# Patient Record
Sex: FEMALE | Race: WHITE | URBAN - NONMETROPOLITAN AREA
[De-identification: names, ages, dates, MRNs, and addresses within clinical notes are randomized per-mention and may not be internally consistent; named-entity substitution may affect disease eponyms.]

---

## 2017-01-24 ENCOUNTER — OPTICAL OFFICE (OUTPATIENT)
Dept: URBAN - NONMETROPOLITAN AREA CLINIC 4 | Facility: CLINIC | Age: 52
Setting detail: OPHTHALMOLOGY
End: 2017-01-24
Payer: COMMERCIAL

## 2017-01-24 DIAGNOSIS — H52.223: ICD-10-CM

## 2017-01-24 PROCEDURE — V2102 SINGL VISN SPHERE 7.12-20.00: HCPCS | Performed by: OPTOMETRIST

## 2017-01-24 PROCEDURE — V2020 VISION SVCS FRAMES PURCHASES: HCPCS | Performed by: OPTOMETRIST

## 2017-01-24 PROCEDURE — V2784 LENS POLYCARB OR EQUAL: HCPCS | Performed by: OPTOMETRIST

## 2018-01-02 ENCOUNTER — RX ONLY (RX ONLY)
Age: 53
End: 2018-01-02

## 2018-02-01 ENCOUNTER — OPTICAL OFFICE (OUTPATIENT)
Dept: URBAN - NONMETROPOLITAN AREA CLINIC 5 | Facility: CLINIC | Age: 53
Setting detail: OPHTHALMOLOGY
End: 2018-02-01
Payer: COMMERCIAL

## 2018-02-01 ENCOUNTER — DOCTOR'S OFFICE (OUTPATIENT)
Dept: URBAN - NONMETROPOLITAN AREA CLINIC 2 | Facility: CLINIC | Age: 53
Setting detail: OPHTHALMOLOGY
End: 2018-02-01
Payer: COMMERCIAL

## 2018-02-01 DIAGNOSIS — H52.4: ICD-10-CM

## 2018-02-01 DIAGNOSIS — Z01.00: ICD-10-CM

## 2018-02-01 DIAGNOSIS — H52.13: ICD-10-CM

## 2018-02-01 DIAGNOSIS — H52.223: ICD-10-CM

## 2018-02-01 PROCEDURE — V2784 LENS POLYCARB OR EQUAL: HCPCS | Performed by: OPTOMETRIST

## 2018-02-01 PROCEDURE — V2102 SINGL VISN SPHERE 7.12-20.00: HCPCS | Performed by: OPTOMETRIST

## 2018-02-01 PROCEDURE — 92015 DETERMINE REFRACTIVE STATE: CPT | Performed by: OPTOMETRIST

## 2018-02-01 PROCEDURE — 92310 CONTACT LENS FITTING OU: CPT | Performed by: OPTOMETRIST

## 2018-02-01 PROCEDURE — 92014 COMPRE OPH EXAM EST PT 1/>: CPT | Performed by: OPTOMETRIST

## 2018-02-01 PROCEDURE — V2020 VISION SVCS FRAMES PURCHASES: HCPCS | Performed by: OPTOMETRIST

## 2018-02-01 ASSESSMENT — REFRACTION_OUTSIDERX
OS_AXIS: 150
OS_VA2: 20/20-2
OD_SPHERE: -2.50
OD_AXIS: 105
OD_VA2: 20/20-2
OD_ADD: +2.25
OS_ADD: +2.25
OD_CYLINDER: -0.25
OS_VA1: 20/20-2
OD_VA1: 20/20-2
OS_SPHERE: -2.50
OS_VA3: 20/
OU_VA: 20/
OS_CYLINDER: -0.25
OD_VA3: 20/

## 2018-02-01 ASSESSMENT — REFRACTION_MANIFEST
OD_VA2: 20/
OD_VA3: 20/
OD_VA1: 20/
OS_VA1: 20/
OS_VA3: 20/
OS_VA2: 20/
OD_VA3: 20/
OD_VA2: 20/
OU_VA: 20/
OS_VA1: 20/
OU_VA: 20/
OD_VA1: 20/
OS_VA3: 20/
OS_VA2: 20/

## 2018-02-01 ASSESSMENT — CONFRONTATIONAL VISUAL FIELD TEST (CVF)
OD_FINDINGS: FULL
OS_FINDINGS: FULL

## 2018-04-04 ENCOUNTER — OPTICAL OFFICE (OUTPATIENT)
Dept: URBAN - NONMETROPOLITAN AREA CLINIC 4 | Facility: CLINIC | Age: 53
Setting detail: OPHTHALMOLOGY
End: 2018-04-04
Payer: COMMERCIAL

## 2018-04-04 DIAGNOSIS — H52.223: ICD-10-CM

## 2018-04-04 PROCEDURE — V2102 SINGL VISN SPHERE 7.12-20.00: HCPCS | Performed by: OPTOMETRIST

## 2018-04-04 PROCEDURE — V2020 VISION SVCS FRAMES PURCHASES: HCPCS | Performed by: OPTOMETRIST

## 2018-04-04 PROCEDURE — V2784 LENS POLYCARB OR EQUAL: HCPCS | Performed by: OPTOMETRIST

## 2018-04-30 ASSESSMENT — REFRACTION_AUTOREFRACTION
OS_AXIS: 140
OD_CYLINDER: -0.25
OD_SPHERE: -2.50
OS_SPHERE: -2.50
OS_CYLINDER: -0.50
OD_AXIS: 086

## 2018-04-30 ASSESSMENT — KERATOMETRY
OD_AXISANGLE_DEGREES: 120
OD_K1POWER_DIOPTERS: 44.50
OD_K2POWER_DIOPTERS: 45.50

## 2018-04-30 ASSESSMENT — REFRACTION_CURRENTRX
OD_OVR_VA: 20/
OD_SPHERE: -3.00
OS_OVR_VA: 20/
OS_VPRISM_DIRECTION: SV
OD_VPRISM_DIRECTION: SV
OS_SPHERE: -3.00
OS_OVR_VA: 20/
OD_CYLINDER: -0.25
OS_OVR_VA: 20/
OD_OVR_VA: 20/
OS_CYLINDER: -0.25
OS_AXIS: 152
OD_AXIS: 098
OD_OVR_VA: 20/

## 2018-04-30 ASSESSMENT — VISUAL ACUITY
OD_BCVA: 20/25-2
OS_BCVA: 20/25+1

## 2018-04-30 ASSESSMENT — SPHEQUIV_DERIVED
OD_SPHEQUIV: -2.625
OS_SPHEQUIV: -2.75

## 2018-04-30 ASSESSMENT — AXIALLENGTH_DERIVED: OD_AL: 24.0733

## 2019-02-28 ENCOUNTER — DOCTOR'S OFFICE (OUTPATIENT)
Dept: URBAN - NONMETROPOLITAN AREA CLINIC 2 | Facility: CLINIC | Age: 54
Setting detail: OPHTHALMOLOGY
End: 2019-02-28
Payer: COMMERCIAL

## 2019-02-28 ENCOUNTER — OPTICAL OFFICE (OUTPATIENT)
Dept: URBAN - NONMETROPOLITAN AREA CLINIC 5 | Facility: CLINIC | Age: 54
Setting detail: OPHTHALMOLOGY
End: 2019-02-28
Payer: COMMERCIAL

## 2019-02-28 DIAGNOSIS — H52.13: ICD-10-CM

## 2019-02-28 DIAGNOSIS — H52.4: ICD-10-CM

## 2019-02-28 DIAGNOSIS — Z01.00: ICD-10-CM

## 2019-02-28 DIAGNOSIS — H52.223: ICD-10-CM

## 2019-02-28 PROCEDURE — 92310 CONTACT LENS FITTING OU: CPT | Performed by: OPTOMETRIST

## 2019-02-28 PROCEDURE — V2102 SINGL VISN SPHERE 7.12-20.00: HCPCS | Performed by: OPTOMETRIST

## 2019-02-28 PROCEDURE — V2103 SPHEROCYLINDR 4.00D/12-2.00D: HCPCS | Performed by: OPTOMETRIST

## 2019-02-28 PROCEDURE — V2020 VISION SVCS FRAMES PURCHASES: HCPCS | Performed by: OPTOMETRIST

## 2019-02-28 PROCEDURE — V2784 LENS POLYCARB OR EQUAL: HCPCS | Performed by: OPTOMETRIST

## 2019-02-28 PROCEDURE — 92014 COMPRE OPH EXAM EST PT 1/>: CPT | Performed by: OPTOMETRIST

## 2019-02-28 PROCEDURE — 92015 DETERMINE REFRACTIVE STATE: CPT | Performed by: OPTOMETRIST

## 2019-02-28 ASSESSMENT — REFRACTION_MANIFEST
OU_VA: 20/
OS_VA1: 20/20-2
OD_VA2: 20/
OD_VA1: 20/20-2
OD_CYLINDER: -0.25
OS_AXIS: 105
OD_VA3: 20/
OS_VA2: 20/20-2
OU_VA: 20/
OD_VA2: 20/20-2
OD_SPHERE: -2.25
OS_VA3: 20/
OD_VA3: 20/
OS_VA1: 20/
OS_VA3: 20/
OD_ADD: +2.25
OS_ADD: +2.25
OS_SPHERE: -2.25
OD_AXIS: 115
OD_VA1: 20/
OS_CYLINDER: -0.25
OS_VA2: 20/

## 2019-02-28 ASSESSMENT — CONFRONTATIONAL VISUAL FIELD TEST (CVF)
OS_FINDINGS: FULL
OD_FINDINGS: FULL

## 2019-02-28 ASSESSMENT — SPHEQUIV_DERIVED
OD_SPHEQUIV: -2.375
OS_SPHEQUIV: -2.375

## 2019-03-01 ASSESSMENT — REFRACTION_CURRENTRX
OS_OVR_VA: 20/
OD_CYLINDER: -0.25
OD_AXIS: 098
OD_VPRISM_DIRECTION: SV
OS_OVR_VA: 20/
OS_VPRISM_DIRECTION: SV
OS_CYLINDER: -0.25
OD_OVR_VA: 20/
OS_OVR_VA: 20/
OS_AXIS: 152
OD_OVR_VA: 20/
OS_SPHERE: -3.00
OD_OVR_VA: 20/
OD_SPHERE: -3.00

## 2019-03-01 ASSESSMENT — REFRACTION_AUTOREFRACTION
OS_SPHERE: -2.25
OD_SPHERE: -2.25
OS_AXIS: 108
OD_AXIS: 116
OD_CYLINDER: -1.00
OS_CYLINDER: -0.25

## 2019-03-01 ASSESSMENT — SPHEQUIV_DERIVED
OS_SPHEQUIV: -2.375
OD_SPHEQUIV: -2.75

## 2019-03-01 ASSESSMENT — KERATOMETRY
OD_AXISANGLE_DEGREES: 120
OD_K2POWER_DIOPTERS: 45.50
OD_K1POWER_DIOPTERS: 44.50

## 2019-03-01 ASSESSMENT — AXIALLENGTH_DERIVED: OD_AL: 24.1241

## 2019-03-01 ASSESSMENT — VISUAL ACUITY
OD_BCVA: 20/25-1
OS_BCVA: 20/25+1

## 2019-03-07 ENCOUNTER — OPTICAL OFFICE (OUTPATIENT)
Dept: URBAN - NONMETROPOLITAN AREA CLINIC 4 | Facility: CLINIC | Age: 54
Setting detail: OPHTHALMOLOGY
End: 2019-03-07
Payer: COMMERCIAL

## 2019-03-07 DIAGNOSIS — H52.223: ICD-10-CM

## 2019-03-07 PROCEDURE — V2103 SPHEROCYLINDR 4.00D/12-2.00D: HCPCS | Performed by: OPTOMETRIST

## 2019-03-07 PROCEDURE — V2784 LENS POLYCARB OR EQUAL: HCPCS | Performed by: OPTOMETRIST

## 2019-03-07 PROCEDURE — V2102 SINGL VISN SPHERE 7.12-20.00: HCPCS | Performed by: OPTOMETRIST

## 2019-03-07 PROCEDURE — V2020 VISION SVCS FRAMES PURCHASES: HCPCS | Performed by: OPTOMETRIST

## 2020-06-16 ENCOUNTER — OPTICAL OFFICE (OUTPATIENT)
Dept: URBAN - NONMETROPOLITAN AREA CLINIC 5 | Facility: CLINIC | Age: 55
Setting detail: OPHTHALMOLOGY
End: 2020-06-16
Payer: COMMERCIAL

## 2020-06-16 ENCOUNTER — DOCTOR'S OFFICE (OUTPATIENT)
Dept: URBAN - NONMETROPOLITAN AREA CLINIC 2 | Facility: CLINIC | Age: 55
Setting detail: OPHTHALMOLOGY
End: 2020-06-16
Payer: COMMERCIAL

## 2020-06-16 VITALS — HEIGHT: 55 IN

## 2020-06-16 DIAGNOSIS — H52.223: ICD-10-CM

## 2020-06-16 DIAGNOSIS — H52.13: ICD-10-CM

## 2020-06-16 DIAGNOSIS — H52.4: ICD-10-CM

## 2020-06-16 PROCEDURE — V2102 SINGL VISN SPHERE 7.12-20.00: HCPCS | Performed by: OPTOMETRIST

## 2020-06-16 PROCEDURE — 92014 COMPRE OPH EXAM EST PT 1/>: CPT | Performed by: OPTOMETRIST

## 2020-06-16 PROCEDURE — 92015 DETERMINE REFRACTIVE STATE: CPT | Performed by: OPTOMETRIST

## 2020-06-16 PROCEDURE — V2784 LENS POLYCARB OR EQUAL: HCPCS | Performed by: OPTOMETRIST

## 2020-06-16 PROCEDURE — V2020 VISION SVCS FRAMES PURCHASES: HCPCS | Performed by: OPTOMETRIST

## 2020-06-16 PROCEDURE — V2103 SPHEROCYLINDR 4.00D/12-2.00D: HCPCS | Performed by: OPTOMETRIST

## 2020-06-16 PROCEDURE — 92310 CONTACT LENS FITTING OU: CPT | Performed by: OPTOMETRIST

## 2020-06-16 ASSESSMENT — CONFRONTATIONAL VISUAL FIELD TEST (CVF)
OS_FINDINGS: FULL
OD_FINDINGS: FULL

## 2020-06-22 ASSESSMENT — AXIALLENGTH_DERIVED
OD_AL: 24.0198
OD_AL: 24.1212
OS_AL: 23.7376
OS_AL: 23.7376

## 2020-06-22 ASSESSMENT — VISUAL ACUITY
OD_BCVA: 20/20-1
OS_BCVA: 20/20-2

## 2020-06-22 ASSESSMENT — REFRACTION_MANIFEST
OD_SPHERE: -2.25
OS_CYLINDER: -0.25
OS_VA2: 20/20-2
OD_ADD: +2.25
OD_VA1: 20/20-2
OS_AXIS: 105
OD_CYLINDER: -0.25
OS_VA1: 20/20-2
OS_ADD: +2.25
OU_VA: 20/20
OD_AXIS: 115
OD_VA2: 20/20-2
OS_SPHERE: -2.25

## 2020-06-22 ASSESSMENT — SPHEQUIV_DERIVED
OD_SPHEQUIV: -2.625
OD_SPHEQUIV: -2.375
OS_SPHEQUIV: -2.375
OS_SPHEQUIV: -2.375

## 2020-06-22 ASSESSMENT — REFRACTION_AUTOREFRACTION
OD_AXIS: 094
OS_CYLINDER: -0.75
OS_SPHERE: -2.00
OD_SPHERE: -2.50
OD_CYLINDER: -0.25
OS_AXIS: 142

## 2020-06-22 ASSESSMENT — KERATOMETRY
OD_K1POWER_DIOPTERS: 44.50
OS_AXISANGLE_DEGREES: 002
OD_K2POWER_DIOPTERS: 45.25
OD_AXISANGLE_DEGREES: 086
OS_K1POWER_DIOPTERS: 38.25
OS_K2POWER_DIOPTERS: 53.00

## 2020-06-22 ASSESSMENT — REFRACTION_CURRENTRX
OS_OVR_VA: 20/
OS_AXIS: 109
OS_SPHERE: -2.25
OD_VPRISM_DIRECTION: SV
OS_CYLINDER: -0.25
OD_OVR_VA: 20/
OD_CYLINDER: -0.25
OS_VPRISM_DIRECTION: SV
OD_SPHERE: -2.25
OD_AXIS: 108

## 2020-07-02 ENCOUNTER — HOSPITAL ENCOUNTER (EMERGENCY)
Facility: HOSPITAL | Age: 55
Discharge: HOME | End: 2020-07-02
Attending: EMERGENCY MEDICINE | Admitting: EMERGENCY MEDICINE
Payer: COMMERCIAL

## 2020-07-02 ENCOUNTER — APPOINTMENT (EMERGENCY)
Dept: ULTRASOUND IMAGING | Facility: HOSPITAL | Age: 55
End: 2020-07-02
Payer: COMMERCIAL

## 2020-07-02 ENCOUNTER — APPOINTMENT (EMERGENCY)
Dept: CT IMAGING | Facility: HOSPITAL | Age: 55
End: 2020-07-02
Payer: COMMERCIAL

## 2020-07-02 VITALS
DIASTOLIC BLOOD PRESSURE: 75 MMHG | TEMPERATURE: 98.4 F | OXYGEN SATURATION: 98 % | HEART RATE: 65 BPM | BODY MASS INDEX: 28.44 KG/M2 | RESPIRATION RATE: 18 BRPM | HEIGHT: 67 IN | WEIGHT: 181.22 LBS | SYSTOLIC BLOOD PRESSURE: 148 MMHG

## 2020-07-02 DIAGNOSIS — R10.9 RIGHT FLANK PAIN: Primary | ICD-10-CM

## 2020-07-02 LAB
ALBUMIN SERPL BCP-MCNC: 4.2 G/DL (ref 3.5–5)
ALP SERPL-CCNC: 103 U/L (ref 46–116)
ALT SERPL W P-5'-P-CCNC: 26 U/L (ref 12–78)
ANION GAP SERPL CALCULATED.3IONS-SCNC: 10 MMOL/L (ref 4–13)
AST SERPL W P-5'-P-CCNC: 12 U/L (ref 5–45)
BACTERIA UR QL AUTO: ABNORMAL /HPF
BASOPHILS # BLD AUTO: 0.06 THOUSANDS/ΜL (ref 0–0.1)
BASOPHILS NFR BLD AUTO: 1 % (ref 0–1)
BILIRUB SERPL-MCNC: 0.39 MG/DL (ref 0.2–1)
BILIRUB UR QL STRIP: ABNORMAL
BUN SERPL-MCNC: 10 MG/DL (ref 5–25)
CALCIUM SERPL-MCNC: 9.3 MG/DL (ref 8.3–10.1)
CHLORIDE SERPL-SCNC: 102 MMOL/L (ref 100–108)
CLARITY UR: CLEAR
CO2 SERPL-SCNC: 28 MMOL/L (ref 21–32)
COLOR UR: YELLOW
CREAT SERPL-MCNC: 1.11 MG/DL (ref 0.6–1.3)
EOSINOPHIL # BLD AUTO: 0.1 THOUSAND/ΜL (ref 0–0.61)
EOSINOPHIL NFR BLD AUTO: 1 % (ref 0–6)
ERYTHROCYTE [DISTWIDTH] IN BLOOD BY AUTOMATED COUNT: 13.4 % (ref 11.6–15.1)
GFR SERPL CREATININE-BSD FRML MDRD: 56 ML/MIN/1.73SQ M
GLUCOSE SERPL-MCNC: 99 MG/DL (ref 65–140)
GLUCOSE UR STRIP-MCNC: NEGATIVE MG/DL
HCT VFR BLD AUTO: 43.2 % (ref 34.8–46.1)
HGB BLD-MCNC: 13.9 G/DL (ref 11.5–15.4)
HGB UR QL STRIP.AUTO: ABNORMAL
HYALINE CASTS #/AREA URNS LPF: ABNORMAL /LPF
IMM GRANULOCYTES # BLD AUTO: 0.04 THOUSAND/UL (ref 0–0.2)
IMM GRANULOCYTES NFR BLD AUTO: 0 % (ref 0–2)
KETONES UR STRIP-MCNC: NEGATIVE MG/DL
LEUKOCYTE ESTERASE UR QL STRIP: NEGATIVE
LIPASE SERPL-CCNC: 77 U/L (ref 73–393)
LYMPHOCYTES # BLD AUTO: 2.9 THOUSANDS/ΜL (ref 0.6–4.47)
LYMPHOCYTES NFR BLD AUTO: 29 % (ref 14–44)
MCH RBC QN AUTO: 26.9 PG (ref 26.8–34.3)
MCHC RBC AUTO-ENTMCNC: 32.2 G/DL (ref 31.4–37.4)
MCV RBC AUTO: 84 FL (ref 82–98)
MONOCYTES # BLD AUTO: 0.64 THOUSAND/ΜL (ref 0.17–1.22)
MONOCYTES NFR BLD AUTO: 6 % (ref 4–12)
NEUTROPHILS # BLD AUTO: 6.36 THOUSANDS/ΜL (ref 1.85–7.62)
NEUTS SEG NFR BLD AUTO: 63 % (ref 43–75)
NITRITE UR QL STRIP: NEGATIVE
NON-SQ EPI CELLS URNS QL MICRO: ABNORMAL /HPF
NRBC BLD AUTO-RTO: 0 /100 WBCS
PH UR STRIP.AUTO: 6.5 [PH]
PLATELET # BLD AUTO: 301 THOUSANDS/UL (ref 149–390)
PMV BLD AUTO: 11.5 FL (ref 8.9–12.7)
POTASSIUM SERPL-SCNC: 3.7 MMOL/L (ref 3.5–5.3)
PROT SERPL-MCNC: 8.5 G/DL (ref 6.4–8.2)
PROT UR STRIP-MCNC: NEGATIVE MG/DL
RBC # BLD AUTO: 5.17 MILLION/UL (ref 3.81–5.12)
RBC #/AREA URNS AUTO: ABNORMAL /HPF
SODIUM SERPL-SCNC: 140 MMOL/L (ref 136–145)
SP GR UR STRIP.AUTO: 1.01 (ref 1–1.03)
UROBILINOGEN UR QL STRIP.AUTO: 0.2 E.U./DL
WBC # BLD AUTO: 10.1 THOUSAND/UL (ref 4.31–10.16)
WBC #/AREA URNS AUTO: ABNORMAL /HPF

## 2020-07-02 PROCEDURE — 76830 TRANSVAGINAL US NON-OB: CPT

## 2020-07-02 PROCEDURE — 76856 US EXAM PELVIC COMPLETE: CPT

## 2020-07-02 PROCEDURE — 81001 URINALYSIS AUTO W/SCOPE: CPT | Performed by: PHYSICIAN ASSISTANT

## 2020-07-02 PROCEDURE — 99284 EMERGENCY DEPT VISIT MOD MDM: CPT

## 2020-07-02 PROCEDURE — 96374 THER/PROPH/DIAG INJ IV PUSH: CPT

## 2020-07-02 PROCEDURE — 83690 ASSAY OF LIPASE: CPT | Performed by: PHYSICIAN ASSISTANT

## 2020-07-02 PROCEDURE — 36415 COLL VENOUS BLD VENIPUNCTURE: CPT | Performed by: PHYSICIAN ASSISTANT

## 2020-07-02 PROCEDURE — 74177 CT ABD & PELVIS W/CONTRAST: CPT

## 2020-07-02 PROCEDURE — 85025 COMPLETE CBC W/AUTO DIFF WBC: CPT | Performed by: PHYSICIAN ASSISTANT

## 2020-07-02 PROCEDURE — 99285 EMERGENCY DEPT VISIT HI MDM: CPT | Performed by: PHYSICIAN ASSISTANT

## 2020-07-02 PROCEDURE — 80053 COMPREHEN METABOLIC PANEL: CPT | Performed by: PHYSICIAN ASSISTANT

## 2020-07-02 PROCEDURE — 96361 HYDRATE IV INFUSION ADD-ON: CPT

## 2020-07-02 RX ORDER — ACETAMINOPHEN 325 MG/1
650 TABLET ORAL ONCE
Status: COMPLETED | OUTPATIENT
Start: 2020-07-02 | End: 2020-07-02

## 2020-07-02 RX ORDER — PHENTERMINE HYDROCHLORIDE 37.5 MG/1
37.5 TABLET ORAL DAILY
COMMUNITY
Start: 2020-01-15 | End: 2021-03-08

## 2020-07-02 RX ORDER — ALBUTEROL SULFATE 90 UG/1
AEROSOL, METERED RESPIRATORY (INHALATION) AS NEEDED
COMMUNITY
Start: 2017-09-08

## 2020-07-02 RX ORDER — ALBUTEROL SULFATE 2.5 MG/3ML
2.5 SOLUTION RESPIRATORY (INHALATION) AS NEEDED
COMMUNITY
Start: 2020-05-21

## 2020-07-02 RX ORDER — EPINEPHRINE 0.3 MG/.3ML
INJECTION SUBCUTANEOUS
COMMUNITY
Start: 2019-08-14

## 2020-07-02 RX ORDER — FLUTICASONE PROPIONATE 50 MCG
SPRAY, SUSPENSION (ML) NASAL
COMMUNITY
Start: 2017-09-08

## 2020-07-02 RX ORDER — MONTELUKAST SODIUM 10 MG/1
10 TABLET ORAL
COMMUNITY
Start: 2018-02-14

## 2020-07-02 RX ORDER — CETIRIZINE HYDROCHLORIDE 10 MG/1
10 TABLET ORAL DAILY
COMMUNITY
Start: 2017-09-08

## 2020-07-02 RX ORDER — MULTIVIT-MIN/IRON FUM/FOLIC AC 7.5 MG-4
1 TABLET ORAL DAILY
COMMUNITY

## 2020-07-02 RX ORDER — FENTANYL CITRATE 50 UG/ML
50 INJECTION, SOLUTION INTRAMUSCULAR; INTRAVENOUS ONCE
Status: COMPLETED | OUTPATIENT
Start: 2020-07-02 | End: 2020-07-02

## 2020-07-02 RX ORDER — TOPIRAMATE 100 MG/1
100 TABLET, FILM COATED ORAL EVERY 12 HOURS SCHEDULED
COMMUNITY
Start: 2020-01-15 | End: 2021-05-28

## 2020-07-02 RX ORDER — AMINOCAPROIC ACID 500 MG/1
500 TABLET ORAL AS NEEDED
COMMUNITY
Start: 2019-02-13

## 2020-07-02 RX ORDER — ACETAMINOPHEN AND CODEINE PHOSPHATE 300; 30 MG/1; MG/1
TABLET ORAL AS NEEDED
COMMUNITY
Start: 2018-08-17 | End: 2021-03-08

## 2020-07-02 RX ORDER — KETOROLAC TROMETHAMINE 30 MG/ML
15 INJECTION, SOLUTION INTRAMUSCULAR; INTRAVENOUS ONCE
Status: DISCONTINUED | OUTPATIENT
Start: 2020-07-02 | End: 2020-07-02

## 2020-07-02 RX ORDER — NITROFURANTOIN 25; 75 MG/1; MG/1
100 CAPSULE ORAL 2 TIMES DAILY
COMMUNITY
Start: 2020-06-29 | End: 2020-07-04

## 2020-07-02 RX ORDER — VENLAFAXINE HYDROCHLORIDE 37.5 MG/1
37.5 CAPSULE, EXTENDED RELEASE ORAL DAILY
COMMUNITY
Start: 2020-06-25 | End: 2021-03-08

## 2020-07-02 RX ORDER — BUPROPION HYDROCHLORIDE 150 MG/1
150 TABLET, EXTENDED RELEASE ORAL 2 TIMES DAILY
COMMUNITY
Start: 2019-12-05

## 2020-07-02 RX ADMIN — FENTANYL CITRATE 50 MCG: 50 INJECTION INTRAMUSCULAR; INTRAVENOUS at 12:20

## 2020-07-02 RX ADMIN — ACETAMINOPHEN 650 MG: 325 TABLET ORAL at 12:04

## 2020-07-02 RX ADMIN — SODIUM CHLORIDE 500 ML: 0.9 INJECTION, SOLUTION INTRAVENOUS at 12:06

## 2020-07-02 RX ADMIN — IOHEXOL 100 ML: 350 INJECTION, SOLUTION INTRAVENOUS at 12:40

## 2020-07-02 NOTE — ED ATTENDING ATTESTATION
7/2/2020  Bree Cisneros DO, saw and evaluated the patient  I have discussed the patient with the resident/non-physician practitioner and agree with the resident's/non-physician practitioner's findings, Plan of Care, and MDM as documented in the resident's/non-physician practitioner's note, except where noted  All available labs and Radiology studies were reviewed  I was present for key portions of any procedure(s) performed by the resident/non-physician practitioner and I was immediately available to provide assistance  At this point I agree with the current assessment done in the Emergency Department  I have conducted an independent evaluation of this patient a history and physical is as follows:    ED Course  Seen evaluated with the physician's assistant  Patient with right lower quadrant abdominal pain right-sided flank pain since about Monday  Saw her gynecologist and was diagnosed with a urinary tract infection and started on antibiotics  She reports that the pain has persisted  She does report a history of having previous renal lithiasis and urinary tract infections in the past   No nausea vomiting    Patient has a history of hysterectomy but still has her ovaries  Evaluation the patient's abdomen found her to be soft with some fairly significant right lower quadrant tenderness to palpation that seems to extend down below the pelvic brim on the right  There is some guarding  Bowel sounds are present  Evaluation the emergency room is as documented by the physician's assistant  Patient's pain will be managed and routine imaging studies and laboratory values will be obtained  Patient will be reassessed  Disposition is pending results of the studies      Concern would be for an infectious process as such as a urinary tract infection not treated with an antibiotic that covers the pathogen, pollen nephritis, renal lithiasis, colitis, bowel obstruction, mesenteric ischemia, ovarian torsion  This is not a complete list     Please see physician assistant's note for further information regarding this patient's care and disposition      Diagnosis  Acute abdominal pain    Critical Care Time  Procedures

## 2020-07-02 NOTE — DISCHARGE INSTRUCTIONS
Please continue to take your Macrobid as prescribed by your gynecologist   Please follow-up with your gynecologist early next week for continued care  Please return to the ED with new or worsening symptoms

## 2020-07-02 NOTE — ED PROVIDER NOTES
History  Chief Complaint   Patient presents with    Back Pain     pt c/o increased right flank pain radiates to lower abdomen w/difficulty urinating for past 4 days  pt seen by gyn at that time and given abx per uti  denies travel/sob/fevers/n/v/d     63year old female presents to the ED for evaluation of right sided flank pain with radiation into the lower abdomen  PMH: UTI, Kidney stones, Von Willebrand disease, heart murmur  PSH: Appendectomy, hysterectomy  She notes pain is accompanied with frequent urge to void with little output feeling like she was unable to empty her bladder  She denies hematuria, dysuria  Symptom onset Monday 6/29/20  She was seen by her gynecologist on Monday 06/29/2020 was started Macrobid  Patient reports she has taking prescription without any improvement symptoms  Patient denies fevers, chills, nausea vomiting, diarrhea  Pt denies abnormal vaginal discharge or pelvic pain  Prior to Admission Medications   Prescriptions Last Dose Informant Patient Reported? Taking? EPINEPHrine (EPIPEN) 0 3 mg/0 3 mL SOAJ   Yes Yes   Sig: For a severe reaction: Inject in outer thigh following instructions on package and go to the Emergency room     Multiple Vitamins-Minerals (MULTIVITAMIN WITH MINERALS) tablet  Self Yes Yes   Sig: Take 1 tablet by mouth daily   OMEPRAZOLE PO   Yes Yes   Sig: Take 20 mg by mouth daily   VITAMIN D, CHOLECALCIFEROL, PO   Yes Yes   Sig: daily   acetaminophen-codeine (TYLENOL/CODEINE #3) 300-30 MG per tablet   Yes Yes   Sig: Take by mouth as needed   albuterol (2 5 mg/3 mL) 0 083 % nebulizer solution   Yes Yes   Sig: Inhale 2 5 mg as needed   albuterol (PROVENTIL HFA,VENTOLIN HFA) 90 mcg/act inhaler   Yes Yes   Sig: as needed   aminocaproic acid (AMICAR) 500 mg tablet   Yes Yes   Sig: Take 500 mg by mouth as needed   buPROPion (WELLBUTRIN SR) 150 mg 12 hr tablet   Yes Yes   Sig: Take 150 mg by mouth 2 (two) times a day   cetirizine (ZyrTEC) 10 mg tablet Yes Yes   Sig: Take 10 mg by mouth daily   fluticasone (FLONASE) 50 mcg/act nasal spray   Yes Yes   Sig: daily at bedtime   montelukast (SINGULAIR) 10 mg tablet   Yes Yes   Sig: Take 10 mg by mouth daily at bedtime   nitrofurantoin (Macrobid) 100 mg capsule   Yes Yes   Sig: Take 100 mg by mouth 2 (two) times a day   phentermine (ADIPEX-P) 37 5 MG tablet   Yes Yes   Sig: Take 37 5 mg by mouth daily   topiramate (TOPAMAX) 25 mg tablet   Yes Yes   Sig: Take 100 mg by mouth daily at bedtime   venlafaxine (EFFEXOR-XR) 37 5 mg 24 hr capsule   Yes Yes   Sig: Take 37 5 mg by mouth daily      Facility-Administered Medications: None       Past Medical History:   Diagnosis Date    Heart murmur     Kidney stones     Von Willebrand disease ([facility] [address] )        Past Surgical History:   Procedure Laterality Date    APPENDECTOMY      BREAST LUMPECTOMY Left     CARPAL TUNNEL RELEASE Bilateral     CHOLECYSTECTOMY      HYSTERECTOMY      KNEE SURGERY Left     PARTIAL HYSTERECTOMY      ROTATOR CUFF REPAIR Left     TONSILLECTOMY         History reviewed  No pertinent family history  I have reviewed and agree with the history as documented  E-Cigarette/Vaping    E-Cigarette Use Never User      E-Cigarette/Vaping Substances     Social History     Tobacco Use    Smoking status: Former Smoker     Types: Cigarettes    Smokeless tobacco: Never Used   Substance Use Topics    Alcohol use: Not Currently    Drug use: Never       Review of Systems   Constitutional: Negative for appetite change, chills, diaphoresis, fatigue and fever  HENT: Negative  Respiratory: Negative for cough, choking, chest tightness, shortness of breath and stridor  Cardiovascular: Negative for chest pain, palpitations and leg swelling  Gastrointestinal: Positive for abdominal pain  Negative for abdominal distention, anal bleeding, blood in stool, constipation, diarrhea, nausea, rectal pain and vomiting     Genitourinary: Positive for difficulty urinating, flank pain (right) and frequency  Negative for decreased urine volume, dysuria, enuresis, genital sores, hematuria, pelvic pain, urgency, vaginal bleeding, vaginal discharge and vaginal pain  Musculoskeletal: Positive for back pain  Negative for arthralgias, gait problem, joint swelling, myalgias, neck pain and neck stiffness  Skin: Negative  Neurological: Negative  Psychiatric/Behavioral: Negative  All other systems reviewed and are negative  Physical Exam  Physical Exam   Constitutional: She is oriented to person, place, and time  She appears well-developed and well-nourished  No distress  HENT:   Head: Normocephalic and atraumatic  Eyes: Pupils are equal, round, and reactive to light  Conjunctivae and EOM are normal    Neck: Normal range of motion  Cardiovascular: Normal rate and regular rhythm  Murmur heard  Pulmonary/Chest: Effort normal and breath sounds normal  No stridor  No respiratory distress  She has no wheezes  She has no rales  She exhibits no tenderness  Abdominal: Soft  Normal appearance  She exhibits no distension  There is tenderness in the right lower quadrant  There is CVA tenderness (right)  There is no rigidity, no rebound, no guarding, no tenderness at McBurney's point and negative Barber's sign  Musculoskeletal: Normal range of motion  Neurological: She is alert and oriented to person, place, and time  She displays normal reflexes  No sensory deficit  Coordination normal    Skin: Skin is warm and dry  Capillary refill takes less than 2 seconds  She is not diaphoretic  No erythema  Psychiatric: She has a normal mood and affect  Her behavior is normal  Judgment and thought content normal    Nursing note and vitals reviewed        Vital Signs  ED Triage Vitals [07/02/20 1143]   Temperature Pulse Respirations Blood Pressure SpO2   98 4 °F (36 9 °C) 75 18 134/83 99 %      Temp Source Heart Rate Source Patient Position - Orthostatic VS BP Location FiO2 (%)   Temporal Monitor Lying Right arm --      Pain Score       8           Vitals:    07/02/20 1240 07/02/20 1300 07/02/20 1415 07/02/20 1430   BP: 158/72 142/76 137/74 148/75   Pulse: 63 62 62 65   Patient Position - Orthostatic VS: Lying Lying Lying Lying         Visual Acuity  Visual Acuity      Most Recent Value   L Pupil Size (mm)  3   R Pupil Size (mm)  3          ED Medications  Medications   sodium chloride 0 9 % bolus 500 mL (0 mL Intravenous Stopped 7/2/20 1306)   acetaminophen (TYLENOL) tablet 650 mg (650 mg Oral Given 7/2/20 1204)   fentanyl citrate (PF) 100 MCG/2ML 50 mcg (50 mcg Intravenous Given 7/2/20 1220)   iohexol (OMNIPAQUE) 350 MG/ML injection (MULTI-DOSE) 100 mL (100 mL Intravenous Given 7/2/20 1240)       Diagnostic Studies  Results Reviewed     Procedure Component Value Units Date/Time    Comprehensive metabolic panel [[id number]]  (Abnormal) Collected:  07/02/20 1159    Lab Status:  Final result Specimen:  Blood from Arm, Left Updated:  07/02/20 1219     Sodium 140 mmol/L      Potassium 3 7 mmol/L      Chloride 102 mmol/L      CO2 28 mmol/L      ANION GAP 10 mmol/L      BUN 10 mg/dL      Creatinine 1 11 mg/dL      Glucose 99 mg/dL      Calcium 9 3 mg/dL      AST 12 U/L      ALT 26 U/L      Alkaline Phosphatase 103 U/L      Total Protein 8 5 g/dL      Albumin 4 2 g/dL      Total Bilirubin 0 39 mg/dL      eGFR 56 ml/min/1 73sq m     Narrative:       Radha guidelines for Chronic Kidney Disease (CKD):     Stage 1 with normal or high GFR (GFR > 90 mL/min/1 73 square meters)    Stage 2 Mild CKD (GFR = 60-89 mL/min/1 73 square meters)    Stage 3A Moderate CKD (GFR = 45-59 mL/min/1 73 square meters)    Stage 3B Moderate CKD (GFR = 30-44 mL/min/1 73 square meters)    Stage 4 Severe CKD (GFR = 15-29 mL/min/1 73 square meters)    Stage 5 End Stage CKD (GFR <15 mL/min/1 73 square meters)  Note: GFR calculation is accurate only with a steady state creatinine    Lipase [[id number]]  (Normal) Collected:  07/02/20 1159    Lab Status:  Final result Specimen:  Blood from Arm, Left Updated:  07/02/20 1219     Lipase 77 u/L     Urine Microscopic [[id number]]  (Abnormal) Collected:  07/02/20 1200    Lab Status:  Final result Specimen:  Urine, Clean Catch Updated:  07/02/20 1216     RBC, UA None Seen /hpf      WBC, UA 1-2 /hpf      Epithelial Cells Moderate /hpf      Bacteria, UA None Seen /hpf      Hyaline Casts, UA 0-1 /lpf     CBC and differential [[id number]]  (Abnormal) Collected:  07/02/20 1159    Lab Status:  Final result Specimen:  Blood from Arm, Left Updated:  07/02/20 1205     WBC 10 10 Thousand/uL      RBC 5 17 Million/uL      Hemoglobin 13 9 g/dL      Hematocrit 43 2 %      MCV 84 fL      MCH 26 9 pg      MCHC 32 2 g/dL      RDW 13 4 %      MPV 11 5 fL      Platelets 380 Thousands/uL      nRBC 0 /100 WBCs      Neutrophils Relative 63 %      Immat GRANS % 0 %      Lymphocytes Relative 29 %      Monocytes Relative 6 %      Eosinophils Relative 1 %      Basophils Relative 1 %      Neutrophils Absolute 6 36 Thousands/µL      Immature Grans Absolute 0 04 Thousand/uL      Lymphocytes Absolute 2 90 Thousands/µL      Monocytes Absolute 0 64 Thousand/µL      Eosinophils Absolute 0 10 Thousand/µL      Basophils Absolute 0 06 Thousands/µL     UA w Reflex to Microscopic w Reflex to Culture [[id number]]  (Abnormal) Collected:  07/02/20 1200    Lab Status:  Final result Specimen:  Urine, Clean Catch Updated:  07/02/20 1205     Color, UA Yellow     Clarity, UA Clear     Specific Gravity, UA 1 015     pH, UA 6 5     Leukocytes, UA Negative     Nitrite, UA Negative     Protein, UA Negative mg/dl      Glucose, UA Negative mg/dl      Ketones, UA Negative mg/dl      Urobilinogen, UA 0 2 E U /dl      Bilirubin, UA Small     Blood, UA Small                 US pelvis complete w transvaginal   Final Result by Alesia Mcbride MD (07/02 1410)       The ovaries were not identified        Some debris is noted within the bladder and should be correlated with urinalysis  Workstation performed: [id number]         CT abdomen pelvis with contrast   Final Result by Donovan Langston MD (07/02 1258)      No acute abnormality            Workstation performed: [id number]                    Procedures  Procedures         ED Course  ED Course as of Jul 02 1548   Thu Jul 02, 2020   1218 UA shows trace blood   WBC normal      1221 CMP relatively unremarkable   Lipase normal       1302 CT abd: No acute abnormality  Calcification noted within the distal right renal artery  No renal system stone  No hydronephrosis  1314 Patient has history of hysterectomy however still has ovaries  Discussed ultrasound with patient to rule ovarian cyst versus ovarian torsion which is less likely due to length of symptoms  Patient agrees with treatment planning  She is currently resting comfortably complaints  1415 Transvaginal US: The ovaries were not identified  Some debris is noted within the bladder and should be correlated with urinalysis  Discussed findings directly with Dr Santos [place]  Dr Baylee Damon notes its common to not see the ovaries in older females especially with large amount of bowel gas  Notes before unlikely to be torsion as you would expect much larger ovary if the ovary was torsed  1435 I discussed results, findings symptomatic treatment and symptoms that require prompt return to the ED for further evaluation with patient who verbalized understanding  Per urine culture and sensitivities in care everywhere Macrobid is sensitive  Patient was educated to continue her prescription of Macrobid as prescribed by her gynecologist   She was educated on the importance of follow-up with her gynecologist and will see them early next week  Patient had significant improvement of pain while in the emergency department and is resting comfortably without complaints at this time    Patient agreed to this treatment plan, she remained well ED and was discharged home  US AUDIT      Most Recent Value   Initial Alcohol Screen: US AUDIT-C    1  How often do you have a drink containing alcohol?  0 Filed at: 07/02/2020 1219   2  How many drinks containing alcohol do you have on a typical day you are drinking? 0 Filed at: 07/02/2020 1219   3b  FEMALE Any Age, or MALE 65+: How often do you have 4 or more drinks on one occassion? 0 Filed at: 07/02/2020 1219   Audit-C Score  0 Filed at: 07/02/2020 1219                  CRISTINO/DAST-10      Most Recent Value   How many times in the past year have you    Used an illegal drug or used a prescription medication for non-medical reasons? Never Filed at: 07/02/2020 1219                                MDM  Number of Diagnoses or Management Options  Right flank pain: new and requires workup  Diagnosis management comments: ddx includes but not limited to:  UTI, pyelonephritis, renal stone, ovarian cyst, ovarian torsion  Vitals within normal limits  Medical record reviewed  Patient has had urinary frequency with small output, right flank pain since Monday  Started on [address] for UTI by gynecologist   Per sensitivity in care everywhere, [address] was sensitive to urine culture  No fevers or chills  RLQ abdominal tenderness and right CVA tenderness on exam   Laboratory findings relatively unremarkable  Urine had trace blood however no bacteria, leukocytes, nitrites  CT abdomen had no acute findings  No evidence of kidney stone, hydronephrosis, pyelonephritis  Ovaries were not identified on a pelvic ultrasound therefore torsion unlikely  Patient had improvement of symptoms with fentanyl  She was educated to continue her Macrobid in follow-up Gynecology early next week  Patient was educated on symptoms that require prompt return to the ED for further evaluation verbalized understanding    Patient agreed to treatment plan, remained well ED and was discharged home Amount and/or Complexity of Data Reviewed  Clinical lab tests: ordered and reviewed  Tests in the radiology section of CPT®: ordered and reviewed  Review and summarize past medical records: yes  Discuss the patient with other providers: yes  Independent visualization of images, tracings, or specimens: yes          Disposition  Final diagnoses:   Right flank pain     Time reflects when diagnosis was documented in both MDM as applicable and the Disposition within this note     Time User Action Codes Description Comment    7/2/2020  2:29 PM Maverick Gupta Add [R10 9] Right flank pain       ED Disposition     ED Disposition Condition Date/Time Comment    Discharge Stable Thu Jul 2, 2020  2:29 PM Giancarlo Avalos discharge to home/self care              Follow-up Information     Follow up With Specialties Details Why Contact Info    Naman Alvarenga MD Family Medicine In 1 week As needed, If symptoms worsen [id number] [facility]  [phone]            Discharge Medication List as of 7/2/2020  2:30 PM      CONTINUE these medications which have NOT CHANGED    Details   acetaminophen-codeine (TYLENOL/CODEINE #3) 300-30 MG per tablet Take by mouth as needed, Starting Fri 8/17/2018, Historical Med      albuterol (2 5 mg/3 mL) 0 083 % nebulizer solution Inhale 2 5 mg as needed, Starting Thu 5/21/2020, Historical Med      albuterol (PROVENTIL HFA,VENTOLIN HFA) 90 mcg/act inhaler as needed, Starting Fri 9/8/2017, Historical Med      aminocaproic acid (AMICAR) 500 mg tablet Take 500 mg by mouth as needed, Starting Wed 2/13/2019, Historical Med      buPROPion (WELLBUTRIN SR) 150 mg 12 hr tablet Take 150 mg by mouth 2 (two) times a day, Starting Thu 12/5/2019, Historical Med      cetirizine (ZyrTEC) 10 mg tablet Take 10 mg by mouth daily, Starting Fri 9/8/2017, Historical Med      EPINEPHrine (EPIPEN) 0 3 mg/0 3 mL SOAJ For a severe reaction: Inject in outer thigh following instructions on package and go to the Emergency room  , Historical Med      fluticasone (FLONASE) 50 mcg/act nasal spray daily at bedtime, Starting Fri 9/8/2017, Historical Med      montelukast (SINGULAIR) 10 mg tablet Take 10 mg by mouth daily at bedtime, Starting Wed 2/14/2018, Historical Med      Multiple Vitamins-Minerals (MULTIVITAMIN WITH MINERALS) tablet Take 1 tablet by mouth daily, Historical Med      nitrofurantoin (Macrobid) 100 mg capsule Take 100 mg by mouth 2 (two) times a day, Starting Mon 6/29/2020, Until Sat 7/4/2020, Historical Med      OMEPRAZOLE PO Take 20 mg by mouth daily, Starting Fri 5/29/2020, Historical Med      phentermine (ADIPEX-P) 37 5 MG tablet Take 37 5 mg by mouth daily, Starting Wed 1/15/2020, Historical Med      topiramate (TOPAMAX) 25 mg tablet Take 100 mg by mouth daily at bedtime, Starting Wed 1/15/2020, Historical Med      venlafaxine (EFFEXOR-XR) 37 5 mg 24 hr capsule Take 37 5 mg by mouth daily, Starting Thu 6/25/2020, Until Fri 6/25/2021, Historical Med      VITAMIN D, CHOLECALCIFEROL, PO daily, Starting Tue 1/19/2016, Historical Med           No discharge procedures on file      PDMP Review     None          ED Provider  Electronically Signed by           Carlos Reyes PA-C  07/02/20 3264

## 2021-01-14 ENCOUNTER — HOSPITAL ENCOUNTER (EMERGENCY)
Facility: HOSPITAL | Age: 56
Discharge: HOME | End: 2021-01-14
Attending: EMERGENCY MEDICINE | Admitting: EMERGENCY MEDICINE
Payer: COMMERCIAL

## 2021-01-14 ENCOUNTER — APPOINTMENT (EMERGENCY)
Dept: RADIOLOGY | Facility: HOSPITAL | Age: 56
End: 2021-01-14
Payer: COMMERCIAL

## 2021-01-14 ENCOUNTER — APPOINTMENT (EMERGENCY)
Dept: CT IMAGING | Facility: HOSPITAL | Age: 56
End: 2021-01-14
Payer: COMMERCIAL

## 2021-01-14 VITALS
RESPIRATION RATE: 16 BRPM | OXYGEN SATURATION: 98 % | BODY MASS INDEX: 27.41 KG/M2 | WEIGHT: 175 LBS | DIASTOLIC BLOOD PRESSURE: 82 MMHG | HEART RATE: 65 BPM | SYSTOLIC BLOOD PRESSURE: 125 MMHG | TEMPERATURE: 96.9 F

## 2021-01-14 DIAGNOSIS — I10 HYPERTENSION: ICD-10-CM

## 2021-01-14 DIAGNOSIS — R42 VERTIGO: Primary | ICD-10-CM

## 2021-01-14 DIAGNOSIS — R20.2 PARESTHESIA: ICD-10-CM

## 2021-01-14 LAB
ALBUMIN SERPL BCP-MCNC: 3.7 G/DL (ref 3.5–5)
ALP SERPL-CCNC: 86 U/L (ref 46–116)
ALT SERPL W P-5'-P-CCNC: 36 U/L (ref 12–78)
ANION GAP SERPL CALCULATED.3IONS-SCNC: 12 MMOL/L (ref 4–13)
AST SERPL W P-5'-P-CCNC: 14 U/L (ref 5–45)
BASOPHILS # BLD AUTO: 0.05 THOUSANDS/ΜL (ref 0–0.1)
BASOPHILS NFR BLD AUTO: 0 % (ref 0–1)
BILIRUB SERPL-MCNC: 0.2 MG/DL (ref 0.2–1)
BUN SERPL-MCNC: 13 MG/DL (ref 5–25)
CALCIUM SERPL-MCNC: 8.2 MG/DL (ref 8.3–10.1)
CHLORIDE SERPL-SCNC: 107 MMOL/L (ref 100–108)
CO2 SERPL-SCNC: 24 MMOL/L (ref 21–32)
CREAT SERPL-MCNC: 0.99 MG/DL (ref 0.6–1.3)
D DIMER PPP FEU-MCNC: <0.27 UG/ML FEU
EOSINOPHIL # BLD AUTO: 0.07 THOUSAND/ΜL (ref 0–0.61)
EOSINOPHIL NFR BLD AUTO: 1 % (ref 0–6)
ERYTHROCYTE [DISTWIDTH] IN BLOOD BY AUTOMATED COUNT: 13.6 % (ref 11.6–15.1)
GFR SERPL CREATININE-BSD FRML MDRD: 64 ML/MIN/1.73SQ M
GLUCOSE SERPL-MCNC: 135 MG/DL (ref 65–140)
HCT VFR BLD AUTO: 41.7 % (ref 34.8–46.1)
HGB BLD-MCNC: 13.2 G/DL (ref 11.5–15.4)
IMM GRANULOCYTES # BLD AUTO: 0.1 THOUSAND/UL (ref 0–0.2)
IMM GRANULOCYTES NFR BLD AUTO: 1 % (ref 0–2)
LYMPHOCYTES # BLD AUTO: 4.05 THOUSANDS/ΜL (ref 0.6–4.47)
LYMPHOCYTES NFR BLD AUTO: 32 % (ref 14–44)
MCH RBC QN AUTO: 27 PG (ref 26.8–34.3)
MCHC RBC AUTO-ENTMCNC: 31.7 G/DL (ref 31.4–37.4)
MCV RBC AUTO: 86 FL (ref 82–98)
MONOCYTES # BLD AUTO: 0.71 THOUSAND/ΜL (ref 0.17–1.22)
MONOCYTES NFR BLD AUTO: 6 % (ref 4–12)
NEUTROPHILS # BLD AUTO: 7.83 THOUSANDS/ΜL (ref 1.85–7.62)
NEUTS SEG NFR BLD AUTO: 60 % (ref 43–75)
NRBC BLD AUTO-RTO: 0 /100 WBCS
PLATELET # BLD AUTO: 299 THOUSANDS/UL (ref 149–390)
PMV BLD AUTO: 11.3 FL (ref 8.9–12.7)
POTASSIUM SERPL-SCNC: 3.4 MMOL/L (ref 3.5–5.3)
PROT SERPL-MCNC: 7.3 G/DL (ref 6.4–8.2)
RBC # BLD AUTO: 4.88 MILLION/UL (ref 3.81–5.12)
SODIUM SERPL-SCNC: 143 MMOL/L (ref 136–145)
TROPONIN I SERPL-MCNC: <0.02 NG/ML
WBC # BLD AUTO: 12.81 THOUSAND/UL (ref 4.31–10.16)

## 2021-01-14 PROCEDURE — 93005 ELECTROCARDIOGRAM TRACING: CPT

## 2021-01-14 PROCEDURE — 85025 COMPLETE CBC W/AUTO DIFF WBC: CPT | Performed by: EMERGENCY MEDICINE

## 2021-01-14 PROCEDURE — 96361 HYDRATE IV INFUSION ADD-ON: CPT

## 2021-01-14 PROCEDURE — 99284 EMERGENCY DEPT VISIT MOD MDM: CPT

## 2021-01-14 PROCEDURE — 36415 COLL VENOUS BLD VENIPUNCTURE: CPT | Performed by: EMERGENCY MEDICINE

## 2021-01-14 PROCEDURE — G1004 CDSM NDSC: HCPCS

## 2021-01-14 PROCEDURE — 80053 COMPREHEN METABOLIC PANEL: CPT | Performed by: EMERGENCY MEDICINE

## 2021-01-14 PROCEDURE — 84484 ASSAY OF TROPONIN QUANT: CPT | Performed by: EMERGENCY MEDICINE

## 2021-01-14 PROCEDURE — 96374 THER/PROPH/DIAG INJ IV PUSH: CPT

## 2021-01-14 PROCEDURE — 85379 FIBRIN DEGRADATION QUANT: CPT | Performed by: EMERGENCY MEDICINE

## 2021-01-14 PROCEDURE — 96375 TX/PRO/DX INJ NEW DRUG ADDON: CPT

## 2021-01-14 PROCEDURE — 71045 X-RAY EXAM CHEST 1 VIEW: CPT

## 2021-01-14 PROCEDURE — 70450 CT HEAD/BRAIN W/O DYE: CPT

## 2021-01-14 PROCEDURE — 99285 EMERGENCY DEPT VISIT HI MDM: CPT | Performed by: EMERGENCY MEDICINE

## 2021-01-14 RX ORDER — DIAZEPAM 5 MG/ML
5 INJECTION, SOLUTION INTRAMUSCULAR; INTRAVENOUS ONCE
Status: COMPLETED | OUTPATIENT
Start: 2021-01-14 | End: 2021-01-14

## 2021-01-14 RX ORDER — DIAZEPAM 5 MG/1
5-10 TABLET ORAL EVERY 6 HOURS PRN
Qty: 10 TABLET | Refills: 0 | Status: SHIPPED | OUTPATIENT
Start: 2021-01-14 | End: 2021-03-08

## 2021-01-14 RX ORDER — ONDANSETRON 2 MG/ML
4 INJECTION INTRAMUSCULAR; INTRAVENOUS ONCE
Status: COMPLETED | OUTPATIENT
Start: 2021-01-14 | End: 2021-01-14

## 2021-01-14 RX ORDER — MECLIZINE HYDROCHLORIDE 25 MG/1
25 TABLET ORAL 3 TIMES DAILY PRN
Qty: 15 TABLET | Refills: 0 | Status: SHIPPED | OUTPATIENT
Start: 2021-01-14 | End: 2021-03-08

## 2021-01-14 RX ORDER — MECLIZINE HYDROCHLORIDE 25 MG/1
25 TABLET ORAL ONCE
Status: COMPLETED | OUTPATIENT
Start: 2021-01-14 | End: 2021-01-14

## 2021-01-14 RX ORDER — AMLODIPINE BESYLATE 5 MG/1
5 TABLET ORAL DAILY
COMMUNITY
Start: 2021-01-14 | End: 2021-03-08

## 2021-01-14 RX ADMIN — MECLIZINE HYDROCHLORIDE 25 MG: 25 TABLET ORAL at 21:21

## 2021-01-14 RX ADMIN — ONDANSETRON 4 MG: 2 INJECTION INTRAMUSCULAR; INTRAVENOUS at 21:18

## 2021-01-14 RX ADMIN — SODIUM CHLORIDE 1000 ML: 0.9 INJECTION, SOLUTION INTRAVENOUS at 21:17

## 2021-01-14 RX ADMIN — DIAZEPAM 5 MG: 10 INJECTION, SOLUTION INTRAMUSCULAR; INTRAVENOUS at 21:21

## 2021-01-14 NOTE — Clinical Note
Tracie Ward was seen and treated in our emergency department on 1/14/2021  Diagnosis:     Lilia Pimentel  may return to work on return date  She may return on this date: 01/16/2021         If you have any questions or concerns, please don't hesitate to call        Manolo Brewer DO    ______________________________           _______________          _______________  Hospital Representative                              Date                                Time

## 2021-01-15 LAB
ATRIAL RATE: 68 BPM
P AXIS: 77 DEGREES
PR INTERVAL: 158 MS
QRS AXIS: 66 DEGREES
QRSD INTERVAL: 86 MS
QT INTERVAL: 430 MS
QTC INTERVAL: 457 MS
T WAVE AXIS: 52 DEGREES
VENTRICULAR RATE: 68 BPM

## 2021-01-15 NOTE — ED PROVIDER NOTES
History  Chief Complaint   Patient presents with    Hypertension     Patient has been having issues with elevated blood pressure a few days ago  Patient has cont hypertension with left sided chest pain and numbness on left side of face and left arm around 630pm with a bp of 198/98  Patient has some dizziness and nausea  43-year-old female complains of intermittent dizziness, spinning, with nausea associated with headaches, mild, general, ongoing for 3 days with associated elevated blood pressure, started antihypertensive today from family physician  Two days ago while at work had an episode associated left-sided tingling  Denies numbness and weakness  She notes symptoms are worse with positional changes and activity, has some posterior neck and upper back pain  States she was seen and evaluated at [facility] ER, noted to have MINH changes  Denies past medical history of CAD, CVA and VTE      History provided by:  Patient  Hypertension  Onset quality:  Gradual  Timing:  Constant  Progression:  Unchanged  Chronicity:  New  Associated symptoms: dizziness, headaches, nausea and neck pain    Associated symptoms: no abdominal pain, no blurred vision, no confusion, no fever, no peripheral edema, no shortness of breath, no syncope, no tinnitus, not vomiting and no weakness        Prior to Admission Medications   Prescriptions Last Dose Informant Patient Reported? Taking? EPINEPHrine (EPIPEN) 0 3 mg/0 3 mL SOAJ   Yes No   Sig: For a severe reaction: Inject in outer thigh following instructions on package and go to the Emergency room     Multiple Vitamins-Minerals (MULTIVITAMIN WITH MINERALS) tablet  Self Yes No   Sig: Take 1 tablet by mouth daily   OMEPRAZOLE PO   Yes No   Sig: Take 20 mg by mouth daily   VITAMIN D, CHOLECALCIFEROL, PO   Yes No   Sig: daily   acetaminophen-codeine (TYLENOL/CODEINE #3) 300-30 MG per tablet   Yes No   Sig: Take by mouth as needed   albuterol (2 5 mg/3 mL) 0 083 % nebulizer solution   Yes No   Sig: Inhale 2 5 mg as needed   albuterol (PROVENTIL HFA,VENTOLIN HFA) 90 mcg/act inhaler   Yes No   Sig: as needed   amLODIPine (NORVASC) 5 mg tablet   Yes Yes   Sig: Take 5 mg by mouth daily   aminocaproic acid (AMICAR) 500 mg tablet   Yes No   Sig: Take 500 mg by mouth as needed   buPROPion (WELLBUTRIN SR) 150 mg 12 hr tablet   Yes No   Sig: Take 150 mg by mouth 2 (two) times a day   cetirizine (ZyrTEC) 10 mg tablet   Yes No   Sig: Take 10 mg by mouth daily   fluticasone (FLONASE) 50 mcg/act nasal spray   Yes No   Sig: daily at bedtime   montelukast (SINGULAIR) 10 mg tablet   Yes No   Sig: Take 10 mg by mouth daily at bedtime   phentermine (ADIPEX-P) 37 5 MG tablet   Yes No   Sig: Take 37 5 mg by mouth daily   topiramate (TOPAMAX) 25 mg tablet   Yes No   Sig: Take 100 mg by mouth daily at bedtime   venlafaxine (EFFEXOR-XR) 37 5 mg 24 hr capsule   Yes No   Sig: Take 37 5 mg by mouth daily      Facility-Administered Medications: None       Past Medical History:   Diagnosis Date    Heart murmur     Kidney stones     Von Willebrand disease ([facility] [address] )        Past Surgical History:   Procedure Laterality Date    APPENDECTOMY      BREAST LUMPECTOMY Left     CARPAL TUNNEL RELEASE Bilateral     CHOLECYSTECTOMY      HYSTERECTOMY      KNEE SURGERY Left     PARTIAL HYSTERECTOMY      ROTATOR CUFF REPAIR Left     TONSILLECTOMY         History reviewed  No pertinent family history  I have reviewed and agree with the history as documented  E-Cigarette/Vaping    E-Cigarette Use Never User      E-Cigarette/Vaping Substances     Social History     Tobacco Use    Smoking status: Former Smoker     Types: Cigarettes    Smokeless tobacco: Never Used   Substance Use Topics    Alcohol use: Not Currently    Drug use: Never       Review of Systems   Constitutional: Negative for fever  HENT: Negative for tinnitus  Eyes: Negative for blurred vision     Respiratory: Negative for shortness of breath  Cardiovascular: Negative for syncope  Gastrointestinal: Positive for nausea  Negative for abdominal pain and vomiting  Musculoskeletal: Positive for neck pain  Neurological: Positive for dizziness and headaches  Negative for weakness  Psychiatric/Behavioral: Negative for confusion  All other systems reviewed and are negative  Physical Exam  Physical Exam  Vitals signs and nursing note reviewed  Constitutional:       Appearance: Normal appearance  Comments: Pleasant, comfortable-appearing   HENT:      Head: Normocephalic and atraumatic  Eyes:      Conjunctiva/sclera: Conjunctivae normal       Pupils: Pupils are equal, round, and reactive to light  Comments: Mild horizontal nystagmus fast left, fatigues   Neck:      Musculoskeletal: Neck supple  Cardiovascular:      Rate and Rhythm: Normal rate and regular rhythm  Heart sounds: Normal heart sounds  Pulmonary:      Effort: Pulmonary effort is normal       Breath sounds: Normal breath sounds  Abdominal:      General: Bowel sounds are normal  There is no distension  Palpations: Abdomen is soft  Tenderness: There is no abdominal tenderness  Musculoskeletal: Normal range of motion  Skin:     General: Skin is warm and dry  Neurological:      General: No focal deficit present  Mental Status: She is alert and oriented to person, place, and time  Cranial Nerves: No cranial nerve deficit  Motor: No weakness  Coordination: Coordination normal       Gait: Gait normal    Psychiatric:         Behavior: Behavior normal          Thought Content:  Thought content normal          Judgment: Judgment normal          Vital Signs  ED Triage Vitals   Temperature Pulse Respirations Blood Pressure SpO2   01/14/21 2044 01/14/21 2044 01/14/21 2044 01/14/21 2044 01/14/21 2122   (!) 96 9 °F (36 1 °C) 65 18 170/79 96 %      Temp Source Heart Rate Source Patient Position - Orthostatic VS BP Location FiO2 (%) 01/14/21 2044 01/14/21 2044 01/14/21 2044 01/14/21 2044 --   Temporal Monitor Lying Right arm       Pain Score       01/14/21 2044       9           Vitals:    01/14/21 2044 01/14/21 2130 01/14/21 2214   BP: 170/79 168/71 131/64   Pulse: 65 84 63   Patient Position - Orthostatic VS: Lying Lying Sitting         Visual Acuity  Visual Acuity      Most Recent Value   L Pupil Size (mm)  3   R Pupil Size (mm)  3          ED Medications  Medications   sodium chloride 0 9 % bolus 1,000 mL (0 mL Intravenous Stopped 1/14/21 2214)   ondansetron (ZOFRAN) injection 4 mg (4 mg Intravenous Given 1/14/21 2118)   diazepam (VALIUM) injection 5 mg (5 mg Intravenous Given 1/14/21 2121)   meclizine (ANTIVERT) tablet 25 mg (25 mg Oral Given 1/14/21 2121)       Diagnostic Studies  Results Reviewed     Procedure Component Value Units Date/Time    Comprehensive metabolic panel [[id number]]  (Abnormal) Collected: 01/14/21 2059    Lab Status: Final result Specimen: Blood from Arm, Right Updated: 01/14/21 2147     Sodium 143 mmol/L      Potassium 3 4 mmol/L      Chloride 107 mmol/L      CO2 24 mmol/L      ANION GAP 12 mmol/L      BUN 13 mg/dL      Creatinine 0 99 mg/dL      Glucose 135 mg/dL      Calcium 8 2 mg/dL      AST 14 U/L      ALT 36 U/L      Alkaline Phosphatase 86 U/L      Total Protein 7 3 g/dL      Albumin 3 7 g/dL      Total Bilirubin 0 20 mg/dL      eGFR 64 ml/min/1 73sq m     Narrative:      Radha guidelines for Chronic Kidney Disease (CKD):     Stage 1 with normal or high GFR (GFR > 90 mL/min/1 73 square meters)    Stage 2 Mild CKD (GFR = 60-89 mL/min/1 73 square meters)    Stage 3A Moderate CKD (GFR = 45-59 mL/min/1 73 square meters)    Stage 3B Moderate CKD (GFR = 30-44 mL/min/1 73 square meters)    Stage 4 Severe CKD (GFR = 15-29 mL/min/1 73 square meters)    Stage 5 End Stage CKD (GFR <15 mL/min/1 73 square meters)  Note: GFR calculation is accurate only with a steady state creatinine D-Dimer [[id number]]  (Normal) Collected: 21    Lab Status: Final result Specimen: Blood from Arm, Right Updated: 21     D-Dimer, Quant <0 27 ug/ml FEU     Troponin I [[id number]]  (Normal) Collected: 21    Lab Status: Final result Specimen: Blood from Arm, Right Updated: 21     Troponin I <0 02 ng/mL     CBC and differential [[id number]]  (Abnormal) Collected: 21    Lab Status: Final result Specimen: Blood from Arm, Right Updated: 21     WBC 12 81 Thousand/uL      RBC 4 88 Million/uL      Hemoglobin 13 2 g/dL      Hematocrit 41 7 %      MCV 86 fL      MCH 27 0 pg      MCHC 31 7 g/dL      RDW 13 6 %      MPV 11 3 fL      Platelets 435 Thousands/uL      nRBC 0 /100 WBCs      Neutrophils Relative 60 %      Immat GRANS % 1 %      Lymphocytes Relative 32 %      Monocytes Relative 6 %      Eosinophils Relative 1 %      Basophils Relative 0 %      Neutrophils Absolute 7 83 Thousands/µL      Immature Grans Absolute 0 10 Thousand/uL      Lymphocytes Absolute 4 05 Thousands/µL      Monocytes Absolute 0 71 Thousand/µL      Eosinophils Absolute 0 07 Thousand/µL      Basophils Absolute 0 05 Thousands/µL                  XR chest 1 view portable   ED Interpretation by Ana Bagley DO ([id number])   normal      CT head without contrast   Final Result by Víctor Miller DO ([id number])      No acute intracranial abnormality  Workstation performed: [id number]                    Procedures  Procedures         ED Course  ED Course as of 2239   u  EKG  normal sinus rhythm rate 68 normal axis normal intervals no ST elevation or depression interpreted by me       States she is feeling better just has mild headache  We discussed results, I provided a copy  We discussed options including hospitalization, possibility of stroke changes    Notes she has neurologist in recent brain MRI for headache in adamantly against hospitalization, voices good understanding close outpatient follow-up and will return immediately if worse or new symptoms                    Stroke Assessment     Row Name 01/14/21 2113             NIH Stroke Scale    Interval  Baseline      Level of Consciousness (1a )  0      LOC Questions (1b )  0      LOC Commands (1c )  0      Best Gaze (2 )  0      Visual (3 )  0      Facial Palsy (4 )  0      Motor Arm, Left (5a )  0      Motor Arm, Right (5b )  0      Motor Leg, Left (6a )  0      Motor Leg, Right (6b )  0      Limb Ataxia (7 )  0      Sensory (8 )  0      Best Language (9 )  0      Dysarthria (10 )  0      Extinction and Inattention (11 ) (Formerly Neglect)  0      Total  0                    SBIRT 22yo+      Most Recent Value   SBIRT (25 yo +)   In order to provide better care to our patients, we are screening all of our patients for alcohol and drug use  Would it be okay to ask you these screening questions? Yes Filed at: 01/14/2021 2049   Initial Alcohol Screen: US AUDIT-C    1  How often do you have a drink containing alcohol?  0 Filed at: 01/14/2021 2049   2  How many drinks containing alcohol do you have on a typical day you are drinking? 0 Filed at: 01/14/2021 2049   3b  FEMALE Any Age, or MALE 65+: How often do you have 4 or more drinks on one occassion? 0 Filed at: 01/14/2021 2049   Audit-C Score  0 Filed at: 01/14/2021 2049   CRISTINO: How many times in the past year have you    Used an illegal drug or used a prescription medication for non-medical reasons?   Never Filed at: 01/14/2021 2049                    MDM    Disposition  Final diagnoses:   Vertigo   Paresthesia   Hypertension     Time reflects when diagnosis was documented in both MDM as applicable and the Disposition within this note     Time User Action Codes Description Comment    1/14/2021 10:35 PM Ashley Ozuna Add [R42] Vertigo     1/14/2021 10:36 PM Ashley Ozuna Add [R20 2] Paresthesia     1/14/2021 10:36 PM Aggie Lott J Add [I10] Hypertension       ED Disposition     ED Disposition Condition Date/Time Comment    Discharge Stable Thu Jan 14, 2021 10:37 PM Seabron Sicard discharge to home/self care  Follow-up Information     Follow up With Specialties Details Why Contact Info    Mariya Dunbar MD Family Medicine Call in 1 day Please inform of visit [phone]  [phone]            Patient's Medications   Discharge Prescriptions    DIAZEPAM (VALIUM) 5 MG TABLET    Take 1-2 tablets (5-10 mg total) by mouth every 6 (six) hours as needed (Vertigo) for up to 10 days       Start Date: 1/14/2021 End Date: 1/24/2021       Order Dose: 5-10 mg       Quantity: 10 tablet    Refills: 0    MECLIZINE (ANTIVERT) 25 MG TABLET    Take 1 tablet (25 mg total) by mouth 3 (three) times a day as needed for dizziness       Start Date: 1/14/2021 End Date: --       Order Dose: 25 mg       Quantity: 15 tablet    Refills: 0     No discharge procedures on file      PDMP Review     None          ED Provider  Electronically Signed by           Becky Milian DO  01/14/21 223

## 2021-01-15 NOTE — DISCHARGE INSTRUCTIONS
Return immediately if worse or any new symptoms  Call your neurologist tomorrow and inform of visit and arrange follow-up as soon as possible

## 2021-01-18 ENCOUNTER — APPOINTMENT (EMERGENCY)
Dept: CT IMAGING | Facility: HOSPITAL | Age: 56
End: 2021-01-18
Payer: COMMERCIAL

## 2021-01-18 ENCOUNTER — HOSPITAL ENCOUNTER (OUTPATIENT)
Facility: HOSPITAL | Age: 56
Setting detail: OBSERVATION
Discharge: HOME | End: 2021-01-19
Attending: EMERGENCY MEDICINE | Admitting: FAMILY MEDICINE
Payer: COMMERCIAL

## 2021-01-18 ENCOUNTER — APPOINTMENT (EMERGENCY)
Dept: RADIOLOGY | Facility: HOSPITAL | Age: 56
End: 2021-01-18
Payer: COMMERCIAL

## 2021-01-18 ENCOUNTER — APPOINTMENT (OUTPATIENT)
Dept: MRI IMAGING | Facility: HOSPITAL | Age: 56
End: 2021-01-18
Payer: COMMERCIAL

## 2021-01-18 DIAGNOSIS — R07.89 ATYPICAL CHEST PAIN: Primary | ICD-10-CM

## 2021-01-18 DIAGNOSIS — R29.90 STROKE-LIKE SYMPTOMS: ICD-10-CM

## 2021-01-18 DIAGNOSIS — Z86.69 HISTORY OF MIGRAINE: ICD-10-CM

## 2021-01-18 PROBLEM — I10 ESSENTIAL HYPERTENSION: Status: ACTIVE | Noted: 2021-01-18

## 2021-01-18 LAB
ALBUMIN SERPL BCP-MCNC: 3.6 G/DL (ref 3.5–5)
ALP SERPL-CCNC: 86 U/L (ref 46–116)
ALT SERPL W P-5'-P-CCNC: 27 U/L (ref 12–78)
ANION GAP SERPL CALCULATED.3IONS-SCNC: 10 MMOL/L (ref 4–13)
APTT PPP: 31 SECONDS (ref 23–37)
AST SERPL W P-5'-P-CCNC: 11 U/L (ref 5–45)
BILIRUB DIRECT SERPL-MCNC: 0.06 MG/DL (ref 0–0.2)
BILIRUB SERPL-MCNC: 0.24 MG/DL (ref 0.2–1)
BUN SERPL-MCNC: 11 MG/DL (ref 5–25)
CALCIUM SERPL-MCNC: 8.8 MG/DL (ref 8.3–10.1)
CHLORIDE SERPL-SCNC: 103 MMOL/L (ref 100–108)
CO2 SERPL-SCNC: 26 MMOL/L (ref 21–32)
CREAT SERPL-MCNC: 0.93 MG/DL (ref 0.6–1.3)
ERYTHROCYTE [DISTWIDTH] IN BLOOD BY AUTOMATED COUNT: 13.3 % (ref 11.6–15.1)
FLUAV RNA RESP QL NAA+PROBE: NEGATIVE
FLUBV RNA RESP QL NAA+PROBE: NEGATIVE
GFR SERPL CREATININE-BSD FRML MDRD: 69 ML/MIN/1.73SQ M
GLUCOSE SERPL-MCNC: 127 MG/DL (ref 65–140)
GLUCOSE SERPL-MCNC: 90 MG/DL (ref 65–140)
HCT VFR BLD AUTO: 40.8 % (ref 34.8–46.1)
HGB BLD-MCNC: 12.7 G/DL (ref 11.5–15.4)
INR PPP: 0.9 (ref 0.84–1.19)
MAGNESIUM SERPL-MCNC: 2 MG/DL (ref 1.6–2.6)
MCH RBC QN AUTO: 26.2 PG (ref 26.8–34.3)
MCHC RBC AUTO-ENTMCNC: 31.1 G/DL (ref 31.4–37.4)
MCV RBC AUTO: 84 FL (ref 82–98)
NT-PROBNP SERPL-MCNC: 57 PG/ML
PLATELET # BLD AUTO: 254 THOUSANDS/UL (ref 149–390)
PMV BLD AUTO: 10.8 FL (ref 8.9–12.7)
POTASSIUM SERPL-SCNC: 3.1 MMOL/L (ref 3.5–5.3)
PROT SERPL-MCNC: 7.4 G/DL (ref 6.4–8.2)
PROTHROMBIN TIME: 12 SECONDS (ref 11.6–14.5)
RBC # BLD AUTO: 4.85 MILLION/UL (ref 3.81–5.12)
RSV RNA RESP QL NAA+PROBE: NEGATIVE
SARS-COV-2 RNA RESP QL NAA+PROBE: NEGATIVE
SODIUM SERPL-SCNC: 139 MMOL/L (ref 136–145)
TROPONIN I SERPL-MCNC: <0.02 NG/ML
TSH SERPL DL<=0.05 MIU/L-ACNC: 1.02 UIU/ML (ref 0.36–3.74)
WBC # BLD AUTO: 13.3 THOUSAND/UL (ref 4.31–10.16)

## 2021-01-18 PROCEDURE — 80076 HEPATIC FUNCTION PANEL: CPT | Performed by: PHYSICIAN ASSISTANT

## 2021-01-18 PROCEDURE — 99285 EMERGENCY DEPT VISIT HI MDM: CPT

## 2021-01-18 PROCEDURE — 93005 ELECTROCARDIOGRAM TRACING: CPT

## 2021-01-18 PROCEDURE — 96360 HYDRATION IV INFUSION INIT: CPT

## 2021-01-18 PROCEDURE — 70498 CT ANGIOGRAPHY NECK: CPT

## 2021-01-18 PROCEDURE — 85027 COMPLETE CBC AUTOMATED: CPT | Performed by: PHYSICIAN ASSISTANT

## 2021-01-18 PROCEDURE — G1004 CDSM NDSC: HCPCS

## 2021-01-18 PROCEDURE — 80048 BASIC METABOLIC PNL TOTAL CA: CPT | Performed by: PHYSICIAN ASSISTANT

## 2021-01-18 PROCEDURE — 70551 MRI BRAIN STEM W/O DYE: CPT

## 2021-01-18 PROCEDURE — 71045 X-RAY EXAM CHEST 1 VIEW: CPT

## 2021-01-18 PROCEDURE — 0241U HB NFCT DS VIR RESP RNA 4 TRGT: CPT | Performed by: PHYSICIAN ASSISTANT

## 2021-01-18 PROCEDURE — 83880 ASSAY OF NATRIURETIC PEPTIDE: CPT | Performed by: PHYSICIAN ASSISTANT

## 2021-01-18 PROCEDURE — 84443 ASSAY THYROID STIM HORMONE: CPT | Performed by: NURSE PRACTITIONER

## 2021-01-18 PROCEDURE — 84484 ASSAY OF TROPONIN QUANT: CPT | Performed by: PHYSICIAN ASSISTANT

## 2021-01-18 PROCEDURE — 99243 OFF/OP CNSLTJ NEW/EST LOW 30: CPT | Performed by: PSYCHIATRY & NEUROLOGY

## 2021-01-18 PROCEDURE — 70496 CT ANGIOGRAPHY HEAD: CPT

## 2021-01-18 PROCEDURE — 99285 EMERGENCY DEPT VISIT HI MDM: CPT | Performed by: EMERGENCY MEDICINE

## 2021-01-18 PROCEDURE — 85610 PROTHROMBIN TIME: CPT | Performed by: PHYSICIAN ASSISTANT

## 2021-01-18 PROCEDURE — 36415 COLL VENOUS BLD VENIPUNCTURE: CPT | Performed by: PHYSICIAN ASSISTANT

## 2021-01-18 PROCEDURE — 82948 REAGENT STRIP/BLOOD GLUCOSE: CPT

## 2021-01-18 PROCEDURE — 83735 ASSAY OF MAGNESIUM: CPT | Performed by: PHYSICIAN ASSISTANT

## 2021-01-18 PROCEDURE — 84484 ASSAY OF TROPONIN QUANT: CPT | Performed by: NURSE PRACTITIONER

## 2021-01-18 PROCEDURE — 85730 THROMBOPLASTIN TIME PARTIAL: CPT | Performed by: PHYSICIAN ASSISTANT

## 2021-01-18 PROCEDURE — 99220 PR INITIAL OBSERVATION CARE/DAY 70 MINUTES: CPT | Performed by: NURSE PRACTITIONER

## 2021-01-18 RX ORDER — TOPIRAMATE 100 MG/1
100 TABLET, FILM COATED ORAL
Status: DISCONTINUED | OUTPATIENT
Start: 2021-01-18 | End: 2021-01-18

## 2021-01-18 RX ORDER — POTASSIUM CHLORIDE 20 MEQ/1
40 TABLET, EXTENDED RELEASE ORAL ONCE
Status: COMPLETED | OUTPATIENT
Start: 2021-01-18 | End: 2021-01-18

## 2021-01-18 RX ORDER — ALBUTEROL SULFATE 90 UG/1
2 AEROSOL, METERED RESPIRATORY (INHALATION) EVERY 4 HOURS PRN
Status: DISCONTINUED | OUTPATIENT
Start: 2021-01-18 | End: 2021-01-19 | Stop reason: HOSPADM

## 2021-01-18 RX ORDER — BUPROPION HYDROCHLORIDE 150 MG/1
150 TABLET ORAL DAILY
Status: DISCONTINUED | OUTPATIENT
Start: 2021-01-18 | End: 2021-01-19 | Stop reason: HOSPADM

## 2021-01-18 RX ORDER — LORATADINE 10 MG/1
10 TABLET ORAL DAILY
Status: DISCONTINUED | OUTPATIENT
Start: 2021-01-18 | End: 2021-01-19 | Stop reason: HOSPADM

## 2021-01-18 RX ORDER — VENLAFAXINE HYDROCHLORIDE 37.5 MG/1
37.5 CAPSULE, EXTENDED RELEASE ORAL DAILY
Status: DISCONTINUED | OUTPATIENT
Start: 2021-01-18 | End: 2021-01-19 | Stop reason: HOSPADM

## 2021-01-18 RX ORDER — GABAPENTIN 100 MG/1
100 CAPSULE ORAL 3 TIMES DAILY
Status: DISCONTINUED | OUTPATIENT
Start: 2021-01-18 | End: 2021-01-19

## 2021-01-18 RX ORDER — AMLODIPINE BESYLATE 5 MG/1
5 TABLET ORAL DAILY
Status: DISCONTINUED | OUTPATIENT
Start: 2021-01-19 | End: 2021-01-19 | Stop reason: HOSPADM

## 2021-01-18 RX ORDER — ALBUTEROL SULFATE 2.5 MG/3ML
2.5 SOLUTION RESPIRATORY (INHALATION) EVERY 4 HOURS PRN
Status: DISCONTINUED | OUTPATIENT
Start: 2021-01-18 | End: 2021-01-19 | Stop reason: HOSPADM

## 2021-01-18 RX ORDER — SODIUM CHLORIDE, SODIUM LACTATE, POTASSIUM CHLORIDE, CALCIUM CHLORIDE 600; 310; 30; 20 MG/100ML; MG/100ML; MG/100ML; MG/100ML
75 INJECTION, SOLUTION INTRAVENOUS CONTINUOUS
Status: DISCONTINUED | OUTPATIENT
Start: 2021-01-18 | End: 2021-01-19

## 2021-01-18 RX ORDER — MAGNESIUM SULFATE HEPTAHYDRATE 40 MG/ML
2 INJECTION, SOLUTION INTRAVENOUS ONCE
Status: COMPLETED | OUTPATIENT
Start: 2021-01-18 | End: 2021-01-18

## 2021-01-18 RX ORDER — ATORVASTATIN CALCIUM 40 MG/1
40 TABLET, FILM COATED ORAL EVERY EVENING
Status: DISCONTINUED | OUTPATIENT
Start: 2021-01-18 | End: 2021-01-19 | Stop reason: HOSPADM

## 2021-01-18 RX ORDER — FLUTICASONE PROPIONATE 50 MCG
2 SPRAY, SUSPENSION (ML) NASAL
Status: DISCONTINUED | OUTPATIENT
Start: 2021-01-18 | End: 2021-01-19 | Stop reason: HOSPADM

## 2021-01-18 RX ORDER — PANTOPRAZOLE SODIUM 20 MG/1
20 TABLET, DELAYED RELEASE ORAL
Status: DISCONTINUED | OUTPATIENT
Start: 2021-01-19 | End: 2021-01-19 | Stop reason: HOSPADM

## 2021-01-18 RX ORDER — MONTELUKAST SODIUM 10 MG/1
10 TABLET ORAL
Status: DISCONTINUED | OUTPATIENT
Start: 2021-01-18 | End: 2021-01-19 | Stop reason: HOSPADM

## 2021-01-18 RX ORDER — ACETAMINOPHEN 325 MG/1
650 TABLET ORAL EVERY 6 HOURS PRN
Status: DISCONTINUED | OUTPATIENT
Start: 2021-01-18 | End: 2021-01-19 | Stop reason: HOSPADM

## 2021-01-18 RX ADMIN — BUPROPION 150 MG: 150 TABLET, EXTENDED RELEASE ORAL at 15:25

## 2021-01-18 RX ADMIN — TOPIRAMATE 150 MG: 25 TABLET, FILM COATED ORAL at 21:07

## 2021-01-18 RX ADMIN — POTASSIUM CHLORIDE 40 MEQ: 1500 TABLET, EXTENDED RELEASE ORAL at 13:34

## 2021-01-18 RX ADMIN — IOHEXOL 85 ML: 350 INJECTION, SOLUTION INTRAVENOUS at 11:16

## 2021-01-18 RX ADMIN — MAGNESIUM SULFATE HEPTAHYDRATE 2 G: 40 INJECTION, SOLUTION INTRAVENOUS at 13:35

## 2021-01-18 RX ADMIN — GABAPENTIN 100 MG: 100 CAPSULE ORAL at 16:21

## 2021-01-18 RX ADMIN — ATORVASTATIN CALCIUM 40 MG: 40 TABLET, FILM COATED ORAL at 17:57

## 2021-01-18 RX ADMIN — MONTELUKAST 10 MG: 10 TABLET, FILM COATED ORAL at 21:07

## 2021-01-18 RX ADMIN — GABAPENTIN 100 MG: 100 CAPSULE ORAL at 21:07

## 2021-01-18 RX ADMIN — SODIUM CHLORIDE, SODIUM LACTATE, POTASSIUM CHLORIDE, AND CALCIUM CHLORIDE 75 ML/HR: .6; .31; .03; .02 INJECTION, SOLUTION INTRAVENOUS at 15:18

## 2021-01-18 RX ADMIN — ACETAMINOPHEN 650 MG: 325 TABLET ORAL at 21:07

## 2021-01-18 RX ADMIN — VENLAFAXINE HYDROCHLORIDE 37.5 MG: 37.5 CAPSULE, EXTENDED RELEASE ORAL at 15:26

## 2021-01-18 RX ADMIN — SODIUM CHLORIDE 1000 ML: 0.9 INJECTION, SOLUTION INTRAVENOUS at 11:28

## 2021-01-18 RX ADMIN — LORATADINE 10 MG: 10 TABLET ORAL at 15:25

## 2021-01-18 NOTE — ED PROVIDER NOTES
History  Chief Complaint   Patient presents with    Chest Pain     pt c/o chest pain radiating down left arm with numbness in left side face and nausea  pt recently seen for similar sx 4 days ago  denies travel/fevers/cough/v/d     The patient is a 58-year-old female with a past medical history of hypertension, von Willebrand's disease, presents emergency department today with a chief complaint of chest pain x1 week and left-sided facial numbness with arm numbness since last evening  Patient states that around 12:00 a m  Earlier this morning she developed left face and left arm numbness  Patient states she still able to the feel these areas however it feels like a decreased sensation  Patient denies any headache, blurred vision, nausea or vomiting, dizziness, falls or traumas  Patient states she has been having substernal chest pain x1 week  Patient was evaluated by her primary care physician on Wednesday was started on  Norvasc 5 mg due to her elevated blood pressures  CVA/TIA-like Symptoms  Presenting symptoms: sensory loss    Date/time of last known well:  1/18/2021 12:00 PM  Onset quality:  Sudden  Timing:  Constant  Progression:  Unchanged  Similar to previous episodes: no    Associated symptoms: chest pain    Associated symptoms: no trouble swallowing, no dizziness, no facial pain, no fall, no fever, no hearing loss, no bladder incontinence, no nausea, no neck pain, no paresthesias, no seizures, no tinnitus, no vertigo and no vomiting    Chest pain:     Quality:  Sharp    Severity:  Moderate    Duration:  7 days    Timing:  Constant    Progression:  Unchanged    Chronicity:  New      Prior to Admission Medications   Prescriptions Last Dose Informant Patient Reported? Taking? EPINEPHrine (EPIPEN) 0 3 mg/0 3 mL SOAJ More than a month at Unknown time  Yes No   Sig: For a severe reaction: Inject in outer thigh following instructions on package and go to the Emergency room     Multiple Vitamins-Minerals (MULTIVITAMIN WITH MINERALS) tablet 1/17/2021 at 0730 Self Yes Yes   Sig: Take 1 tablet by mouth daily   OMEPRAZOLE PO 1/17/2021 at 0900  Yes Yes   Sig: Take 20 mg by mouth daily   VITAMIN D, CHOLECALCIFEROL, PO 1/17/2021 at 0900  Yes Yes   Sig: daily   acetaminophen-codeine (TYLENOL/CODEINE #3) 300-30 MG per tablet Not Taking at Unknown time  Yes No   Sig: Take by mouth as needed   albuterol (2 5 mg/3 mL) 0 083 % nebulizer solution Past Week at Unknown time  Yes Yes   Sig: Inhale 2 5 mg as needed   albuterol (PROVENTIL HFA,VENTOLIN HFA) 90 mcg/act inhaler Past Week at Unknown time  Yes Yes   Sig: as needed   amLODIPine (NORVASC) 5 mg tablet 1/18/2021 at 0730  Yes Yes   Sig: Take 5 mg by mouth daily   aminocaproic acid (AMICAR) 500 mg tablet More than a month at Unknown time  Yes No   Sig: Take 500 mg by mouth as needed for bleeding    buPROPion (WELLBUTRIN SR) 150 mg 12 hr tablet 1/17/2021 at 0900  Yes Yes   Sig: Take 150 mg by mouth 2 (two) times a day   cetirizine (ZyrTEC) 10 mg tablet Past Week at Unknown time  Yes Yes   Sig: Take 10 mg by mouth daily   diazepam (VALIUM) 5 mg tablet 1/17/2021 at 0900  No Yes   Sig: Take 1-2 tablets (5-10 mg total) by mouth every 6 (six) hours as needed (Vertigo) for up to 10 days   fluticasone (FLONASE) 50 mcg/act nasal spray 1/17/2021 at Unknown time  Yes Yes   Sig: daily at bedtime   meclizine (ANTIVERT) 25 mg tablet 1/17/2021 at 0900  No Yes   Sig: Take 1 tablet (25 mg total) by mouth 3 (three) times a day as needed for dizziness   montelukast (SINGULAIR) 10 mg tablet Past Week at 1900  Yes Yes   Sig: Take 10 mg by mouth daily at bedtime   phentermine (ADIPEX-P) 37 5 MG tablet 1/17/2021 at 0900  Yes Yes   Sig: Take 37 5 mg by mouth daily   topiramate (TOPAMAX) 25 mg tablet Past Week at 1900  Yes Yes   Sig: Take 150 mg by mouth daily at bedtime    venlafaxine (EFFEXOR-XR) 37 5 mg 24 hr capsule 1/17/2021 at 0900  Yes Yes   Sig: Take 37 5 mg by mouth daily Facility-Administered Medications: None       Past Medical History:   Diagnosis Date    Heart murmur     Kidney stones     Von Willebrand disease ([facility] [address] )        Past Surgical History:   Procedure Laterality Date    APPENDECTOMY      BREAST LUMPECTOMY Left     CARPAL TUNNEL RELEASE Bilateral     CHOLECYSTECTOMY      HYSTERECTOMY      KNEE SURGERY Left     PARTIAL HYSTERECTOMY      ROTATOR CUFF REPAIR Left     TONSILLECTOMY         Family History   Problem Relation Age of Onset    Cancer Mother     Cancer Sister     Stroke Brother      I have reviewed and agree with the history as documented  E-Cigarette/Vaping    E-Cigarette Use Current Some Day User     Comments pt states 2 cigarettes a week      E-Cigarette/Vaping Substances    Nicotine Yes 2 cigarettes a week    THC No     CBD No     Flavoring No      Social History     Tobacco Use    Smoking status: Current Some Day Smoker     Years: 25 00     Types: Cigarettes    Smokeless tobacco: Current User   Substance Use Topics    Alcohol use: Not Currently    Drug use: Never       Review of Systems   Constitutional: Negative for chills and fever  HENT: Negative for ear pain, hearing loss, sore throat, tinnitus and trouble swallowing  Eyes: Negative for pain and visual disturbance  Respiratory: Negative for cough, shortness of breath and wheezing  Cardiovascular: Positive for chest pain  Negative for palpitations and leg swelling  Gastrointestinal: Negative for abdominal pain, nausea and vomiting  Genitourinary: Negative for bladder incontinence, dysuria and hematuria  Musculoskeletal: Negative for arthralgias, back pain and neck pain  Skin: Negative for color change and rash  Neurological: Negative for dizziness, vertigo, seizures, syncope and paresthesias  Physical Exam  Physical Exam  Vitals signs and nursing note reviewed  Constitutional:       General: She is not in acute distress       Appearance: She is well-developed  HENT:      Head: Normocephalic and atraumatic  Right Ear: External ear normal       Left Ear: External ear normal    Eyes:      General: No visual field deficit  Pupils: Pupils are equal, round, and reactive to light  Neck:      Musculoskeletal: Normal range of motion and neck supple  Cardiovascular:      Rate and Rhythm: Normal rate and regular rhythm  Heart sounds: No murmur  Pulmonary:      Effort: Pulmonary effort is normal  No respiratory distress  Breath sounds: Normal breath sounds  No wheezing  Chest:      Chest wall: No tenderness  Abdominal:      General: Bowel sounds are normal       Palpations: Abdomen is soft  There is no mass  Tenderness: There is no abdominal tenderness  There is no rebound  Hernia: No hernia is present  Musculoskeletal:      Right lower leg: She exhibits no tenderness  No edema  Left lower leg: She exhibits no tenderness  No edema  Skin:     General: Skin is warm and dry  Capillary Refill: Capillary refill takes less than 2 seconds  Neurological:      Mental Status: She is alert and oriented to person, place, and time  Cranial Nerves: No cranial nerve deficit or facial asymmetry  Sensory: Sensory deficit present  Motor: Motor function is intact  No tremor or pronator drift  Coordination: Coordination is intact  Coordination normal       Gait: Gait is intact  Comments: Patient has decreased sensation over her left face and left arm  NIH scale was 1 due to this finding     Psychiatric:         Behavior: Behavior normal          Vital Signs  ED Triage Vitals   Temperature Pulse Respirations Blood Pressure SpO2   01/18/21 1101 01/18/21 1101 01/18/21 1101 01/18/21 1101 01/18/21 1101   (!) 97 °F (36 1 °C) 71 18 158/100 96 %      Temp Source Heart Rate Source Patient Position - Orthostatic VS BP Location FiO2 (%)   01/18/21 1101 01/18/21 1101 01/18/21 1101 01/18/21 1101 --   Temporal Monitor Sitting Right arm       Pain Score       01/18/21 1115       8           Vitals:    01/19/21 0300 01/19/21 0500 01/19/21 0511 01/19/21 0714   BP: 134/74 136/76 136/76 135/77   Pulse:   63 63   Patient Position - Orthostatic VS:             Visual Acuity  Visual Acuity      Most Recent Value   L Pupil Size (mm)  2   R Pupil Size (mm)  2   L Pupil Shape  Round   R Pupil Shape  Round          ED Medications  Medications   albuterol inhalation solution 2 5 mg (has no administration in time range)   albuterol (PROVENTIL HFA,VENTOLIN HFA) inhaler 2 puff (has no administration in time range)   amLODIPine (NORVASC) tablet 5 mg (5 mg Oral Given 1/19/21 0836)   buPROPion (WELLBUTRIN XL) 24 hr tablet 150 mg (150 mg Oral Given 1/19/21 0835)   loratadine (CLARITIN) tablet 10 mg (10 mg Oral Refused 1/19/21 0836)   fluticasone (FLONASE) 50 mcg/act nasal spray 2 spray (2 sprays Each Nare Refused 1/18/21 2107)   montelukast (SINGULAIR) tablet 10 mg (10 mg Oral Given 1/18/21 2107)   pantoprazole (PROTONIX) EC tablet 20 mg (20 mg Oral Given 1/19/21 0523)   venlafaxine (EFFEXOR-XR) 24 hr capsule 37 5 mg (37 5 mg Oral Given 1/19/21 0835)   atorvastatin (LIPITOR) tablet 40 mg (40 mg Oral Given 1/18/21 1757)   acetaminophen (TYLENOL) tablet 650 mg (650 mg Oral Given 1/19/21 0523)   topiramate (TOPAMAX) tablet 150 mg (150 mg Oral Given 1/18/21 2107)   gabapentin (NEURONTIN) capsule 300 mg (has no administration in time range)   sodium chloride 0 9 % bolus 1,000 mL (0 mL Intravenous Stopped 1/18/21 1235)   iohexol (OMNIPAQUE) 350 MG/ML injection (SINGLE-DOSE) 85 mL (85 mL Intravenous Given 1/18/21 1116)   magnesium sulfate 2 g/50 mL IVPB (premix) 2 g (2 g Intravenous New Bag 1/18/21 1335)   potassium chloride (K-DUR,KLOR-CON) CR tablet 40 mEq (40 mEq Oral Given 1/18/21 1334)       Diagnostic Studies  Results Reviewed     Procedure Component Value Units Date/Time    TSH, 3rd generation [[id number]]  (Normal) Collected: 01/18/21 1110    Lab Status: Final result Specimen: Blood from Arm, Right Updated: 01/18/21 1622     TSH 3RD GENERATON 1 024 uIU/mL     Narrative:      Patients undergoing fluorescein dye angiography may retain small amounts of fluorescein in the body for 48-72 hours post procedure  Samples containing fluorescein can produce falsely depressed TSH values  If the patient had this procedure,a specimen should be resubmitted post fluorescein clearance  Hepatic function panel [[id number]]  (Normal) Collected: 01/18/21 1110    Lab Status: Final result Specimen: Blood from Arm, Right Updated: 01/18/21 1206     Total Bilirubin 0 24 mg/dL      Bilirubin, Direct 0 06 mg/dL      Alkaline Phosphatase 86 U/L      AST 11 U/L      ALT 27 U/L      Total Protein 7 4 g/dL      Albumin 3 6 g/dL     COVID19, Influenza A/B, RSV PCR, [facility] [[id number]]  (Normal) Collected: 01/18/21 1110    Lab Status: Final result Specimen: Nares from Nasopharyngeal Swab Updated: 01/18/21 1155     SARS-CoV-2 Negative     INFLUENZA A PCR Negative     INFLUENZA B PCR Negative     RSV PCR Negative    Narrative: This test has been authorized by FDA under an EUA (Emergency Use Assay) for use by authorized laboratories  Clinical caution and judgement should be used with the interpretation of these results with consideration of the clinical impression and other laboratory testing  Testing reported as "Positive" or "Negative" has been proven to be accurate according to standard laboratory validation requirements  All testing is performed with control materials showing appropriate reactivity at standard intervals      Basic metabolic panel [[id number]]  (Abnormal) Collected: 01/18/21 1110    Lab Status: Final result Specimen: Blood from Arm, Right Updated: 01/18/21 1149     Sodium 139 mmol/L      Potassium 3 1 mmol/L      Chloride 103 mmol/L      CO2 26 mmol/L      ANION GAP 10 mmol/L      BUN 11 mg/dL      Creatinine 0 93 mg/dL      Glucose 90 mg/dL      Calcium 8 8 mg/dL eGFR 69 ml/min/1 73sq m     Narrative:      [place] guidelines for Chronic Kidney Disease (CKD):     Stage 1 with normal or high GFR (GFR > 90 mL/min/1 73 square meters)    Stage 2 Mild CKD (GFR = 60-89 mL/min/1 73 square meters)    Stage 3A Moderate CKD (GFR = 45-59 mL/min/1 73 square meters)    Stage 3B Moderate CKD (GFR = 30-44 mL/min/1 73 square meters)    Stage 4 Severe CKD (GFR = 15-29 mL/min/1 73 square meters)    Stage 5 End Stage CKD (GFR <15 mL/min/1 73 square meters)  Note: GFR calculation is accurate only with a steady state creatinine    NT-BNP PRO [[id number]]  (Normal) Collected: 01/18/21 1110    Lab Status: Final result Specimen: Blood from Arm, Right Updated: 01/18/21 1149     NT-proBNP 57 pg/mL     Magnesium [[id number]]  (Normal) Collected: 01/18/21 1110    Lab Status: Final result Specimen: Blood from Arm, Right Updated: 01/18/21 1149     Magnesium 2 0 mg/dL     Troponin I [[id number]]  (Normal) Collected: 01/18/21 1110    Lab Status: Final result Specimen: Blood from Arm, Right Updated: 01/18/21 1135     Troponin I <0 02 ng/mL     Protime-INR [[id number]]  (Normal) Collected: 01/18/21 1110    Lab Status: Final result Specimen: Blood from Arm, Right Updated: 01/18/21 1128     Protime 12 0 seconds      INR 0 90    APTT [[id number]]  (Normal) Collected: 01/18/21 1110    Lab Status: Final result Specimen: Blood from Arm, Right Updated: 01/18/21 1128     PTT 31 seconds     CBC and Platelet [[id number]]  (Abnormal) Collected: 01/18/21 1110    Lab Status: Final result Specimen: Blood from Arm, Right Updated: 01/18/21 1116     WBC 13 30 Thousand/uL      RBC 4 85 Million/uL      Hemoglobin 12 7 g/dL      Hematocrit 40 8 %      MCV 84 fL      MCH 26 2 pg      MCHC 31 1 g/dL      RDW 13 3 %      Platelets 092 Thousands/uL      MPV 10 8 fL     Fingerstick Glucose (POCT) [[id number]]  (Normal) Collected: 01/18/21 1108    Lab Status: Final result Updated: 01/18/21 1114     POC Glucose 127 mg/dl                  MRI brain wo contrast   Final Result by Ann Marie Anthony MD (01/18 7824)      Punctate focus of signal abnormality in the right mehnaz  This may represent sequela of migraines, subacute infarction, and/or demyelination  I personally discussed this study with Jose Rahman on 1/18/2021 at 1:07 PM                Workstation performed: [id number]         X-ray chest 1 view portable   Final Result by Em Evangelista MD (01/18 1202)      No acute cardiopulmonary disease  Workstation performed: [id number]         CT stroke alert brain   Final Result by Ann Marie Anthony MD (01/18 7002)      No acute intracranial abnormality  Findings were directly discussed with PER ARCHIBALD on 1/18/2021 11:36 AM       Workstation performed: [id number]         CTA stroke alert (head/neck)   Final Result by Ann Marie Anthony MD (01/18 7663)      No cervical or intracranial large vessel occlusion  Mild bilateral cervical carotid atherosclerotic disease with less than 50% stenosis at the carotid bulbs  No intracranial aneurysm  Anatomic variation of a near fetal left PCA  No pulmonary parenchymal changes to suggest COVID19 infection        Findings were directly discussed with PER Alejandro on 1/18/2021 11:27 AM                      Workstation performed: [id number]                    Procedures  ECG 12 Lead Documentation Only    Date/Time: 1/18/2021 2:08 PM  Performed by: Raffaele Hastings PA-C  Authorized by: Raffaele Hastings PA-C     Indications / Diagnosis:  Chest pain   ECG reviewed by me, the ED Provider: yes    Patient location:  ED  Previous ECG:     Previous ECG:  Unavailable  Interpretation:     Interpretation: normal    Rate:     ECG rate:  66    ECG rate assessment: normal    Rhythm:     Rhythm: sinus rhythm    Conduction:     Conduction: normal    ST segments:     ST segments:  Normal  T waves:     T waves: normal               ED Course Stroke Assessment     Row Name 01/18/21 1112             NIH Stroke Scale    Interval  24 hours post-onset of symptoms      Level of Consciousness (1a )  0      LOC Questions (1b )  0      LOC Commands (1c )  0      Best Gaze (2 )  0      Visual (3 )  0      Facial Palsy (4 )  0      Motor Arm, Left (5a )  0      Motor Arm, Right (5b )  0      Motor Leg, Left (6a )  0      Motor Leg, Right (6b )  0      Limb Ataxia (7 )  0      Sensory (8 )  1      Best Language (9 )  0      Dysarthria (10 )  0      Extinction and Inattention (11 ) (Formerly Neglect)  0      Total  1                    SBIRT 22yo+      Most Recent Value   SBIRT (24 yo +)   In order to provide better care to our patients, we are screening all of our patients for alcohol and drug use  Would it be okay to ask you these screening questions? Yes Filed at: 01/18/2021 1216   Initial Alcohol Screen: US AUDIT-C    1  How often do you have a drink containing alcohol?  0 Filed at: 01/18/2021 1216   2  How many drinks containing alcohol do you have on a typical day you are drinking? 0 Filed at: 01/18/2021 1216   3a  Male UNDER 65: How often do you have five or more drinks on one occasion? 0 Filed at: 01/18/2021 1216   3b  FEMALE Any Age, or MALE 65+: How often do you have 4 or more drinks on one occassion? 0 Filed at: 01/18/2021 1216   Audit-C Score  0 Filed at: 01/18/2021 1216   CRISTINO: How many times in the past year have you    Used an illegal drug or used a prescription medication for non-medical reasons? Never Filed at: 01/18/2021 1216                    MDM  Number of Diagnoses or Management Options  Atypical chest pain:   Stroke-like symptoms:   Diagnosis management comments: Patient's lab work was relatively unremarkable upon today's ER visit  Troponin was negative EKG without any acute ischemic findings  Anti-platelet therapy was held secondary to patient's history of von Willebrand's disease    CTA head and neck was without any acute stroke-like findings  MRI brain with without contrast was ordered for further stroke protocol  Patient was discussed with Neurology as noted above due to the stroke alert called in the emergency department and also admitted to the hospitalist service Verdon Mcardle  Amount and/or Complexity of Data Reviewed  Clinical lab tests: ordered and reviewed  Tests in the radiology section of CPT®: ordered and reviewed  Decide to obtain previous medical records or to obtain history from someone other than the patient: yes  Review and summarize past medical records: yes  Discuss the patient with other providers: yes  Independent visualization of images, tracings, or specimens: yes    Risk of Complications, Morbidity, and/or Mortality  Presenting problems: moderate  Diagnostic procedures: moderate  Management options: moderate    Patient Progress  Patient progress: stable      Disposition  Final diagnoses:   Atypical chest pain   Stroke-like symptoms     Time reflects when diagnosis was documented in both MDM as applicable and the Disposition within this note     Time User Action Codes Description Comment    1/18/2021 12:14 PM Jonah Lucia Add [R07 89] Atypical chest pain     1/18/2021 12:14 PM Jonah Lucia Add [R29 90] Stroke-like symptoms     1/19/2021 10:16 AM Martina Conteh Add [Z86 69] History of migraine       ED Disposition     ED Disposition Condition Date/Time Comment    Admit Stable Mon Jan 18, 2021 12:14 PM Case was discussed with Reema  and the patient's admission status was agreed to be Admission Status: observation status to the service of Dr René Toth    Follow-up Information     Follow up With Specialties Details Why Contact Soila Green MD Family Medicine Follow up on 1/22/2021 A post hospital follow up appointment has been schedule for Friday, January 22 with Eduardo Jeter  Please arrive at 08:45am for a 09:00 am appointment    If you are unable to keep this appointment  Please call the office to reschedule  [address] [address] [place]  [phone]      Primary Headache Specialist  Follow up please call and schedule an appointment at earliest convenience; recommend by the end of this week              Discharge Medication List as of 1/19/2021 10:26 AM      START taking these medications    Details   atorvastatin (LIPITOR) 10 mg tablet Take 1 tablet (10 mg total) by mouth every evening, Starting Tue 1/19/2021, Until Thu 2/18/2021, Normal      gabapentin (NEURONTIN) 300 mg capsule Take 1 capsule (300 mg total) by mouth 3 (three) times a day, Starting Tue 1/19/2021, Until Thu 2/18/2021, Normal         CONTINUE these medications which have NOT CHANGED    Details   albuterol (2 5 mg/3 mL) 0 083 % nebulizer solution Inhale 2 5 mg as needed, Starting Thu 5/21/2020, Historical Med      albuterol (PROVENTIL HFA,VENTOLIN HFA) 90 mcg/act inhaler as needed, Starting Fri 9/8/2017, Historical Med      amLODIPine (NORVASC) 5 mg tablet Take 5 mg by mouth daily, Starting Thu 1/14/2021, Historical Med      buPROPion (WELLBUTRIN SR) 150 mg 12 hr tablet Take 150 mg by mouth 2 (two) times a day, Starting Thu 12/5/2019, Historical Med      cetirizine (ZyrTEC) 10 mg tablet Take 10 mg by mouth daily, Starting Fri 9/8/2017, Historical Med      diazepam (VALIUM) 5 mg tablet Take 1-2 tablets (5-10 mg total) by mouth every 6 (six) hours as needed (Vertigo) for up to 10 days, Starting Thu 1/14/2021, Until Sun 1/24/2021, Normal      fluticasone (FLONASE) 50 mcg/act nasal spray daily at bedtime, Starting Fri 9/8/2017, Historical Med      meclizine (ANTIVERT) 25 mg tablet Take 1 tablet (25 mg total) by mouth 3 (three) times a day as needed for dizziness, Starting Thu 1/14/2021, Normal      montelukast (SINGULAIR) 10 mg tablet Take 10 mg by mouth daily at bedtime, Starting Wed 2/14/2018, Historical Med      Multiple Vitamins-Minerals (MULTIVITAMIN WITH MINERALS) tablet Take 1 tablet by mouth daily, Historical Med      OMEPRAZOLE PO Take 20 mg by mouth daily, Starting Fri 5/29/2020, Historical Med      phentermine (ADIPEX-P) 37 5 MG tablet Take 37 5 mg by mouth daily, Starting Wed 1/15/2020, Historical Med      topiramate (TOPAMAX) 25 mg tablet Take 150 mg by mouth daily at bedtime , Starting Wed 1/15/2020, Historical Med      venlafaxine (EFFEXOR-XR) 37 5 mg 24 hr capsule Take 37 5 mg by mouth daily, Starting Thu 6/25/2020, Until Fri 6/25/2021, Historical Med      VITAMIN D, CHOLECALCIFEROL, PO daily, Starting Tue 1/19/2016, Historical Med      acetaminophen-codeine (TYLENOL/CODEINE #3) 300-30 MG per tablet Take by mouth as needed, Starting Fri 8/17/2018, Historical Med      aminocaproic acid (AMICAR) 500 mg tablet Take 500 mg by mouth as needed for bleeding , Starting Wed 2/13/2019, Historical Med      EPINEPHrine (EPIPEN) 0 3 mg/0 3 mL SOAJ For a severe reaction: Inject in outer thigh following instructions on package and go to the Emergency room  , Historical Med           No discharge procedures on file      PDMP Review     None          ED Provider  Electronically Signed by           Stan Nunez PA-C  01/18/21 1736 Pro Guzman MD  01/19/21 3471

## 2021-01-18 NOTE — ASSESSMENT & PLAN NOTE
· If MRI positive would start on asa and monitor on platelets after speaking to patient's primary Hematology team  · Hold off for now

## 2021-01-18 NOTE — ASSESSMENT & PLAN NOTE
· Presents to the ED with BP in the 170s  · Does not appear to be in any blood pressure medications as an outpatient  · Will await Neurology recommendation given concern for possible stroke  · Monitor with routine vitals

## 2021-01-18 NOTE — TELEMEDICINE
TeleConsultation - Stroke   Liana Truong 63 y o  female MRN: [id number]  Unit/Bed#: ED 10 Encounter: [id number]      REQUIRED DOCUMENTATION:     1  This service was provided via Telemedicine  2  Provider located at [facility] ER  3  TeleMed provider: Naa Horta MD   4  Identify all parties in room with patient during tele consult:  Patient, daughter Makayla Sanchez  5  After connecting through televideo, patient was identified by name and date of birth and assistant checked wristband  Patient was then informed that this was a Telemedicine visit and that the exam was being conducted confidentially over secure lines  Door closed  Patient acknowledged consent and understanding of privacy and security of the Telemedicine visit, and gave us permission to have the assistant stay in the room in order to assist with the history and to conduct the exam   I informed the patient that I have reviewed their record in Epic and presented the opportunity for them to ask any questions regarding the visit today  The patient agreed to participate  Assessment/Plan   Assessment: Atypical complex migraine  Low risk for acute and acute MI for that matter given lack of hlp history and normal ekg and von willenbrandt syndrome which makes blood thinner  Mri brain completed shortly after stroke alert and no restriction diffusion on mri  There is a punctuate focus of T2 hyperintense signal on the flair sequence  Unclear what to make of this as there can be multiple etiologies  Given clearly face, lue symptoms and no leg involvement, would be unusual for a pontine stroke to also not involve the leg  Further more symptoms started at midnight so we would expect restriction diffusion showing bright region on the dwi sequence if this was indeed an acute cva      Unclear why she has been having daily HAs for the past two weeks, seems like has been frequent event before hand however more intense and frequent the last couple weeks  Denies neck stiffness  She has some tenderness the left side of her neck resulting in slight restriction when turning head to the right however denies HA starting from the neck  Denies head/neck injury  CT head with no edema or early evidence of acute cva  cta head/neck with no lvo or significant stenosis  Lower suspicion for a vasculitis  She is not a great sleeper to begin with, may be playing a role with HAs and HAs may be driving her bp up as she usually is not hypertensive she states  She was in the ER four days ago with vertigo, ct head unremarkable at that time and felt to be bp induced  She currently has no meningeal signs or symptoms  Blurriness in both eyes, worse in the left, no eye pain thus lower suspicion for an optic neuritis  Pt is out of iv tpa window and lower suspicion for acute cva  Plan:   No clinical indication for antiplatelets at this point  Mag sulfate 1 gm iv bid during hospitalization  Continue topamax 150 mg daily  Start gabapentin 300 mg po tid see if it helps  outpt sleep study    History of Present Illness     Reason for Consult / Principal Problem: stroke alert  Patient last known well: midnight  Stroke alert called: 11:09 am  Neurology time of arrival: neurology call back 11:09 am  HPI: Dominic Wiley is a 63 y o   female who presents with sudden left sided chest pain while sitting down watching TV around midnight  She then noticed numbness/tingling left side of her face, then went down the lue and slight heaviness  So symptoms in LEs  She says headache mild then  Chest pain much better but no improvement or worsening of left sided symptoms  HA now up to 8/10, pressure, left side of head  No allergies  Last Monday had mild HA, some chest pain, tingling left side of face, mild numbness lue then went away 15 min later and no residual symptoms      History of chest pain for months on and off however more intense and frequent for the past two weeks along with the headaches  No infectious symptoms or sick contacts  History of left shoulder surgery where at baseline she cannot elevate the extremity as well  A few weeks ago her neurologist increased her topamax to 150 mg says didn't help  She was given prednisone last week as well, didn't help  She very infrequently uses over the counter meds given history of Von willendbrands disease  She last used tylenol last Monday  She infrequently gets migraine HAs  HAs she gets usually deep pressure with mild nausea, light sensitivity, blurriness as opposed to pulsing, severe nausea/light sensitivity that she does get with the migraines  Yesterday during the day the HA was more intense than light night and she says she was more nauseous and vomiting      Consult to Neurology  Consult performed by: Dalton Molina MD  Consult ordered by: Jorge Juarez PA-C          Review of Systems  Per 12 point review, HA, chest pain, neck pain, blurry vision, nausea, light sensitivity, trouble falling/staying asleep, rest negative    Historical Information   Past Medical History:   Diagnosis Date    Heart murmur     Kidney stones     Von Willebrand disease ([facility] [address] )      Past Surgical History:   Procedure Laterality Date    APPENDECTOMY      BREAST LUMPECTOMY Left     CARPAL TUNNEL RELEASE Bilateral     CHOLECYSTECTOMY      HYSTERECTOMY      KNEE SURGERY Left     PARTIAL HYSTERECTOMY      ROTATOR CUFF REPAIR Left     TONSILLECTOMY       Social History   Social History     Substance and Sexual Activity   Alcohol Use Not Currently     Social History     Substance and Sexual Activity   Drug Use Never     E-Cigarette/Vaping    E-Cigarette Use Never User      E-Cigarette/Vaping Substances     Social History     Tobacco Use   Smoking Status Former Smoker    Types: Cigarettes   Smokeless Tobacco Never Used     Family History: non-contributory      Meds/Allergies   all current active meds have been reviewed    Allergies   Allergen Reactions    Erythromycin Vomiting    Morphine Palpitations       Objective   Vitals:Blood pressure (!) 173/89, pulse 65, temperature (!) 97 °F (36 1 °C), temperature source Temporal, resp  rate (!) 36, height 5' 7" (1 702 m), weight 79 4 kg (175 lb), SpO2 99 %  ,Body mass index is 27 41 kg/m²  Physical Exam   General: no acute distress  Extremities: no visible deformities    Neurologic Exam   MS: Alert and orientedX3  No aphasia  CN 2-12: left v1-v3 decreased light touch  Motor: per nurse exam under my video supervision, 5 power grossly except lue 4+ with trace weakness  Sensory: lue slightly decreased to light touch, rest of extremities intact  Coordination: finger to nose, heel to shin no dysmetria or ataxia    NIHSS:  1a Level of Consciousness: 0 = Alert   1b  LOC Questions: 0 = Answers both correctly   1c  LOC Commands: 0 = Obeys both correctly   2  Best Gaze: 0 = Normal   3  Visual: 0 = No visual field loss   4  Facial Palsy: 0=Normal symmetric movement   5a  Motor Right Arm: 0=No drift, limb holds 90 (or 45) degrees for full 10 seconds   5b  Motor Left Arm: 0=No drift, limb holds 90 (or 45) degrees for full 10 seconds   6a  Motor Right Le=No drift, limb holds 90 (or 45) degrees for full 10 seconds   6b  Motor Left Le=No drift, limb holds 90 (or 45) degrees for full 10 seconds   7  Limb Ataxia:  0=Absent   8  Sensory: 1=Mild to moderate sensory loss; patient feels pinprick is less sharp or is dull on the affected side; there is a loss of superficial pain with pinprick but patient is aware She is being touched   9  Best Language:  0=No aphasia, normal   10  Dysarthria: 0=Normal articulation   11  Extinction and Inattention (formerly Neglect): 0=No abnormality   Total Score: 1       Modified [place] Score:  1 (No significant disability  Able to carry out all usual activities, despite some symptoms)    Lab Results: I have personally reviewed pertinent reports      Imaging Studies: I have personally reviewed pertinent films in PACS  EKG, Pathology, and Other Studies: I have personally reviewed pertinent films in PACS      Counseling / Coordination of Care  34 min critical care time spent

## 2021-01-18 NOTE — ED ATTENDING ATTESTATION
1/18/2021  I, Nini Jara MD, saw and evaluated the patient  I have discussed the patient with the resident/non-physician practitioner and agree with the resident's/non-physician practitioner's findings, Plan of Care, and MDM as documented in the resident's/non-physician practitioner's note, except where noted  All available labs and Radiology studies were reviewed  I was present for key portions of any procedure(s) performed by the resident/non-physician practitioner and I was immediately available to provide assistance  At this point I agree with the current assessment done in the Emergency Department      ED Course         Critical Care Time  Procedures

## 2021-01-18 NOTE — LETTER
[facility]  [address]  [facility] [address]  Dept: [phone]    January 19, 2021     Patient: Harmony Mak   YOB: 1965   Date of Visit: 1/18/2021       To Whom it May Concern:    Sonia Aldridge is under my professional care  She was seen in the hospital from 1/18/2021   to 01/19/21  She may return to work on Friday January 22nd, 2021 without limitations  If you have any questions or concerns, please don't hesitate to call           Sincerely,          ANAY Bowser

## 2021-01-18 NOTE — ASSESSMENT & PLAN NOTE
· Which has been uncontrolled and worsening over the past 2 weeks  · Currently being managed by headache specialist  · Status post steroids as well as increase in Topamax  · Pending MRI with new meds as noted above

## 2021-01-18 NOTE — PLAN OF CARE
Problem: Potential for Falls  Goal: Patient will remain free of falls  Description: INTERVENTIONS:  - Assess patient frequently for physical needs  -  Identify cognitive and physical deficits and behaviors that affect risk of falls  -  [place] fall precautions as indicated by assessment   - Educate patient/family on patient safety including physical limitations  - Instruct patient to call for assistance with activity based on assessment  - Modify environment to reduce risk of injury  - Consider OT/PT consult to assist with strengthening/mobility  Outcome: Progressing     Problem: Neurological Deficit  Goal: Neurological status is stable or improving  Description: Interventions:  - Monitor and assess patient's level of consciousness, motor function, sensory function, and level of assistance needed for ADLs  - Monitor and report changes from baseline  Collaborate with interdisciplinary team to initiate plan and implement interventions as ordered  - Provide and maintain a safe environment  - Consider seizure precautions  - Consider fall precautions  - Consider aspiration precautions  - Consider bleeding precautions  Outcome: Progressing     Problem: Activity Intolerance/Impaired Mobility  Goal: Mobility/activity is maintained at optimum level for patient  Description: Interventions:  - Assess and monitor patient  barriers to mobility and need for assistive/adaptive devices  - Assess patient's emotional response to limitations  - Collaborate with interdisciplinary team and initiate plans and interventions as ordered  - Encourage independent activity per ability   - Maintain proper body alignment  - Perform active/passive rom as tolerated/ordered    - Plan activities to conserve energy   - Turn patient as appropriate  Outcome: Progressing     Problem: NEUROSENSORY - ADULT  Goal: Achieves stable or improved neurological status  Description: INTERVENTIONS  - Monitor and report changes in neurological status  - Monitor vital signs such as temperature, blood pressure, glucose, and any other labs ordered   - Initiate measures to prevent increased intracranial pressure  - Monitor for seizure activity and implement precautions if appropriate      Outcome: Progressing  Goal: Remains free of injury related to seizures activity  Description: INTERVENTIONS  - Maintain airway, patient safety  and administer oxygen as ordered  - Monitor patient for seizure activity, document and report duration and description of seizure to physician/advanced practitioner  - If seizure occurs,  ensure patient safety during seizure  - Reorient patient post seizure  - Seizure pads on all 4 side rails  - Instruct patient/family to notify RN of any seizure activity including if an aura is experienced  - Instruct patient/family to call for assistance with activity based on nursing assessment  - Administer anti-seizure medications if ordered    Outcome: Progressing  Goal: Achieves maximal functionality and self care  Description: INTERVENTIONS  - Monitor swallowing and airway patency with patient fatigue and changes in neurological status  - Encourage and assist patient to increase activity and self care     - Encourage visually impaired, hearing impaired and aphasic patients to use assistive/communication devices  Outcome: Progressing     Problem: PAIN - ADULT  Goal: Verbalizes/displays adequate comfort level or baseline comfort level  Description: Interventions:  - Encourage patient to monitor pain and request assistance  - Assess pain using appropriate pain scale  - Administer analgesics based on type and severity of pain and evaluate response  - Implement non-pharmacological measures as appropriate and evaluate response  - Consider cultural and social influences on pain and pain management  - Notify physician/advanced practitioner if interventions unsuccessful or patient reports new pain  Outcome: Progressing     Problem: INFECTION - ADULT  Goal: Absence or prevention of progression during hospitalization  Description: INTERVENTIONS:  - Assess and monitor for signs and symptoms of infection  - Monitor lab/diagnostic results  - Monitor all insertion sites, i e  indwelling lines, tubes, and drains  - Monitor endotracheal if appropriate and nasal secretions for changes in amount and color  - [place] appropriate cooling/warming therapies per order  - Administer medications as ordered  - Instruct and encourage patient and family to use good hand hygiene technique  - Identify and instruct in appropriate isolation precautions for identified infection/condition  Outcome: Progressing     Problem: SAFETY ADULT  Goal: Patient will remain free of falls  Description: INTERVENTIONS:  - Assess patient frequently for physical needs  -  Identify cognitive and physical deficits and behaviors that affect risk of falls    -  [place] fall precautions as indicated by assessment   - Educate patient/family on patient safety including physical limitations  - Instruct patient to call for assistance with activity based on assessment  - Modify environment to reduce risk of injury  - Consider OT/PT consult to assist with strengthening/mobility  Outcome: Progressing  Goal: Maintain or return to baseline ADL function  Description: INTERVENTIONS:  -  Assess patient's ability to carry out ADLs; assess patient's baseline for ADL function and identify physical deficits which impact ability to perform ADLs (bathing, care of mouth/teeth, toileting, grooming, dressing, etc )  - Assess/evaluate cause of self-care deficits   - Assess range of motion  - Assess patient's mobility; develop plan if impaired  - Assess patient's need for assistive devices and provide as appropriate  - Encourage maximum independence but intervene and supervise when necessary  - Involve family in performance of ADLs  - Assess for home care needs following discharge   - Consider OT consult to assist with ADL evaluation and planning for discharge  - Provide patient education as appropriate  Outcome: Progressing  Goal: Maintain or return mobility status to optimal level  Description: INTERVENTIONS:  - Assess patient's baseline mobility status (ambulation, transfers, stairs, etc )    - Identify cognitive and physical deficits and behaviors that affect mobility  - Identify mobility aids required to assist with transfers and/or ambulation (gait belt, sit-to-stand, lift, walker, cane, etc )  - [place] fall precautions as indicated by assessment  - Record patient progress and toleration of activity level on Mobility SBAR; progress patient to next Phase/Stage  - Instruct patient to call for assistance with activity based on assessment  - Consider rehabilitation consult to assist with strengthening/weightbearing, etc   Outcome: Progressing     Problem: DISCHARGE PLANNING  Goal: Discharge to home or other facility with appropriate resources  Description: INTERVENTIONS:  - Identify barriers to discharge w/patient and caregiver  - Arrange for needed discharge resources and transportation as appropriate  - Identify discharge learning needs (meds, wound care, etc )  - Arrange for interpretive services to assist at discharge as needed  - Refer to Case Management Department for coordinating discharge planning if the patient needs post-hospital services based on physician/advanced practitioner order or complex needs related to functional status, cognitive ability, or social support system  Outcome: Progressing     Problem: Knowledge Deficit  Goal: Patient/family/caregiver demonstrates understanding of disease process, treatment plan, medications, and discharge instructions  Description: Complete learning assessment and assess knowledge base    Interventions:  - Provide teaching at level of understanding  - Provide teaching via preferred learning methods  Outcome: Progressing

## 2021-01-18 NOTE — RESPIRATORY THERAPY NOTE
RT Protocol Note  Abisai Colby 63 y o  female MRN: [id number]  Unit/Bed#: -01 Encounter: [id number]    Assessment    Principal Problem:    Stroke-like symptoms  Active Problems:    Von Willebrand disease ([facility] [address] )    Essential hypertension    History of migraine      Home Pulmonary Medications:  Albuterol PRN       Past Medical History:   Diagnosis Date    Heart murmur     Kidney stones     Von Willebrand disease ([facility] [address] )      Social History     Socioeconomic History    Marital status: Single     Spouse name: None    Number of children: None    Years of education: None    Highest education level: None   Occupational History    None   Social Needs    Financial resource strain: None    Food insecurity     Worry: None     Inability: None    Transportation needs     Medical: None     Non-medical: None   Tobacco Use    Smoking status: Current Some Day Smoker     Years: 25 00     Types: Cigarettes    Smokeless tobacco: Current User   Substance and Sexual Activity    Alcohol use: Not Currently    Drug use: Never    Sexual activity: None   Lifestyle    Physical activity     Days per week: None     Minutes per session: None    Stress: None   Relationships    Social connections     Talks on phone: None     Gets together: None     Attends Gnosticist service: None     Active member of club or organization: None     Attends meetings of clubs or organizations: None     Relationship status: None    Intimate partner violence     Fear of current or ex partner: None     Emotionally abused: None     Physically abused: None     Forced sexual activity: None   Other Topics Concern    None   Social History Narrative    None       Subjective         Objective    Physical Exam:   Assessment Type: (P) Assess only  General Appearance: (P) Alert, Awake  Respiratory Pattern: (P) Normal  Chest Assessment: (P) Chest expansion symmetrical  Bilateral Breath Sounds: (P) Diminished  Cough: (P) Non-productive  O2 Device: (P) Ra    Vitals:  Blood pressure 153/80, pulse 70, temperature 97 9 °F (36 6 °C), resp  rate 18, height 5' 7" (1 702 m), weight 95 3 kg (210 lb 1 6 oz), SpO2 99 %  Imaging and other studies: I have personally reviewed pertinent reports  O2 Device: (P) Ra     Plan    Respiratory Plan: (P) Home Bronchodilator Patient pathway        Resp Comments: (P) Pt admitted with stroke like symptoms  Pt hs hxof Asthma takes albuterol as needed  Pt has no resp distress at this time  Will follow home med schedule  No O2 needed

## 2021-01-18 NOTE — H&P
H&P- Abby Credit 1965, 63 y o  female MRN: [id number]    Unit/Bed#: ED 10 Encounter: [id number]    Primary Care Provider: Courtney Darby MD   Date and time admitted to hospital: 1/18/2021 10:59 AM    * Stroke-like symptoms  Assessment & Plan  Background:  Presented to the ED with complaints of left-sided chest pain, left-sided facial and arm numbness  Of note was recently seen in the ED on 01/14 for intermittent dizziness, spinning, nausea associated with headaches  At that time she was also noted to have significantly elevated blood pressure  Also complains of significant headaches for the past 2 weeks  o Differential includes complex migraine versus uncontrolled hypertension versus acute cva    Chest x-ray unremarkable   CTA head/neck with no cervical or intracranial large vessel occlusion    Mild bilateral or   MRI brain ordered; if negative would treat for complicated migraine   ECHO ordered    Stroke pathway   DVT prophylaxis    PT/OT/ST   Troponin negative X1; for completeness will trend x2   Hgb A1C and lipid panel ordered   Hold off on any aspirin given Von Willebrand's disease   Statin ordered  [facility] Neurology consult; recommendations appreciated  o Magnesium ordered x1  o Pending MRI unremarkable would initiate 100 mg TID of gabapentin for further control of migraine and if does not work after 100 mg would increase to 300 mg TID      Essential hypertension  Assessment & Plan  · Presents to the ED with BP in the 170s  · Does not appear to be in any blood pressure medications as an outpatient  · Will await Neurology recommendation given concern for possible stroke  · Monitor with routine vitals    Von Willebrand disease ([facility] [address] )  Assessment & Plan  · If MRI positive would start on asa and monitor on platelets after speaking to patient's primary Hematology team  · Hold off for now    History of migraine  Assessment & Plan  · Which has been uncontrolled and worsening over the past 2 weeks  · Currently being managed by headache specialist  · Status post steroids as well as increase in Topamax  · Pending MRI with new meds as noted above    VTE Prophylaxis: On hold in the setting of von Willebrand's disease  / sequential compression device   Code Status:  Full code  Discussion with family: patient reports that she called her daughter today however would like her updated on 1/19 on daily basis per routine  Anticipated Length of Stay:  Patient will be admitted on an Observation basis with an anticipated length of stay of  < 2 midnights  Justification for Hospital Stay:  Stroke pathway    Total Time for Visit, including Counseling / Coordination of Care: 45 minutes  Greater than 50% of this total time spent on direct patient counseling and coordination of care  Chief Complaint:   Chest pain,  limb and facial numbness as well as headache    History of Present Illness:    Seabron Sicard is a 63 y o  female with past medical history migraines, hypertension, and Skippy Bourgeois Willebrand's disease who presents with left limb numbness and tingling as well as left facial numbness and tingling, chest pain, and severe headache  Patient reports that she has been having a worsening headache over the past couple weeks  Recently started on amlodipine as well as increased dose of Topamax  She regularly follows outpatient with headache specialist   Given patient presentation she was admitted for Stroke pathway workup  MRI completed in does not reveal any acute infarct  However highly suspect secondary to complex migraine  Per Neurology evaluation and recommendation to be given and gabapentin 100 mg t i d  And monitoring for improvement in symptomatology  Patient reports she has not had a migraine in approximately 20 years and this does not seem like that however she said that it usually never is as it seems    Rest of workup as above    Review of Systems:    Review of Systems   Constitutional: Negative for activity change, appetite change, chills, fatigue and fever  HENT: Negative  Respiratory: Negative for cough, shortness of breath and wheezing  Cardiovascular: Negative for chest pain, palpitations and leg swelling  Gastrointestinal: Negative  Genitourinary: Negative  Musculoskeletal: Positive for back pain (pmhx of sciatica; chronic)  Neurological: Positive for dizziness, numbness and headaches  Negative for tremors, seizures, syncope, facial asymmetry and speech difficulty  Past Medical and Surgical History:     Past Medical History:   Diagnosis Date    Heart murmur     Kidney stones     Von Willebrand disease ([facility] [address] )        Past Surgical History:   Procedure Laterality Date    APPENDECTOMY      BREAST LUMPECTOMY Left     CARPAL TUNNEL RELEASE Bilateral     CHOLECYSTECTOMY      HYSTERECTOMY      KNEE SURGERY Left     PARTIAL HYSTERECTOMY      ROTATOR CUFF REPAIR Left     TONSILLECTOMY         Meds/Allergies:    Prior to Admission medications    Medication Sig Start Date End Date Taking?  Authorizing Provider   acetaminophen-codeine (TYLENOL/CODEINE #3) 300-30 MG per tablet Take by mouth as needed 8/17/18   Historical Provider, MD   albuterol (2 5 mg/3 mL) 0 083 % nebulizer solution Inhale 2 5 mg as needed 5/21/20   Historical Provider, MD   albuterol (PROVENTIL HFA,VENTOLIN HFA) 90 mcg/act inhaler as needed 9/8/17   Historical Provider, MD   aminocaproic acid (AMICAR) 500 mg tablet Take 500 mg by mouth as needed 2/13/19   Historical Provider, MD   amLODIPine (NORVASC) 5 mg tablet Take 5 mg by mouth daily 1/14/21   Historical Provider, MD   buPROPion [facility] SR) 150 mg 12 hr tablet Take 150 mg by mouth 2 (two) times a day 12/5/19   Historical Provider, MD   cetirizine (ZyrTEC) 10 mg tablet Take 10 mg by mouth daily 9/8/17   Historical Provider, MD   diazepam (VALIUM) 5 mg tablet Take 1-2 tablets (5-10 mg total) by mouth every 6 (six) hours as needed (Vertigo) for up to 10 days 1/14/21 1/24/21  Julio Vazquez DO   EPINEPHrine (EPIPEN) 0 3 mg/0 3 mL SOAJ For a severe reaction: Inject in outer thigh following instructions on package and go to the Emergency room  8/14/19   Historical Provider, MD   fluticasone Vicki Pal) 50 mcg/act nasal spray daily at bedtime 9/8/17   Historical Provider, MD   meclizine (ANTIVERT) 25 mg tablet Take 1 tablet (25 mg total) by mouth 3 (three) times a day as needed for dizziness 1/14/21   Julio Vazquez DO   montelukast (SINGULAIR) 10 mg tablet Take 10 mg by mouth daily at bedtime 2/14/18   Historical Provider, MD   Multiple Vitamins-Minerals (MULTIVITAMIN WITH MINERALS) tablet Take 1 tablet by mouth daily    Historical Provider, MD   OMEPRAZOLE PO Take 20 mg by mouth daily 5/29/20   Historical Provider, MD   phentermine (ADIPEX-P) 37 5 MG tablet Take 37 5 mg by mouth daily 1/15/20   Historical Provider, MD   topiramate (TOPAMAX) 25 mg tablet Take 100 mg by mouth daily at bedtime 1/15/20   Historical Provider, MD   venlafaxine (EFFEXOR-XR) 37 5 mg 24 hr capsule Take 37 5 mg by mouth daily 6/25/20 6/25/21  Historical Provider, MD   VITAMIN D, CHOLECALCIFEROL, PO daily 1/19/16   Historical Provider, MD     I have reviewed home medications using allscripts  Allergies: Allergies   Allergen Reactions    Erythromycin Vomiting    Morphine Palpitations       Social History:     Marital Status: Single   Substance Use History:   Social History     Substance and Sexual Activity   Alcohol Use Not Currently     Social History     Tobacco Use   Smoking Status Former Smoker    Types: Cigarettes   Smokeless Tobacco Never Used     Social History     Substance and Sexual Activity   Drug Use Never       Family History:    History reviewed  No pertinent family history      Physical Exam:     Vitals:   Blood Pressure: (!) 173/89 (01/18/21 1215)  Pulse: 65 (01/18/21 1215)  Temperature: (!) 97 °F (36 1 °C) (01/18/21 1105)  Temp Source: Temporal (01/18/21 1105)  Respirations: (!) 36 (01/18/21 1215)  Height: 5' 7" (170 2 cm) (01/18/21 1101)  Weight - Scale: 79 4 kg (175 lb) (01/18/21 1101)  SpO2: 99 % (01/18/21 1215)    Physical Exam  Vitals signs and nursing note reviewed  Constitutional:       General: She is not in acute distress  Appearance: Normal appearance  She is well-developed  She is not ill-appearing  HENT:      Head: Normocephalic and atraumatic  Eyes:      Conjunctiva/sclera: Conjunctivae normal    Neck:      Musculoskeletal: Neck supple  Cardiovascular:      Rate and Rhythm: Normal rate and regular rhythm  Heart sounds: No murmur  Pulmonary:      Effort: Pulmonary effort is normal  No respiratory distress  Breath sounds: Normal breath sounds  Abdominal:      Palpations: Abdomen is soft  Tenderness: There is no abdominal tenderness  Skin:     General: Skin is warm and dry  Capillary Refill: Capillary refill takes less than 2 seconds  Neurological:      Mental Status: She is alert and oriented to person, place, and time  Mental status is at baseline  Psychiatric:         Mood and Affect: Mood normal          Behavior: Behavior normal          Judgment: Judgment normal            Additional Data:     Lab Results: I have personally reviewed pertinent reports        Results from last 7 days   Lab Units 01/18/21  1110 01/14/21  2059   WBC Thousand/uL 13 30* 12 81*   HEMOGLOBIN g/dL 12 7 13 2   HEMATOCRIT % 40 8 41 7   PLATELETS Thousands/uL 254 299   NEUTROS PCT %  --  60   LYMPHS PCT %  --  32   MONOS PCT %  --  6   EOS PCT %  --  1     Results from last 7 days   Lab Units 01/18/21  1110   SODIUM mmol/L 139   POTASSIUM mmol/L 3 1*   CHLORIDE mmol/L 103   CO2 mmol/L 26   BUN mg/dL 11   CREATININE mg/dL 0 93   ANION GAP mmol/L 10   CALCIUM mg/dL 8 8   ALBUMIN g/dL 3 6   TOTAL BILIRUBIN mg/dL 0 24   ALK PHOS U/L 86   ALT U/L 27   AST U/L 11   GLUCOSE RANDOM mg/dL 90     Results from last 7 days   Lab Units 01/18/21  1110   INR  0 90 Results from last 7 days   Lab Units 01/18/21  1108   POC GLUCOSE mg/dl 127               Imaging: I have personally reviewed pertinent reports  X-ray chest 1 view portable   Final Result by Gregg Maldonado MD (01/18 1202)      No acute cardiopulmonary disease  Workstation performed: [id number]         CT stroke alert brain   Final Result by Miguel Gomez MD (01/18 1137)      No acute intracranial abnormality  Findings were directly discussed with PER ARCHIBALD on 1/18/2021 11:36 AM       Workstation performed: [id number]         CTA stroke alert (head/neck)   Final Result by Miguel Gomez MD (01/18 1137)      No cervical or intracranial large vessel occlusion  Mild bilateral cervical carotid atherosclerotic disease with less than 50% stenosis at the carotid bulbs  No intracranial aneurysm  Anatomic variation of a near fetal left PCA  No pulmonary parenchymal changes to suggest COVID19 infection  Findings were directly discussed with PER ARCHIBALD on 1/18/2021 11:27 AM                      Workstation performed: [id number]         MRI brain wo contrast    (Results Pending)       EKG, Pathology, and Other Studies Reviewed on Admission:   · EKG:  Reviewed from ED    Allscripts / Epic Records Reviewed: Yes     ** Please Note: This note has been constructed using a voice recognition system   **

## 2021-01-18 NOTE — ASSESSMENT & PLAN NOTE
Background:  Presented to the ED with complaints of left-sided chest pain, left-sided facial and arm numbness  Of note was recently seen in the ED on 01/14 for intermittent dizziness, spinning, nausea associated with headaches  At that time she was also noted to have significantly elevated blood pressure  Also complains of significant headaches for the past 2 weeks  o Differential includes complex migraine versus uncontrolled hypertension versus acute cva    Chest x-ray unremarkable   CTA head/neck with no cervical or intracranial large vessel occlusion  Mild bilateral or cervical carotid atherosclerotic disease with less than 50% stenosis at the carotid bulbs  No intracranial aneurysm  Anatomic variation of a near fetal left PCA   CT head with no acute intracranial abnormality     MRI brain ordered; if negative would treat for complicated migraine   ECHO ordered    Stroke pathway   PT/OT/ST   Troponin negative X1; for completeness will trend x2   Hgb A1C and lipid panel ordered   Hold off on any aspirin given Von Willebrand's disease   Statin ordered  [facility] Neurology consult; recommendations appreciated  o Magnesium ordered x1  o Pending MRI unremarkable would initiate 100 mg TID of gabapentin for further control of migraine and if does not work after 100 mg would increase to 300 mg TID

## 2021-01-19 ENCOUNTER — APPOINTMENT (OUTPATIENT)
Dept: NON INVASIVE DIAGNOSTICS | Facility: HOSPITAL | Age: 56
End: 2021-01-19
Payer: COMMERCIAL

## 2021-01-19 VITALS
TEMPERATURE: 98.1 F | OXYGEN SATURATION: 99 % | WEIGHT: 210.1 LBS | DIASTOLIC BLOOD PRESSURE: 77 MMHG | SYSTOLIC BLOOD PRESSURE: 135 MMHG | HEART RATE: 63 BPM | BODY MASS INDEX: 32.98 KG/M2 | HEIGHT: 67 IN | RESPIRATION RATE: 18 BRPM

## 2021-01-19 LAB
ALBUMIN SERPL BCP-MCNC: 3.3 G/DL (ref 3.5–5)
ALP SERPL-CCNC: 80 U/L (ref 46–116)
ALT SERPL W P-5'-P-CCNC: 24 U/L (ref 12–78)
ANION GAP SERPL CALCULATED.3IONS-SCNC: 10 MMOL/L (ref 4–13)
AST SERPL W P-5'-P-CCNC: 11 U/L (ref 5–45)
BASOPHILS # BLD AUTO: 0.06 THOUSANDS/ΜL (ref 0–0.1)
BASOPHILS NFR BLD AUTO: 1 % (ref 0–1)
BILIRUB SERPL-MCNC: 0.44 MG/DL (ref 0.2–1)
BUN SERPL-MCNC: 12 MG/DL (ref 5–25)
CALCIUM ALBUM COR SERPL-MCNC: 8.8 MG/DL (ref 8.3–10.1)
CALCIUM SERPL-MCNC: 8.2 MG/DL (ref 8.3–10.1)
CHLORIDE SERPL-SCNC: 105 MMOL/L (ref 100–108)
CHOLEST SERPL-MCNC: 203 MG/DL (ref 50–200)
CO2 SERPL-SCNC: 23 MMOL/L (ref 21–32)
CREAT SERPL-MCNC: 0.83 MG/DL (ref 0.6–1.3)
EOSINOPHIL # BLD AUTO: 0.09 THOUSAND/ΜL (ref 0–0.61)
EOSINOPHIL NFR BLD AUTO: 1 % (ref 0–6)
ERYTHROCYTE [DISTWIDTH] IN BLOOD BY AUTOMATED COUNT: 13.5 % (ref 11.6–15.1)
EST. AVERAGE GLUCOSE BLD GHB EST-MCNC: 117 MG/DL
GFR SERPL CREATININE-BSD FRML MDRD: 79 ML/MIN/1.73SQ M
GLUCOSE SERPL-MCNC: 98 MG/DL (ref 65–140)
HBA1C MFR BLD: 5.7 %
HCT VFR BLD AUTO: 39.6 % (ref 34.8–46.1)
HDLC SERPL-MCNC: 39 MG/DL
HGB BLD-MCNC: 12.3 G/DL (ref 11.5–15.4)
IMM GRANULOCYTES # BLD AUTO: 0.08 THOUSAND/UL (ref 0–0.2)
IMM GRANULOCYTES NFR BLD AUTO: 1 % (ref 0–2)
LDLC SERPL CALC-MCNC: 109 MG/DL (ref 0–100)
LYMPHOCYTES # BLD AUTO: 3.1 THOUSANDS/ΜL (ref 0.6–4.47)
LYMPHOCYTES NFR BLD AUTO: 29 % (ref 14–44)
MAGNESIUM SERPL-MCNC: 2.3 MG/DL (ref 1.6–2.6)
MCH RBC QN AUTO: 26.2 PG (ref 26.8–34.3)
MCHC RBC AUTO-ENTMCNC: 31.1 G/DL (ref 31.4–37.4)
MCV RBC AUTO: 84 FL (ref 82–98)
MONOCYTES # BLD AUTO: 0.67 THOUSAND/ΜL (ref 0.17–1.22)
MONOCYTES NFR BLD AUTO: 6 % (ref 4–12)
NEUTROPHILS # BLD AUTO: 6.72 THOUSANDS/ΜL (ref 1.85–7.62)
NEUTS SEG NFR BLD AUTO: 62 % (ref 43–75)
NRBC BLD AUTO-RTO: 0 /100 WBCS
PHOSPHATE SERPL-MCNC: 3.4 MG/DL (ref 2.7–4.5)
PLATELET # BLD AUTO: 223 THOUSANDS/UL (ref 149–390)
PMV BLD AUTO: 11.2 FL (ref 8.9–12.7)
POTASSIUM SERPL-SCNC: 3.7 MMOL/L (ref 3.5–5.3)
PROT SERPL-MCNC: 7 G/DL (ref 6.4–8.2)
RBC # BLD AUTO: 4.69 MILLION/UL (ref 3.81–5.12)
SODIUM SERPL-SCNC: 138 MMOL/L (ref 136–145)
TRIGL SERPL-MCNC: 273 MG/DL
WBC # BLD AUTO: 10.72 THOUSAND/UL (ref 4.31–10.16)

## 2021-01-19 PROCEDURE — 93306 TTE W/DOPPLER COMPLETE: CPT

## 2021-01-19 PROCEDURE — 84100 ASSAY OF PHOSPHORUS: CPT | Performed by: NURSE PRACTITIONER

## 2021-01-19 PROCEDURE — 83036 HEMOGLOBIN GLYCOSYLATED A1C: CPT | Performed by: NURSE PRACTITIONER

## 2021-01-19 PROCEDURE — 99217 PR OBSERVATION CARE DISCHARGE MANAGEMENT: CPT | Performed by: NURSE PRACTITIONER

## 2021-01-19 PROCEDURE — 85025 COMPLETE CBC W/AUTO DIFF WBC: CPT | Performed by: NURSE PRACTITIONER

## 2021-01-19 PROCEDURE — 80053 COMPREHEN METABOLIC PANEL: CPT | Performed by: NURSE PRACTITIONER

## 2021-01-19 PROCEDURE — 93306 TTE W/DOPPLER COMPLETE: CPT | Performed by: INTERNAL MEDICINE

## 2021-01-19 PROCEDURE — 80061 LIPID PANEL: CPT | Performed by: NURSE PRACTITIONER

## 2021-01-19 PROCEDURE — 83735 ASSAY OF MAGNESIUM: CPT | Performed by: NURSE PRACTITIONER

## 2021-01-19 RX ORDER — ATORVASTATIN CALCIUM 10 MG/1
10 TABLET, FILM COATED ORAL EVERY EVENING
Qty: 30 TABLET | Refills: 0 | Status: SHIPPED | OUTPATIENT
Start: 2021-01-19 | End: 2021-03-08

## 2021-01-19 RX ORDER — GABAPENTIN 300 MG/1
300 CAPSULE ORAL 3 TIMES DAILY
Status: DISCONTINUED | OUTPATIENT
Start: 2021-01-19 | End: 2021-01-19 | Stop reason: HOSPADM

## 2021-01-19 RX ORDER — GABAPENTIN 300 MG/1
300 CAPSULE ORAL 3 TIMES DAILY
Qty: 90 CAPSULE | Refills: 0 | Status: SHIPPED | OUTPATIENT
Start: 2021-01-19 | End: 2021-03-08

## 2021-01-19 RX ADMIN — AMLODIPINE BESYLATE 5 MG: 5 TABLET ORAL at 08:36

## 2021-01-19 RX ADMIN — ACETAMINOPHEN 650 MG: 325 TABLET ORAL at 05:23

## 2021-01-19 RX ADMIN — VENLAFAXINE HYDROCHLORIDE 37.5 MG: 37.5 CAPSULE, EXTENDED RELEASE ORAL at 08:35

## 2021-01-19 RX ADMIN — SODIUM CHLORIDE, SODIUM LACTATE, POTASSIUM CHLORIDE, AND CALCIUM CHLORIDE 75 ML/HR: .6; .31; .03; .02 INJECTION, SOLUTION INTRAVENOUS at 05:23

## 2021-01-19 RX ADMIN — BUPROPION 150 MG: 150 TABLET, EXTENDED RELEASE ORAL at 08:35

## 2021-01-19 RX ADMIN — GABAPENTIN 100 MG: 100 CAPSULE ORAL at 08:36

## 2021-01-19 RX ADMIN — PANTOPRAZOLE SODIUM 20 MG: 20 TABLET, DELAYED RELEASE ORAL at 05:23

## 2021-01-19 NOTE — OCCUPATIONAL THERAPY NOTE
Occupational Therapy Screen     Patient Name: Farzad Marcos  QTHVU'L Date: 1/19/2021  Problem List  Principal Problem:    Stroke-like symptoms  Active Problems:    Von Willebrand disease ([address] )    Essential hypertension    History of migraine         OT orders received  Chart review completed  Pt admitted to [address] [place] [address] under observation 1/18/2021 d/t experiencing stroke-like symptoms  Pt reports being completely independent with ADLs, IADLs, functional ambulation and community mobility + driving  @I  Pt reports feeling at baseline at this time and has no concerns for returning home  Pt reports the only thing that is "different" is the sensation in L hand  Continues to be functional, just "diferent than my right"  Pt with no other deficits noted at this time  D/c OT services effective 1/19/2021  Should new concerns arise, please re-consult          01/19/21 0999   Note Type   Note type Screen   Pain Assessment   Pain Assessment Tool 0-10   Pain Score No Pain       Brandon Win, OTR/L

## 2021-01-19 NOTE — SOCIAL WORK
Current LOS 1 day  CM Consult for Ischemic Stroke and OP Resources  CM completed chart review  Pt case discussed with attending [facility] [address]  Pt OBS d/t stroke-like symptoms  Neurology consulted completed  Ruled most likely Atypical complex migraine  Medication recommendations made  PT/OT/Speech consults still pending  CM met with patient at the bedside,baseline information was obtained  CM discussed the role of CM in helping the patient develop a discharge plan and assist the patient in carry out their plan  Pt lives alone susan 2 SH, 0 BIGG and 10-12 steps to the 2nd floor bedroom and bathroom  Home does not have a bathroom on the first floor  Pt completes ADLs independently  Pt ambulates independently  No AD  Pt does have a nebulizer at home  Pt drives  Pt is employed  Pt has no hx of STR or HHC  Pt denies hx of MH or D&A tx  PCP: Dr Courtney Darby [facility])   Preferred Pharmacy: [facility] in [place]  Contact: Marilynn Thakur (daughter) [phone]  No formal POA  Pt states daughter would assist with decisions  Pt daughter will transport home at time of d/c  CM will continue to follow pt medical course and assist with d/c planning as needed

## 2021-01-19 NOTE — DISCHARGE INSTRUCTIONS
Hyperlipidemia   WHAT YOU NEED TO KNOW:   Hyperlipidemia is a high level of lipids (fats) in your blood  These lipids include cholesterol or triglycerides  Lipids are made by your body  They also come from the foods you eat  Your body needs lipids to work properly, but high levels increase your risk for heart disease, heart attack, and stroke  DISCHARGE INSTRUCTIONS:   Call 911 for any of the following:   · You have any of the following signs of a heart attack:      ? Squeezing, pressure, or pain in your chest    ? You may  also have any of the following:     ? Discomfort or pain in your back, neck, jaw, stomach, or arm    ? Shortness of breath    ? Nausea or vomiting    ? Lightheadedness or a sudden cold sweat    · You have any of the following signs of a stroke:      ? Numbness or drooping on one side of your face     ? Weakness in an arm or leg    ? Confusion or difficulty speaking    ? Dizziness, a severe headache, or vision loss    Contact your healthcare provider if:   · You have questions or concerns about your condition or care  Medicines:   · Medicines  may be given to reduce your cholesterol or triglyceride levels  · Take your medicine as directed  Contact your healthcare provider if you think your medicine is not helping or if you have side effects  Tell him or her if you are allergic to any medicine  Keep a list of the medicines, vitamins, and herbs you take  Include the amounts, and when and why you take them  Bring the list or the pill bottles to follow-up visits  Carry your medicine list with you in case of an emergency  Follow up with your healthcare provider as directed: You may need to return for more tests  Your healthcare provider may refer you to a dietitian  Write down your questions so you remember to ask them during your visits     Manage hyperlipidemia:  Your healthcare provider may first recommend that you make lifestyle changes to help decrease your lipid levels  You may also need to take medicine to lower your lipid levels  Some of the lifestyle changes you may need to make include the following:  · Maintain a healthy weight  Ask your healthcare provider how much you should weigh  Ask him or her to help you create a weight loss plan if you are overweight  Weight loss can decrease your cholesterol and triglyceride levels  · Exercise as directed  Exercise lowers your cholesterol levels and helps you maintain a healthy weight  Get 30 minutes or more of aerobic exercise 4 to 6 days each week  You can split your exercise into four 10-minute workouts instead of 30 minutes at one time  Examples of aerobic exercises include walking briskly, swimming, or riding a bike  Work with your healthcare provider to plan the best exercise program for you  · Do not smoke  Nicotine and other chemicals in cigarettes and cigars can increase your risk for a heart attack and stroke  Ask your healthcare provider for information if you currently smoke and need help to quit  E-cigarettes or smokeless tobacco still contain nicotine  Talk to your healthcare provider before you use these products  · Eat heart-healthy foods  Talk to your dietitian about a heart-healthy diet  The following will help you manage hyperlipidemia:     ? Decrease the total amount of fat you eat  Choose lean meats, fat-free or 1% fat milk, and low-fat dairy products, such as yogurt and cheese  Limit or do not eat red meat  Red meats are high in fat and cholesterol  ? Replace unhealthy fats with healthy fats  Unhealthy fats include saturated fat, trans fat, and cholesterol  Choose soft margarines that are low in saturated fat and have little or no trans fat  Monounsaturated fats are healthy fats  These are found in olive oil, canola oil, avocado, and nuts  Polyunsaturated fats are also healthy  These are found in fish, flaxseed, walnuts, and soybeans       ? Eat 5 or more servings of fruits and vegetables every day  They are low in calories and fat and a good source of essential vitamins  Include dark green, red, and orange vegetables  Examples include spinach, kale, broccoli, and carrots  ? Eat foods high in fiber  Fiber can help lower your cholesterol levels  Choose whole grain, high-fiber foods  Good choices include whole-wheat breads or cereals, beans, peas, fruits, and vegetables  · Ask your healthcare provider if it is safe for you to drink alcohol  Alcohol can increase your cholesterol and triglyceride levels  A drink of alcohol is 12 ounces of beer, 5 ounces of wine, or 1½ ounces of liquor  © Copyright [address] Information is for End User's use only and may not be sold, redistributed or otherwise used for commercial purposes  All illustrations and images included in CareNotes® are the copyrighted property of [employer]  or [facility] Yasmany Lyman   The above information is an  only  It is not intended as medical advice for individual conditions or treatments  Talk to your doctor, nurse or pharmacist before following any medical regimen to see if it is safe and effective for you  Cholesterol and Your Health   AMBULATORY CARE:   Cholesterol  is a waxy, fat-like substance  Your body uses cholesterol to make hormones and new cells, and to protect nerves  Cholesterol is made by your body  It also comes from certain foods you eat, such as meat and dairy products  Your healthcare provider can help you set goals for your cholesterol levels  He or she can help you create a plan to meet your goals  Cholesterol level goals: Your cholesterol level goals depend on your risk for heart disease, your age, and your other health conditions  The following are general guidelines:  · Total cholesterol  includes low-density lipoprotein (LDL), high-density lipoprotein (HDL), and triglyceride levels   The total cholesterol level should be lower than 200 mg/dL and is best at about 150 mg/dL  · LDL cholesterol  is called bad cholesterol  because it forms plaque in your arteries  As plaque builds up, your arteries become narrow, and less blood flows through  When plaque decreases blood flow to your heart, you may have chest pain  If plaque completely blocks an artery that brings blood to your heart, you may have a heart attack  Plaque can break off and form blood clots  Blood clots may block arteries in your brain and cause a stroke  The level should be less than 130 mg/dL and is best at about 100 mg/dL  · HDL cholesterol  is called good cholesterol  because it helps remove LDL cholesterol from your arteries  It does this by attaching to LDL cholesterol and carrying it to your liver  Your liver breaks down LDL cholesterol so your body can get rid of it  High levels of HDL cholesterol can help prevent a heart attack and stroke  Low levels of HDL cholesterol can increase your risk for heart disease, heart attack, and stroke  The level should be 60 mg/dL or higher  · Triglycerides  are a type of fat that store energy from foods you eat  High levels of triglycerides also cause plaque buildup  This can increase your risk for a heart attack or stroke  If your triglyceride level is high, your LDL cholesterol level may also be high  The level should be less than 150 mg/dL  What increases your risk for high cholesterol:   · Smoking cigarettes    · Being overweight or obese, or not getting enough exercise    · Drinking large amounts of alcohol    · A medical condition such as hypertension (high blood pressure) or diabetes    · Certain genes passed from your parents to you    · Age older than 65 years    What you need to know about having your cholesterol levels checked: Adults 21to 39years of age should have their cholesterol levels checked every 4 to 6 years  Adults 45 years or older should have their cholesterol checked every 1 to 2 years   You may need your cholesterol checked more often, or at a younger age, if you have risk factors for heart disease  You may also need to have your cholesterol checked more often if you have other health conditions, such as diabetes  Blood tests are used to check cholesterol levels  Blood tests measure your levels of triglycerides, LDL cholesterol, and HDL cholesterol  How healthy fats affect your cholesterol levels:  Healthy fats, also called unsaturated fats, help lower LDL cholesterol and triglyceride levels  Healthy fats include the following:  · Monounsaturated fats  are found in foods such as olive oil, canola oil, avocado, nuts, and olives  · Polyunsaturated fats,  such as omega 3 fats, are found in fish, such as salmon, trout, and tuna  They can also be found in plant foods such as flaxseed, walnuts, and soybeans  How unhealthy fats affect your cholesterol levels:  Unhealthy fats increase LDL cholesterol and triglyceride levels  They are found in foods high in cholesterol, saturated fat, and trans fat:  · Cholesterol  is found in eggs, dairy, and meat  · Saturated fat  is found in butter, cheese, ice cream, whole milk, and coconut oil  Saturated fat is also found in meat, such as sausage, hot dogs, and bologna  · Trans fat  is found in liquid oils and is used in fried and baked foods  Foods that contain trans fats include chips, crackers, muffins, sweet rolls, microwave popcorn, and cookies  Treatment  for high cholesterol will also decrease your risk of heart disease, heart attack, and stroke  Treatment may include any of the following:  · Lifestyle changes  may include food, exercise, weight loss, and quitting smoking  You may also need to decrease the amount of alcohol you drink  Your healthcare provider will want you to start with lifestyle changes  Other treatment may be added if lifestyle changes are not enough      · Medicines  may be given to lower your LDL cholesterol, triglyceride levels, or total cholesterol level  You may need medicines to lower your cholesterol if any of the following is true:     ? You have a history of stroke, TIA, unstable angina, or a heart attack  ? Your LDL cholesterol level is 190 mg/dL or higher  ? You are age 36 to 76 years, have diabetes or heart disease risk factors, and your LDL cholesterol is 70 mg/dL or higher  · Supplements  include fish oil, red yeast rice, and garlic  Fish oil may help lower your triglyceride and LDL cholesterol levels  It may also increase your HDL cholesterol level  Red yeast rice may help decrease your total cholesterol level and LDL cholesterol level  Garlic may help lower your total cholesterol level  Do not take these supplements without talking to your healthcare provider  Food changes you can make to lower your cholesterol levels:  A dietitian can help you create a healthy eating plan  He or she can show you how to read food labels and choose foods low in saturated fat, trans fats, and cholesterol  · Decrease the total amount of fat you eat  Choose lean meats, fat-free or 1% fat milk, and low-fat dairy products, such as yogurt and cheese  Try to limit or avoid red meats  Limit or do not eat fried foods or baked goods, such as cookies  · Replace unhealthy fats with healthy fats  Cook foods in olive oil or canola oil  Choose soft margarines that are low in saturated fat and trans fat  Seeds, nuts, and avocados are other examples of healthy fats  · Eat foods with omega-3 fats  Examples include salmon, tuna, mackerel, walnuts, and flaxseed  Eat fish 2 times per week  Pregnant women should not eat fish that have high levels of mercury, such as shark, swordfish, and franklin mackerel  · Increase the amount of high-fiber foods you eat  High-fiber foods can help lower your LDL cholesterol  Aim to get between 20 and 30 grams of fiber each day  Fruits and vegetables are high in fiber  Eat at least 5 servings each day   Other high-fiber foods are whole-grain or whole-wheat breads, pastas, or cereals, and brown rice  Eat 3 ounces of whole-grain foods each day  Increase fiber slowly  You may have abdominal discomfort, bloating, and gas if you add fiber to your diet too quickly  · Eat healthy protein foods  Examples include low-fat dairy products, skinless chicken and turkey, fish, and nuts  · Limit foods and drinks that are high in sugar  Your dietitian or healthcare provider can help you create daily limits for high-sugar foods and drinks  The limit may be lower if you have diabetes or another health condition  Limits can also help you lose weight if needed  Lifestyle changes you can make to lower your cholesterol levels:   · Maintain a healthy weight  Ask your healthcare provider what a healthy weight is for you  Ask him or her to help you create a weight loss plan if needed  Weight loss can decrease your total cholesterol and triglyceride levels  Weight loss may also help keep your blood pressure at a healthy level  · Exercise regularly  Exercise can help lower your total cholesterol level and maintain a healthy weight  Exercise can also help increase your HDL cholesterol level  Work with your healthcare provider to create an exercise program that is right for you  Get at least 30 to 40 minutes of moderate exercise most days of the week  Examples of exercise include brisk walking, swimming, or biking  · Do not smoke  Nicotine and other chemicals in cigarettes and cigars can raise your cholesterol levels  Ask your healthcare provider for information if you currently smoke and need help to quit  E-cigarettes or smokeless tobacco still contain nicotine  Talk to your healthcare provider before you use these products  · Limit or do not drink alcohol  Alcohol can increase your triglyceride levels  Ask your healthcare provider before you drink alcohol  Ask how much is okay for you to drink in 1 day or 1 week      © Copyright [address] [facility] ([place], [place]) Information is for [employer] use only and may not be sold, redistributed or otherwise used for commercial purposes  All illustrations and images included in CareNotes® are the copyrighted property of [employer] Inc  or [employer] Lyman   The above information is an  only  It is not intended as medical advice for individual conditions or treatments  Talk to your doctor, nurse or pharmacist before following any medical regimen to see if it is safe and effective for you  Heart Healthy Diet   WHAT YOU NEED TO KNOW:   A heart healthy diet is an eating plan low in unhealthy fats and sodium (salt)  The plan is high in healthy fats and fiber  A heart healthy diet helps improve your cholesterol levels and lowers your risk for heart disease and stroke  A dietitian will teach you how to read and understand food labels  DISCHARGE INSTRUCTIONS:   Heart healthy diet guidelines to follow:   · Choose foods that contain healthy fats  ? Unsaturated fats  include monounsaturated and polyunsaturated fats  Unsaturated fat is found in foods such as soybean, canola, olive, corn, and safflower oils  It is also found in soft tub margarine that is made with liquid vegetable oil  ? Omega-3 fat  is found in certain fish, such as salmon, tuna, and trout, and in walnuts and flaxseed  Eat fish high in omega-3 fats at least 2 times a week  · Get 20 to 30 grams of fiber each day  Fruits, vegetables, whole-grain foods, and legumes (cooked beans) are good sources of fiber  · Limit or do not have unhealthy fats  ? Cholesterol  is found in animal foods, such as eggs and lobster, and in dairy products made from whole milk  Limit cholesterol to less than 200 mg each day  ? Saturated fat  is found in meats, such as clayton and hamburger  It is also found in chicken or turkey skin, whole milk, and butter   Limit saturated fat to less than 7% of your total daily calories  ? Trans fat  is found in packaged foods, such as potato chips and cookies  It is also in hard margarine, some fried foods, and shortening  Do not eat foods that contain trans fats  · Limit sodium as directed  You may be told to limit sodium to 2,000 to 2,300 mg each day  Choose low-sodium or no-salt-added foods  Add little or no salt to food you prepare  Use herbs and spices in place of salt  Include the following in your heart healthy plan:  Ask your dietitian or healthcare provider how many servings to have from each of the following food groups:  · Grains:      ? Whole-wheat breads, cereals, and pastas, and brown rice    ? Low-fat, low-sodium crackers and chips    · Vegetables:      ? Broccoli, green beans, green peas, and spinach    ? Collards, kale, and lima beans    ? Carrots, sweet potatoes, tomatoes, and peppers    ? Canned vegetables with no salt added    · Fruits:      ? Bananas, peaches, pears, and pineapple    ? Grapes, raisins, and dates    ? Oranges, tangerines, grapefruit, orange juice, and grapefruit juice    ? Apricots, mangoes, melons, and papaya    ? Raspberries and strawberries    ? Canned fruit with no added sugar    · Low-fat dairy:      ? Nonfat (skim) milk, 1% milk, and low-fat almond, cashew, or soy milks fortified with calcium    ? Low-fat cheese, regular or frozen yogurt, and cottage cheese    · Meats and proteins:      ? Lean cuts of beef and pork (loin, leg, round), skinless chicken and turkey    ? Legumes, soy products, egg whites, or nuts    Limit or do not include the following in your heart healthy plan:   · Unhealthy fats and oils:      ? Whole or 2% milk, cream cheese, sour cream, or cheese    ? High-fat cuts of beef (T-bone steaks, ribs), chicken or turkey with skin, and organ meats such as liver    ?  Butter, stick margarine, shortening, and cooking oils such as coconut or palm oil    · Foods and liquids high in sodium:      ? Packaged foods, such as frozen dinners, cookies, macaroni and cheese, and cereals with more than 300 mg of sodium per serving    ? Vegetables with added sodium, such as instant potatoes, vegetables with added sauces, or regular canned vegetables    ? Cured or smoked meats, such as hot dogs, clayton, and sausage    ? High-sodium ketchup, barbecue sauce, salad dressing, pickles, olives, soy sauce, or miso    · Foods and liquids high in sugar:      ? Candy, cake, cookies, pies, or doughnuts    ? Soft drinks (soda), sports drinks, or sweetened tea    ? Canned or dry mixes for cakes, soups, sauces, or gravies    Other healthy heart guidelines:   · Do not smoke  Nicotine and other chemicals in cigarettes and cigars can cause lung and heart damage  Ask your healthcare provider for information if you currently smoke and need help to quit  E-cigarettes or smokeless tobacco still contain nicotine  Talk to your healthcare provider before you use these products  · Limit or do not drink alcohol as directed  Alcohol can damage your heart and raise your blood pressure  Your healthcare provider may give you specific daily and weekly limits  The general recommended limit is 1 drink a day for women 21 or older and for men 72 or older  Do not have more than 3 drinks in a day or 7 in a week  The recommended limit is 2 drinks a day for men 24to 59years of age  Do not have more than 4 drinks in a day or 14 in a week  A drink of alcohol is 12 ounces of beer, 5 ounces of wine, or 1½ ounces of liquor  · Exercise regularly  Exercise can help you maintain a healthy weight and improve your blood pressure and cholesterol levels  Regular exercise can also decrease your risk for heart problems  Ask your healthcare provider about the best exercise plan for you  Do not start an exercise program without asking your healthcare provider         Follow up with your doctor or cardiologist as directed:  Write down your questions so you remember to ask them during your visits  © Copyright [address] [place] [address] Information is for End User's use only and may not be sold, redistributed or otherwise used for commercial purposes  All illustrations and images included in CareNotes® are the copyrighted property of [employer]  or [employer] Chavez  The above information is an  only  It is not intended as medical advice for individual conditions or treatments  Talk to your doctor, nurse or pharmacist before following any medical regimen to see if it is safe and effective for you  Low Fat Diet   WHAT YOU NEED TO KNOW:   A low-fat diet is an eating plan that is low in total fat, unhealthy fat, and cholesterol  You may need to follow a low-fat diet if you have trouble digesting or absorbing fat  You may also need to follow this diet if you have high cholesterol  You can also lower your cholesterol by increasing the amount of fiber in your diet  Soluble fiber is a type of fiber that helps to decrease cholesterol levels  DISCHARGE INSTRUCTIONS:   Different types of fat in food:   · Limit unhealthy fats  A diet that is high in cholesterol, saturated fat, and trans fat may cause unhealthy cholesterol levels  Unhealthy cholesterol levels increase your risk of heart disease  ? Cholesterol:  Limit intake of cholesterol to less than 200 mg per day  Cholesterol is found in meat, eggs, and dairy  ? Saturated fat:  Limit saturated fat to less than 7% of your total daily calories  Ask your dietitian how many calories you need each day  Saturated fat is found in butter, cheese, ice cream, whole milk, and palm oil  Saturated fat is also found in meat, such as beef, pork, chicken skin, and processed meats  Processed meats include sausage, hot dogs, and bologna  ? Trans fat:  Avoid trans fat as much as possible  Trans fat is used in fried and baked foods  Foods that say trans fat free on the label may still have up to 0 5 grams of trans fat per serving      · Include healthy fats  Replace foods that are high in saturated and trans fat with foods high in healthy fats  This may help to decrease high cholesterol levels  ? Monounsaturated fats: These are found in avocados, nuts, and vegetable oils, such as olive, canola, and sunflower oil  ? Polyunsaturated fats: These can be found in vegetable oils, such as soybean or corn oil  Omega-3 fats can help to decrease the risk of heart disease  Omega-3 fats are found in fish, such as salmon, herring, trout, and tuna  Omega-3 fats can also be found in plant foods, such as walnuts, flaxseed, soybeans, and canola oil  Foods to limit or avoid:   · Grains:      ? Snacks that are made with partially hydrogenated oils, such as chips, regular crackers, and butter-flavored popcorn    ? High-fat baked goods, such as biscuits, croissants, doughnuts, pies, cookies, and pastries    · Dairy:      ? Whole milk, 2% milk, and yogurt and ice cream made with whole milk    ? Half and half creamer, heavy cream, and whipping cream    ? Cheese, cream cheese, and sour cream    · Meats and proteins:      ? High-fat cuts of meat (T-bone steak, regular hamburger, and ribs)    ? Fried meat, poultry (turkey and chicken), and fish    ? Poultry (chicken and turkey) with skin    ? Cold cuts (salami or bologna), hot dogs, clayton, and sausage    ? Whole eggs and egg yolks    · Vegetables and fruits with added fat:      ? Fried vegetables or vegetables in butter or high-fat sauces, such as cream or cheese sauces    ? Fried fruit or fruit served with butter or cream    · Fats:      ? Butter, stick margarine, and shortening    ? Coconut, palm oil, and palm kernel oil    Foods to include:   · Grains:      ? Whole-grain breads, cereals, pasta, and brown rice    ? Low-fat crackers and pretzels    · Vegetables and fruits:      ? Fresh, frozen, or canned vegetables (no salt or low-sodium)    ?  Fresh, frozen, dried, or canned fruit (canned in light syrup or fruit juice)    ? Avocado    · Low-fat dairy products:      ? Nonfat (skim) or 1% milk    ? Nonfat or low-fat cheese, yogurt, and cottage cheese    · Meats and proteins:      ? Chicken or turkey with no skin    ? Baked or broiled fish    ? Lean beef and pork (loin, round, extra lean hamburger)    ? Beans and peas, unsalted nuts, soy products    ? Egg whites and substitutes    ? Seeds and nuts    · Fats:      ? Unsaturated oil, such as canola, olive, peanut, soybean, or sunflower oil    ? Soft or liquid margarine and vegetable oil spread    ? Low-fat salad dressing    Other ways to decrease fat:   · Read food labels before you buy foods  Choose foods that have less than 30% of calories from fat  Choose low-fat or fat-free dairy products  Remember that fat free does not mean calorie free  These foods still contain calories, and too many calories can lead to weight gain  · Trim fat from meat and avoid fried food  Trim all visible fat from meat before you cook it  Remove the skin from poultry  Do not dyer meat, fish, or poultry  Bake, roast, boil, or broil these foods instead  Avoid fried foods  Eat a baked potato instead of [place] fries  Steam vegetables instead of sautéing them in butter  · Add less fat to foods  Use imitation clayton bits on salads and baked potatoes instead of regular clayton bits  Use fat-free or low-fat salad dressings instead of regular dressings  Use low-fat or nonfat butter-flavored topping instead of regular butter or margarine on popcorn and other foods  Ways to decrease fat in recipes:  Replace high-fat ingredients with low-fat or nonfat ones  This may cause baked goods to be drier than usual  You may need to use nonfat cooking spray on pans to prevent food from sticking  You also may need to change the amount of other ingredients, such as water, in the recipe  Try the following:  · Use low-fat or light margarine instead of regular margarine or shortening       · Use lean ground turkey breast or chicken, or lean ground beef (less than 5% fat) instead of hamburger  · Add 1 teaspoon of canola oil to 8 ounces of skim milk instead of using cream or half and half  · Use grated zucchini, carrots, or apples in breads instead of coconut  · Use blenderized, low-fat cottage cheese, plain tofu, or low-fat ricotta cheese instead of cream cheese  · Use 1 egg white and 1 teaspoon of canola oil, or use ¼ cup (2 ounces) of fat-free egg substitute instead of a whole egg  · Replace half of the oil that is called for in a recipe with applesauce when you bake  Use 3 tablespoons of cocoa powder and 1 tablespoon of canola oil instead of a square of baking chocolate  How to increase fiber:  Eat enough high-fiber foods to get 20 to 30 grams of fiber every day  Slowly increase your fiber intake to avoid stomach cramps, gas, and other problems  · Eat 3 ounces of whole-grain foods each day  An ounce is about 1 slice of bread  Eat whole-grain breads, such as whole-wheat bread  Whole wheat, whole-wheat flour, or other whole grains should be listed as the first ingredient on the food label  Replace white flour with whole-grain flour or use half of each in recipes  Whole-grain flour is heavier than white flour, so you may have to add more yeast or baking powder  · Eat a high-fiber cereal for breakfast   Oatmeal is a good source of soluble fiber  Look for cereals that have bran or fiber in the name  Choose whole-grain products, such as brown rice, barley, and whole-wheat pasta  · Eat more beans, peas, and lentils  For example, add beans to soups or salads  Eat at least 5 cups of fruits and vegetables each day  Eat fruits and vegetables with the peel because the peel is high in fiber  © Copyright [address] Information is for End User's use only and may not be sold, redistributed or otherwise used for commercial purposes   All illustrations and images included in CareNotes® are the copyrighted property of A D A M [employer]  or [address]   The above information is an  only  It is not intended as medical advice for individual conditions or treatments  Talk to your doctor, nurse or pharmacist before following any medical regimen to see if it is safe and effective for you  Mediterranean Diet   WHAT YOU NEED TO KNOW:   A Mediterranean diet is a meal plan that includes foods that are commonly eaten in countries that border the [facility]  This meal plan may provide several health benefits  These include losing or maintaining weight, and decreasing blood pressure, blood sugar, and cholesterol levels  It may also help protect against certain health conditions such as heart disease, cancer, type 2 diabetes, and Alzheimer disease  Work with a dietitian to develop a meal plan that is right for you  DISCHARGE INSTRUCTIONS:   Foods to include in the Mediterranean diet:   · Include fruits and vegetables in each meal   Eat a variety of fresh fruits and vegetables  · Choose whole grains every day  These foods include whole-grain breads, pastas, and cereals  It also includes brown rice, quinoa, and millet  · Use unsaturated fats instead of saturated fats  Cook with olive or canola oil  Limit saturated fats, such as butter, margarine, and shortening  Saturated fat is an unhealthy fat that can increase your cholesterol levels  · Choose plant foods, poultry, and fish as your main sources of protein  ? Eat plant-based foods that provide protein,  such as lentils, beans, chickpeas, nuts, and seeds  Choose mostly plant-based foods in place of meat on most days of the week  ? Eat protein foods high in omega-3 fats  Fish high in omega-3 fats include salmon, trout, and tuna  Include these types of fish 1 or 2 times each week  Limit fish high in mercury, such as shark, swordfish, tilefish, and franklin mackerel  Omega-3 fats are also found in walnuts and flaxseed  ? Choose poultry (chicken or turkey)  without skin instead of red meat  Red meat is high in saturated fat  Limit eggs and high-fat meats, such as clayton, sausage, and hot dogs  · Choose low-fat dairy foods  such as nonfat or 1% milk, or low-fat almond, cashew, or soy milk  Other examples include low-fat cheese, yogurt, and cottage cheese  · Limit sweets  Limit your intake of high-sugar foods, such as soda, desserts, and candy  · Talk to your healthcare provider about alcohol  Studies have shown that moderate intake of wine may reduce the risk of heart disease  A moderate amount of wine is 1 serving for women and men 65 years and older each day  Two servings is recommended for men 24to 59years of age each day  A serving of wine is 5 ounces  Other things you need to know if you follow the Mediterranean diet:   · Include foods high in iron and vitamin C   Plant-based foods that are high in iron include spinach, beans, tofu, and artichoke  Eat a serving of vitamin C with any iron-rich food to help your body absorb more iron  Examples include oranges, strawberries, cantaloupe, broccoli, and yellow peppers  · Get regular physical activity  The Mediterranean diet will have the most benefit if you get regular physical activity  Get 30 minutes of physical activity at least 5 days a week  Choose physical activities that increase your heart rate  Examples include walking, hiking, swimming, and riding a bike  Ask your healthcare provider about the best exercise plan for you  © Copyright [address] Information is for End User's use only and may not be sold, redistributed or otherwise used for commercial purposes  All illustrations and images included in CareNotes® are the copyrighted property of A D A M , Inc  or [address] [place] [address] [place]   The above information is an  only  It is not intended as medical advice for individual conditions or treatments   Talk to your doctor, nurse or pharmacist before following any medical regimen to see if it is safe and effective for you  Gabapentin (By mouth)   Gabapentin (phan-a-PEN-tin)  Treats seizures and pain caused by shingles  Brand Name(s): FusePaq Fanatrex, Gralise, Neurontin   There may be other brand names for this medicine  When This Medicine Should Not Be Used: This medicine is not right for everyone  Do not use it if you had an allergic reaction to gabapentin  How to Use This Medicine:   Capsule, Liquid, Tablet  · Take your medicine as directed  Your dose may need to be changed several times to find what works best for you  If you have epilepsy, do not allow more than 12 hours to pass between doses  · Capsule: Swallow the capsule whole with plenty of water  Do not open, crush, or chew it  · Gralise® tablet: Swallow the tablet whole   Do not crush, break, or chew it  · Neurontin® tablet: If you break a tablet into 2 pieces, use the second half as your next dose  Do not use the half-tablet if the whole tablet has been cut or broken after 28 days  · Oral liquid: Measure the oral liquid medicine with a marked measuring spoon, oral syringe, or medicine cup  · This medicine should come with a Medication Guide  Ask your pharmacist for a copy if you do not have one  · Missed dose: Take a dose as soon as you remember  If it is almost time for your next dose, wait until then and take a regular dose  Do not take extra medicine to make up for a missed dose  · Store the medicine in a closed container at room temperature, away from heat, moisture, and direct light  Store the Neurontin® oral liquid in the refrigerator  Do not freeze  Drugs and Foods to Avoid:   Ask your doctor or pharmacist before using any other medicine, including over-the-counter medicines, vitamins, and herbal products  · Some medicines can affect how gabapentin works  Tell your doctor if you also using hydrocodone or morphine    · If you take an antacid, wait at least 2 hours before you take gabapentin  · Do not drink alcohol while you are using this medicine  · Tell your doctor if you use anything else that makes you sleepy  Some examples are allergy medicine, narcotic pain medicine, and alcohol  Tell your doctor if you are also using lorazepam, oxycodone, or zolpidem  Warnings While Using This Medicine:   · Tell your doctor if you are pregnant or breastfeeding, or if you have kidney problems (including patients receiving dialysis) or lung problems  Tell your doctor if you have a history of depression or mental health problems  · This medicine may cause the following problems:  ? Drug reaction with eosinophilia and systemic symptoms (DRESS) or multiorgan hypersensitivity, which may damage the liver, kidney, blood, heart, or muscles  ? Changes in mood or behavior, including suicidal thoughts or behavior  ? Respiratory depression (serious breathing problem that can be life-threatening), when used with narcotic pain medicines  · Do not stop using this medicine suddenly  Your doctor will need to slowly decrease your dose before you stop it completely  · This medicine may make you dizzy or drowsy  Do not drive or do anything else that could be dangerous until you know how this medicine affects you  · Tell any doctor or dentist who treats you that you are using this medicine  This medicine may affect certain medical test results  · Your doctor will check your progress and the effects of this medicine at regular visits  Keep all appointments  · Keep all medicine out of the reach of children  Never share your medicine with anyone    Possible Side Effects While Using This Medicine:   Call your doctor right away if you notice any of these side effects:  · Allergic reaction: Itching or hives, swelling in your face or hands, swelling or tingling in your mouth or throat, chest tightness, trouble breathing  · Behavior problems, aggression, restlessness, trouble concentrating, moodiness (especially in children)  · Blistering, peeling, red skin rash  · Blue lips, fingernails, or skin, chest pain, fast heartbeat, trouble breathing  · Change in how much or how often you urinate, bloody or cloudy urine  · Dark urine or pale stools, nausea, vomiting, loss of appetite, stomach pain, yellow skin or eyes  · Fever, chills, cough, sore throat, body aches  · Problems with coordination, shakiness, unsteadiness, unusual eye movement  · Rapid weight gain, swelling in your hands, ankles, or feet  · Rash, swollen or tender glands in the neck, armpit, or groin  · Unusual moods or behaviors, thoughts of hurting yourself, feeling depressed  If you notice these less serious side effects, talk with your doctor:   · Dizziness, drowsiness, sleepiness, tiredness  If you notice other side effects that you think are caused by this medicine, tell your doctor  Call your doctor for medical advice about side effects  You may report side effects to FDA at 0-816-XJF-4787  © [address] Information is for End User's use only and may not be sold, redistributed or otherwise used for commercial purposes  The above information is an  only  It is not intended as medical advice for individual conditions or treatments  Talk to your doctor, nurse or pharmacist before following any medical regimen to see if it is safe and effective for you  Migraine Headache   WHAT YOU SHOULD KNOW:   A migraine is a severe headache  The pain can be so severe that it interferes with your daily activities  A migraine can last a few hours up to several days  The exact cause of migraines is not known  It may be caused by changes in your body chemicals and extra sensitive nerves in your brain  AFTER YOU LEAVE:   Medicines:  Take medicine as soon as you feel a migraine begin  · Pain medicine: You may need medicine to take away or decrease pain  You may need a doctor's order for this medicine   Do not wait until the pain is severe before you take your medicine  · Migraine medicines: These are used to help prevent a migraine or stop it once it starts  · Antinausea medicine: This medicine may be given to calm your stomach and to help prevent vomiting  They can also help relieve pain  · Take your medicine as directed  Call your healthcare provider if you think your medicine is not helping or if you have side effects  Tell him if you are allergic to any medicine  Keep a list of the medicines, vitamins, and herbs you take  Include the amounts, and when and why you take them  Bring the list or the pill bottles to follow-up visits  Carry your medicine list with you in case of an emergency  Manage your symptoms:   · Rest:  Rest in a dark, quiet room  This will help decrease your pain  · Ice:  Ice helps decrease pain  Use an ice pack or put crushed ice in a plastic bag  Cover the ice pack with a towel and place it on your head where it hurts for 15 to 20 minutes every hour  · Heat:  Heat helps decrease pain and muscle spasms  Use a small towel dampened with warm water or a heating pad, or sit in a warm bath  Apply heat on the area for 20 to 30 minutes every 2 hours  You may alternate heat and ice  Keep a headache diary:  Write down when your migraines start and stop  Include your symptoms and what you were doing when a migraine began  Record what you ate or drank for 24 hours before the migraine started  Describe the pain and where it hurts  Keep track of what you did to treat your migraine and whether it worked  Follow up with your primary healthcare provider or neurologist as directed:  Bring your headache diary with you when you see your primary healthcare provider  Write down your questions so you remember to ask them during your visits  Prevent another migraine:   · Do not smoke: If you smoke, it is never too late to quit  Tobacco smoke can trigger a migraine   It can also cause heart disease, lung disease, cancer, and other health problems  Quitting smoking will improve your health and the health of those around you  If you smoke, ask for information about how to stop  · Do not drink alcohol:  Alcohol can trigger a migraine  It can also interfere with the medicines used to treat your migraine  · Get regular exercise:  Exercise may help prevent migraines  Talk to your primary healthcare provider about the best exercise plan for you  · Manage stress:  Stress may trigger a migraine  Learn new ways to relax, such as deep breathing  · Stick to a sleep schedule:  Go to bed and get up at the same time each day  · Eat regular meals:  Include healthy foods such as include fruit, vegetables, whole-grain breads, low-fat dairy products, beans, lean meat, and fish  Avoid trigger foods like chocolate, hard cheese, and red wine  Foods that contain gluten, nitrates, MSG, or artificial sweeteners may also trigger migraines  Caffeine, which is often used to treat migraines, can also trigger them  Contact your primary healthcare provider or neurologist if:   · You have a fever  · Your migraines interfere with your daily activities  · Your medicines or treatments stop working  · You have questions about your condition or care  Seek care immediately or call 911 if:   · You have a headache that seems different or much worse than your usual migraine headache  · You have a severe headache with a fever or a stiff neck  · You have new problems with speech, vision, balance, or movement  · You feel like you are going to faint, you become confused, or you have a seizure  © 2014 [address] is for End User's use only and may not be sold, redistributed or otherwise used for commercial purposes  All illustrations and images included in CareNotes® are the copyrighted property of [employer]  or [address]her [address]berg  The above information is an  only   It is not intended as medical advice for individual conditions or treatments  Talk to your doctor, nurse or pharmacist before following any medical regimen to see if it is safe and effective for you

## 2021-01-19 NOTE — PLAN OF CARE
Problem: Potential for Falls  Goal: Patient will remain free of falls  Description: INTERVENTIONS:  - Assess patient frequently for physical needs  -  Identify cognitive and physical deficits and behaviors that affect risk of falls  -  [place] fall precautions as indicated by assessment   - Educate patient/family on patient safety including physical limitations  - Instruct patient to call for assistance with activity based on assessment  - Modify environment to reduce risk of injury  - Consider OT/PT consult to assist with strengthening/mobility  Outcome: Progressing     Problem: Neurological Deficit  Goal: Neurological status is stable or improving  Description: Interventions:  - Monitor and assess patient's level of consciousness, motor function, sensory function, and level of assistance needed for ADLs  - Monitor and report changes from baseline  Collaborate with interdisciplinary team to initiate plan and implement interventions as ordered  - Provide and maintain a safe environment  - Consider seizure precautions  - Consider fall precautions  - Consider aspiration precautions  - Consider bleeding precautions  Outcome: Progressing     Problem: Activity Intolerance/Impaired Mobility  Goal: Mobility/activity is maintained at optimum level for patient  Description: Interventions:  - Assess and monitor patient  barriers to mobility and need for assistive/adaptive devices  - Assess patient's emotional response to limitations  - Collaborate with interdisciplinary team and initiate plans and interventions as ordered  - Encourage independent activity per ability   - Maintain proper body alignment  - Perform active/passive rom as tolerated/ordered    - Plan activities to conserve energy   - Turn patient as appropriate  Outcome: Progressing     Problem: NEUROSENSORY - ADULT  Goal: Achieves stable or improved neurological status  Description: INTERVENTIONS  - Monitor and report changes in neurological status  - Monitor vital signs such as temperature, blood pressure, glucose, and any other labs ordered   - Initiate measures to prevent increased intracranial pressure  - Monitor for seizure activity and implement precautions if appropriate      Outcome: Progressing  Goal: Remains free of injury related to seizures activity  Description: INTERVENTIONS  - Maintain airway, patient safety  and administer oxygen as ordered  - Monitor patient for seizure activity, document and report duration and description of seizure to physician/advanced practitioner  - If seizure occurs,  ensure patient safety during seizure  - Reorient patient post seizure  - Seizure pads on all 4 side rails  - Instruct patient/family to notify RN of any seizure activity including if an aura is experienced  - Instruct patient/family to call for assistance with activity based on nursing assessment  - Administer anti-seizure medications if ordered    Outcome: Progressing  Goal: Achieves maximal functionality and self care  Description: INTERVENTIONS  - Monitor swallowing and airway patency with patient fatigue and changes in neurological status  - Encourage and assist patient to increase activity and self care     - Encourage visually impaired, hearing impaired and aphasic patients to use assistive/communication devices  Outcome: Progressing     Problem: PAIN - ADULT  Goal: Verbalizes/displays adequate comfort level or baseline comfort level  Description: Interventions:  - Encourage patient to monitor pain and request assistance  - Assess pain using appropriate pain scale  - Administer analgesics based on type and severity of pain and evaluate response  - Implement non-pharmacological measures as appropriate and evaluate response  - Consider cultural and social influences on pain and pain management  - Notify physician/advanced practitioner if interventions unsuccessful or patient reports new pain  Outcome: Progressing     Problem: INFECTION - ADULT  Goal: Absence or prevention of progression during hospitalization  Description: INTERVENTIONS:  - Assess and monitor for signs and symptoms of infection  - Monitor lab/diagnostic results  - Monitor all insertion sites, i e  indwelling lines, tubes, and drains  - Monitor endotracheal if appropriate and nasal secretions for changes in amount and color  - [place] appropriate cooling/warming therapies per order  - Administer medications as ordered  - Instruct and encourage patient and family to use good hand hygiene technique  - Identify and instruct in appropriate isolation precautions for identified infection/condition  Outcome: Progressing     Problem: SAFETY ADULT  Goal: Patient will remain free of falls  Description: INTERVENTIONS:  - Assess patient frequently for physical needs  -  Identify cognitive and physical deficits and behaviors that affect risk of falls    -  [place] fall precautions as indicated by assessment   - Educate patient/family on patient safety including physical limitations  - Instruct patient to call for assistance with activity based on assessment  - Modify environment to reduce risk of injury  - Consider OT/PT consult to assist with strengthening/mobility  Outcome: Progressing  Goal: Maintain or return to baseline ADL function  Description: INTERVENTIONS:  -  Assess patient's ability to carry out ADLs; assess patient's baseline for ADL function and identify physical deficits which impact ability to perform ADLs (bathing, care of mouth/teeth, toileting, grooming, dressing, etc )  - Assess/evaluate cause of self-care deficits   - Assess range of motion  - Assess patient's mobility; develop plan if impaired  - Assess patient's need for assistive devices and provide as appropriate  - Encourage maximum independence but intervene and supervise when necessary  - Involve family in performance of ADLs  - Assess for home care needs following discharge   - Consider OT consult to assist with ADL evaluation and planning for discharge  - Provide patient education as appropriate  Outcome: Progressing  Goal: Maintain or return mobility status to optimal level  Description: INTERVENTIONS:  - Assess patient's baseline mobility status (ambulation, transfers, stairs, etc )    - Identify cognitive and physical deficits and behaviors that affect mobility  - Identify mobility aids required to assist with transfers and/or ambulation (gait belt, sit-to-stand, lift, walker, cane, etc )  - [place] fall precautions as indicated by assessment  - Record patient progress and toleration of activity level on Mobility SBAR; progress patient to next Phase/Stage  - Instruct patient to call for assistance with activity based on assessment  - Consider rehabilitation consult to assist with strengthening/weightbearing, etc   Outcome: Progressing     Problem: DISCHARGE PLANNING  Goal: Discharge to home or other facility with appropriate resources  Description: INTERVENTIONS:  - Identify barriers to discharge w/patient and caregiver  - Arrange for needed discharge resources and transportation as appropriate  - Identify discharge learning needs (meds, wound care, etc )  - Arrange for interpretive services to assist at discharge as needed  - Refer to Case Management Department for coordinating discharge planning if the patient needs post-hospital services based on physician/advanced practitioner order or complex needs related to functional status, cognitive ability, or social support system  Outcome: Progressing     Problem: Knowledge Deficit  Goal: Patient/family/caregiver demonstrates understanding of disease process, treatment plan, medications, and discharge instructions  Description: Complete learning assessment and assess knowledge base    Interventions:  - Provide teaching at level of understanding  - Provide teaching via preferred learning methods  Outcome: Progressing

## 2021-01-19 NOTE — PHYSICAL THERAPY NOTE
Physical Therapy Screen    Patient Name: Abisai Colby    BTGFX'L Date: 1/19/2021     Problem List  Principal Problem:    Stroke-like symptoms  Active Problems:    Von Willebrand disease ([facility] [address] )    Essential hypertension    History of migraine       Past Medical History  Past Medical History:   Diagnosis Date    Heart murmur     Kidney stones     Von Willebrand disease ([facility] [address] )         Past Surgical History  Past Surgical History:   Procedure Laterality Date    APPENDECTOMY      BREAST LUMPECTOMY Left     CARPAL TUNNEL RELEASE Bilateral     CHOLECYSTECTOMY      HYSTERECTOMY      KNEE SURGERY Left     PARTIAL HYSTERECTOMY      ROTATOR CUFF REPAIR Left     TONSILLECTOMY          01/19/21 0965   PT Last Visit   PT Visit Date 01/19/21   Note Type   Note type Screen        Received order for PT consult  Chart reviewed  Patient admitted under observation with stroke-like symptoms  Spoke with patient  Patient reports she is independent PTA without AD  Reports ambulating to and from the bathroom without assistance without difficulty  Observed patient ambulating in room independently without LOB or difficulty  Pt reports being at her PLOF at this time, no concerns regarding returning home  Will D/C PT services at this time as patient is at her PLOF without need for acute care PT services  Should patient's status change, will require new order      Guera Eth, PT,DPT

## 2021-01-19 NOTE — ASSESSMENT & PLAN NOTE
Background:  Presented to the ED with complaints of left-sided chest pain, left-sided facial and arm numbness  Of note was recently seen in the ED on 01/14 for intermittent dizziness, spinning, nausea associated with headaches  At that time she was also noted to have significantly elevated blood pressure  Also complains of significant headaches for the past 2 weeks  o Differential includes complex migraine versus uncontrolled hypertension versus acute cva    Chest x-ray unremarkable   CTA head/neck with no cervical or intracranial large vessel occlusion  Mild bilateral or cervical carotid atherosclerotic disease with less than 50% stenosis at the carotid bulbs  No intracranial aneurysm  Anatomic variation of a near fetal left PCA   CT head with no acute intracranial abnormality   MRI brain findings consistent with complex migraine   ECHO completed; advised to follow up outpatient with PCP for results   Stroke pathway   Troponin negative x3   Lipid panel elevated will discharge home on 10mg Lipitor PO daily and lifestyle modifications   Hold off on any aspirin given Von Willebrand's disease   Statin ordered   Gabapentin  PO increased to 300mg BID

## 2021-01-19 NOTE — ASSESSMENT & PLAN NOTE
· Which has been uncontrolled and worsening over the past 2 weeks  · Currently being managed by headache specialist  · Status post steroids as well as increase in Topamax  · MRI revealed "Punctate focus of signal abnormality in the right mehnaz    This may represent sequela of migraines, subacute infarction, and/or demyelination"   · Increased gabapentin 300mg BID   · F/U outpatient with headache specialist

## 2021-01-19 NOTE — DISCHARGE SUMMARY
Discharge- Farzad Marcos 1965, 63 y o  female MRN: [id number]    Unit/Bed#: -01 Encounter: [id number]    Primary Care Provider: Savannah Thomas MD   Date and time admitted to hospital: 1/18/2021 10:59 AM    * Stroke-like symptoms  Assessment & Plan  Background:  Presented to the ED with complaints of left-sided chest pain, left-sided facial and arm numbness  Of note was recently seen in the ED on 01/14 for intermittent dizziness, spinning, nausea associated with headaches  At that time she was also noted to have significantly elevated blood pressure  Also complains of significant headaches for the past 2 weeks  o Differential includes complex migraine versus uncontrolled hypertension versus acute cva    Chest x-ray unremarkable   CTA head/neck with no cervical or intracranial large vessel occlusion  Mild bilateral or cervical carotid atherosclerotic disease with less than 50% stenosis at the carotid bulbs  No intracranial aneurysm  Anatomic variation of a near fetal left PCA   CT head with no acute intracranial abnormality   MRI brain findings consistent with complex migraine   ECHO completed; advised to follow up outpatient with PCP for results   Stroke pathway   Troponin negative x3   Lipid panel elevated will discharge home on 10mg Lipitor PO daily and lifestyle modifications   Hold off on any aspirin given Von Willebrand's disease   Statin ordered   Gabapentin  PO increased to 300mg BID  History of migraine  Assessment & Plan  · Which has been uncontrolled and worsening over the past 2 weeks  · Currently being managed by headache specialist  · Status post steroids as well as increase in Topamax  · MRI revealed "Punctate focus of signal abnormality in the right mehnaz    This may represent sequela of migraines, subacute infarction, and/or demyelination"   · Increased gabapentin 300mg BID   · F/U outpatient with headache specialist     Essential hypertension  Assessment & Plan  · Presents to the ED with BP in the 170s  · Recently initiated on amlodipine 5 mg as an outpatient  · Suspect elevation secondary to uncontrolled migraine control  · Ensure adequate migraine control  · Stable    Von Willebrand disease ([address] )  Assessment & Plan  · MRI consistent with complex migraines  No need to start ASA  · Follow with outpatient hematology team          Discharging Physician / Practitioner: Betzy Bowser  PCP: Nelda Rojo MD  Admission Date:   Admission Orders (From admission, onward)     Ordered        01/18/21 1215  Place in Observation  Once                   Discharge Date: 01/19/21    Resolved Problems  Date Reviewed: 1/19/2021    None          Consultations During Hospital Stay:  · Neurology     Procedures Performed:   · ECHO   · MRI   · CT Scan    Significant Findings / Test Results:   · As noted above    Incidental Findings:   · None     Test Results Pending at Discharge (will require follow up): · None      Outpatient Tests Requested:  · None    Complications:      Reason for Admission:  Stroke pathway    Hospital Course:     Roly Damico is a 63 y o  female patient with a significant past medical history for vonWilebrand disease and chronic migraines who originally presented to the hospital on 1/18/2021 due to left sided chest pain and left sided facial and arm numbness  Her CT scan did not reveal any acute ischemic or hemorrhagic stroke findings  We completed an MRI which was consistent with complex migraine findings  Upon admission we continued Topamax per outpatient regimen as prescribed by headache specialist and initiated gabapentin for further migraine control  Lipid panel was elevated and imitated low dose statin and lifestyle modification  Patient reported that she did not have any known history of elevated cholesterol however her family has had multiple CABGs as well as heart problems and she suspects that it could be familial/genetic        Please see above list of diagnoses and related plan for additional information  Condition at Discharge: good     Discharge Day Visit / Exam:     Subjective:  " I am still having left sided head pain " However she did report that her migraine was better than on admission  Denied any numbness, tingling, facial numbness, dysphagia, or visual disturbance  Vitals: Blood Pressure: 135/77 (01/19/21 0714)  Pulse: 63 (01/19/21 0714)  Temperature: 98 1 °F (36 7 °C) (01/19/21 0714)  Temp Source: Oral (01/18/21 1500)  Respirations: 18 (01/19/21 0714)  Height: 5' 7" (170 2 cm) (01/18/21 1354)  Weight - Scale: 95 3 kg (210 lb 1 6 oz) (01/18/21 1354)  SpO2: 99 % (01/19/21 0714)  Exam:   Physical Exam  Constitutional:       Appearance: Normal appearance  HENT:      Head: Normocephalic  Eyes:      Pupils: Pupils are equal, round, and reactive to light  Cardiovascular:      Rate and Rhythm: Normal rate  Pulses: Normal pulses  Heart sounds: Normal heart sounds  Pulmonary:      Effort: Pulmonary effort is normal       Breath sounds: Normal breath sounds  Chest:      Chest wall: No tenderness  Abdominal:      General: Abdomen is flat  Musculoskeletal: Normal range of motion  Skin:     General: Skin is warm and dry  Capillary Refill: Capillary refill takes less than 2 seconds  Neurological:      General: No focal deficit present  Mental Status: She is alert and oriented to person, place, and time  Cranial Nerves: No cranial nerve deficit  Motor: No weakness  Comments: Reports ongoing sharp left sided head pain  Psychiatric:         Mood and Affect: Mood normal        Discussion with Family: Declined per patient  Discharge instructions/Information to patient and family:   See after visit summary for information provided to patient and family  Provisions for Follow-Up Care:  See after visit summary for information related to follow-up care and any pertinent home health orders  Disposition:     Home    Planned Readmission:  None      Discharge Statement:  I spent 45 minutes discharging the patient  This time was spent on the day of discharge  I had direct contact with the patient on the day of discharge  Greater than 50% of the total time was spent examining patient, answering all patient questions, arranging and discussing plan of care with patient as well as directly providing post-discharge instructions  Additional time then spent on discharge activities  Discharge Medications:  See after visit summary for reconciled discharge medications provided to patient and family        ** Please Note: This note has been constructed using a voice recognition system **

## 2021-01-19 NOTE — ASSESSMENT & PLAN NOTE
· MRI consistent with complex migraines  No need to start ASA     · Follow with outpatient hematology team

## 2021-01-19 NOTE — ASSESSMENT & PLAN NOTE
· Presents to the ED with BP in the 170s  · Recently initiated on amlodipine 5 mg as an outpatient  · Suspect elevation secondary to uncontrolled migraine control  · Ensure adequate migraine control  · Stable

## 2021-01-19 NOTE — UTILIZATION REVIEW
Initial Clinical Review    Admission: Date/Time/Statement:   Admission Orders (From admission, onward)     Ordered        01/18/21 1215  Place in Observation  Once                   Orders Placed This Encounter   Procedures    Place in Observation     Standing Status:   Standing     Number of Occurrences:   1     Order Specific Question:   Level of Care     Answer:   Med Surg [16]     ED Arrival Information     Expected Arrival Acuity Means of Arrival Escorted By Service Admission Type    - 1/18/2021 10:53 Emergent Walk-In [facility] Emergency    Arrival Complaint    Chest pain, facial numbness        Chief Complaint   Patient presents with    Chest Pain     pt c/o chest pain radiating down left arm with numbness in left side face and nausea  pt recently seen for similar sx 4 days ago  denies travel/fevers/cough/v/d     Assessment/Plan: 63year old female to the ED from home with complaints of chest pain radiating down left arm for 4 days  Admitted under observation for stroke like symptoms  Recently seen in the ED 1/14 for intermittent dizziness, nausea with headaches  Found to have elevated bp at that time as well  Has had headaches for the past 2 weeks  CTA head/neck shows no abnormality  CHeck MRI, ECHO  GCS 15  REcently started on amlodipine and increase dose of topamax  She follows with outpatient headache specialist  Troponin neg  Neuro consult  Give IV mag  Sensory deficit over left face and arm  NIHSS 1       1/18 Neuro tele consult:  Unclear why she is having HAs for the past 2 weeks  +tenderness to left side of neck  Lower suspicion for vasculitis  Mri brain completed shortly after stroke alert and no restriction diffusion on mri  There is a punctuate focus of T2 hyperintense signal on the flair sequence  Unclear what to make of this as there can be multiple etiologies  Out of TPA window  Neurologic Exam   MS: Alert and orientedX3   No aphasia  CN 2-12: left v1-v3 decreased light touch  Motor: per nurse exam under my video supervision, 5 power grossly except lue 4+ with trace weakness  Sensory: lue slightly decreased to light touch, rest of extremities intact  Coordination: finger to nose, heel to shin no dysmetria or ataxia   ED Triage Vitals   Temperature Pulse Respirations Blood Pressure SpO2   01/18/21 1101 01/18/21 1101 01/18/21 1101 01/18/21 1101 01/18/21 1101   (!) 97 °F (36 1 °C) 71 18 158/100 96 %      Temp Source Heart Rate Source Patient Position - Orthostatic VS BP Location FiO2 (%)   01/18/21 1101 01/18/21 1101 01/18/21 1101 01/18/21 1101 --   Temporal Monitor Sitting Right arm       Pain Score       01/18/21 1115       8          Wt Readings from Last 1 Encounters:   01/18/21 95 3 kg (210 lb 1 6 oz)     Additional Vital Signs:   Date/Time  Temp  Pulse  Resp  BP  MAP (mmHg)  SpO2  O2 Device  Patient Position - Orthostatic VS   01/19/21 07:14:27  98 1 °F (36 7 °C)  63  18  135/77  96  99 %       01/19/21 05:11:34  97 7 °F (36 5 °C)  63  15  136/76  96  99 %       01/19/21 0500      18  136/76           01/19/21 0300        134/74           01/19/21 0100    Pinky Lilo      None (Room air)     01/19/21 00:59:07    59    134/76  95  97 %       01/18/21 23:13:13  97 9 °F (36 6 °C)  65  16  108/58  75  98 %       01/18/21 2300  97 9 °F (36 6 °C)  65  16  108/58           01/18/21 20:37:56    66    134/68  90  98 %       01/18/21 1900  98 7 °F (37 1 °C)  66  19  129/68  88      Lying   01/18/21 18:59:37            97 %       01/18/21 1800  98 7 °F (37 1 °C)  70  18  164/53      None (Room air)     01/18/21 1700  98 7 °F (37 1 °C)  75  18  163/98        Sitting   01/18/21 1600  98 7 °F (37 1 °C)  70  17  155/80  105      Lying   01/18/21 15:53:42  98 4 °F (36 9 °C)  69  18  150/81  104  98 %  None (Room air)     01/18/21 1500  98 6 °F (37 °C)  68  17  156/83  107    None          Pertinent Labs/Diagnostic Test Results: 1/18 MRI brain: Punctate focus of signal abnormality in the right mehnaz   This may represent sequela of migraines, subacute infarction, and/or demyelination  1/18 CXR:   No acute cardiopulmonary disease  1/18 CT brain: No acute intracranial abnormality  1/18 CTA head/neck: No cervical or intracranial large vessel occlusion  Mild bilateral cervical carotid atherosclerotic disease with less than 50% stenosis at the carotid bulbs  No intracranial aneurysm   Anatomic variation of a near fetal left PCA  No pulmonary parenchymal changes to suggest COVID19 infection       Results from last 7 days   Lab Units 01/18/21  1110   SARS-COV-2  Negative     Results from last 7 days   Lab Units 01/19/21  0522 01/18/21  1110 01/14/21 2059   WBC Thousand/uL 10 72* 13 30* 12 81*   HEMOGLOBIN g/dL 12 3 12 7 13 2   HEMATOCRIT % 39 6 40 8 41 7   PLATELETS Thousands/uL 223 254 299   NEUTROS ABS Thousands/µL 6 72  --  7 83*         Results from last 7 days   Lab Units 01/19/21  0522 01/18/21  1110 01/14/21 2059   SODIUM mmol/L 138 139 143   POTASSIUM mmol/L 3 7 3 1* 3 4*   CHLORIDE mmol/L 105 103 107   CO2 mmol/L 23 26 24   ANION GAP mmol/L 10 10 12   BUN mg/dL 12 11 13   CREATININE mg/dL 0 83 0 93 0 99   EGFR ml/min/1 73sq m 79 69 64   CALCIUM mg/dL 8 2* 8 8 8 2*   MAGNESIUM mg/dL 2 3 2 0  --    PHOSPHORUS mg/dL 3 4  --   --      Results from last 7 days   Lab Units 01/19/21  0522 01/18/21  1110 01/14/21 2059   AST U/L 11 11 14   ALT U/L 24 27 36   ALK PHOS U/L 80 86 86   TOTAL PROTEIN g/dL 7 0 7 4 7 3   ALBUMIN g/dL 3 3* 3 6 3 7   TOTAL BILIRUBIN mg/dL 0 44 0 24 0 20   BILIRUBIN DIRECT mg/dL  --  0 06  --      Results from last 7 days   Lab Units 01/18/21  1108   POC GLUCOSE mg/dl 127     Results from last 7 days   Lab Units 01/19/21  0522 01/18/21  1110 01/14/21 2059   GLUCOSE RANDOM mg/dL 98 90 135     Results from last 7 days   Lab Units 01/18/21  1757 01/18/21  1442 01/18/21  1110 01/14/21  2059   TROPONIN I ng/mL <0 02 <0 02 <0 02 <0 02     Results from last 7 days   Lab Units 01/14/21  2059   D-DIMER QUANTITATIVE ug/ml FEU <0 27     Results from last 7 days   Lab Units 01/18/21  1110   PROTIME seconds 12 0   INR  0 90   PTT seconds 31     Results from last 7 days   Lab Units 01/18/21  1110   TSH 3RD GENERATON uIU/mL 1 024       Results from last 7 days   Lab Units 01/18/21  1110   NT-PRO BNP pg/mL 57       Results from last 7 days   Lab Units 01/18/21  1110   INFLUENZA A PCR  Negative   INFLUENZA B PCR  Negative   RSV PCR  Negative     ED Treatment:   Medication Administration from 01/18/2021 1053 to 01/18/2021 1353       Date/Time Order Dose Route Action     01/18/2021 1128 sodium chloride 0 9 % bolus 1,000 mL 1,000 mL Intravenous New Bag     01/18/2021 1335 magnesium sulfate 2 g/50 mL IVPB (premix) 2 g 2 g Intravenous New Bag     01/18/2021 1334 potassium chloride (K-DUR,KLOR-CON) CR tablet 40 mEq 40 mEq Oral Given        Past Medical History:   Diagnosis Date    Heart murmur     Kidney stones     Von Willebrand disease ([facility] [address] )        Admitting Diagnosis: Chest pain [R07 9]  Atypical chest pain [R07 89]  Stroke-like symptoms [R29 90]  Age/Sex: 63 y o  female  Admission Orders:  NIHSS  Up and OOB  Up with assist  Tele   Neuro checks: Every 1 hour x 4 hours, then every 2 hours x 8 hours, then every 4 hours x 72 hours  ECHO  Scheduled Medications:  amLODIPine, 5 mg, Oral, Daily  atorvastatin, 40 mg, Oral, QPM  buPROPion, 150 mg, Oral, Daily  fluticasone, 2 spray, Each Nare, HS  gabapentin, 100 mg, Oral, TID  loratadine, 10 mg, Oral, Daily  montelukast, 10 mg, Oral, HS  pantoprazole, 20 mg, Oral, Early Morning  topiramate, 150 mg, Oral, HS  venlafaxine, 37 5 mg, Oral, Daily      Continuous IV Infusions:  lactated ringers, 75 mL/hr, Intravenous, Continuous      PRN Meds:  acetaminophen, 650 mg, Oral, Q6H PRN  albuterol, 2 puff, Inhalation, Q4H PRN  albuterol, 2 5 mg, Nebulization, Q4H PRN        IP CONSULT TO NEUROLOGY  IP CONSULT TO CASE MANAGEMENT  IP CONSULT TO CASE MANAGEMENT    Network Utilization Review Department  ATTENTION: Please call with any questions or concerns to [phone] and carefully listen to the prompts so that you are directed to the right person  All voicemails are confidential   Katherin Ruiz all requests for admission clinical reviews, approved or denied determinations and any other requests to dedicated fax number below belonging to the campus where the patient is receiving treatment   List of dedicated fax numbers for the Facilities:  [address] DENIALS (Administrative/Medical Necessity) [phone]   [address] (Maternity/NICU/Pediatrics) [phone]   [address] [place] [address] Dr Marily Hendrix [phone] (  Jeramy Barba "Debbie" 103) [address] [address] [address] Lui Nevaeh Jacobs 14[address] [place] [address] [phone]

## 2021-01-23 LAB
ATRIAL RATE: 66 BPM
P AXIS: 61 DEGREES
PR INTERVAL: 154 MS
QRS AXIS: 18 DEGREES
QRSD INTERVAL: 92 MS
QT INTERVAL: 420 MS
QTC INTERVAL: 440 MS
T WAVE AXIS: 42 DEGREES
VENTRICULAR RATE: 66 BPM

## 2021-01-23 PROCEDURE — 93010 ELECTROCARDIOGRAM REPORT: CPT | Performed by: INTERNAL MEDICINE

## 2021-03-08 ENCOUNTER — APPOINTMENT (EMERGENCY)
Dept: CT IMAGING | Facility: HOSPITAL | Age: 56
DRG: 058 | End: 2021-03-08
Payer: COMMERCIAL

## 2021-03-08 ENCOUNTER — APPOINTMENT (EMERGENCY)
Dept: RADIOLOGY | Facility: HOSPITAL | Age: 56
DRG: 058 | End: 2021-03-08
Payer: COMMERCIAL

## 2021-03-08 ENCOUNTER — APPOINTMENT (INPATIENT)
Dept: MRI IMAGING | Facility: HOSPITAL | Age: 56
DRG: 058 | End: 2021-03-08
Payer: COMMERCIAL

## 2021-03-08 ENCOUNTER — HOSPITAL ENCOUNTER (INPATIENT)
Facility: HOSPITAL | Age: 56
LOS: 1 days | Discharge: LEFT BEFORE DISCHARGE | DRG: 058 | End: 2021-03-09
Attending: EMERGENCY MEDICINE | Admitting: STUDENT IN AN ORGANIZED HEALTH CARE EDUCATION/TRAINING PROGRAM
Payer: COMMERCIAL

## 2021-03-08 DIAGNOSIS — R29.90 STROKE-LIKE SYMPTOMS: ICD-10-CM

## 2021-03-08 DIAGNOSIS — R07.9 CHEST PAIN: Primary | ICD-10-CM

## 2021-03-08 DIAGNOSIS — R20.0 NUMBNESS AND TINGLING OF LEFT LOWER EXTREMITY: ICD-10-CM

## 2021-03-08 DIAGNOSIS — R20.2 NUMBNESS AND TINGLING IN LEFT ARM: ICD-10-CM

## 2021-03-08 DIAGNOSIS — R20.0 NUMBNESS AND TINGLING OF LEFT SIDE OF FACE: ICD-10-CM

## 2021-03-08 DIAGNOSIS — E87.6 HYPOKALEMIA: ICD-10-CM

## 2021-03-08 DIAGNOSIS — R20.2 NUMBNESS AND TINGLING OF LEFT SIDE OF FACE: ICD-10-CM

## 2021-03-08 DIAGNOSIS — R20.0 NUMBNESS AND TINGLING IN LEFT ARM: ICD-10-CM

## 2021-03-08 DIAGNOSIS — R20.2 NUMBNESS AND TINGLING OF LEFT LOWER EXTREMITY: ICD-10-CM

## 2021-03-08 PROBLEM — J45.20 MILD INTERMITTENT ASTHMA WITHOUT COMPLICATION: Status: ACTIVE | Noted: 2021-03-08

## 2021-03-08 LAB
ALBUMIN SERPL BCP-MCNC: 4 G/DL (ref 3.5–5)
ALP SERPL-CCNC: 95 U/L (ref 46–116)
ALT SERPL W P-5'-P-CCNC: 30 U/L (ref 12–78)
ANION GAP SERPL CALCULATED.3IONS-SCNC: 11 MMOL/L (ref 4–13)
AST SERPL W P-5'-P-CCNC: 14 U/L (ref 5–45)
BACTERIA UR QL AUTO: NORMAL /HPF
BASOPHILS # BLD AUTO: 0.05 THOUSANDS/ΜL (ref 0–0.1)
BASOPHILS NFR BLD AUTO: 1 % (ref 0–1)
BILIRUB SERPL-MCNC: 0.31 MG/DL (ref 0.2–1)
BILIRUB UR QL STRIP: NEGATIVE
BUN SERPL-MCNC: 10 MG/DL (ref 5–25)
CALCIUM SERPL-MCNC: 9 MG/DL (ref 8.3–10.1)
CHLORIDE SERPL-SCNC: 104 MMOL/L (ref 100–108)
CK SERPL-CCNC: 94 U/L (ref 26–192)
CLARITY UR: CLEAR
CO2 SERPL-SCNC: 26 MMOL/L (ref 21–32)
COLOR UR: YELLOW
CREAT SERPL-MCNC: 1.05 MG/DL (ref 0.6–1.3)
D DIMER PPP FEU-MCNC: 0.4 UG/ML FEU
EOSINOPHIL # BLD AUTO: 0.07 THOUSAND/ΜL (ref 0–0.61)
EOSINOPHIL NFR BLD AUTO: 1 % (ref 0–6)
ERYTHROCYTE [DISTWIDTH] IN BLOOD BY AUTOMATED COUNT: 13.2 % (ref 11.6–15.1)
GFR SERPL CREATININE-BSD FRML MDRD: 60 ML/MIN/1.73SQ M
GLUCOSE SERPL-MCNC: 93 MG/DL (ref 65–140)
GLUCOSE UR STRIP-MCNC: NEGATIVE MG/DL
HCT VFR BLD AUTO: 42.4 % (ref 34.8–46.1)
HGB BLD-MCNC: 13.3 G/DL (ref 11.5–15.4)
HGB UR QL STRIP.AUTO: ABNORMAL
IMM GRANULOCYTES # BLD AUTO: 0.02 THOUSAND/UL (ref 0–0.2)
IMM GRANULOCYTES NFR BLD AUTO: 0 % (ref 0–2)
INR PPP: 1 (ref 0.84–1.19)
KETONES UR STRIP-MCNC: NEGATIVE MG/DL
LEUKOCYTE ESTERASE UR QL STRIP: NEGATIVE
LIPASE SERPL-CCNC: 85 U/L (ref 73–393)
LYMPHOCYTES # BLD AUTO: 2.56 THOUSANDS/ΜL (ref 0.6–4.47)
LYMPHOCYTES NFR BLD AUTO: 36 % (ref 14–44)
MAGNESIUM SERPL-MCNC: 2.1 MG/DL (ref 1.6–2.6)
MCH RBC QN AUTO: 26.4 PG (ref 26.8–34.3)
MCHC RBC AUTO-ENTMCNC: 31.4 G/DL (ref 31.4–37.4)
MCV RBC AUTO: 84 FL (ref 82–98)
MONOCYTES # BLD AUTO: 0.41 THOUSAND/ΜL (ref 0.17–1.22)
MONOCYTES NFR BLD AUTO: 6 % (ref 4–12)
NEUTROPHILS # BLD AUTO: 4.02 THOUSANDS/ΜL (ref 1.85–7.62)
NEUTS SEG NFR BLD AUTO: 56 % (ref 43–75)
NITRITE UR QL STRIP: NEGATIVE
NON-SQ EPI CELLS URNS QL MICRO: NORMAL /HPF
NRBC BLD AUTO-RTO: 0 /100 WBCS
NT-PROBNP SERPL-MCNC: 45 PG/ML
PH UR STRIP.AUTO: 6 [PH]
PLATELET # BLD AUTO: 267 THOUSANDS/UL (ref 149–390)
PMV BLD AUTO: 12.1 FL (ref 8.9–12.7)
POTASSIUM SERPL-SCNC: 3.2 MMOL/L (ref 3.5–5.3)
PROT SERPL-MCNC: 7.8 G/DL (ref 6.4–8.2)
PROT UR STRIP-MCNC: NEGATIVE MG/DL
PROTHROMBIN TIME: 13 SECONDS (ref 11.6–14.5)
RBC # BLD AUTO: 5.04 MILLION/UL (ref 3.81–5.12)
RBC #/AREA URNS AUTO: NORMAL /HPF
SODIUM SERPL-SCNC: 141 MMOL/L (ref 136–145)
SP GR UR STRIP.AUTO: 1.02 (ref 1–1.03)
TROPONIN I SERPL-MCNC: <0.02 NG/ML
TSH SERPL DL<=0.05 MIU/L-ACNC: 0.87 UIU/ML (ref 0.36–3.74)
UROBILINOGEN UR QL STRIP.AUTO: 0.2 E.U./DL
WBC # BLD AUTO: 7.13 THOUSAND/UL (ref 4.31–10.16)
WBC #/AREA URNS AUTO: NORMAL /HPF

## 2021-03-08 PROCEDURE — 71045 X-RAY EXAM CHEST 1 VIEW: CPT

## 2021-03-08 PROCEDURE — 83735 ASSAY OF MAGNESIUM: CPT | Performed by: EMERGENCY MEDICINE

## 2021-03-08 PROCEDURE — 70496 CT ANGIOGRAPHY HEAD: CPT

## 2021-03-08 PROCEDURE — 84484 ASSAY OF TROPONIN QUANT: CPT | Performed by: EMERGENCY MEDICINE

## 2021-03-08 PROCEDURE — 85025 COMPLETE CBC W/AUTO DIFF WBC: CPT | Performed by: EMERGENCY MEDICINE

## 2021-03-08 PROCEDURE — 80053 COMPREHEN METABOLIC PANEL: CPT | Performed by: EMERGENCY MEDICINE

## 2021-03-08 PROCEDURE — 99223 1ST HOSP IP/OBS HIGH 75: CPT | Performed by: STUDENT IN AN ORGANIZED HEALTH CARE EDUCATION/TRAINING PROGRAM

## 2021-03-08 PROCEDURE — 82550 ASSAY OF CK (CPK): CPT | Performed by: EMERGENCY MEDICINE

## 2021-03-08 PROCEDURE — 84443 ASSAY THYROID STIM HORMONE: CPT | Performed by: EMERGENCY MEDICINE

## 2021-03-08 PROCEDURE — 99285 EMERGENCY DEPT VISIT HI MDM: CPT

## 2021-03-08 PROCEDURE — 84484 ASSAY OF TROPONIN QUANT: CPT | Performed by: STUDENT IN AN ORGANIZED HEALTH CARE EDUCATION/TRAINING PROGRAM

## 2021-03-08 PROCEDURE — 85379 FIBRIN DEGRADATION QUANT: CPT | Performed by: EMERGENCY MEDICINE

## 2021-03-08 PROCEDURE — G1004 CDSM NDSC: HCPCS

## 2021-03-08 PROCEDURE — 83880 ASSAY OF NATRIURETIC PEPTIDE: CPT | Performed by: EMERGENCY MEDICINE

## 2021-03-08 PROCEDURE — 93005 ELECTROCARDIOGRAM TRACING: CPT

## 2021-03-08 PROCEDURE — 81001 URINALYSIS AUTO W/SCOPE: CPT | Performed by: EMERGENCY MEDICINE

## 2021-03-08 PROCEDURE — 96374 THER/PROPH/DIAG INJ IV PUSH: CPT

## 2021-03-08 PROCEDURE — 85610 PROTHROMBIN TIME: CPT | Performed by: EMERGENCY MEDICINE

## 2021-03-08 PROCEDURE — 70498 CT ANGIOGRAPHY NECK: CPT

## 2021-03-08 PROCEDURE — 70551 MRI BRAIN STEM W/O DYE: CPT

## 2021-03-08 PROCEDURE — 36415 COLL VENOUS BLD VENIPUNCTURE: CPT | Performed by: EMERGENCY MEDICINE

## 2021-03-08 PROCEDURE — 99285 EMERGENCY DEPT VISIT HI MDM: CPT | Performed by: EMERGENCY MEDICINE

## 2021-03-08 PROCEDURE — 83690 ASSAY OF LIPASE: CPT | Performed by: EMERGENCY MEDICINE

## 2021-03-08 PROCEDURE — 96361 HYDRATE IV INFUSION ADD-ON: CPT

## 2021-03-08 RX ORDER — LISINOPRIL 20 MG/1
20 TABLET ORAL DAILY
Status: DISCONTINUED | OUTPATIENT
Start: 2021-03-09 | End: 2021-03-09 | Stop reason: HOSPADM

## 2021-03-08 RX ORDER — MONTELUKAST SODIUM 10 MG/1
10 TABLET ORAL
Status: DISCONTINUED | OUTPATIENT
Start: 2021-03-08 | End: 2021-03-09 | Stop reason: HOSPADM

## 2021-03-08 RX ORDER — HYDROCHLOROTHIAZIDE 25 MG/1
25 TABLET ORAL DAILY
Status: DISCONTINUED | OUTPATIENT
Start: 2021-03-09 | End: 2021-03-09 | Stop reason: HOSPADM

## 2021-03-08 RX ORDER — POTASSIUM CHLORIDE 20 MEQ/1
40 TABLET, EXTENDED RELEASE ORAL ONCE
Status: COMPLETED | OUTPATIENT
Start: 2021-03-08 | End: 2021-03-08

## 2021-03-08 RX ORDER — ATORVASTATIN CALCIUM 40 MG/1
40 TABLET, FILM COATED ORAL EVERY EVENING
Status: DISCONTINUED | OUTPATIENT
Start: 2021-03-08 | End: 2021-03-09 | Stop reason: HOSPADM

## 2021-03-08 RX ORDER — FLUTICASONE PROPIONATE 50 MCG
2 SPRAY, SUSPENSION (ML) NASAL DAILY
Status: DISCONTINUED | OUTPATIENT
Start: 2021-03-08 | End: 2021-03-09 | Stop reason: HOSPADM

## 2021-03-08 RX ORDER — NITROGLYCERIN 0.4 MG/1
0.4 TABLET SUBLINGUAL ONCE
Status: COMPLETED | OUTPATIENT
Start: 2021-03-08 | End: 2021-03-08

## 2021-03-08 RX ORDER — MAGNESIUM HYDROXIDE/ALUMINUM HYDROXICE/SIMETHICONE 120; 1200; 1200 MG/30ML; MG/30ML; MG/30ML
15 SUSPENSION ORAL ONCE
Status: COMPLETED | OUTPATIENT
Start: 2021-03-08 | End: 2021-03-08

## 2021-03-08 RX ORDER — TOPIRAMATE 100 MG/1
100 TABLET, FILM COATED ORAL EVERY 12 HOURS SCHEDULED
Status: DISCONTINUED | OUTPATIENT
Start: 2021-03-08 | End: 2021-03-09 | Stop reason: HOSPADM

## 2021-03-08 RX ORDER — PANTOPRAZOLE SODIUM 20 MG/1
20 TABLET, DELAYED RELEASE ORAL
Status: DISCONTINUED | OUTPATIENT
Start: 2021-03-09 | End: 2021-03-09 | Stop reason: HOSPADM

## 2021-03-08 RX ORDER — ASPIRIN 81 MG/1
81 TABLET, CHEWABLE ORAL DAILY
Status: DISCONTINUED | OUTPATIENT
Start: 2021-03-08 | End: 2021-03-09 | Stop reason: HOSPADM

## 2021-03-08 RX ORDER — HYDROCHLOROTHIAZIDE 25 MG/1
25 TABLET ORAL DAILY
COMMUNITY
End: 2021-12-01

## 2021-03-08 RX ORDER — LORATADINE 10 MG/1
10 TABLET ORAL DAILY
Status: DISCONTINUED | OUTPATIENT
Start: 2021-03-08 | End: 2021-03-09 | Stop reason: HOSPADM

## 2021-03-08 RX ORDER — HYDROCHLOROTHIAZIDE 25 MG/1
25 TABLET ORAL DAILY
Status: DISCONTINUED | OUTPATIENT
Start: 2021-03-08 | End: 2021-03-08

## 2021-03-08 RX ORDER — SODIUM CHLORIDE 9 MG/ML
3 INJECTION INTRAVENOUS
Status: DISCONTINUED | OUTPATIENT
Start: 2021-03-08 | End: 2021-03-09 | Stop reason: HOSPADM

## 2021-03-08 RX ORDER — ALBUTEROL SULFATE 2.5 MG/3ML
2.5 SOLUTION RESPIRATORY (INHALATION) EVERY 4 HOURS PRN
Status: DISCONTINUED | OUTPATIENT
Start: 2021-03-08 | End: 2021-03-09 | Stop reason: HOSPADM

## 2021-03-08 RX ORDER — LISINOPRIL 20 MG/1
20 TABLET ORAL DAILY
COMMUNITY

## 2021-03-08 RX ORDER — ALBUTEROL SULFATE 90 UG/1
2 AEROSOL, METERED RESPIRATORY (INHALATION) EVERY 4 HOURS PRN
Status: DISCONTINUED | OUTPATIENT
Start: 2021-03-08 | End: 2021-03-09 | Stop reason: HOSPADM

## 2021-03-08 RX ORDER — LISINOPRIL 20 MG/1
20 TABLET ORAL DAILY
Status: DISCONTINUED | OUTPATIENT
Start: 2021-03-08 | End: 2021-03-08

## 2021-03-08 RX ORDER — BUPROPION HYDROCHLORIDE 150 MG/1
150 TABLET ORAL DAILY
Status: DISCONTINUED | OUTPATIENT
Start: 2021-03-08 | End: 2021-03-09 | Stop reason: HOSPADM

## 2021-03-08 RX ADMIN — BUPROPION 150 MG: 150 TABLET, EXTENDED RELEASE ORAL at 16:34

## 2021-03-08 RX ADMIN — LISINOPRIL 20 MG: 20 TABLET ORAL at 16:35

## 2021-03-08 RX ADMIN — IOHEXOL 85 ML: 350 INJECTION, SOLUTION INTRAVENOUS at 11:27

## 2021-03-08 RX ADMIN — SODIUM CHLORIDE 1000 ML: 0.9 INJECTION, SOLUTION INTRAVENOUS at 10:29

## 2021-03-08 RX ADMIN — ATORVASTATIN CALCIUM 40 MG: 40 TABLET, FILM COATED ORAL at 17:09

## 2021-03-08 RX ADMIN — POTASSIUM CHLORIDE 40 MEQ: 1500 TABLET, EXTENDED RELEASE ORAL at 16:34

## 2021-03-08 RX ADMIN — TOPIRAMATE 100 MG: 100 TABLET, FILM COATED ORAL at 21:59

## 2021-03-08 RX ADMIN — HYDROCHLOROTHIAZIDE 25 MG: 25 TABLET ORAL at 16:34

## 2021-03-08 RX ADMIN — ENOXAPARIN SODIUM 40 MG: 40 INJECTION SUBCUTANEOUS at 16:35

## 2021-03-08 RX ADMIN — LORATADINE 10 MG: 10 TABLET ORAL at 16:35

## 2021-03-08 RX ADMIN — FAMOTIDINE 20 MG: 10 INJECTION, SOLUTION INTRAVENOUS at 11:36

## 2021-03-08 RX ADMIN — MONTELUKAST 10 MG: 10 TABLET, FILM COATED ORAL at 21:59

## 2021-03-08 RX ADMIN — ALUMINUM HYDROXIDE, MAGNESIUM HYDROXIDE, AND SIMETHICONE 15 ML: 200; 200; 20 SUSPENSION ORAL at 11:35

## 2021-03-08 RX ADMIN — NITROGLYCERIN 0.4 MG: 0.4 TABLET SUBLINGUAL at 10:28

## 2021-03-08 RX ADMIN — POTASSIUM CHLORIDE 40 MEQ: 1500 TABLET, EXTENDED RELEASE ORAL at 11:35

## 2021-03-08 RX ADMIN — MULTIPLE VITAMINS W/ MINERALS TAB 1 TABLET: TAB at 16:35

## 2021-03-08 RX ADMIN — ASPIRIN 81 MG: 81 TABLET, CHEWABLE ORAL at 16:34

## 2021-03-08 NOTE — ASSESSMENT & PLAN NOTE
· BP elevated, received her lisinopril and hydrochlorothiazide on the floor  · Continue lisinopril 20 mg daily  · Continue hydrochlorothiazide 25 mg daily  · Continue to monitor BP

## 2021-03-08 NOTE — H&P
H&P- Sedrick Roosevelt 1965, 63 y o  female MRN: [id number]    Unit/Bed#: -01 Encounter: [id number]    Primary Care Provider: Geoff Bee MD   Date and time admitted to hospital: 3/8/2021  9:49 AM        * Numbness and tingling in left arm  Assessment & Plan  · Patient admitted under the stroke pathway presenting with left facial numbness and tingling that had radiated down her left arm as well  · Patient states that this occurs quite often and is associated with blurring of vision  · Out of the window for TPA  · CTH negative  · MRI reviewed: negative for acute ischemia, small focus of gliosis  · Neuro consulted and as per notes, patient's symptoms not likely secondary to stroke and not neurologic in nature  · C/w aspirin and statin  · Patient had TTE in January 2021 which was unremarkable    Chest pain  Assessment & Plan  · Patient developed chest pain over left chest at the same time as her head numbness and tingling  · Chest pain radiated towards left side and back  · Resolved in ED  · Monitor on telemetry  · Initial troponin and EKG normal  · Monitor troponin and serial EKG  · IMTIAZ 1    Mild intermittent asthma without complication  Assessment & Plan  · Continue home meds  · Prn albuterol nebs    History of migraine  Assessment & Plan  · Stable at this time  · Continue topiramate    Essential hypertension  Assessment & Plan  · BP elevated, received her lisinopril and hydrochlorothiazide on the floor  · Continue lisinopril 20 mg daily  · Continue hydrochlorothiazide 25 mg daily  · Continue to monitor BP    VTE Prophylaxis: Enoxaparin (Lovenox)  / sequential compression device   Code Status: FULL CODE  POLST: There is no POLST form on file for this patient (pre-hospital)  Discussion with family: patient    Anticipated Length of Stay:  Patient will be admitted on an Inpatient basis with an anticipated length of stay of  More than 2 midnights     Justification for Hospital Stay: stroke-like symptoms, chest pain    Total Time for Visit, including Counseling / Coordination of Care: 45 minutes  Greater than 50% of this total time spent on direct patient counseling and coordination of care  Chief Complaint:   Numbness and tingling over left side of face and arm, chest pain over left chest with radiation to back    History of Present Illness:    Kenji Bundy is a 63 y o  female who presents with left-sided facial numbness and tingling with radiation of numbness and tingling down left arm associated with the chest pain over left side with radiation to the back  Patient endorses that this occurred at 9:00 p m  Last night  During this time she was climbing the stairs and she started noticing weakness in her left side with blurring of vision  Because of this patient tripped on the stairs on her way up to her bedroom to rest   Patient stated that she was able to sleep through the night however woke up early in the morning with the same kind of sensation over her left face and arm  Persistence of symptoms prompted the patient to come to the emergency room  During the encounter, patient endorsed that she feels weak on her left side  Patient endorses that this has occurred multiple times in the past   Denies any other symptoms at this time  Review of Systems:    Review of Systems   Constitutional: Negative for chills and fever  HENT: Negative for ear pain and sore throat  Eyes: Negative for pain and visual disturbance  Respiratory: Negative for cough and shortness of breath  Cardiovascular: Positive for chest pain  Negative for palpitations  Gastrointestinal: Negative for abdominal pain and vomiting  Genitourinary: Negative for dysuria and hematuria  Musculoskeletal: Negative for arthralgias and back pain  Skin: Negative for color change and rash  Neurological: Positive for weakness, light-headedness, numbness and headaches  Negative for seizures and syncope     All other systems reviewed and are negative  Past Medical and Surgical History:     Past Medical History:   Diagnosis Date    Heart murmur     Hypertension     Kidney stones     Kidney stones     Von Willebrand disease ([facility] [address] )        Past Surgical History:   Procedure Laterality Date    APPENDECTOMY      BREAST LUMPECTOMY Left     CARPAL TUNNEL RELEASE Bilateral     CHOLECYSTECTOMY      HYSTERECTOMY      KNEE SURGERY Left     PARTIAL HYSTERECTOMY      ROTATOR CUFF REPAIR Left     TONSILLECTOMY         Meds/Allergies:    Prior to Admission medications    Medication Sig Start Date End Date Taking?  Authorizing Provider   albuterol (2 5 mg/3 mL) 0 083 % nebulizer solution Inhale 2 5 mg as needed 5/21/20  Yes Historical Provider, MD   albuterol (PROVENTIL HFA,VENTOLIN HFA) 90 mcg/act inhaler as needed 9/8/17  Yes Historical Provider, MD   buPROPion [facility]) 150 mg 12 hr tablet Take 150 mg by mouth 2 (two) times a day 12/5/19  Yes Historical Provider, MD   cetirizine (ZyrTEC) 10 mg tablet Take 10 mg by mouth daily 9/8/17  Yes Historical Provider, MD   fluticasone (FLONASE) 50 mcg/act nasal spray daily at bedtime 9/8/17  Yes Historical Provider, MD   hydrochlorothiazide (HYDRODIURIL) 25 mg tablet Take 25 mg by mouth daily   Yes Historical Provider, MD   lisinopril (ZESTRIL) 20 mg tablet Take 20 mg by mouth daily   Yes Historical Provider, MD   montelukast (SINGULAIR) 10 mg tablet Take 10 mg by mouth daily at bedtime 2/14/18  Yes Historical Provider, MD   Multiple Vitamins-Minerals (MULTIVITAMIN WITH MINERALS) tablet Take 1 tablet by mouth daily   Yes Historical Provider, MD   OMEPRAZOLE PO Take 20 mg by mouth daily 5/29/20  Yes Historical Provider, MD   topiramate (TOPAMAX) 100 mg tablet Take 100 mg by mouth every 12 (twelve) hours  1/15/20  Yes Historical Provider, MD   VITAMIN D, CHOLECALCIFEROL, PO daily 1/19/16  Yes Historical Provider, MD   aminocaproic acid (AMICAR) 500 mg tablet Take 500 mg by mouth as needed for bleeding  2/13/19   Historical Provider, MD   EPINEPHrine (EPIPEN) 0 3 mg/0 3 mL SOAJ For a severe reaction: Inject in outer thigh following instructions on package and go to the Emergency room  8/14/19   Historical Provider, MD   acetaminophen-codeine (TYLENOL/CODEINE #3) 300-30 MG per tablet Take by mouth as needed 8/17/18 3/8/21  Historical Provider, MD   amLODIPine (NORVASC) 5 mg tablet Take 5 mg by mouth daily 1/14/21 3/8/21  Historical Provider, MD   atorvastatin (LIPITOR) 10 mg tablet Take 1 tablet (10 mg total) by mouth every evening 1/19/21 3/8/21  ANAY Lara   diazepam (VALIUM) 5 mg tablet Take 1-2 tablets (5-10 mg total) by mouth every 6 (six) hours as needed (Vertigo) for up to 10 days 1/14/21 3/8/21  Ramakrishna Vazquez DO   gabapentin (NEURONTIN) 300 mg capsule Take 1 capsule (300 mg total) by mouth 3 (three) times a day 1/19/21 3/8/21  ANAY Lara   meclizine (ANTIVERT) 25 mg tablet Take 1 tablet (25 mg total) by mouth 3 (three) times a day as needed for dizziness 1/14/21 3/8/21  Ramakrishna Vazquez DO   phentermine (ADIPEX-P) 37 5 MG tablet Take 37 5 mg by mouth daily 1/15/20 3/8/21  Historical Provider, MD   venlafaxine (EFFEXOR-XR) 37 5 mg 24 hr capsule Take 37 5 mg by mouth daily 6/25/20 3/8/21  Historical Provider, MD MULLINS have reviewed home medications with patient personally  Allergies:    Allergies   Allergen Reactions    Erythromycin Vomiting    Morphine Palpitations       Social History:     Marital Status: Single   Patient Pre-hospital Living Situation:  Lives at home  Patient Pre-hospital Level of Mobility:  Ambulates independently  Patient Pre-hospital Diet Restrictions:  Low-sodium diet  Substance Use History:   Social History     Substance and Sexual Activity   Alcohol Use Not Currently     Social History     Tobacco Use   Smoking Status Current Every Day Smoker    Packs/day: 0 25    Years: 25 00    Pack years: 6 25    Types: Cigarettes Smokeless Tobacco Former User   Tobacco Comment    few cigs/day     Social History     Substance and Sexual Activity   Drug Use Never       Family History:    Hypertension-parents    Physical Exam:     Vitals:   Blood Pressure: 153/82 (03/09/21 0000)  Pulse: 63 (03/09/21 0000)  Temperature: 97 6 °F (36 4 °C) (03/09/21 0000)  Temp Source: Oral (03/09/21 0000)  Respirations: 18 (03/09/21 0000)  Height: 5' 7" (170 2 cm) (03/08/21 0956)  Weight - Scale: 93 5 kg (206 lb 2 1 oz) (03/08/21 0956)  SpO2: 94 % (03/09/21 0000)    Physical Exam  Vitals signs and nursing note reviewed  Constitutional:       General: She is not in acute distress  Appearance: She is well-developed  HENT:      Head: Normocephalic and atraumatic  Eyes:      Conjunctiva/sclera: Conjunctivae normal    Neck:      Musculoskeletal: Neck supple  Cardiovascular:      Rate and Rhythm: Normal rate and regular rhythm  Heart sounds: No murmur  Pulmonary:      Effort: Pulmonary effort is normal  No respiratory distress  Breath sounds: Normal breath sounds  Abdominal:      Palpations: Abdomen is soft  Tenderness: There is no abdominal tenderness  Skin:     General: Skin is warm and dry  Neurological:      Mental Status: She is alert and oriented to person, place, and time  Cranial Nerves: No cranial nerve deficit  Sensory: No sensory deficit  Motor: Weakness present  Comments: No dysarthria  No facial asymmetry  4/5 motor strength in left upper and lower extremities  Intact sensation bilaterally             Additional Data:     Lab Results: I have personally reviewed pertinent reports  Imaging: I have personally reviewed pertinent reports  MRI brain wo contrast   Final Result by Tisha Adan MD (03/08 1615)      No mass effect, acute intracranial hemorrhage or evidence of recent infarction        Stable focus of nonspecific FLAIR signal hyperintensity/T2 prolongation within the right mehnaz, likely representing a small focus of gliosis  Workstation performed: [id number]         CTA head and neck with and without contrast   Final Result by Marian Rubio MD (03/08 4147)      No acute intracranial disease  No large vessel flow restrictive disease within the head or neck  The CTA findings similar to prior exam 1/18/2021  Workstation performed: [id number]         X-ray chest 1 view portable   Final Result by Jeromy Arguelles MD (03/08 7600)      No acute cardiopulmonary disease  Workstation performed: [id number]             EKG, Pathology, and Other Studies Reviewed on Admission:   · EKG:  Reviewed    Allscripts / Epic Records Reviewed: Yes     ** Please Note: This note has been constructed using a voice recognition system   **

## 2021-03-08 NOTE — ED PROVIDER NOTES
History  Chief Complaint   Patient presents with    Chest Pain     states for a few months having chest pain on/off, states this episode started at midnight last night, left lower chest pain radiating into left side, SOB and "clammy", denies N/V, states when she eats gets burning in her stomach     62-year-old female with a past medical history of von Willebrand's disease, chronic back pain, major depressive disorder, GERD, asthma, tobacco usage, hypertension, migraines, stroke-like symptoms presents with elevated blood pressure last night (230/140) with associated chest pain, mild shortness of breath, mild dizziness and left-sided weakness and numbness that started around 9:30 p m  Sherrell Radha Patient states that she has had intermittent left-sided chest pain for 1 or 2 months which has no specific pattern  Denies previous history of stroke, thyroid disorder, vertigo, myocardial infarction, stents and does not follow with a cardiologist  Patient  states that her father had two CABGs  Patient states she has been compliant with her blood pressure medication  Review of medical chart shows that patient was admitted to [address] [place] [address] on January 18, 2021 with similar symptoms with negative CT head but MRI showed possible punctate focus in right mehnaz  Patient states that her chest pain presently is 4/10  She has had intermittent chest pain for a while, however last night it started to worsen with radiation to left arm with associated left cheek, left arm and left leg tingling and numbness which has persisted  Patient follows with Neurology at [facility] for her migraines    Patient denies fever, chills, dizziness, diaphoresis, loss of taste or smell, headache, neck stiffness, sore throat, cough, sputum production, nausea, vomiting, shortness of breath, back pain, rash, abdominal pain, urinary symptoms, vaginal bleeding or discharge, focal motor deficits, lower extremity swelling or pain, diarrhea, bloody stools or constipation  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 wore PPE during clinical evaluation due to COVID-19 pandemic including bonnet, eye goggles, face mask, gown and gloves  History provided by:  Patient and medical records   used: No    Chest Pain  Pain location:  L chest  Pain quality: pressure    Pain radiates to:  L shoulder and L arm  Pain radiates to the back: no    Pain severity:  Mild  Onset quality:  Unable to specify  Timing:  Intermittent  Progression:  Waxing and waning  Chronicity:  Recurrent  Context: at rest    Context: not breathing, no drug use, not eating, no intercourse, not lifting, no movement, not raising an arm, no stress and no trauma    Relieved by:  Nothing  Worsened by:  Nothing tried  Ineffective treatments:  None tried  Associated symptoms: dizziness, numbness, shortness of breath and weakness    Associated symptoms: no abdominal pain, no AICD problem, no altered mental status, no anorexia, no anxiety, no back pain, no claudication, no cough, no diaphoresis, no dysphagia, no fatigue, no fever, no headache, no heartburn, no lower extremity edema, no nausea, no near-syncope, no orthopnea, no palpitations, no PND, no syncope and not vomiting    Shortness of breath:     Severity:  Mild    Onset quality:  Unable to specify    Timing:  Unable to specify    Progression:  Unable to specify  Risk factors: hypertension, obesity and smoking    Risk factors: no aortic disease, no birth control, no coronary artery disease, no diabetes mellitus, no Britt-Danlos syndrome, no high cholesterol, no immobilization, not male, no Marfan's syndrome, not pregnant, no prior DVT/PE and no surgery        Prior to Admission Medications   Prescriptions Last Dose Informant Patient Reported? Taking? EPINEPHrine (EPIPEN) 0 3 mg/0 3 mL SOAJ   Yes Yes   Sig: For a severe reaction: Inject in outer thigh following instructions on package and go to the Emergency room     Multiple Vitamins-Minerals (MULTIVITAMIN WITH MINERALS) tablet  Self Yes Yes   Sig: Take 1 tablet by mouth daily   OMEPRAZOLE PO   Yes Yes   Sig: Take 20 mg by mouth daily   VITAMIN D, CHOLECALCIFEROL, PO   Yes Yes   Sig: daily   albuterol (2 5 mg/3 mL) 0 083 % nebulizer solution   Yes Yes   Sig: Inhale 2 5 mg as needed   albuterol (PROVENTIL HFA,VENTOLIN HFA) 90 mcg/act inhaler   Yes Yes   Sig: as needed   aminocaproic acid (AMICAR) 500 mg tablet   Yes Yes   Sig: Take 500 mg by mouth as needed for bleeding    buPROPion (WELLBUTRIN SR) 150 mg 12 hr tablet   Yes Yes   Sig: Take 150 mg by mouth 2 (two) times a day   cetirizine (ZyrTEC) 10 mg tablet   Yes Yes   Sig: Take 10 mg by mouth daily   fluticasone (FLONASE) 50 mcg/act nasal spray   Yes Yes   Sig: daily at bedtime   hydrochlorothiazide (HYDRODIURIL) 25 mg tablet   Yes Yes   Sig: Take 25 mg by mouth daily   lisinopril (ZESTRIL) 20 mg tablet   Yes Yes   Sig: Take 20 mg by mouth daily   montelukast (SINGULAIR) 10 mg tablet   Yes Yes   Sig: Take 10 mg by mouth daily at bedtime   topiramate (TOPAMAX) 100 mg tablet   Yes Yes   Sig: Take 100 mg by mouth every 12 (twelve) hours       Facility-Administered Medications: None       Past Medical History:   Diagnosis Date    Heart murmur     Kidney stones     Kidney stones     Von Willebrand disease ([facility] [address] )        Past Surgical History:   Procedure Laterality Date    APPENDECTOMY      BREAST LUMPECTOMY Left     CARPAL TUNNEL RELEASE Bilateral     CHOLECYSTECTOMY      HYSTERECTOMY      KNEE SURGERY Left     PARTIAL HYSTERECTOMY      ROTATOR CUFF REPAIR Left     TONSILLECTOMY         Family History   Problem Relation Age of Onset    Cancer Mother     Cancer Sister     Stroke Brother      I have reviewed and agree with the history as documented      E-Cigarette/Vaping    E-Cigarette Use Current Some Day User      E-Cigarette/Vaping Substances    Nicotine Yes 2 cigarettes a week     Social History     Tobacco Use    Smoking status: Former Smoker     Years: 25 00     Types: Cigarettes    Smokeless tobacco: Former User   Substance Use Topics    Alcohol use: Not Currently    Drug use: Never       Review of Systems   Constitutional: Negative for chills, diaphoresis, fatigue and fever  HENT: Negative for congestion, drooling, facial swelling, nosebleeds, sneezing, trouble swallowing and voice change  Eyes: Negative for photophobia, pain and visual disturbance  Respiratory: Positive for shortness of breath  Negative for cough, chest tightness and wheezing  Cardiovascular: Positive for chest pain  Negative for palpitations, orthopnea, claudication, leg swelling, syncope, PND and near-syncope  Gastrointestinal: Negative for abdominal pain, anorexia, constipation, diarrhea, heartburn, nausea and vomiting  Genitourinary: Negative for decreased urine volume, difficulty urinating, dysuria, flank pain, frequency, hematuria, urgency, vaginal bleeding and vaginal discharge  Musculoskeletal: Negative for arthralgias, back pain, myalgias, neck pain and neck stiffness  Skin: Negative for color change, pallor, rash and wound  Allergic/Immunologic: Negative for immunocompromised state  Neurological: Positive for dizziness, weakness and numbness  Negative for tremors, seizures, syncope, facial asymmetry, speech difficulty, light-headedness and headaches  Hematological: Negative for adenopathy  Psychiatric/Behavioral: Negative for agitation, confusion, hallucinations and suicidal ideas  The patient is not nervous/anxious  Physical Exam  Physical Exam  Vitals signs and nursing note reviewed  Exam conducted with a chaperone present  Constitutional:       General: She is not in acute distress  Appearance: Normal appearance  She is well-developed and normal weight  She is not ill-appearing, toxic-appearing or diaphoretic  HENT:      Head: Normocephalic and atraumatic  Jaw: There is normal jaw occlusion        Right Ear: Hearing, tympanic membrane, ear canal and external ear normal  There is no impacted cerumen  No mastoid tenderness  No hemotympanum  Left Ear: Hearing, tympanic membrane, ear canal and external ear normal  There is no impacted cerumen  No mastoid tenderness  No hemotympanum  Nose: Nose normal       Right Nostril: No epistaxis  Left Nostril: No epistaxis  Right Sinus: No maxillary sinus tenderness or frontal sinus tenderness  Left Sinus: No maxillary sinus tenderness or frontal sinus tenderness  Mouth/Throat:      Lips: Pink  No lesions  Mouth: Mucous membranes are moist  No lacerations or angioedema  Tongue: No lesions  Tongue does not deviate from midline  Palate: No mass and lesions  Pharynx: Oropharynx is clear  Uvula midline  No pharyngeal swelling, oropharyngeal exudate, posterior oropharyngeal erythema or uvula swelling  Tonsils: No tonsillar exudate or tonsillar abscesses  Eyes:      General: Lids are normal  Vision grossly intact  Gaze aligned appropriately  No visual field deficit or scleral icterus  Right eye: No discharge  Left eye: No discharge  Extraocular Movements: Extraocular movements intact  Right eye: No nystagmus  Left eye: No nystagmus  Conjunctiva/sclera: Conjunctivae normal       Right eye: Right conjunctiva is not injected  Left eye: Left conjunctiva is not injected  Pupils: Pupils are equal, round, and reactive to light  Neck:      Musculoskeletal: Full passive range of motion without pain, normal range of motion and neck supple  No neck rigidity, spinous process tenderness or muscular tenderness  Thyroid: No thyroid mass or thyromegaly  Trachea: Trachea and phonation normal    Cardiovascular:      Rate and Rhythm: Normal rate and regular rhythm  Pulses: Normal pulses  Radial pulses are 2+ on the right side and 2+ on the left side          Dorsalis pedis pulses are 2+ on the right side and 2+ on the left side  Heart sounds: Normal heart sounds, S1 normal and S2 normal    Pulmonary:      Effort: Pulmonary effort is normal  No tachypnea, accessory muscle usage, respiratory distress or retractions  Breath sounds: Normal breath sounds and air entry  No stridor or decreased air movement  No decreased breath sounds, wheezing, rhonchi or rales  Chest:      Chest wall: No tenderness  Abdominal:      General: Abdomen is flat  Bowel sounds are normal  There is no distension  Palpations: Abdomen is soft  Abdomen is not rigid  There is no hepatomegaly, splenomegaly or mass  Tenderness: There is no abdominal tenderness  There is no right CVA tenderness, left CVA tenderness, guarding or rebound  Negative signs include Barber's sign and McBurney's sign  Hernia: No hernia is present  Musculoskeletal: Normal range of motion  General: No swelling, tenderness, deformity or signs of injury  Right lower leg: She exhibits no tenderness  No edema  Left lower leg: She exhibits no tenderness  No edema  Lymphadenopathy:      Cervical: No cervical adenopathy  Skin:     General: Skin is warm and dry  Capillary Refill: Capillary refill takes less than 2 seconds  Coloration: Skin is not ashen, cyanotic, jaundiced, mottled, pale or sallow  Findings: No abrasion, abscess, acne, bruising, burn, ecchymosis, erythema, signs of injury, laceration, lesion, petechiae, rash or wound  Rash is not macular or papular  Nails: There is no clubbing  Neurological:      Mental Status: She is alert and oriented to person, place, and time  Mental status is at baseline  She is not disoriented  GCS: GCS eye subscore is 4  GCS verbal subscore is 5  GCS motor subscore is 6  Cranial Nerves: Cranial nerves are intact  No cranial nerve deficit, dysarthria or facial asymmetry  Sensory: Sensory deficit present  Motor: Motor function is intact   No weakness, tremor, atrophy, abnormal muscle tone or seizure activity  Coordination: Coordination is intact  Coordination normal       Gait: Gait is intact  Gait normal       Comments: Patient is AAOx4, GCS 15; speaking clearly and appropriately; motor intact; visual fields intact; cranial nerves II-XII grossly intact; no facial droop, slurred speech or arm drift; decreased sensation to left cheek, left arm and left leg  NIH 1   Psychiatric:         Attention and Perception: Attention and perception normal  She is attentive  Mood and Affect: Affect normal  Mood is anxious  Speech: Speech normal          Behavior: Behavior normal  Behavior is cooperative  Thought Content:  Thought content normal          Cognition and Memory: Cognition and memory normal          Judgment: Judgment normal          Vital Signs  ED Triage Vitals [03/08/21 0956]   Temperature Pulse Respirations Blood Pressure SpO2   (!) 97 4 °F (36 3 °C) 64 20 150/89 98 %      Temp Source Heart Rate Source Patient Position - Orthostatic VS BP Location FiO2 (%)   Temporal Monitor Lying Right arm --      Pain Score       5           Vitals:    03/08/21 0956 03/08/21 1030 03/08/21 1045 03/08/21 1300   BP: 150/89 150/84 143/75 138/71   Pulse: 64 70 66 59   Patient Position - Orthostatic VS: Lying Lying           Visual Acuity      ED Medications  Medications   sodium chloride (PF) 0 9 % injection 3 mL (has no administration in time range)   sodium chloride 0 9 % bolus 1,000 mL (0 mL Intravenous Stopped 3/8/21 1305)   nitroglycerin (NITROSTAT) SL tablet 0 4 mg (0 4 mg Sublingual Given 3/8/21 1028)   potassium chloride (K-DUR,KLOR-CON) CR tablet 40 mEq (40 mEq Oral Given 3/8/21 1135)   famotidine (PEPCID) injection 20 mg (20 mg Intravenous Given 3/8/21 1136)   aluminum-magnesium hydroxide-simethicone (MYLANTA) oral suspension 15 mL (15 mL Oral Given 3/8/21 1135)   iohexol (OMNIPAQUE) 350 MG/ML injection (MULTI-DOSE) 85 mL (85 mL Intravenous Given 3/8/21 1127)       Diagnostic Studies  Results Reviewed     Procedure Component Value Units Date/Time    Troponin I repeat in 3hrs [[id number]] Collected: 03/08/21 1309    Lab Status: In process Specimen: Blood from Arm, Left Updated: 03/08/21 1311    NT-BNP PRO [[id number]]  (Normal) Collected: 03/08/21 1029    Lab Status: Final result Specimen: Blood from Arm, Right Updated: 03/08/21 1119     NT-proBNP 45 pg/mL     TSH, 3rd generation [[id number]]  (Normal) Collected: 03/08/21 1029    Lab Status: Final result Specimen: Blood from Arm, Right Updated: 03/08/21 1107     TSH 3RD GENERATON 0 871 uIU/mL     Narrative:      Patients undergoing fluorescein dye angiography may retain small amounts of fluorescein in the body for 48-72 hours post procedure  Samples containing fluorescein can produce falsely depressed TSH values  If the patient had this procedure,a specimen should be resubmitted post fluorescein clearance        Comprehensive metabolic panel [[id number]]  (Abnormal) Collected: 03/08/21 1029    Lab Status: Final result Specimen: Blood from Arm, Right Updated: 03/08/21 1101     Sodium 141 mmol/L      Potassium 3 2 mmol/L      Chloride 104 mmol/L      CO2 26 mmol/L      ANION GAP 11 mmol/L      BUN 10 mg/dL      Creatinine 1 05 mg/dL      Glucose 93 mg/dL      Calcium 9 0 mg/dL      AST 14 U/L      ALT 30 U/L      Alkaline Phosphatase 95 U/L      Total Protein 7 8 g/dL      Albumin 4 0 g/dL      Total Bilirubin 0 31 mg/dL      eGFR 60 ml/min/1 73sq m     Narrative:      Radha guidelines for Chronic Kidney Disease (CKD):     Stage 1 with normal or high GFR (GFR > 90 mL/min/1 73 square meters)    Stage 2 Mild CKD (GFR = 60-89 mL/min/1 73 square meters)    Stage 3A Moderate CKD (GFR = 45-59 mL/min/1 73 square meters)    Stage 3B Moderate CKD (GFR = 30-44 mL/min/1 73 square meters)    Stage 4 Severe CKD (GFR = 15-29 mL/min/1 73 square meters)    Stage 5 End Stage CKD (GFR <15 mL/min/1 73 square meters)  Note: GFR calculation is accurate only with a steady state creatinine    Lipase [[id number]]  (Normal) Collected: 03/08/21 1029    Lab Status: Final result Specimen: Blood from Arm, Right Updated: 03/08/21 1101     Lipase 85 u/L     Magnesium [[id number]]  (Normal) Collected: 03/08/21 1029    Lab Status: Final result Specimen: Blood from Arm, Right Updated: 03/08/21 1101     Magnesium 2 1 mg/dL     CK (with reflex to MB) [[id number]]  (Normal) Collected: 03/08/21 1029    Lab Status: Final result Specimen: Blood from Arm, Right Updated: 03/08/21 1059     Total CK 94 U/L     Troponin I [[id number]]  (Normal) Collected: 03/08/21 1029    Lab Status: Final result Specimen: Blood from Arm, Right Updated: 03/08/21 1050     Troponin I <0 02 ng/mL     D-dimer, quantitative [[id number]]  (Normal) Collected: 03/08/21 1029    Lab Status: Final result Specimen: Blood from Arm, Right Updated: 03/08/21 1049     D-Dimer, Quant 0 40 ug/ml FEU     Protime-INR [[id number]]  (Normal) Collected: 03/08/21 1029    Lab Status: Final result Specimen: Blood from Arm, Right Updated: 03/08/21 1046     Protime 13 0 seconds      INR 1 00    CBC and differential [[id number]]  (Abnormal) Collected: 03/08/21 1029    Lab Status: Final result Specimen: Blood from Arm, Right Updated: 03/08/21 1035     WBC 7 13 Thousand/uL      RBC 5 04 Million/uL      Hemoglobin 13 3 g/dL      Hematocrit 42 4 %      MCV 84 fL      MCH 26 4 pg      MCHC 31 4 g/dL      RDW 13 2 %      MPV 12 1 fL      Platelets 462 Thousands/uL      nRBC 0 /100 WBCs      Neutrophils Relative 56 %      Immat GRANS % 0 %      Lymphocytes Relative 36 %      Monocytes Relative 6 %      Eosinophils Relative 1 %      Basophils Relative 1 %      Neutrophils Absolute 4 02 Thousands/µL      Immature Grans Absolute 0 02 Thousand/uL      Lymphocytes Absolute 2 56 Thousands/µL      Monocytes Absolute 0 41 Thousand/µL      Eosinophils Absolute 0 07 Thousand/µL Basophils Absolute 0 05 Thousands/µL     UA w Reflex to Microscopic w Reflex to Culture [[id number]]     Lab Status: No result Specimen: Urine                  CTA head and neck with and without contrast   Final Result by Valdo Field MD (03/08 9239)      No acute intracranial disease  No large vessel flow restrictive disease within the head or neck  The CTA findings similar to prior exam 1/18/2021  Workstation performed: [id number]         X-ray chest 1 view portable   Final Result by Flaco Garces MD (03/08 8113)      No acute cardiopulmonary disease  Workstation performed: [id number]                    Procedures  ECG 12 Lead Documentation Only    Date/Time: 3/8/2021 9:57 AM  Performed by: Reji Coyne MD  Authorized by: Reji Coyne MD     Indications / Diagnosis:  Chest pain  ECG reviewed by me, the ED Provider: yes    Patient location:  ED  Previous ECG:     Comparison to cardiac monitor: Yes    Interpretation:     Interpretation: normal    Rate:     ECG rate:  66bpm    ECG rate assessment: normal    Rhythm:     Rhythm: sinus rhythm    Ectopy:     Ectopy: none    QRS:     QRS axis:  Normal  Conduction:     Conduction: normal    ST segments:     ST segments:  Normal  T waves:     T waves: flattening and inverted      Flattening:  I and aVL    Inverted:  AVR  Comments:      No STEMI  MI 162ms  QRS 90ms  QT 465ms    Cardiac monitoring ordered  Heart rate and rhythm as above  I have personally read and interpreted the EKG as above  ED Course  ED Course as of Mar 08 1324   Mon Mar 08, 2021   1126 Antonio replete potassium 3 2       1127 Texted Dr Sonia Escalona, Neurology regarding patient's symptoms and left-sided decreased sensation that started at 9:30 p m  Last night  No stroke alert called  NIH 1        1135 Review of medical chart shows patient was admitted on January 18, 2021 with similar symptoms    MRI brain showed:  MRI brain wo contrast  Final Result by Rebecca Santana MD (01/18 1308)     Punctate focus of signal abnormality in the right mehnaz  This may represent sequela of migraines, subacute infarction, and/or demyelination            I personally discussed this study with Han Mikala on 1/18/2021 at 1:07 PM                    1144 CXR:IMPRESSION:     No acute cardiopulmonary disease             1145 HEART Score 2       1154 Reassessed patient  She denies chest pain and left-sided tingling at this time  Blood pressure 137/74       1155 Dr aDvid Nurse agrees with Hospitalist admission for stroke pathway  1204 CTA:IMPRESSION:     No acute intracranial disease      No large vessel flow restrictive disease within the head or neck  The CTA findings similar to prior exam 1/18/2021      CTH: FINDINGS:  NONCONTRAST BRAIN  PARENCHYMA:  No intracranial mass, mass effect or midline shift  No CT signs of acute infarction  No acute parenchymal hemorrhage  Minor vascular calcifications  [address] Shyanne Sparks, Hospitalist for [facility] inpatient for stroke pathway in addition to chest pain rule out  18 Dr Carlos accepts patient to [facility]  Updated patient  She has no chest pain or neuro deficits at this time  4455 Jeremy Medranoy with Dr David Nurse regarding patient  Patient does not require MRI brain as her symptoms have resolved and she had similar symptoms in January 2021  Neurology thinks that her symptoms are more likely to be cardiac in nature  She recommends starting patient on Lipitor 80 mg once daily for 1 month and to decrease to 40 mg daily thereafter                    HEART Risk Score      Most Recent Value   Heart Score Risk Calculator   History  0 Filed at: 03/08/2021 1144   ECG  0 Filed at: 03/08/2021 1144   Age  1 Filed at: 03/08/2021 1144   Risk Factors  1 Filed at: 03/08/2021 1144   Troponin  0 Filed at: 03/08/2021 1144   HEART Score  2 Filed at: 03/08/2021 1144          Stroke Assessment     Row Name 03/08/21 1130             NIH Stroke Scale    Interval  2 hours post-treatment      Level of Consciousness (1a )  0      LOC Questions (1b )  0      LOC Commands (1c )  0      Best Gaze (2 )  0      Visual (3 )  0      Facial Palsy (4 )  0      Motor Arm, Left (5a )  0      Motor Arm, Right (5b )  0      Motor Leg, Left (6a )  0      Motor Leg, Right (6b )  0      Limb Ataxia (7 )  0      Sensory (8 )  1      Best Language (9 )  0      Dysarthria (10 )  0      Extinction and Inattention (11 ) (Formerly Neglect)  0      Total  1          First Filed Value   TPA Decision  Patient not a TPA candidate  Patient is not a candidate options  Unclear time of onset outside appropriate time window  SBIRT 22yo+      Most Recent Value   SBIRT (24 yo +)   In order to provide better care to our patients, we are screening all of our patients for alcohol and drug use  Would it be okay to ask you these screening questions? Yes Filed at: 03/08/2021 5018   Initial Alcohol Screen: US AUDIT-C    1  How often do you have a drink containing alcohol?  0 Filed at: 03/08/2021 0959   2  How many drinks containing alcohol do you have on a typical day you are drinking? 0 Filed at: 03/08/2021 0959   3a  Male UNDER 65: How often do you have five or more drinks on one occasion? 0 Filed at: 03/08/2021 0959   3b  FEMALE Any Age, or MALE 65+: How often do you have 4 or more drinks on one occassion? 0 Filed at: 03/08/2021 0959   Audit-C Score  0 Filed at: 03/08/2021 3203   CRISTINO: How many times in the past year have you    Used an illegal drug or used a prescription medication for non-medical reasons?   Never Filed at: 03/08/2021 2548                    MDM  Number of Diagnoses or Management Options  Chest pain:   Hypokalemia:   Numbness and tingling in left arm:   Numbness and tingling of left lower extremity:   Numbness and tingling of left side of face:      Amount and/or Complexity of Data Reviewed  Clinical lab tests: reviewed and ordered  Tests in the radiology section of CPT®: reviewed and ordered  Tests in the medicine section of CPT®: ordered and reviewed  Review and summarize past medical records: yes  Discuss the patient with other providers: yes (Neurology, Dr Lenny Kelly, Dr Vero Bunn)  Independent visualization of images, tracings, or specimens: yes (CTH, CTA, CXR, EKG)    Risk of Complications, Morbidity, and/or Mortality  Presenting problems: moderate  Diagnostic procedures: moderate  Management options: moderate    Patient Progress  Patient progress: stable      Disposition  Final diagnoses:   Chest pain   Numbness and tingling in left arm   Numbness and tingling of left lower extremity   Numbness and tingling of left side of face   Hypokalemia     Time reflects when diagnosis was documented in both MDM as applicable and the Disposition within this note     Time User Action Codes Description Comment    3/8/2021 12:08 PM Simmie Achilles Add [R07 9] Chest pain     3/8/2021 12:08 PM Simmie Achilles Add [R20 0,  R20 2] Numbness and tingling in left arm     3/8/2021 12:08 PM Simmie Achilles Add [R20 0,  R20 2] Numbness and tingling of left lower extremity     3/8/2021 12:09 PM Simmie Achilles Add [R20 0,  R20 2] Numbness and tingling of left side of face     3/8/2021  1:21 PM Simmie Achilles Add [E87 6] Hypokalemia       ED Disposition     ED Disposition Condition Date/Time Comment    Admit Stable Mon Mar 8, 2021 12:39 PM Case was discussed with Dr Vero Bunn and the patient's admission status was agreed to be Admission Status: inpatient status to the service of Dr Vero Bunn          Follow-up Information    None         Current Discharge Medication List      CONTINUE these medications which have NOT CHANGED    Details   albuterol (2 5 mg/3 mL) 0 083 % nebulizer solution Inhale 2 5 mg as needed      albuterol (PROVENTIL HFA,VENTOLIN HFA) 90 mcg/act inhaler as needed    Comments: Substitution to a formulary equivalent within the same pharmaceutical class is authorized  aminocaproic acid (AMICAR) 500 mg tablet Take 500 mg by mouth as needed for bleeding       buPROPion (WELLBUTRIN SR) 150 mg 12 hr tablet Take 150 mg by mouth 2 (two) times a day      cetirizine (ZyrTEC) 10 mg tablet Take 10 mg by mouth daily      EPINEPHrine (EPIPEN) 0 3 mg/0 3 mL SOAJ For a severe reaction: Inject in outer thigh following instructions on package and go to the Emergency room  fluticasone (FLONASE) 50 mcg/act nasal spray daily at bedtime      hydrochlorothiazide (HYDRODIURIL) 25 mg tablet Take 25 mg by mouth daily      lisinopril (ZESTRIL) 20 mg tablet Take 20 mg by mouth daily      montelukast (SINGULAIR) 10 mg tablet Take 10 mg by mouth daily at bedtime      Multiple Vitamins-Minerals (MULTIVITAMIN WITH MINERALS) tablet Take 1 tablet by mouth daily      OMEPRAZOLE PO Take 20 mg by mouth daily      topiramate (TOPAMAX) 100 mg tablet Take 100 mg by mouth every 12 (twelve) hours       VITAMIN D, CHOLECALCIFEROL, PO daily           No discharge procedures on file      PDMP Review     None          ED Provider  Electronically Signed by    Marlowe Brunner, MD Emory Lair, MD  03/08/21 6152

## 2021-03-08 NOTE — QUICK NOTE
Neurology called regarding patient  She came in with chest pain left sided reduced sensation which has since resolved  Symptoms present since 930 pm last night  No headache  CTA H/N here unremarkable    Pt had similar presentation 1/18/21 when she was seen by neurology- Adele Espinal, I reviewed notes  MRI brain with no DWI restriction, no acute stroke  [place] hyperintensity- remote [place] vs chronic WM change likely  ECHO with dilated left atrium  Patient with hyperlipidemia  I am told she has von willebrand disease thus cannot take antiplatelet  A/P  TIA? As symptoms resolved and chest pain still persistent, with recent neuro work up largely unremarkable  No need for repeat MRI brain if patient remains non focal from my standpoint  Recommend cardiac work up including loop recorder given dilated left atrium to make sure no PAF  Will defer to medicine team if admit needed for cardiac work up  As stated above no antiplatelets am told due to VWF    Recommend high dose statin lipitor 80 mg x 1 month if she can tolerate it then reduce to lipitor 40 mg as warranted    D/w Dr Carrion

## 2021-03-08 NOTE — ASSESSMENT & PLAN NOTE
· Patient developed chest pain over left chest at the same time as her head numbness and tingling  · Chest pain radiated towards left side and back  · Resolved in ED  · Monitor on telemetry  · Initial troponin and EKG normal  · Monitor troponin and serial EKG  · IMTIAZ 1

## 2021-03-08 NOTE — PLAN OF CARE
Problem: Potential for Falls  Goal: Patient will remain free of falls  Description: INTERVENTIONS:  - Assess patient frequently for physical needs  -  Identify cognitive and physical deficits and behaviors that affect risk of falls    -  [place] fall precautions as indicated by assessment   - Educate patient/family on patient safety including physical limitations  - Instruct patient to call for assistance with activity based on assessment  - Modify environment to reduce risk of injury  - Consider OT/PT consult to assist with strengthening/mobility  Outcome: Progressing     Problem: PAIN - ADULT  Goal: Verbalizes/displays adequate comfort level or baseline comfort level  Description: Interventions:  - Encourage patient to monitor pain and request assistance  - Assess pain using appropriate pain scale  - Administer analgesics based on type and severity of pain and evaluate response  - Implement non-pharmacological measures as appropriate and evaluate response  - Consider cultural and social influences on pain and pain management  - Notify physician/advanced practitioner if interventions unsuccessful or patient reports new pain  Outcome: Progressing     Problem: INFECTION - ADULT  Goal: Absence or prevention of progression during hospitalization  Description: INTERVENTIONS:  - Assess and monitor for signs and symptoms of infection  - Monitor lab/diagnostic results  - Monitor all insertion sites, i e  indwelling lines, tubes, and drains  - Monitor endotracheal if appropriate and nasal secretions for changes in amount and color  - [place] appropriate cooling/warming therapies per order  - Administer medications as ordered  - Instruct and encourage patient and family to use good hand hygiene technique  - Identify and instruct in appropriate isolation precautions for identified infection/condition  Outcome: Progressing     Problem: SAFETY ADULT  Goal: Patient will remain free of falls  Description: INTERVENTIONS:  - Assess patient frequently for physical needs  -  Identify cognitive and physical deficits and behaviors that affect risk of falls    -  [place] fall precautions as indicated by assessment   - Educate patient/family on patient safety including physical limitations  - Instruct patient to call for assistance with activity based on assessment  - Modify environment to reduce risk of injury  - Consider OT/PT consult to assist with strengthening/mobility  Outcome: Progressing  Goal: Maintain or return to baseline ADL function  Description: INTERVENTIONS:  -  Assess patient's ability to carry out ADLs; assess patient's baseline for ADL function and identify physical deficits which impact ability to perform ADLs (bathing, care of mouth/teeth, toileting, grooming, dressing, etc )  - Assess/evaluate cause of self-care deficits   - Assess range of motion  - Assess patient's mobility; develop plan if impaired  - Assess patient's need for assistive devices and provide as appropriate  - Encourage maximum independence but intervene and supervise when necessary  - Involve family in performance of ADLs  - Assess for home care needs following discharge   - Consider OT consult to assist with ADL evaluation and planning for discharge  - Provide patient education as appropriate  Outcome: Progressing  Goal: Maintain or return mobility status to optimal level  Description: INTERVENTIONS:  - Assess patient's baseline mobility status (ambulation, transfers, stairs, etc )    - Identify cognitive and physical deficits and behaviors that affect mobility  - Identify mobility aids required to assist with transfers and/or ambulation (gait belt, sit-to-stand, lift, walker, cane, etc )  - [place] fall precautions as indicated by assessment  - Record patient progress and toleration of activity level on Mobility SBAR; progress patient to next Phase/Stage  - Instruct patient to call for assistance with activity based on assessment  - Consider rehabilitation consult to assist with strengthening/weightbearing, etc   Outcome: Progressing     Problem: DISCHARGE PLANNING  Goal: Discharge to home or other facility with appropriate resources  Description: INTERVENTIONS:  - Identify barriers to discharge w/patient and caregiver  - Arrange for needed discharge resources and transportation as appropriate  - Identify discharge learning needs (meds, wound care, etc )  - Arrange for interpretive services to assist at discharge as needed  - Refer to Case Management Department for coordinating discharge planning if the patient needs post-hospital services based on physician/advanced practitioner order or complex needs related to functional status, cognitive ability, or social support system  Outcome: Progressing     Problem: Knowledge Deficit  Goal: Patient/family/caregiver demonstrates understanding of disease process, treatment plan, medications, and discharge instructions  Description: Complete learning assessment and assess knowledge base    Interventions:  - Provide teaching at level of understanding  - Provide teaching via preferred learning methods  Outcome: Progressing

## 2021-03-08 NOTE — ED NOTES
Pt given ntg sl x 1 per 5/10 pain  Per pt pain has completely resolved as well as facial tingling        Christophe Lane RN  03/08/21 2513

## 2021-03-08 NOTE — ASSESSMENT & PLAN NOTE
· Patient admitted under the stroke pathway presenting with left facial numbness and tingling that had radiated down her left arm as well  · Patient states that this occurs quite often and is associated with blurring of vision  · Out of the window for TPA  · CTH negative  · MRI reviewed: negative for acute ischemia, small focus of gliosis  · Neuro consulted, recs appreciated  · C/w aspirin and statin  · Patient had TTE in January 2021 which was unremarkable  ·

## 2021-03-09 ENCOUNTER — HOSPITAL ENCOUNTER (EMERGENCY)
Facility: HOSPITAL | Age: 56
Discharge: HOME | End: 2021-03-09
Attending: EMERGENCY MEDICINE | Admitting: EMERGENCY MEDICINE
Payer: COMMERCIAL

## 2021-03-09 ENCOUNTER — APPOINTMENT (EMERGENCY)
Dept: RADIOLOGY | Facility: HOSPITAL | Age: 56
End: 2021-03-09
Payer: COMMERCIAL

## 2021-03-09 VITALS
RESPIRATION RATE: 18 BRPM | WEIGHT: 206.13 LBS | DIASTOLIC BLOOD PRESSURE: 82 MMHG | HEART RATE: 63 BPM | TEMPERATURE: 97.6 F | SYSTOLIC BLOOD PRESSURE: 153 MMHG | OXYGEN SATURATION: 94 % | HEIGHT: 67 IN | BODY MASS INDEX: 32.35 KG/M2

## 2021-03-09 VITALS
SYSTOLIC BLOOD PRESSURE: 110 MMHG | WEIGHT: 199 LBS | OXYGEN SATURATION: 100 % | HEIGHT: 67 IN | RESPIRATION RATE: 17 BRPM | BODY MASS INDEX: 31.23 KG/M2 | TEMPERATURE: 98 F | DIASTOLIC BLOOD PRESSURE: 64 MMHG | HEART RATE: 67 BPM

## 2021-03-09 DIAGNOSIS — R07.9 CHEST PAIN: Primary | ICD-10-CM

## 2021-03-09 DIAGNOSIS — S82.891A CLOSED FRACTURE OF RIGHT ANKLE, INITIAL ENCOUNTER: ICD-10-CM

## 2021-03-09 LAB
ANION GAP SERPL CALCULATED.3IONS-SCNC: 9 MMOL/L (ref 4–13)
ATRIAL RATE: 55 BPM
ATRIAL RATE: 66 BPM
BASOPHILS # BLD AUTO: 0.07 THOUSANDS/ΜL (ref 0–0.1)
BASOPHILS NFR BLD AUTO: 1 % (ref 0–1)
BUN SERPL-MCNC: 8 MG/DL (ref 5–25)
CALCIUM SERPL-MCNC: 9.4 MG/DL (ref 8.3–10.1)
CHLORIDE SERPL-SCNC: 104 MMOL/L (ref 100–108)
CO2 SERPL-SCNC: 26 MMOL/L (ref 21–32)
CREAT SERPL-MCNC: 1.05 MG/DL (ref 0.6–1.3)
EOSINOPHIL # BLD AUTO: 0.09 THOUSAND/ΜL (ref 0–0.61)
EOSINOPHIL NFR BLD AUTO: 1 % (ref 0–6)
ERYTHROCYTE [DISTWIDTH] IN BLOOD BY AUTOMATED COUNT: 13.2 % (ref 11.6–15.1)
GFR SERPL CREATININE-BSD FRML MDRD: 60 ML/MIN/1.73SQ M
GLUCOSE SERPL-MCNC: 105 MG/DL (ref 65–140)
HCT VFR BLD AUTO: 41.9 % (ref 34.8–46.1)
HGB BLD-MCNC: 13.5 G/DL (ref 11.5–15.4)
IMM GRANULOCYTES # BLD AUTO: 0.03 THOUSAND/UL (ref 0–0.2)
IMM GRANULOCYTES NFR BLD AUTO: 0 % (ref 0–2)
LYMPHOCYTES # BLD AUTO: 3.17 THOUSANDS/ΜL (ref 0.6–4.47)
LYMPHOCYTES NFR BLD AUTO: 32 % (ref 14–44)
MCH RBC QN AUTO: 26.7 PG (ref 26.8–34.3)
MCHC RBC AUTO-ENTMCNC: 32.2 G/DL (ref 31.4–37.4)
MCV RBC AUTO: 83 FL (ref 82–98)
MONOCYTES # BLD AUTO: 0.67 THOUSAND/ΜL (ref 0.17–1.22)
MONOCYTES NFR BLD AUTO: 7 % (ref 4–12)
NEUTROPHILS # BLD AUTO: 5.97 THOUSANDS/ΜL (ref 1.85–7.62)
NEUTS SEG NFR BLD AUTO: 59 % (ref 43–75)
NRBC BLD AUTO-RTO: 0 /100 WBCS
P AXIS: 58 DEGREES
P AXIS: 71 DEGREES
PLATELET # BLD AUTO: 272 THOUSANDS/UL (ref 149–390)
PMV BLD AUTO: 11.4 FL (ref 8.9–12.7)
POTASSIUM SERPL-SCNC: 3.4 MMOL/L (ref 3.5–5.3)
PR INTERVAL: 162 MS
PR INTERVAL: 184 MS
QRS AXIS: 12 DEGREES
QRS AXIS: 71 DEGREES
QRSD INTERVAL: 88 MS
QRSD INTERVAL: 90 MS
QT INTERVAL: 444 MS
QT INTERVAL: 456 MS
QTC INTERVAL: 436 MS
QTC INTERVAL: 465 MS
RBC # BLD AUTO: 5.06 MILLION/UL (ref 3.81–5.12)
SODIUM SERPL-SCNC: 139 MMOL/L (ref 136–145)
T WAVE AXIS: 35 DEGREES
T WAVE AXIS: 69 DEGREES
TROPONIN I SERPL-MCNC: <0.02 NG/ML
TROPONIN I SERPL-MCNC: <0.02 NG/ML
VENTRICULAR RATE: 55 BPM
VENTRICULAR RATE: 66 BPM
WBC # BLD AUTO: 10 THOUSAND/UL (ref 4.31–10.16)

## 2021-03-09 PROCEDURE — 84484 ASSAY OF TROPONIN QUANT: CPT | Performed by: PHYSICIAN ASSISTANT

## 2021-03-09 PROCEDURE — 93005 ELECTROCARDIOGRAM TRACING: CPT

## 2021-03-09 PROCEDURE — 99284 EMERGENCY DEPT VISIT MOD MDM: CPT | Performed by: EMERGENCY MEDICINE

## 2021-03-09 PROCEDURE — 80048 BASIC METABOLIC PNL TOTAL CA: CPT | Performed by: PHYSICIAN ASSISTANT

## 2021-03-09 PROCEDURE — 85025 COMPLETE CBC W/AUTO DIFF WBC: CPT | Performed by: PHYSICIAN ASSISTANT

## 2021-03-09 PROCEDURE — 36415 COLL VENOUS BLD VENIPUNCTURE: CPT | Performed by: PHYSICIAN ASSISTANT

## 2021-03-09 PROCEDURE — 99285 EMERGENCY DEPT VISIT HI MDM: CPT

## 2021-03-09 PROCEDURE — 73610 X-RAY EXAM OF ANKLE: CPT

## 2021-03-09 PROCEDURE — 96374 THER/PROPH/DIAG INJ IV PUSH: CPT

## 2021-03-09 RX ORDER — ASPIRIN 81 MG/1
324 TABLET, CHEWABLE ORAL ONCE
Status: DISCONTINUED | OUTPATIENT
Start: 2021-03-09 | End: 2021-03-09

## 2021-03-09 RX ORDER — NITROGLYCERIN 0.4 MG/1
0.4 TABLET SUBLINGUAL ONCE
Status: COMPLETED | OUTPATIENT
Start: 2021-03-09 | End: 2021-03-09

## 2021-03-09 RX ORDER — ONDANSETRON 2 MG/ML
4 INJECTION INTRAMUSCULAR; INTRAVENOUS ONCE
Status: COMPLETED | OUTPATIENT
Start: 2021-03-09 | End: 2021-03-09

## 2021-03-09 RX ORDER — ACETAMINOPHEN 325 MG/1
650 TABLET ORAL ONCE
Status: COMPLETED | OUTPATIENT
Start: 2021-03-09 | End: 2021-03-09

## 2021-03-09 RX ORDER — POTASSIUM CHLORIDE 20 MEQ/1
20 TABLET, EXTENDED RELEASE ORAL ONCE
Status: COMPLETED | OUTPATIENT
Start: 2021-03-09 | End: 2021-03-09

## 2021-03-09 RX ADMIN — POTASSIUM CHLORIDE 20 MEQ: 1500 TABLET, EXTENDED RELEASE ORAL at 14:50

## 2021-03-09 RX ADMIN — ACETAMINOPHEN 650 MG: 325 TABLET ORAL at 15:25

## 2021-03-09 RX ADMIN — NITROGLYCERIN 0.4 MG: 0.4 TABLET SUBLINGUAL at 14:50

## 2021-03-09 RX ADMIN — ONDANSETRON 4 MG: 2 INJECTION INTRAMUSCULAR; INTRAVENOUS at 15:25

## 2021-03-09 NOTE — NURSING NOTE
Patient requesting to leave AMA due to death in the family  Patient stated "My son needs me more than I need to be here "  Educated on importance of continuing hospitalization  ANAY Tay made aware

## 2021-03-09 NOTE — UTILIZATION REVIEW
Notification of Inpatient Admission/Inpatient Authorization Request   This is a Notification of Inpatient Admission for Marily [address]  Be advised that this patient was admitted to our facility under Inpatient Status  Contact Fabiana Estrada at [phone] for additional admission information  [facility] DEPT  DEDICATED [phone]  Patient Name:   Conchita Juarez   YOB: 1965       [address]   [address]:   [address]  Tax ID: [id number]  NPI: [id number] Attending Provider/NPI:  Phone:  Address: [address] [place], [place] [[id number]]  [phone]  [facility] FACILITY   Place of Service Code: 24 Place of Service Name:  [address] [place] [address]   Start Date: 3/8/21 1239     Discharge Date & Time: 3/9/2021  1:39 AM    Type of Admission: Inpatient Status Discharge Disposition (if discharged): Left against medical advice or discontinued care   Patient Diagnoses: Hypokalemia [E87 6]  Chest pain [R07 9]  Numbness and tingling in left arm [R20 0, R20 2]  Numbness and tingling of left side of face [R20 0, R20 2]  Numbness and tingling of left lower extremity [R20 0, R20 2]     Orders: Admission Orders (From admission, onward)     Ordered        03/08/21 1239  Inpatient Admission  Once                    Assigned Utilization Review Contact: Fabiana Estrada  Utilization   Network Utilization Review Department  Phone: [phone]; Fax [phone]  Email: [email or web address]   ATTENTION PAYERS: Please call the assigned Utilization  directly with any questions or concerns ALL voicemails in the department are confidential  Send all requests for admission clinical reviews, approved or denied determinations and any other requests to dedicated fax number belonging to the campus where the patient is receiving treatment

## 2021-03-09 NOTE — DISCHARGE INSTRUCTIONS
Please follow up with Cardiology and Orthopedics, referral has been placed for you  Your able to bear weight on her ankle as tolerated  If you have any new or worsening symptoms please return to the emergency department

## 2021-03-09 NOTE — ED PROVIDER NOTES
History  Chief Complaint   Patient presents with    Chest Pain     pt returns to this ED after signing out AMA due to family issue with c/o continued intermittent chest pain radiating to back  pt expected to return per re-admission  63year old female presents the emergency department for evaluation of chest pain  Patient states she presented to the emergency department yesterday for left-sided chest pain with radiation of pain into calling into the left side of her body  Patient states she was admitted and left AMA around 1:00 a m  This morning due to family emergency at home  Patient states she was told to return to the emergency department for [facility] of her heart " Per chart review patient was admitted for chest pain and stroke pathway, had MRI yesterday which was similar to previous with no acute findings  Patient denies any new symptoms  She denies any headaches, visual changes, numbness  She denies any abdominal pain, nausea, vomiting, diarrhea  Patient denies following with Cardiology  She denies any her history  She reports she does follow up Neurology due to migraines  Patient denies palpitations, diaphoresis  She denies any leg swelling  Patient denies shortness of breath  Patient denies any slurred speech          History provided by:  Patient  Chest Pain  Pain location:  L chest  Pain quality: sharp    Pain radiates to:  L arm (Left leg, left-sided face)  Pain radiates to the back: no    Pain severity:  Mild  Onset quality:  Gradual  Timing:  Constant  Associated symptoms: no abdominal pain, no AICD problem, no altered mental status, no anorexia, no anxiety, no back pain, no claudication, no cough, no diaphoresis, no dizziness, no dysphagia, no fatigue, no fever, no headache, no heartburn, no lower extremity edema, no nausea, no near-syncope, no numbness, no orthopnea, no palpitations, no PND, no shortness of breath, no syncope, not vomiting and no weakness        Prior to Admission Medications   Prescriptions Last Dose Informant Patient Reported? Taking? EPINEPHrine (EPIPEN) 0 3 mg/0 3 mL SOAJ   Yes No   Sig: For a severe reaction: Inject in outer thigh following instructions on package and go to the Emergency room     Multiple Vitamins-Minerals (MULTIVITAMIN WITH MINERALS) tablet  Self Yes No   Sig: Take 1 tablet by mouth daily   OMEPRAZOLE PO   Yes No   Sig: Take 20 mg by mouth daily   VITAMIN D, CHOLECALCIFEROL, PO   Yes No   Sig: daily   albuterol (2 5 mg/3 mL) 0 083 % nebulizer solution   Yes No   Sig: Inhale 2 5 mg as needed   albuterol (PROVENTIL HFA,VENTOLIN HFA) 90 mcg/act inhaler   Yes No   Sig: as needed   aminocaproic acid (AMICAR) 500 mg tablet   Yes No   Sig: Take 500 mg by mouth as needed for bleeding    buPROPion (WELLBUTRIN SR) 150 mg 12 hr tablet   Yes No   Sig: Take 150 mg by mouth 2 (two) times a day   cetirizine (ZyrTEC) 10 mg tablet   Yes No   Sig: Take 10 mg by mouth daily   fluticasone (FLONASE) 50 mcg/act nasal spray   Yes No   Sig: daily at bedtime   hydrochlorothiazide (HYDRODIURIL) 25 mg tablet   Yes No   Sig: Take 25 mg by mouth daily   lisinopril (ZESTRIL) 20 mg tablet   Yes No   Sig: Take 20 mg by mouth daily   montelukast (SINGULAIR) 10 mg tablet   Yes No   Sig: Take 10 mg by mouth daily at bedtime   topiramate (TOPAMAX) 100 mg tablet   Yes No   Sig: Take 100 mg by mouth every 12 (twelve) hours       Facility-Administered Medications: None       Past Medical History:   Diagnosis Date    Heart murmur     Hypertension     Kidney stones     Kidney stones     Von Willebrand disease ([facility] [address] )        Past Surgical History:   Procedure Laterality Date    APPENDECTOMY      BREAST LUMPECTOMY Left     CARPAL TUNNEL RELEASE Bilateral     CHOLECYSTECTOMY      HYSTERECTOMY      KNEE SURGERY Left     PARTIAL HYSTERECTOMY      ROTATOR CUFF REPAIR Left     TONSILLECTOMY         Family History   Problem Relation Age of Onset    Cancer Mother     Cancer Sister     Stroke Brother      I have reviewed and agree with the history as documented  E-Cigarette/Vaping    E-Cigarette Use Current Some Day User      E-Cigarette/Vaping Substances    Nicotine Yes 2 cigarettes a week    THC No     CBD No     Flavoring Yes      Social History     Tobacco Use    Smoking status: Current Every Day Smoker     Packs/day: 0 25     Years: 25 00     Pack years: 6 25     Types: Cigarettes    Smokeless tobacco: Former User    Tobacco comment: few cigs/day   Substance Use Topics    Alcohol use: Not Currently    Drug use: Never       Review of Systems   Constitutional: Negative  Negative for appetite change, chills, diaphoresis, fatigue and fever  HENT: Negative  Negative for trouble swallowing  Eyes: Negative for visual disturbance  Respiratory: Negative for cough, choking, chest tightness, shortness of breath, wheezing and stridor  Cardiovascular: Positive for chest pain  Negative for palpitations, orthopnea, claudication, leg swelling, syncope, PND and near-syncope  Gastrointestinal: Negative  Negative for abdominal pain, anorexia, heartburn, nausea and vomiting  Genitourinary: Negative  Musculoskeletal: Negative  Negative for back pain  Skin: Negative  Neurological: Negative for dizziness, weakness, numbness and headaches  Tingling left side   All other systems reviewed and are negative  Physical Exam  Physical Exam  Vitals signs and nursing note reviewed  Constitutional:       General: She is not in acute distress  Appearance: She is well-developed and normal weight  She is not ill-appearing, toxic-appearing or diaphoretic  HENT:      Head: Atraumatic  Eyes:      General: No visual field deficit  Extraocular Movements: Extraocular movements intact  Pupils: Pupils are equal, round, and reactive to light  Neck:      Musculoskeletal: Normal range of motion and neck supple     Cardiovascular:      Rate and Rhythm: Normal rate and regular rhythm  Pulses:           Radial pulses are 2+ on the right side and 2+ on the left side  Dorsalis pedis pulses are 2+ on the right side and 2+ on the left side  Posterior tibial pulses are 2+ on the right side and 2+ on the left side  Pulmonary:      Effort: Pulmonary effort is normal  No tachypnea  Breath sounds: Normal breath sounds  No decreased breath sounds, wheezing, rhonchi or rales  Chest:      Chest wall: No mass or tenderness  Abdominal:      General: Bowel sounds are normal       Palpations: Abdomen is soft  Tenderness: There is no abdominal tenderness  Musculoskeletal:      Right ankle: She exhibits decreased range of motion and swelling  Tenderness  Lateral malleolus tenderness found  Achilles tendon normal       Right lower leg: She exhibits no tenderness  No edema  Left lower leg: She exhibits no tenderness  No edema  Skin:     General: Skin is warm  Capillary Refill: Capillary refill takes less than 2 seconds  Coloration: Skin is not pale  Findings: No ecchymosis, erythema or rash  Nails: There is no clubbing  Neurological:      General: No focal deficit present  Mental Status: She is alert and oriented to person, place, and time  GCS: GCS eye subscore is 4  GCS verbal subscore is 5  GCS motor subscore is 6  Cranial Nerves: Cranial nerves are intact  No dysarthria  Sensory: Sensation is intact  Motor: No tremor  Coordination: Coordination is intact  Gait: Gait is intact  Psychiatric:         Mood and Affect: Mood is anxious           Behavior: Behavior normal          Vital Signs  ED Triage Vitals [03/09/21 1309]   Temperature Pulse Respirations Blood Pressure SpO2   98 °F (36 7 °C) 69 18 149/81 95 %      Temp Source Heart Rate Source Patient Position - Orthostatic VS BP Location FiO2 (%)   Temporal Monitor Lying Left arm --      Pain Score       5           Vitals:    03/09/21 1550 03/09/21 1600 03/09/21 1630 03/09/21 1715   BP: 106/62 106/62  110/64   Pulse: 70 64 72 67   Patient Position - Orthostatic VS: Standing - Orthostatic VS Lying  Lying         Visual Acuity      ED Medications  Medications   potassium chloride (K-DUR,KLOR-CON) CR tablet 20 mEq (20 mEq Oral Given 3/9/21 1450)   nitroglycerin (NITROSTAT) SL tablet 0 4 mg (0 4 mg Sublingual Given 3/9/21 1450)   acetaminophen (TYLENOL) tablet 650 mg (650 mg Oral Given 3/9/21 1525)   ondansetron (ZOFRAN) injection 4 mg (4 mg Intravenous Given 3/9/21 1525)       Diagnostic Studies  Results Reviewed     Procedure Component Value Units Date/Time    Troponin I [[id number]]  (Normal) Collected: 03/09/21 1623    Lab Status: Final result Specimen: Blood from Arm, Right Updated: 03/09/21 1649     Troponin I <0 02 ng/mL     Troponin I [[id number]]  (Normal) Collected: 03/09/21 1355    Lab Status: Final result Specimen: Blood from Arm, Right Updated: 03/09/21 1423     Troponin I <0 02 ng/mL     Basic metabolic panel [[id number]]  (Abnormal) Collected: 03/09/21 1355    Lab Status: Final result Specimen: Blood from Arm, Right Updated: 03/09/21 1417     Sodium 139 mmol/L      Potassium 3 4 mmol/L      Chloride 104 mmol/L      CO2 26 mmol/L      ANION GAP 9 mmol/L      BUN 8 mg/dL      Creatinine 1 05 mg/dL      Glucose 105 mg/dL      Calcium 9 4 mg/dL      eGFR 60 ml/min/1 73sq m     Narrative:      Radha guidelines for Chronic Kidney Disease (CKD):     Stage 1 with normal or high GFR (GFR > 90 mL/min/1 73 square meters)    Stage 2 Mild CKD (GFR = 60-89 mL/min/1 73 square meters)    Stage 3A Moderate CKD (GFR = 45-59 mL/min/1 73 square meters)    Stage 3B Moderate CKD (GFR = 30-44 mL/min/1 73 square meters)    Stage 4 Severe CKD (GFR = 15-29 mL/min/1 73 square meters)    Stage 5 End Stage CKD (GFR <15 mL/min/1 73 square meters)  Note: GFR calculation is accurate only with a steady state creatinine    CBC and differential [[id number]]  (Abnormal) Collected: 03/09/21 1355    Lab Status: Final result Specimen: Blood from Arm, Right Updated: 03/09/21 1402     WBC 10 00 Thousand/uL      RBC 5 06 Million/uL      Hemoglobin 13 5 g/dL      Hematocrit 41 9 %      MCV 83 fL      MCH 26 7 pg      MCHC 32 2 g/dL      RDW 13 2 %      MPV 11 4 fL      Platelets 522 Thousands/uL      nRBC 0 /100 WBCs      Neutrophils Relative 59 %      Immat GRANS % 0 %      Lymphocytes Relative 32 %      Monocytes Relative 7 %      Eosinophils Relative 1 %      Basophils Relative 1 %      Neutrophils Absolute 5 97 Thousands/µL      Immature Grans Absolute 0 03 Thousand/uL      Lymphocytes Absolute 3 17 Thousands/µL      Monocytes Absolute 0 67 Thousand/µL      Eosinophils Absolute 0 09 Thousand/µL      Basophils Absolute 0 07 Thousands/µL                  XR ankle 3+ vw right   Final Result by Mallorie Arcos MD (03/09 1635)      Distal lateral tibia small cortical avulsion fracture  The study was marked in [facility] for immediate notification              Workstation performed: [id number]                    Procedures  ECG 12 Lead Documentation Only    Date/Time: 3/9/2021 1:12 PM  Performed by: Dc Zhao PA-C  Authorized by: Dc Zhao PA-C     Indications / Diagnosis:  Chest pain  Patient location:  ED  Interpretation:     Interpretation: non-specific    Rate:     ECG rate:  67    ECG rate assessment: normal    Rhythm:     Rhythm: sinus rhythm    Ectopy:     Ectopy: none    QRS:     QRS axis:  Normal    QRS intervals:  Normal  Conduction:     Conduction: normal    ST segments:     ST segments:  Normal  T waves:     T waves: normal      Splint application    Date/Time: 3/9/2021 4:57 PM  Performed by: Dc Zhao PA-C  Authorized by: Dc Zhao PA-C   Universal Protocol:  Consent given by: patient  Time out: Immediately prior to procedure a "time out" was called to verify the correct patient, procedure, equipment, support staff and site/side marked as required  Timeout called at: 3/9/2021 4:57 PM   Patient understanding: patient states understanding of the procedure being performed  Radiology Images displayed and confirmed  If images not available, report reviewed: imaging studies available  Patient identity confirmed: verbally with patient and arm band      Pre-procedure details:     Sensation:  Normal  Procedure details:     Laterality:  Right    Location:  Ankle    Ankle:  R ankle    Splint type:  Short leg    Supplies used: air cast   Post-procedure details:     Pain:  Unchanged    Sensation:  Normal    Patient tolerance of procedure: Tolerated well, no immediate complications             ED Course             HEART Risk Score      Most Recent Value   Heart Score Risk Calculator   History  0 Filed at: 03/09/2021 1429   ECG  0 Filed at: 03/09/2021 1429   Age  1 Filed at: 03/09/2021 1429   Risk Factors  1 Filed at: 03/09/2021 1429   Troponin  0 Filed at: 03/09/2021 1429   HEART Score  2 Filed at: 03/09/2021 1429                      SBIRT 20yo+      Most Recent Value   SBIRT (23 yo +)   In order to provide better care to our patients, we are screening all of our patients for alcohol and drug use  Would it be okay to ask you these screening questions? Yes Filed at: 03/09/2021 1320   Initial Alcohol Screen: US AUDIT-C    1  How often do you have a drink containing alcohol?  0 Filed at: 03/09/2021 1320   2  How many drinks containing alcohol do you have on a typical day you are drinking? 0 Filed at: 03/09/2021 1320   3b  FEMALE Any Age, or MALE 65+: How often do you have 4 or more drinks on one occassion? 0 Filed at: 03/09/2021 1320   Audit-C Score  0 Filed at: 03/09/2021 1320   CRISTINO: How many times in the past year have you    Used an illegal drug or used a prescription medication for non-medical reasons?   Never Filed at: 03/09/2021 1320                    MDM  Number of Diagnoses or Management Options  Chest pain: new and requires workup  Closed fracture of right ankle, initial encounter: new and requires workup  Diagnosis management comments: 63year old female presents to the emergency department for evaluation of left-sided chest pain with radiation into the left side of her body  Vitals and medical record reviewed  Patient was admitted for same yesterday and left AMA  Reviewed case with hospitalist who feels workup has been completed  MRI negative for acute findings  EKG was nonischemic  Troponin negative x2  Patient did report occasionally becoming lightheaded which causes her to stumble  Twisting injury of the right ankle resulted in small distal tibial avulsion fracture  Discussed this with Orthopedics who recommended Aircast and weight-bearing as tolerated  Discussed with neurology who feels workup has been completed from their standpoint not feel like this is a neurologic issue  I discussed all results and findings with the patient  Will have patient follow-up with cardiology  Patient will follow-up with her neurologist   Will have patient follow-up with orthopedics  Aircast was applied  Patient remained neurovascularly intact  We discussed symptomatic treatment and symptoms that require prompt return to the ED for further evaluation  Patient agreed to this treatment plan she remained well ED and was discharged home         Amount and/or Complexity of Data Reviewed  Clinical lab tests: ordered and reviewed  Tests in the radiology section of CPT®: ordered and reviewed  Review and summarize past medical records: yes  Discuss the patient with other providers: yes  Independent visualization of images, tracings, or specimens: yes        Disposition  Final diagnoses:   Chest pain   Closed fracture of right ankle, initial encounter     Time reflects when diagnosis was documented in both MDM as applicable and the Disposition within this note     Time User Action Codes Description Comment    3/9/2021  4:58 PM Mihaela Madera Add [R07 9] Chest pain     3/9/2021  4:58 PM Avelina Vaca Add [W54 [id number]] Closed fracture of right ankle, initial encounter       ED Disposition     ED Disposition Condition Date/Time Comment    Discharge Stable Tue Mar 9, 2021  4:58 PM Wren Gloss discharge to home/self care  Follow-up Information     Follow up With Specialties Details Why Molly Trevino MD Family Medicine In 1 week As needed, If symptoms worsen [address] [facility] [address] [facility] Orthopedic Surgery In 1 week For reevaluation [address]      Ulysses Rico DO Cardiology In 1 week For continued care [address]  [place] [address]      [place] [address] Referral Cardiology, Gastroenterology, General Surgery, Anesthesiology, Hematology and Oncology, Urology, Emergency Medicine, Hematology, [address] [facility] Cardiology [address] [facility] [address] [place] [address]  [phone]            Discharge Medication List as of 3/9/2021  5:02 PM      CONTINUE these medications which have NOT CHANGED    Details   albuterol (2 5 mg/3 mL) 0 083 % nebulizer solution Inhale 2 5 mg as needed, Starting Thu 5/21/2020, Historical Med      albuterol (PROVENTIL HFA,VENTOLIN HFA) 90 mcg/act inhaler as needed, Starting Fri 9/8/2017, Historical Med      aminocaproic acid (AMICAR) 500 mg tablet Take 500 mg by mouth as needed for bleeding , Starting Wed 2/13/2019, Historical Med      buPROPion (WELLBUTRIN SR) 150 mg 12 hr tablet Take 150 mg by mouth 2 (two) times a day, Starting Thu 12/5/2019, Historical Med      cetirizine (ZyrTEC) 10 mg tablet Take 10 mg by mouth daily, Starting Fri 9/8/2017, Historical Med      EPINEPHrine (EPIPEN) 0 3 mg/0 3 mL SOAJ For a severe reaction: Inject in outer thigh following instructions on package and go to the Emergency room  , Historical Med      fluticasone (FLONASE) 50 mcg/act nasal spray daily at bedtime, Starting Fri 9/8/2017, Historical Med      lisinopril (ZESTRIL) 20 mg tablet Take 20 mg by mouth daily, Historical Med      montelukast (SINGULAIR) 10 mg tablet Take 10 mg by mouth daily at bedtime, Starting Wed 2/14/2018, Historical Med      Multiple Vitamins-Minerals (MULTIVITAMIN WITH MINERALS) tablet Take 1 tablet by mouth daily, Historical Med      OMEPRAZOLE PO Take 20 mg by mouth daily, Starting Fri 5/29/2020, Historical Med      topiramate (TOPAMAX) 100 mg tablet Take 100 mg by mouth every 12 (twelve) hours , Starting Wed 1/15/2020, Historical Med      VITAMIN D, CHOLECALCIFEROL, PO daily, Starting Tue 1/19/2016, Historical Med      hydrochlorothiazide (HYDRODIURIL) 25 mg tablet Take 25 mg by mouth daily, Historical Med               PDMP Review     None          ED Provider  Electronically Signed by           Leia Noble PA-C  03/09/21 2014       Agus Ariza MD  03/10/21 1386

## 2021-03-09 NOTE — UTILIZATION REVIEW
Initial Clinical Review    Admission: Date/Time/Statement:   Admission Orders (From admission, onward)     Ordered        03/08/21 1239  Inpatient Admission  Once                   Orders Placed This Encounter   Procedures    Inpatient Admission     Standing Status:   Standing     Number of Occurrences:   1     Order Specific Question:   Level of Care     Answer:   Med Surg [16]     Order Specific Question:   Estimated length of stay     Answer:   More than 2 Midnights     Order Specific Question:   Certification     Answer:   I certify that inpatient services are medically necessary for this patient for a duration of greater than two midnights  See H&P and MD Progress Notes for additional information about the patient's course of treatment  ED Arrival Information     Expected Arrival Acuity Means of Arrival Escorted By Service Admission Type    - 3/8/2021 09:47 Urgent Walk-In Family Member Hospitalist Urgent    Arrival Complaint    Chest pain High BP        Chief Complaint   Patient presents with    Chest Pain     states for a few months having chest pain on/off, states this episode started at midnight last night, left lower chest pain radiating into left side, SOB and "clammy", denies N/V, states when she eats gets burning in her stomach     Assessment/Plan: 63year old female to the ED from home with complaints of chest pain, left sided weakness and numbness which started about 12 hours prior to arrival  Admitted to inpatient for stroke like symptoms, chest pain  SHe arrives with dizziness, weakness, numbness  Decreased sensation to left cheek  NIHSS 1  She is anxious  Neuro consult  CT head negative for acute abnormality  MRI negative for acute ischemia, small focus of gliosis  CHest pain resolved  Troponin neg  Monitor tele  3/8 Neuro quick note:  Pt had similar presentation 1/18/21 when she was seen by neurology  At that time, MRI brain with no DWI restriction, no acute stroke   Pontine hyperintensity- remote lacune vs chronic WM change likely  No need to repeat MRI if she remains nonfocal  REcommend high dose statin  Antiplatelets on hold due to von willebrand disease  ED Triage Vitals [03/08/21 0956]   Temperature Pulse Respirations Blood Pressure SpO2   (!) 97 4 °F (36 3 °C) 64 20 150/89 98 %      Temp Source Heart Rate Source Patient Position - Orthostatic VS BP Location FiO2 (%)   Temporal Monitor Lying Right arm --      Pain Score       5          Wt Readings from Last 1 Encounters:   03/08/21 93 5 kg (206 lb 2 1 oz)     Additional Vital Signs:  Date/Time  Temp  Pulse  Resp  BP  MAP (mmHg)  SpO2  O2 Device Patient Position - Orthostatic VS   03/09/21 0000  97 6 °F (36 4 °C)  63  18  153/82  106  94 %  None (Room air) Lying   03/08/21 2200  97 8 °F (36 6 °C)  61  18  157/83  --  96 %  None (Room air) Sitting   03/08/21 2000  97 8 °F (36 6 °C)  61  18  155/84  108  98 %  None (Room air) Lying   03/08/21 1800  --  82  18  152/84  --  --  -- --   03/08/21 1700  97 7 °F (36 5 °C)  80  18  154/82  --  --  -- --   03/08/21 1600  --  55  18  155/84  --  --  -- --   03/08/21 1500  97 9 °F (36 6 °C)  64  16  152/85  107  --  -- Lying   03/08/21 1330  --  --  --  --  --  --  None (Room air) --   03/08/21 13:25:20  --  --  --  141/82  102  --  -- --   03/08/21 1300  --  59  24Abnormal   138/71  --  97 %  -- --   03/08/21 1045  --  66  25Abnormal   143/75  102  97 %  -- --   03/08/21 1030  --  70  20  150/84  111  98 %  None (Room air) Lying   03/08/21 1020  --  --  --  --  --  --  None (Room air) --   03/08/21 0956  97 4 °F (36 3 °C)Abnormal   64  20  150/89  --  98 %  None (Room air)      Pertinent Labs/Diagnostic Test Results:   3/8 EKG: Normal sinus rhythm    3/8 MRI head: No mass effect, acute intracranial hemorrhage or evidence of recent infarction  Stable focus of nonspecific FLAIR signal hyperintensity/T2 prolongation within the right mehnaz, likely representing a small focus of gliosis    3/8 CT head: No acute intracranial disease  No large vessel flow restrictive disease within the head or neck     3/8 CXR:   No acute cardiopulmonary disease         Results from last 7 days   Lab Units 03/08/21  1029   WBC Thousand/uL 7 13   HEMOGLOBIN g/dL 13 3   HEMATOCRIT % 42 4   PLATELETS Thousands/uL 267   NEUTROS ABS Thousands/µL 4 02         Results from last 7 days   Lab Units 03/08/21  1029   SODIUM mmol/L 141   POTASSIUM mmol/L 3 2*   CHLORIDE mmol/L 104   CO2 mmol/L 26   ANION GAP mmol/L 11   BUN mg/dL 10   CREATININE mg/dL 1 05   EGFR ml/min/1 73sq m 60   CALCIUM mg/dL 9 0   MAGNESIUM mg/dL 2 1     Results from last 7 days   Lab Units 03/08/21  1029   AST U/L 14   ALT U/L 30   ALK PHOS U/L 95   TOTAL PROTEIN g/dL 7 8   ALBUMIN g/dL 4 0   TOTAL BILIRUBIN mg/dL 0 31         Results from last 7 days   Lab Units 03/08/21  1029   GLUCOSE RANDOM mg/dL 93       Results from last 7 days   Lab Units 03/08/21  1029   CK TOTAL U/L 94     Results from last 7 days   Lab Units 03/08/21  1943 03/08/21  1309 03/08/21  1029   TROPONIN I ng/mL <0 02 <0 02 <0 02     Results from last 7 days   Lab Units 03/08/21  1029   D-DIMER QUANTITATIVE ug/ml FEU 0 40     Results from last 7 days   Lab Units 03/08/21  1029   PROTIME seconds 13 0   INR  1 00     Results from last 7 days   Lab Units 03/08/21  1029   TSH 3RD GENERATON uIU/mL 0 871         Results from last 7 days   Lab Units 03/08/21  1029   NT-PRO BNP pg/mL 45     Results from last 7 days   Lab Units 03/08/21  1029   LIPASE u/L 85     Results from last 7 days   Lab Units 03/08/21  2239   CLARITY UA  Clear   COLOR UA  Yellow   SPEC GRAV UA  1 020   PH UA  6 0   GLUCOSE UA mg/dl Negative   KETONES UA mg/dl Negative   BLOOD UA  Trace-Intact*   PROTEIN UA mg/dl Negative   NITRITE UA  Negative   BILIRUBIN UA  Negative   UROBILINOGEN UA E U /dl 0 2   LEUKOCYTES UA  Negative   WBC UA /hpf 0-1   RBC UA /hpf 1-2   BACTERIA UA /hpf None Seen   EPITHELIAL CELLS WET PREP /hpf Occasional ED Treatment:   Medication Administration from 03/08/2021 0947 to 03/08/2021 1321       Date/Time Order Dose Route Action     03/08/2021 1029 sodium chloride 0 9 % bolus 1,000 mL 1,000 mL Intravenous New Bag     03/08/2021 1028 nitroglycerin (NITROSTAT) SL tablet 0 4 mg 0 4 mg Sublingual Given     03/08/2021 1135 potassium chloride (K-DUR,KLOR-CON) CR tablet 40 mEq 40 mEq Oral Given     03/08/2021 1136 famotidine (PEPCID) injection 20 mg 20 mg Intravenous Given     03/08/2021 1135 aluminum-magnesium hydroxide-simethicone (MYLANTA) oral suspension 15 mL 15 mL Oral Given        Past Medical History:   Diagnosis Date    Heart murmur     Kidney stones     Kidney stones     Von Willebrand disease ([facility] [address] )          Admitting Diagnosis: Hypokalemia [E87 6]  Chest pain [R07 9]  Numbness and tingling in left arm [R20 0, R20 2]  Numbness and tingling of left side of face [R20 0, R20 2]  Numbness and tingling of left lower extremity [R20 0, R20 2]  Age/Sex: 63 y o  female  Admission Orders:  Tele  Neuro checks   Tele  Vitals: Every 1 hour x 4 hours, then every 2 hours x 8 hours, then every 4 hours x 72 hours  Scheduled Medications:  Aluminum magnesium, lipitor po, aspirin po, wellbutrin po, lovenox sq, pepcid iv, hctz po, zestril po, claritin,  kdur po,       Network Utilization Review Department  ATTENTION: Please call with any questions or concerns to [phone] and carefully listen to the prompts so that you are directed to the right person  All voicemails are confidential   Eunice Miller all requests for admission clinical reviews, approved or denied determinations and any other requests to dedicated fax number below belonging to the campus where the patient is receiving treatment   List of dedicated fax numbers for the Facilities:  [address] DENIALS (Administrative/Medical Necessity) [phone]   [address] (Maternity/NICU/Pediatrics) [phone]   [address]  [phone]   [address] ([address]) [address] [place] [address] [address] [phone]   [address] [phone]

## 2021-03-09 NOTE — ED ATTENDING ATTESTATION
3/9/2021  ILeoncio MD, saw and evaluated the patient  I have discussed the patient with the resident/non-physician practitioner and agree with the resident's/non-physician practitioner's findings, Plan of Care, and MDM as documented in the resident's/non-physician practitioner's note, except where noted  All available labs and Radiology studies were reviewed  I was present for key portions of any procedure(s) performed by the resident/non-physician practitioner and I was immediately available to provide assistance  At this point I agree with the current assessment done in the Emergency Department        ED Course         Critical Care Time  Procedures
normal...

## 2021-03-09 NOTE — NURSING NOTE
Patient IV removed, no bleeding noted  Belongings gathered and given to patient  Patient ambulated by self and accompanied by this RN to exit

## 2021-03-10 ENCOUNTER — OFFICE VISIT (OUTPATIENT)
Dept: OBGYN CLINIC | Facility: CLINIC | Age: 56
End: 2021-03-10
Payer: COMMERCIAL

## 2021-03-10 VITALS
DIASTOLIC BLOOD PRESSURE: 90 MMHG | TEMPERATURE: 97.9 F | SYSTOLIC BLOOD PRESSURE: 182 MMHG | BODY MASS INDEX: 31.23 KG/M2 | HEIGHT: 67 IN | WEIGHT: 199 LBS

## 2021-03-10 DIAGNOSIS — M25.571 ACUTE RIGHT ANKLE PAIN: Primary | ICD-10-CM

## 2021-03-10 DIAGNOSIS — I10 ESSENTIAL HYPERTENSION: ICD-10-CM

## 2021-03-10 DIAGNOSIS — S82.891A CLOSED FRACTURE OF RIGHT ANKLE, INITIAL ENCOUNTER: ICD-10-CM

## 2021-03-10 PROCEDURE — 99204 OFFICE O/P NEW MOD 45 MIN: CPT | Performed by: ORTHOPAEDIC SURGERY

## 2021-03-10 NOTE — PROGRESS NOTES
ASSESSMENT/PLAN:    Diagnoses and all orders for this visit:    Acute right ankle pain    Closed fracture of right ankle, initial encounter  -     Ambulatory referral to Orthopedic Surgery    Essential hypertension        Plan:  I would recommend follow-up in 2 weeks  Treatment options were discussed  She elected to proceed with continued use of the air stirrup as she did not feel she would need a walker cast boot  The air cast is to remain in place except for periods of sleep and cleansing  Precautions have been reviewed  The office should be contacted if questions or concerns arise prior to follow-up  The patient's hypertension has triggered falls secondary to syncopal episodes, including this most recent episode  She was cautioned against avoiding rapid changes in position that might trigger further falls and exacerbation of her injury  _____________________________________________________  CHIEF COMPLAINT:  Chief Complaint   Patient presents with    Right Ankle - Pain         SUBJECTIVE:  Harman Justice is a 63y o  year old female who presents for evaluation of right ankle injury sustained 3/7/2021  Patient reports that she tripped while walking up a flight of stairs in her home, resulting in immediate pain, swelling, and inability to weightbear on the right lower extremity  Her fall was due in part to a syncopal episode, which she has been experiencing recently due to blood pressure complications  She is being evaluated in regards to her vascular issues otherwise  She was evaluated at her local  [facility] ED, but   Discharged against medical advice before she could be fully evaluated  She was seen again on 3/9/2021, at which time she was fully evaluated for her ankle injury  X-rays were performed and read as suspicious for small avulsion fracture in the distal tibia  She was placed in a stirrup ankle splint, and put on partial weight-bearing restriction via the use of bilateral crutches  referred to Orthopedics for further management  On today's presentation she continues to complain of pain in the lateral aspect of the ankle that is exacerbated with weight-bearing activity  She denies any significant bruising, but reports swelling  She also denies any numbness, tingling, or feelings of instability  PAST MEDICAL HISTORY:  Past Medical History:   Diagnosis Date    Heart murmur     Hypertension     Kidney stones     Kidney stones     Von Willebrand disease ([address] )        PAST SURGICAL HISTORY:  Past Surgical History:   Procedure Laterality Date    APPENDECTOMY      BREAST LUMPECTOMY Left     CARPAL TUNNEL RELEASE Bilateral     CHOLECYSTECTOMY      HYSTERECTOMY      KNEE SURGERY Left     PARTIAL HYSTERECTOMY      ROTATOR CUFF REPAIR Left     TONSILLECTOMY         FAMILY HISTORY:  Family History   Problem Relation Age of Onset    Cancer Mother     Cancer Sister     Stroke Brother        SOCIAL HISTORY:  Social History     Tobacco Use    Smoking status: Current Every Day Smoker     Packs/day: 0 25     Years: 25 00     Pack years: 6 25     Types: Cigarettes    Smokeless tobacco: Former User    Tobacco comment: few cigs/day   Substance Use Topics    Alcohol use: Not Currently    Drug use: Never       MEDICATIONS:    Current Outpatient Medications:     albuterol (2 5 mg/3 mL) 0 083 % nebulizer solution, Inhale 2 5 mg as needed, Disp: , Rfl:     albuterol (PROVENTIL HFA,VENTOLIN HFA) 90 mcg/act inhaler, as needed, Disp: , Rfl:     aminocaproic acid (AMICAR) 500 mg tablet, Take 500 mg by mouth as needed for bleeding , Disp: , Rfl:     buPROPion (WELLBUTRIN SR) 150 mg 12 hr tablet, Take 150 mg by mouth 2 (two) times a day, Disp: , Rfl:     cetirizine (ZyrTEC) 10 mg tablet, Take 10 mg by mouth daily, Disp: , Rfl:     EPINEPHrine (EPIPEN) 0 3 mg/0 3 mL SOAJ, For a severe reaction: Inject in outer thigh following instructions on package and go to the Emergency room  , Disp: , Rfl:   fluticasone (FLONASE) 50 mcg/act nasal spray, daily at bedtime, Disp: , Rfl:     lisinopril (ZESTRIL) 20 mg tablet, Take 20 mg by mouth daily, Disp: , Rfl:     montelukast (SINGULAIR) 10 mg tablet, Take 10 mg by mouth daily at bedtime, Disp: , Rfl:     Multiple Vitamins-Minerals (MULTIVITAMIN WITH MINERALS) tablet, Take 1 tablet by mouth daily, Disp: , Rfl:     OMEPRAZOLE PO, Take 20 mg by mouth daily, Disp: , Rfl:     topiramate (TOPAMAX) 100 mg tablet, Take 100 mg by mouth every 12 (twelve) hours , Disp: , Rfl:     VITAMIN D, CHOLECALCIFEROL, PO, daily, Disp: , Rfl:     hydrochlorothiazide (HYDRODIURIL) 25 mg tablet, Take 25 mg by mouth daily, Disp: , Rfl:   No current facility-administered medications for this visit  ALLERGIES:  Allergies   Allergen Reactions    Erythromycin Vomiting    Morphine Palpitations       Review of systems:   Constitutional: Negative for fatigue, fever or loss of apetite  HENT: Negative  Respiratory: Negative for shortness of breath, dyspnea  Cardiovascular: Negative for chest pain/tightness  Gastrointestinal: Negative for abdominal pain, N/V  Endocrine: Negative for cold/heat intolerance, unexplained weight loss/gain  Genitourinary: Negative for flank pain, dysuria, hematuria   Musculoskeletal:  As noted in HPI   Skin: Negative for rash  Neurological:  Negative for numbness, tingling, or paresthesias  Psychiatric/Behavioral: Negative for agitation  _____________________________________________________  PHYSICAL EXAMINATION:    Blood pressure (!) 182/90, temperature 97 9 °F (36 6 °C), height 5' 7" (1 702 m), weight 90 3 kg (199 lb)      General: well developed and well nourished, alert, oriented times 3 and appears comfortable  Psychiatric: Normal  HEENT: Benign  Cardiovascular:  As noted in HPI, normal heart rate    Pulmonary: No wheezing or stridor  Abdomen: Soft, Nontender  Skin: No masses, erythema, lacerations, fluctation, ulcerations  Neurovascular:  Palpable distal pulses, DTRs intact    MUSCULOSKELETAL EXAMINATION:   right ankle -    patient presents observing partial weight-bearing restriction via the use of bilateral crutches   presents with stirrup ankle brace in place, which was removed at time of visit without difficulty   no obvious anatomical deformity   Skin is warm and dry to touch with no signs of erythema, ecchymosis, infection   mild lateral soft tissue swelling noted, no effusion noted   TTP over posterior aspect of lateral malleolus   symptom exacerbation with passive ankle dorsiflexion   patient is unable to perform active ankle eversion   no instability appreciated with medial lateral talar tilt   4/5 MMT  Plantar flexion, dorsiflexion, and eversion; seemingly due to pain inhibition   2+ TP and DP pulses with brisk capillary refill to the toes   Sural, saphenous, tibial, superficial and deep peroneal motor and sensory distributions intact  Sensation light touch intact distally      _____________________________________________________  STUDIES REVIEWED:  Attending Physician has personally reviewed pertinent imaging in PACS, impression is as follows:    Review of radiographic series taken  3/9/2021 of the  Right ankle shows  Small  Nondisplaced avulsion fracture in the distal lateral aspect of the tibia best seen in mortise view    Scribe Attestation    I,:  Marbella Coulter am acting as a scribe while in the presence of the attending physician :       I,:  Emerson Wing personally performed the services described in this documentation    as scribed in my presence :

## 2021-03-12 LAB
ATRIAL RATE: 67 BPM
P AXIS: 65 DEGREES
PR INTERVAL: 162 MS
QRS AXIS: 51 DEGREES
QRSD INTERVAL: 88 MS
QT INTERVAL: 430 MS
QTC INTERVAL: 454 MS
T WAVE AXIS: 54 DEGREES
VENTRICULAR RATE: 67 BPM

## 2021-03-12 PROCEDURE — 93010 ELECTROCARDIOGRAM REPORT: CPT | Performed by: INTERNAL MEDICINE

## 2021-03-16 ENCOUNTER — TELEPHONE (OUTPATIENT)
Dept: OBGYN CLINIC | Facility: HOSPITAL | Age: 56
End: 2021-03-16

## 2021-03-16 NOTE — TELEPHONE ENCOUNTER
Spoke to patient's daughter  She stated that the patient fell today getting out of the shower, patient did not have brace on when she fell  Reports inability to weight bear and increased pain, redness, swelling, and bruising  Daughter reports that the patient also hit her head when she fell, denies LOC  Advised she needs to go to the ER for evaluation of symptoms and new injuries  Verbalized understanding

## 2021-03-16 NOTE — TELEPHONE ENCOUNTER
Patient sees Dr Kimberly Rios  Patient's daughter Amish Ardon called because her mother has her Right leg from calf down through her ankle swollen  She is asking for a call back     # [phone]

## 2021-03-17 PROBLEM — R20.2 NUMBNESS AND TINGLING IN LEFT ARM: Status: ACTIVE | Noted: 2021-01-18

## 2021-03-17 PROBLEM — R20.0 NUMBNESS AND TINGLING IN LEFT ARM: Status: ACTIVE | Noted: 2021-01-18

## 2021-03-18 ENCOUNTER — HOSPITAL ENCOUNTER (OUTPATIENT)
Dept: RADIOLOGY | Facility: CLINIC | Age: 56
Discharge: HOME | End: 2021-03-18
Payer: COMMERCIAL

## 2021-03-18 ENCOUNTER — OFFICE VISIT (OUTPATIENT)
Dept: OBGYN CLINIC | Facility: CLINIC | Age: 56
End: 2021-03-18
Payer: COMMERCIAL

## 2021-03-18 VITALS
WEIGHT: 175 LBS | TEMPERATURE: 97.6 F | DIASTOLIC BLOOD PRESSURE: 80 MMHG | SYSTOLIC BLOOD PRESSURE: 140 MMHG | HEIGHT: 67 IN | HEART RATE: 73 BPM | BODY MASS INDEX: 27.47 KG/M2

## 2021-03-18 DIAGNOSIS — S86.111A SPRAIN, GASTROCNEMIUS, RIGHT, INITIAL ENCOUNTER: ICD-10-CM

## 2021-03-18 DIAGNOSIS — M25.571 ACUTE RIGHT ANKLE PAIN: Primary | ICD-10-CM

## 2021-03-18 DIAGNOSIS — M25.571 ACUTE RIGHT ANKLE PAIN: ICD-10-CM

## 2021-03-18 DIAGNOSIS — S93.401D SPRAIN OF RIGHT ANKLE, UNSPECIFIED LIGAMENT, SUBSEQUENT ENCOUNTER: ICD-10-CM

## 2021-03-18 PROCEDURE — 73610 X-RAY EXAM OF ANKLE: CPT

## 2021-03-18 PROCEDURE — 99213 OFFICE O/P EST LOW 20 MIN: CPT | Performed by: ORTHOPAEDIC SURGERY

## 2021-03-18 NOTE — PATIENT INSTRUCTIONS
Ice your ankle 20-30 mins every hour whenever there is swelling  Also ice in the same way for several hours after activity that causes pain or swelling  While icing, it may be beneficial to elevate your ankle above the level of your heart using some pillows or blankets  Also, use over the counter pain medications including ibuprofen or tylenol for pain control

## 2021-03-18 NOTE — ASSESSMENT & PLAN NOTE
· Patient admitted under the stroke pathway presenting with left facial numbness and tingling that had radiated down her left arm as well  · Patient states that this occurs quite often and is associated with blurring of vision  · Out of the window for TPA  · CTH negative  · MRI reviewed: negative for acute ischemia, small focus of gliosis  · Neuro consulted and as per notes, patient's symptoms not likely secondary to stroke and not neurologic in nature  · C/w aspirin and statin  · Patient had TTE in January 2021 which was unremarkable

## 2021-03-18 NOTE — PROGRESS NOTES
ASSESSMENT/PLAN:    Diagnoses and all orders for this visit:    Acute right ankle pain  -     XR ankle 3+ vw right; Future    Sprain of right ankle, unspecified ligament, subsequent encounter    Sprain, gastrocnemius, right, initial encounter        XRs performed today in clinic were reviewed with the patient  She should continue use of the air cast as previous indicated  She may continue to weight bear as tolerated on the right lower extremity  She was encouraged to ice, elevate, and avoid strenuous activities as needed for ankle pain and swelling  We will see her back as previous scheduled for recheck  She was encouraged to contact the office should questions or concerns arise  Return for recheck as scheduled        This patient was evaluated both by myself and Dr Mejia Macedo         _____________________________________________________  CHIEF COMPLAINT:  Chief Complaint   Patient presents with    Right Ankle - Pain, Swelling         SUBJECTIVE:  Chrissy Currie is a 63 y o  female who presents for follow up  Of right ankle pain  The patient was initially seen and diagnosed with an ankle sprain approximately 2 weeks ago  She states she was getting out of her shower 2 days ago when she stumbled, twisting her ankle  She noticed significant increase in pain and contacted the office  She was advised to come in for follow-up  She states today her pain has not changed much since she was last spoken to  She also complains of pain in the posterior aspect of her ankle at her Achilles tendon that radiates proximally into her calf muscle      PAST MEDICAL HISTORY:  Past Medical History:   Diagnosis Date    Heart murmur     Hypertension     Kidney stones     Kidney stones     Von Willebrand disease ([facility] [address] )        PAST SURGICAL HISTORY:  Past Surgical History:   Procedure Laterality Date    APPENDECTOMY      BREAST LUMPECTOMY Left     CARPAL TUNNEL RELEASE Bilateral     CHOLECYSTECTOMY      HYSTERECTOMY      KNEE SURGERY Left     PARTIAL HYSTERECTOMY      ROTATOR CUFF REPAIR Left     TONSILLECTOMY         FAMILY HISTORY:  Family History   Problem Relation Age of Onset    Cancer Mother     Cancer Sister     Stroke Brother        SOCIAL HISTORY:  Social History     Tobacco Use    Smoking status: Current Every Day Smoker     Packs/day: 0 25     Years: 25 00     Pack years: 6 25     Types: Cigarettes    Smokeless tobacco: Former User    Tobacco comment: few cigs/day   Substance Use Topics    Alcohol use: Not Currently    Drug use: Never       MEDICATIONS:    Current Outpatient Medications:     albuterol (2 5 mg/3 mL) 0 083 % nebulizer solution, Inhale 2 5 mg as needed, Disp: , Rfl:     albuterol (PROVENTIL HFA,VENTOLIN HFA) 90 mcg/act inhaler, as needed, Disp: , Rfl:     aminocaproic acid (AMICAR) 500 mg tablet, Take 500 mg by mouth as needed for bleeding , Disp: , Rfl:     buPROPion (WELLBUTRIN SR) 150 mg 12 hr tablet, Take 150 mg by mouth 2 (two) times a day, Disp: , Rfl:     cetirizine (ZyrTEC) 10 mg tablet, Take 10 mg by mouth daily, Disp: , Rfl:     EPINEPHrine (EPIPEN) 0 3 mg/0 3 mL SOAJ, For a severe reaction: Inject in outer thigh following instructions on package and go to the Emergency room  , Disp: , Rfl:     fluticasone (FLONASE) 50 mcg/act nasal spray, daily at bedtime, Disp: , Rfl:     hydrochlorothiazide (HYDRODIURIL) 25 mg tablet, Take 25 mg by mouth daily, Disp: , Rfl:     lisinopril (ZESTRIL) 20 mg tablet, Take 20 mg by mouth daily, Disp: , Rfl:     montelukast (SINGULAIR) 10 mg tablet, Take 10 mg by mouth daily at bedtime, Disp: , Rfl:     Multiple Vitamins-Minerals (MULTIVITAMIN WITH MINERALS) tablet, Take 1 tablet by mouth daily, Disp: , Rfl:     OMEPRAZOLE PO, Take 20 mg by mouth daily, Disp: , Rfl:     topiramate (TOPAMAX) 100 mg tablet, Take 100 mg by mouth every 12 (twelve) hours , Disp: , Rfl:     VITAMIN D, CHOLECALCIFEROL, PO, daily, Disp: , Rfl:     ALLERGIES:  Allergies Allergen Reactions    Erythromycin Vomiting    Morphine Palpitations       REVIEW OF SYSTEMS:  Pertinent items are noted in HPI  A comprehensive review of systems was negative       _____________________________________________________  PHYSICAL EXAMINATION:  General: well developed and well nourished, alert, oriented times 3 and appears comfortable  Psychiatric: Normal  HEENT:  Normocephalic, atraumatic  Cardiovascular:  Regular  Pulmonary: No wheezing or stridor  Skin: No masses, erthema, lacerations, fluctation, ulcerations  Neurovascular: Motor and sensory exams are grossly intact  Distal pulses are palpable  Limb is warm and well perfused with good color and capillary refill  MUSCULOSKELETAL EXAMINATION:      The right lower extremity exam demonstrates the Aircast to be in place upon arrival   This was removed without difficulty  Skin is intact without erythema or ecchymosis  She does have significant lateral swelling of the ankle  She has diffuse tenderness to palpation about the ankle both laterally and medially  She also experiences tenderness over the proximal Achilles tendon  She also has tenderness to palpation over the gastrocnemius muscle  She has full dorsiflexion of the ankle without pain  She is able to plantar flex but does complain of pain with plantar flexion  Eversion and inversion of the ankle also elicits complaints  The remainder of the right lower extremity exam is benign  _____________________________________________________  STUDIES REVIEWED:  I have personally reviewed pertinent films and reports in PACS and my interpretation is as follows:      X-rays of the right ankle performed today in clinic demonstrate no acute osseous abnormalities  PROCEDURES PERFORMED:   Procedures  None performed today            Mary Simpson PA-C

## 2021-03-24 ENCOUNTER — OFFICE VISIT (OUTPATIENT)
Dept: OBGYN CLINIC | Facility: CLINIC | Age: 56
End: 2021-03-24
Payer: COMMERCIAL

## 2021-03-24 VITALS
TEMPERATURE: 97.3 F | HEIGHT: 67 IN | HEART RATE: 75 BPM | SYSTOLIC BLOOD PRESSURE: 150 MMHG | WEIGHT: 175 LBS | BODY MASS INDEX: 27.47 KG/M2 | DIASTOLIC BLOOD PRESSURE: 90 MMHG

## 2021-03-24 DIAGNOSIS — M25.571 ACUTE RIGHT ANKLE PAIN: ICD-10-CM

## 2021-03-24 DIAGNOSIS — S82.891D CLOSED FRACTURE OF RIGHT ANKLE WITH ROUTINE HEALING, SUBSEQUENT ENCOUNTER: Primary | ICD-10-CM

## 2021-03-24 PROCEDURE — 99213 OFFICE O/P EST LOW 20 MIN: CPT | Performed by: ORTHOPAEDIC SURGERY

## 2021-03-24 RX ORDER — AMLODIPINE BESYLATE 5 MG/1
5 TABLET ORAL DAILY
COMMUNITY
Start: 2021-03-17 | End: 2021-08-06 | Stop reason: ALTCHOICE

## 2021-03-24 NOTE — PROGRESS NOTES
ASSESSMENT/PLAN:    Diagnoses and all orders for this visit:    Closed fracture of right ankle with routine healing, subsequent encounter  -     DXA bone density spine hip and pelvis; Future  -     Ambulatory referral to Physical Therapy; Future    Acute right ankle pain  -     Ambulatory referral to Physical Therapy; Future            Plan:  I would recommend physical therapy and a prescription was placed on the chart  She has been reassured that again, although the diagnosis includes fracture, the injury is more like a bad ankle sprain would just a very distal fibular avulsion  She has been removing her brace and ambulating at home  The brace is to be worn at all times except for periods of inactivity and cleansing  If therapy removes the brace for certain exercises that would be permissible  I will see her in 3 weeks for re-evaluation  Return in about 3 weeks (around 4/14/2021)  _____________________________________________________  CHIEF COMPLAINT:  Chief Complaint   Patient presents with    Follow-up         SUBJECTIVE:  Siean Sawant is a 63y o  year old female who presents for follow up   Of her right ankle  She states that there has been no improvement in her symptoms since she was initially seen  She denies any decrease in pain, decrease in swelling or improvement in tolerance to ambulation        PAST MEDICAL HISTORY:  Past Medical History:   Diagnosis Date    Heart murmur     Hypertension     Kidney stones     Kidney stones     Von Willebrand disease ([facility] [address] )        PAST SURGICAL HISTORY:  Past Surgical History:   Procedure Laterality Date    APPENDECTOMY      BREAST LUMPECTOMY Left     CARPAL TUNNEL RELEASE Bilateral     CHOLECYSTECTOMY      HYSTERECTOMY      KNEE SURGERY Left     PARTIAL HYSTERECTOMY      ROTATOR CUFF REPAIR Left     TONSILLECTOMY         FAMILY HISTORY:  Family History   Problem Relation Age of Onset    Cancer Mother     Cancer Sister     Stroke Brother SOCIAL HISTORY:  Social History     Tobacco Use    Smoking status: Current Every Day Smoker     Packs/day: 0 25     Years: 25 00     Pack years: 6 25     Types: Cigarettes    Smokeless tobacco: Former User    Tobacco comment: few cigs/day   Substance Use Topics    Alcohol use: Not Currently    Drug use: Never       MEDICATIONS:    Current Outpatient Medications:     albuterol (2 5 mg/3 mL) 0 083 % nebulizer solution, Inhale 2 5 mg as needed, Disp: , Rfl:     albuterol (PROVENTIL HFA,VENTOLIN HFA) 90 mcg/act inhaler, as needed, Disp: , Rfl:     aminocaproic acid (AMICAR) 500 mg tablet, Take 500 mg by mouth as needed for bleeding , Disp: , Rfl:     amLODIPine (NORVASC) 5 mg tablet, Take 5 mg by mouth daily, Disp: , Rfl:     buPROPion (WELLBUTRIN SR) 150 mg 12 hr tablet, Take 150 mg by mouth 2 (two) times a day, Disp: , Rfl:     cetirizine (ZyrTEC) 10 mg tablet, Take 10 mg by mouth daily, Disp: , Rfl:     EPINEPHrine (EPIPEN) 0 3 mg/0 3 mL SOAJ, For a severe reaction: Inject in outer thigh following instructions on package and go to the Emergency room  , Disp: , Rfl:     fluticasone (FLONASE) 50 mcg/act nasal spray, daily at bedtime, Disp: , Rfl:     hydrochlorothiazide (HYDRODIURIL) 25 mg tablet, Take 25 mg by mouth daily, Disp: , Rfl:     lisinopril (ZESTRIL) 20 mg tablet, Take 20 mg by mouth daily, Disp: , Rfl:     montelukast (SINGULAIR) 10 mg tablet, Take 10 mg by mouth daily at bedtime, Disp: , Rfl:     Multiple Vitamins-Minerals (MULTIVITAMIN WITH MINERALS) tablet, Take 1 tablet by mouth daily, Disp: , Rfl:     OMEPRAZOLE PO, Take 20 mg by mouth daily, Disp: , Rfl:     topiramate (TOPAMAX) 100 mg tablet, Take 100 mg by mouth every 12 (twelve) hours , Disp: , Rfl:     VITAMIN D, CHOLECALCIFEROL, PO, daily, Disp: , Rfl:     ALLERGIES:  Allergies   Allergen Reactions    Erythromycin Vomiting    Morphine Palpitations       REVIEW OF SYSTEMS:  Pertinent items are noted in HPI    A comprehensive review of systems was negative       _____________________________________________________  PHYSICAL EXAMINATION:  General: alert, oriented times 3 and appears comfortable  Psychiatric: Normal  HEENT:  Normocephalic, atraumatic  Cardiovascular:  Regular  Pulmonary: No wheezing or stridor  Skin: No masses, erthema, lacerations, fluctation, ulcerations  Neurovascular: Motor and sensory exams are grossly intact although she would not make any effort to perform resistive exercises of the right ankle secondary to pain  Pulses are palpable  MUSCULOSKELETAL EXAMINATION:      The right ankle exam demonstrates limited range of motion secondary to apprehension and pain  She does have tenderness over the anterior lateral aspect of the ankle and over the anterior talofibular ligament  The calcaneal fibular ligament and the posterior talofibular ligament are nontender  Deltoid is nontender  Distal tibia and fibula are nontender  Any attempts to test muscle strength resulted in immediate relaxation of her muscle secondary to pain  There is no gross instability of the ankle on inversion stress, eversion stress or with anterior draw            Christopher Cogan

## 2021-04-21 ENCOUNTER — OFFICE VISIT (OUTPATIENT)
Dept: OBGYN CLINIC | Facility: CLINIC | Age: 56
End: 2021-04-21
Payer: COMMERCIAL

## 2021-04-21 VITALS
SYSTOLIC BLOOD PRESSURE: 120 MMHG | HEART RATE: 70 BPM | TEMPERATURE: 97.5 F | HEIGHT: 67 IN | WEIGHT: 175 LBS | DIASTOLIC BLOOD PRESSURE: 76 MMHG | BODY MASS INDEX: 27.47 KG/M2

## 2021-04-21 DIAGNOSIS — S82.891D CLOSED FRACTURE OF RIGHT ANKLE WITH ROUTINE HEALING, SUBSEQUENT ENCOUNTER: Primary | ICD-10-CM

## 2021-04-21 PROCEDURE — 99213 OFFICE O/P EST LOW 20 MIN: CPT | Performed by: ORTHOPAEDIC SURGERY

## 2021-04-21 NOTE — PROGRESS NOTES
ASSESSMENT/PLAN:    Diagnoses and all orders for this visit:    Closed fracture of right ankle with routine healing, subsequent encounter    Other orders  -     Cancel: DXA bone density spine hip and pelvis; Future        Plan: I recommend follow-up in 4-6 weeks  She will begin to wean herself from the ankle brace out of the house as instructed  She can continue in therapy but certainly can transition to a  Home exercise program   She is to contact me if questions or concerns arise  She mentioned that the physical therapist questioned whether an MRI had been done  I informed her that I saw no reason to pursue an MRI at this time  Return for 4-6 weeks  _____________________________________________________  CHIEF COMPLAINT:  Chief Complaint   Patient presents with    Follow-up         SUBJECTIVE:  Franklin Laura is a 63y o  year old female who presents for follow up  Of her right ankle  She has noted improvement but continues to experience some anterior pain  She has also noted paresthesias radiating from the anterior ankle distally to the dorsal 1st webspace and occasional paresthesias involving all of her toes  Occasionally, she will awaken from her sleep with pain in her foot and ankle  She has not had to wear her brace in the house and denies any pain when walking without the brace in the house  She has been compliant with use of the brace out of the house        PAST MEDICAL HISTORY:  Past Medical History:   Diagnosis Date    Heart murmur     Hypertension     Kidney stones     Kidney stones     Von Willebrand disease ([address] )        PAST SURGICAL HISTORY:  Past Surgical History:   Procedure Laterality Date    APPENDECTOMY      BREAST LUMPECTOMY Left     CARPAL TUNNEL RELEASE Bilateral     CHOLECYSTECTOMY      HYSTERECTOMY      KNEE SURGERY Left     PARTIAL HYSTERECTOMY      ROTATOR CUFF REPAIR Left     TONSILLECTOMY         FAMILY HISTORY:  Family History   Problem Relation Age of Onset    Cancer Mother     Cancer Sister     Stroke Brother        SOCIAL HISTORY:  Social History     Tobacco Use    Smoking status: Current Every Day Smoker     Packs/day: 0 25     Years: 25 00     Pack years: 6 25     Types: Cigarettes    Smokeless tobacco: Former User    Tobacco comment: few cigs/day   Substance Use Topics    Alcohol use: Not Currently    Drug use: Never       MEDICATIONS:    Current Outpatient Medications:     albuterol (2 5 mg/3 mL) 0 083 % nebulizer solution, Inhale 2 5 mg as needed, Disp: , Rfl:     albuterol (PROVENTIL HFA,VENTOLIN HFA) 90 mcg/act inhaler, as needed, Disp: , Rfl:     aminocaproic acid (AMICAR) 500 mg tablet, Take 500 mg by mouth as needed for bleeding , Disp: , Rfl:     amLODIPine (NORVASC) 5 mg tablet, Take 5 mg by mouth daily, Disp: , Rfl:     buPROPion (WELLBUTRIN SR) 150 mg 12 hr tablet, Take 150 mg by mouth 2 (two) times a day, Disp: , Rfl:     cetirizine (ZyrTEC) 10 mg tablet, Take 10 mg by mouth daily, Disp: , Rfl:     EPINEPHrine (EPIPEN) 0 3 mg/0 3 mL SOAJ, For a severe reaction: Inject in outer thigh following instructions on package and go to the Emergency room  , Disp: , Rfl:     fluticasone (FLONASE) 50 mcg/act nasal spray, daily at bedtime, Disp: , Rfl:     hydrochlorothiazide (HYDRODIURIL) 25 mg tablet, Take 25 mg by mouth daily, Disp: , Rfl:     lisinopril (ZESTRIL) 20 mg tablet, Take 20 mg by mouth daily, Disp: , Rfl:     montelukast (SINGULAIR) 10 mg tablet, Take 10 mg by mouth daily at bedtime, Disp: , Rfl:     Multiple Vitamins-Minerals (MULTIVITAMIN WITH MINERALS) tablet, Take 1 tablet by mouth daily, Disp: , Rfl:     OMEPRAZOLE PO, Take 20 mg by mouth daily, Disp: , Rfl:     topiramate (TOPAMAX) 100 mg tablet, Take 100 mg by mouth every 12 (twelve) hours , Disp: , Rfl:     VITAMIN D, CHOLECALCIFEROL, PO, daily, Disp: , Rfl:     ALLERGIES:  Allergies   Allergen Reactions    Erythromycin Vomiting    Morphine Palpitations REVIEW OF SYSTEMS:  Pertinent items are noted in HPI  A comprehensive review of systems was negative       _____________________________________________________  PHYSICAL EXAMINATION:  General: alert, oriented times 3 and appears comfortable  Psychiatric: Normal  HEENT:  Normocephalic, atraumatic  Cardiovascular:  Regular  Pulmonary: No wheezing or stridor  Skin: No masses, erthema, lacerations, fluctation, ulcerations  Neurovascular: Motor and sensory exams are intact  Pulses palpable  MUSCULOSKELETAL EXAMINATION:      The right ankle exam demonstrates some mild diffuse swelling  She does have tenderness over the anterior ankle and over the dorsal aspect of the foot  Any attempt at range of motion triggers complaints of anterior ankle pain with symptoms radiating distally toward the toes  She has no instability with testing but complains of pain aggravated by anterior draw, eversion and inversion stress peer there is no tenderness over the distal fibula or medial malleolus  The deltoid ligament and lateral ligaments are nontender            Modesta Anger

## 2021-05-28 ENCOUNTER — OFFICE VISIT (OUTPATIENT)
Dept: OBGYN CLINIC | Facility: CLINIC | Age: 56
End: 2021-05-28
Payer: COMMERCIAL

## 2021-05-28 VITALS
SYSTOLIC BLOOD PRESSURE: 120 MMHG | TEMPERATURE: 97.9 F | BODY MASS INDEX: 26.68 KG/M2 | WEIGHT: 170 LBS | HEIGHT: 67 IN | HEART RATE: 82 BPM | DIASTOLIC BLOOD PRESSURE: 80 MMHG

## 2021-05-28 DIAGNOSIS — S82.891D CLOSED FRACTURE OF RIGHT ANKLE WITH ROUTINE HEALING, SUBSEQUENT ENCOUNTER: Primary | ICD-10-CM

## 2021-05-28 PROCEDURE — 99213 OFFICE O/P EST LOW 20 MIN: CPT | Performed by: ORTHOPAEDIC SURGERY

## 2021-05-28 RX ORDER — NORTRIPTYLINE HYDROCHLORIDE 10 MG/1
CAPSULE ORAL
COMMUNITY
Start: 2021-05-03

## 2021-05-28 NOTE — PROGRESS NOTES
ASSESSMENT/PLAN:    Diagnoses and all orders for this visit:    Closed fracture of right ankle with routine healing, subsequent encounter  -     MRI ankle/heel right  wo contrast; Future    Other orders  -     nortriptyline (PAMELOR) 10 mg capsule; take 1 capsule by mouth at bedtime FOR 2 WEEKS, THEN INCREASE TO 2 tablets nightly THEREAFTER        MRI of the ankle was ordered today due to a plateau in her recovery  We will see her back it is completed  She should continue home exercises provided by PT  She can use the brace as needed for additional support  She was encouraged to contact the office should questions or concerns arise  Return for MRI review when available       _____________________________________________________  CHIEF COMPLAINT:  Chief Complaint   Patient presents with    Follow-up         SUBJECTIVE:  Harika Lundy is a 63 y o  female who presents for follow up of her right ankle  She reports no improvement in her symptoms since she was last seen  She continues to experiencing shooting pain and numbness that starts in the anterior ankle and radiates into the 1st webspace  It is aggravated by any ankle ROM, especially DF, and internal/external rotation  She has discontinued use of the brace which has note helped or worsened the anterior ankle symptoms  She also reports significant swelling in the lower limb that starts in the calf and extends distally into the ankle and foot, worsened with increased activity  She denies new injuries or trauma       PAST MEDICAL HISTORY:  Past Medical History:   Diagnosis Date    Heart murmur     Hypertension     Kidney stones     Kidney stones     Von Willebrand disease ([address] )        PAST SURGICAL HISTORY:  Past Surgical History:   Procedure Laterality Date    APPENDECTOMY      BREAST LUMPECTOMY Left     CARPAL TUNNEL RELEASE Bilateral     CHOLECYSTECTOMY      HYSTERECTOMY      KNEE SURGERY Left     PARTIAL HYSTERECTOMY      ROTATOR CUFF REPAIR Left  TONSILLECTOMY         FAMILY HISTORY:  Family History   Problem Relation Age of Onset    Cancer Mother     Cancer Sister     Stroke Brother        SOCIAL HISTORY:  Social History     Tobacco Use    Smoking status: Current Every Day Smoker     Packs/day: 0 25     Years: 25 00     Pack years: 6 25     Types: Cigarettes    Smokeless tobacco: Former User    Tobacco comment: few cigs/day   Substance Use Topics    Alcohol use: Not Currently    Drug use: Never       MEDICATIONS:    Current Outpatient Medications:     albuterol (2 5 mg/3 mL) 0 083 % nebulizer solution, Inhale 2 5 mg as needed, Disp: , Rfl:     albuterol (PROVENTIL HFA,VENTOLIN HFA) 90 mcg/act inhaler, as needed, Disp: , Rfl:     aminocaproic acid (AMICAR) 500 mg tablet, Take 500 mg by mouth as needed for bleeding , Disp: , Rfl:     amLODIPine (NORVASC) 5 mg tablet, Take 5 mg by mouth daily, Disp: , Rfl:     buPROPion (WELLBUTRIN SR) 150 mg 12 hr tablet, Take 150 mg by mouth 2 (two) times a day, Disp: , Rfl:     cetirizine (ZyrTEC) 10 mg tablet, Take 10 mg by mouth daily, Disp: , Rfl:     EPINEPHrine (EPIPEN) 0 3 mg/0 3 mL SOAJ, For a severe reaction: Inject in outer thigh following instructions on package and go to the Emergency room  , Disp: , Rfl:     fluticasone (FLONASE) 50 mcg/act nasal spray, daily at bedtime, Disp: , Rfl:     hydrochlorothiazide (HYDRODIURIL) 25 mg tablet, Take 25 mg by mouth daily, Disp: , Rfl:     lisinopril (ZESTRIL) 20 mg tablet, Take 20 mg by mouth daily, Disp: , Rfl:     montelukast (SINGULAIR) 10 mg tablet, Take 10 mg by mouth daily at bedtime, Disp: , Rfl:     Multiple Vitamins-Minerals (MULTIVITAMIN WITH MINERALS) tablet, Take 1 tablet by mouth daily, Disp: , Rfl:     nortriptyline (PAMELOR) 10 mg capsule, take 1 capsule by mouth at bedtime FOR 2 WEEKS, THEN INCREASE TO 2 tablets nightly THEREAFTER, Disp: , Rfl:     OMEPRAZOLE PO, Take 20 mg by mouth daily, Disp: , Rfl:     VITAMIN D, CHOLECALCIFEROL, PO, daily, Disp: , Rfl:     ALLERGIES:  Allergies   Allergen Reactions    Erythromycin Vomiting    Morphine Palpitations       REVIEW OF SYSTEMS:  Pertinent items are noted in HPI  A comprehensive review of systems was negative       _____________________________________________________  PHYSICAL EXAMINATION:  General: well developed and well nourished, alert, oriented times 3 and appears comfortable  Psychiatric: Normal  HEENT:  Normocephalic, atraumatic  Cardiovascular:  Regular  Pulmonary: No wheezing or stridor  Skin: No masses, erthema, lacerations, fluctation, ulcerations  Neurovascular: Motor and sensory exams are grossly intact, distal pulses are palpable  Limb is warm and well perfused good color and capillary refill the toes  MUSCULOSKELETAL EXAMINATION:      The right ankle exam demonstrates skin intact, no erythema, no ecchymosis  She does have mild swelling of the ankle laterally  Tenderness to palpation over the anterior ankle, the dorsal aspect of the foot and the 1st webspace  Significant pain and a withdrawal response is noted with dorsiflexion of the ankle  She also subjectively reports pain with all other planes of motion  No instability with testing but again complains of pain with anterior drawer  The remainder of the right lower extremity exam is benign  _____________________________________________________  STUDIES REVIEWED:  No new imaging performed today    PROCEDURES PERFORMED:   Procedures  None performed today            Mary Simpson PA-C

## 2021-06-24 ENCOUNTER — DOCTOR'S OFFICE (OUTPATIENT)
Dept: URBAN - NONMETROPOLITAN AREA CLINIC 2 | Facility: CLINIC | Age: 56
Setting detail: OPHTHALMOLOGY
End: 2021-06-24
Payer: COMMERCIAL

## 2021-06-24 ENCOUNTER — OPTICAL OFFICE (OUTPATIENT)
Dept: URBAN - NONMETROPOLITAN AREA CLINIC 5 | Facility: CLINIC | Age: 56
Setting detail: OPHTHALMOLOGY
End: 2021-06-24
Payer: COMMERCIAL

## 2021-06-24 VITALS — HEIGHT: 55 IN

## 2021-06-24 DIAGNOSIS — Z01.00: ICD-10-CM

## 2021-06-24 DIAGNOSIS — H52.13: ICD-10-CM

## 2021-06-24 DIAGNOSIS — H52.223: ICD-10-CM

## 2021-06-24 DIAGNOSIS — H52.4: ICD-10-CM

## 2021-06-24 PROCEDURE — V2784 LENS POLYCARB OR EQUAL: HCPCS | Performed by: OPTOMETRIST

## 2021-06-24 PROCEDURE — V2103 SPHEROCYLINDR 4.00D/12-2.00D: HCPCS | Performed by: OPTOMETRIST

## 2021-06-24 PROCEDURE — 92015 DETERMINE REFRACTIVE STATE: CPT | Performed by: OPTOMETRIST

## 2021-06-24 PROCEDURE — V2102 SINGL VISN SPHERE 7.12-20.00: HCPCS | Performed by: OPTOMETRIST

## 2021-06-24 PROCEDURE — V2020 VISION SVCS FRAMES PURCHASES: HCPCS | Performed by: OPTOMETRIST

## 2021-06-24 PROCEDURE — 92014 COMPRE OPH EXAM EST PT 1/>: CPT | Performed by: OPTOMETRIST

## 2021-06-24 PROCEDURE — 92310 CONTACT LENS FITTING OU: CPT | Performed by: OPTOMETRIST

## 2021-06-24 ASSESSMENT — CONFRONTATIONAL VISUAL FIELD TEST (CVF)
OS_FINDINGS: FULL
OD_FINDINGS: FULL

## 2021-06-24 ASSESSMENT — TONOMETRY
OS_IOP_MMHG: 15
OD_IOP_MMHG: 15

## 2021-07-14 ENCOUNTER — HOSPITAL ENCOUNTER (OUTPATIENT)
Dept: MRI IMAGING | Facility: HOSPITAL | Age: 56
Discharge: HOME | End: 2021-07-14
Payer: COMMERCIAL

## 2021-07-14 DIAGNOSIS — S82.891D CLOSED FRACTURE OF RIGHT ANKLE WITH ROUTINE HEALING, SUBSEQUENT ENCOUNTER: ICD-10-CM

## 2021-07-14 PROCEDURE — 73721 MRI JNT OF LWR EXTRE W/O DYE: CPT

## 2021-07-19 ENCOUNTER — OFFICE VISIT (OUTPATIENT)
Dept: OBGYN CLINIC | Facility: CLINIC | Age: 56
End: 2021-07-19
Payer: COMMERCIAL

## 2021-07-19 VITALS
BODY MASS INDEX: 26.68 KG/M2 | HEART RATE: 68 BPM | WEIGHT: 170 LBS | DIASTOLIC BLOOD PRESSURE: 90 MMHG | HEIGHT: 67 IN | SYSTOLIC BLOOD PRESSURE: 150 MMHG | TEMPERATURE: 98.2 F

## 2021-07-19 DIAGNOSIS — S82.891D CLOSED FRACTURE OF RIGHT ANKLE WITH ROUTINE HEALING, SUBSEQUENT ENCOUNTER: Primary | ICD-10-CM

## 2021-07-19 PROCEDURE — 99213 OFFICE O/P EST LOW 20 MIN: CPT | Performed by: ORTHOPAEDIC SURGERY

## 2021-07-19 RX ORDER — VENLAFAXINE HYDROCHLORIDE 37.5 MG/1
37.5 CAPSULE, EXTENDED RELEASE ORAL DAILY
COMMUNITY
Start: 2021-06-23 | End: 2022-06-23

## 2021-07-19 NOTE — PROGRESS NOTES
ASSESSMENT/PLAN:    Diagnoses and all orders for this visit:    Closed fracture of right ankle with routine healing, subsequent encounter  -     Ambulatory referral to Physical Therapy; Future    Other orders  -     venlafaxine (EFFEXOR-XR) 37 5 mg 24 hr capsule; Take 37 5 mg by mouth daily        Plan:  I would recommend that showed he try another course of physical therapy  She has been reassured that the MRI showed no evidence of injury that would warrant more aggressive treatment  She does understand that swelling may persist for up to 12 months after injury and that some loss of motion is not uncommon after these injuries  She is somewhat frustrated by the fact that there still is swelling and that her range of motion is not symmetrical with her contralateral left side  I have once again informed her that after these kind of ankle injuries some swelling and loss of motion may be a permanent sequela  Return for 6-8 weeks  _____________________________________________________  CHIEF COMPLAINT:  Chief Complaint   Patient presents with    Right Ankle - Follow-up         SUBJECTIVE:  Keshia Reddy is a 61y o  year old female who presents for follow up right ankle  She denies any change in symptoms since she was last seen  Her MRI has been completed  She continues to be concerned with swelling, limited motion and generalized soreness  She complains of pain and stiffness across the dorsum of the foot but denies plantar pain  She does complain of continued medial and lateral ankle pain  She denies paresthesias        PAST MEDICAL HISTORY:  Past Medical History:   Diagnosis Date    Heart murmur     Hypertension     Kidney stones     Kidney stones     Von Willebrand disease ([facility] [address] )        PAST SURGICAL HISTORY:  Past Surgical History:   Procedure Laterality Date    APPENDECTOMY      BREAST LUMPECTOMY Left     CARPAL TUNNEL RELEASE Bilateral     CHOLECYSTECTOMY      HYSTERECTOMY      KNEE SURGERY Left     PARTIAL HYSTERECTOMY      ROTATOR CUFF REPAIR Left     TONSILLECTOMY         FAMILY HISTORY:  Family History   Problem Relation Age of Onset    Cancer Mother     Cancer Sister     Stroke Brother        SOCIAL HISTORY:  Social History     Tobacco Use    Smoking status: Current Every Day Smoker     Packs/day: 0 25     Years: 25 00     Pack years: 6 25     Types: Cigarettes    Smokeless tobacco: Former User    Tobacco comment: few cigs/day   Vaping Use    Vaping Use: Some days    Substances: Nicotine (2 cigarettes a week), Flavoring   Substance Use Topics    Alcohol use: Not Currently    Drug use: Never       MEDICATIONS:    Current Outpatient Medications:     albuterol (2 5 mg/3 mL) 0 083 % nebulizer solution, Inhale 2 5 mg as needed, Disp: , Rfl:     albuterol (PROVENTIL HFA,VENTOLIN HFA) 90 mcg/act inhaler, as needed, Disp: , Rfl:     aminocaproic acid (AMICAR) 500 mg tablet, Take 500 mg by mouth as needed for bleeding , Disp: , Rfl:     buPROPion (WELLBUTRIN SR) 150 mg 12 hr tablet, Take 150 mg by mouth 2 (two) times a day, Disp: , Rfl:     cetirizine (ZyrTEC) 10 mg tablet, Take 10 mg by mouth daily, Disp: , Rfl:     EPINEPHrine (EPIPEN) 0 3 mg/0 3 mL SOAJ, For a severe reaction: Inject in outer thigh following instructions on package and go to the Emergency room  , Disp: , Rfl:     fluticasone (FLONASE) 50 mcg/act nasal spray, daily at bedtime, Disp: , Rfl:     montelukast (SINGULAIR) 10 mg tablet, Take 10 mg by mouth daily at bedtime, Disp: , Rfl:     Multiple Vitamins-Minerals (MULTIVITAMIN WITH MINERALS) tablet, Take 1 tablet by mouth daily, Disp: , Rfl:     nortriptyline (PAMELOR) 10 mg capsule, take 1 capsule by mouth at bedtime FOR 2 WEEKS, THEN INCREASE TO 2 tablets nightly THEREAFTER, Disp: , Rfl:     OMEPRAZOLE PO, Take 20 mg by mouth daily, Disp: , Rfl:     venlafaxine (EFFEXOR-XR) 37 5 mg 24 hr capsule, Take 37 5 mg by mouth daily, Disp: , Rfl:     VITAMIN D, CHOLECALCIFEROL, PO, daily, Disp: , Rfl:     amLODIPine (NORVASC) 5 mg tablet, Take 5 mg by mouth daily (Patient not taking: Reported on 7/19/2021), Disp: , Rfl:     hydrochlorothiazide (HYDRODIURIL) 25 mg tablet, Take 25 mg by mouth daily (Patient not taking: Reported on 7/19/2021), Disp: , Rfl:     lisinopril (ZESTRIL) 20 mg tablet, Take 20 mg by mouth daily (Patient not taking: Reported on 7/19/2021), Disp: , Rfl:     ALLERGIES:  Allergies   Allergen Reactions    Erythromycin Vomiting    Morphine Palpitations       REVIEW OF SYSTEMS:  Pertinent items are noted in HPI  A comprehensive review of systems was negative       _____________________________________________________  PHYSICAL EXAMINATION:  General: alert, oriented times 3 and appears comfortable  Psychiatric: Normal  HEENT:  Normocephalic, atraumatic  Cardiovascular:  Regular  Pulmonary: No wheezing or stridor  Skin: No masses, erthema, lacerations, fluctation, ulcerations  Neurovascular: Motor and sensory exams are intact and pulses are palpable  MUSCULOSKELETAL EXAMINATION:    Exam demonstrates swelling over the lateral ankle  She has minimal swelling medially  The foot demonstrates no swelling  She has limited dorsiflexion to neutral, perhaps 5° of dorsiflexion both actively and passively  She has 20° of plantar flexion  She has good subtalar and forefoot motion  Initially, she complained of tenderness with palpation over the lateral ankle ligament, dorsum of the foot and the ankle and the medial ankle  However, upon further questioning, the exam did not trigger a knee increase in her baseline pain excluding over the anterior talofibular ligament  There is no instability with anterior draw, talar tilt external rotation and a negative syndesmotic compression  _____________________________________________________  STUDIES REVIEWED:  The MRI demonstrates no evidence of acute abnormality        The report was reviewed              King Mo

## 2021-08-06 ENCOUNTER — HOSPITAL ENCOUNTER (EMERGENCY)
Facility: HOSPITAL | Age: 56
Discharge: HOME | End: 2021-08-06
Attending: STUDENT IN AN ORGANIZED HEALTH CARE EDUCATION/TRAINING PROGRAM | Admitting: STUDENT IN AN ORGANIZED HEALTH CARE EDUCATION/TRAINING PROGRAM
Payer: COMMERCIAL

## 2021-08-06 ENCOUNTER — APPOINTMENT (EMERGENCY)
Dept: RADIOLOGY | Facility: HOSPITAL | Age: 56
End: 2021-08-06
Payer: COMMERCIAL

## 2021-08-06 VITALS
HEIGHT: 67 IN | RESPIRATION RATE: 22 BRPM | SYSTOLIC BLOOD PRESSURE: 159 MMHG | OXYGEN SATURATION: 96 % | TEMPERATURE: 97.8 F | BODY MASS INDEX: 32.66 KG/M2 | DIASTOLIC BLOOD PRESSURE: 82 MMHG | HEART RATE: 66 BPM | WEIGHT: 208.11 LBS

## 2021-08-06 DIAGNOSIS — I16.0 HYPERTENSIVE URGENCY: ICD-10-CM

## 2021-08-06 DIAGNOSIS — R07.9 CHEST PAIN, UNSPECIFIED TYPE: Primary | ICD-10-CM

## 2021-08-06 LAB
ALBUMIN SERPL BCP-MCNC: 4 G/DL (ref 3.5–5)
ALP SERPL-CCNC: 95 U/L (ref 46–116)
ALT SERPL W P-5'-P-CCNC: 27 U/L (ref 12–78)
ANION GAP SERPL CALCULATED.3IONS-SCNC: 12 MMOL/L (ref 4–13)
AST SERPL W P-5'-P-CCNC: 15 U/L (ref 5–45)
BACTERIA UR QL AUTO: NORMAL /HPF
BASOPHILS # BLD AUTO: 0.06 THOUSANDS/ΜL (ref 0–0.1)
BASOPHILS NFR BLD AUTO: 1 % (ref 0–1)
BILIRUB SERPL-MCNC: 0.4 MG/DL (ref 0.2–1)
BILIRUB UR QL STRIP: NEGATIVE
BUN SERPL-MCNC: 10 MG/DL (ref 5–25)
CALCIUM SERPL-MCNC: 8.8 MG/DL (ref 8.3–10.1)
CHLORIDE SERPL-SCNC: 102 MMOL/L (ref 100–108)
CLARITY UR: CLEAR
CO2 SERPL-SCNC: 28 MMOL/L (ref 21–32)
COLOR UR: YELLOW
CREAT SERPL-MCNC: 0.88 MG/DL (ref 0.6–1.3)
EOSINOPHIL # BLD AUTO: 0.11 THOUSAND/ΜL (ref 0–0.61)
EOSINOPHIL NFR BLD AUTO: 1 % (ref 0–6)
ERYTHROCYTE [DISTWIDTH] IN BLOOD BY AUTOMATED COUNT: 13 % (ref 11.6–15.1)
GFR SERPL CREATININE-BSD FRML MDRD: 73 ML/MIN/1.73SQ M
GLUCOSE SERPL-MCNC: 92 MG/DL (ref 65–140)
GLUCOSE UR STRIP-MCNC: NEGATIVE MG/DL
HCT VFR BLD AUTO: 42.4 % (ref 34.8–46.1)
HGB BLD-MCNC: 13.5 G/DL (ref 11.5–15.4)
HGB UR QL STRIP.AUTO: ABNORMAL
IMM GRANULOCYTES # BLD AUTO: 0.07 THOUSAND/UL (ref 0–0.2)
IMM GRANULOCYTES NFR BLD AUTO: 1 % (ref 0–2)
KETONES UR STRIP-MCNC: NEGATIVE MG/DL
LEUKOCYTE ESTERASE UR QL STRIP: NEGATIVE
LYMPHOCYTES # BLD AUTO: 2.96 THOUSANDS/ΜL (ref 0.6–4.47)
LYMPHOCYTES NFR BLD AUTO: 29 % (ref 14–44)
MAGNESIUM SERPL-MCNC: 2.2 MG/DL (ref 1.6–2.6)
MCH RBC QN AUTO: 26.5 PG (ref 26.8–34.3)
MCHC RBC AUTO-ENTMCNC: 31.8 G/DL (ref 31.4–37.4)
MCV RBC AUTO: 83 FL (ref 82–98)
MONOCYTES # BLD AUTO: 0.82 THOUSAND/ΜL (ref 0.17–1.22)
MONOCYTES NFR BLD AUTO: 8 % (ref 4–12)
NEUTROPHILS # BLD AUTO: 6.34 THOUSANDS/ΜL (ref 1.85–7.62)
NEUTS SEG NFR BLD AUTO: 60 % (ref 43–75)
NITRITE UR QL STRIP: NEGATIVE
NON-SQ EPI CELLS URNS QL MICRO: NORMAL /HPF
NRBC BLD AUTO-RTO: 0 /100 WBCS
NT-PROBNP SERPL-MCNC: 138 PG/ML
PH UR STRIP.AUTO: 6.5 [PH]
PLATELET # BLD AUTO: 305 THOUSANDS/UL (ref 149–390)
PMV BLD AUTO: 11.6 FL (ref 8.9–12.7)
POTASSIUM SERPL-SCNC: 3.8 MMOL/L (ref 3.5–5.3)
PROT SERPL-MCNC: 7.8 G/DL (ref 6.4–8.2)
PROT UR STRIP-MCNC: NEGATIVE MG/DL
RBC # BLD AUTO: 5.09 MILLION/UL (ref 3.81–5.12)
RBC #/AREA URNS AUTO: NORMAL /HPF
SODIUM SERPL-SCNC: 142 MMOL/L (ref 136–145)
SP GR UR STRIP.AUTO: <=1.005 (ref 1–1.03)
TROPONIN I SERPL-MCNC: <0.02 NG/ML
UROBILINOGEN UR QL STRIP.AUTO: 0.2 E.U./DL
WBC # BLD AUTO: 10.36 THOUSAND/UL (ref 4.31–10.16)
WBC #/AREA URNS AUTO: NORMAL /HPF

## 2021-08-06 PROCEDURE — 99285 EMERGENCY DEPT VISIT HI MDM: CPT

## 2021-08-06 PROCEDURE — 80053 COMPREHEN METABOLIC PANEL: CPT | Performed by: PHYSICIAN ASSISTANT

## 2021-08-06 PROCEDURE — 36415 COLL VENOUS BLD VENIPUNCTURE: CPT | Performed by: PHYSICIAN ASSISTANT

## 2021-08-06 PROCEDURE — 93005 ELECTROCARDIOGRAM TRACING: CPT

## 2021-08-06 PROCEDURE — 84484 ASSAY OF TROPONIN QUANT: CPT | Performed by: PHYSICIAN ASSISTANT

## 2021-08-06 PROCEDURE — 81001 URINALYSIS AUTO W/SCOPE: CPT | Performed by: PHYSICIAN ASSISTANT

## 2021-08-06 PROCEDURE — 83735 ASSAY OF MAGNESIUM: CPT | Performed by: PHYSICIAN ASSISTANT

## 2021-08-06 PROCEDURE — 71045 X-RAY EXAM CHEST 1 VIEW: CPT

## 2021-08-06 PROCEDURE — 85025 COMPLETE CBC W/AUTO DIFF WBC: CPT | Performed by: PHYSICIAN ASSISTANT

## 2021-08-06 PROCEDURE — 83880 ASSAY OF NATRIURETIC PEPTIDE: CPT | Performed by: PHYSICIAN ASSISTANT

## 2021-08-06 PROCEDURE — 99285 EMERGENCY DEPT VISIT HI MDM: CPT | Performed by: PHYSICIAN ASSISTANT

## 2021-08-06 RX ORDER — AMLODIPINE BESYLATE 5 MG/1
5 TABLET ORAL DAILY
COMMUNITY

## 2021-08-06 RX ORDER — ASPIRIN 325 MG
325 TABLET ORAL ONCE
Status: DISCONTINUED | OUTPATIENT
Start: 2021-08-06 | End: 2021-08-06 | Stop reason: HOSPADM

## 2021-08-06 RX ORDER — NITROGLYCERIN 0.4 MG/1
0.4 TABLET SUBLINGUAL ONCE
Status: COMPLETED | OUTPATIENT
Start: 2021-08-06 | End: 2021-08-06

## 2021-08-06 RX ADMIN — NITROGLYCERIN 0.4 MG: 0.4 TABLET SUBLINGUAL at 11:41

## 2021-08-06 NOTE — ED PROVIDER NOTES
History  Chief Complaint   Patient presents with    Chest Pain     pt c/o off and on chest pain since 0200  lasting about 10 minutes  states her BP has been elevated      The patient is a 58-year-old female, current smoker, past medical history of COPD, hypertension, dyslipidemia who presents emergency department today with a complaint of chest pain and shortness of breath  She states that she started having chest pain around 2:00 a m  Last evening  She states his sharp stabbing sensation that shoots across her left chest and she is also having some numbness and tingling in her left arm with the pain  She states that when she gets the pain she feels short of breath  She states that this pain is usually a 6-8/10 intensity , she states that it usually lasts about 10 minutes and then subsides without intervention  She states that currently the pain is there and is a 6/10 intensity  Patient denies any dizziness, nausea vomiting, abdominal pain, back pain, falls or traumas, headache  She states that she has also had elevated blood pressure readings all week, 806 systolic  She states that she has a past medical history of hypertension and has been taking her medications regularly  She states that she has been trying to contact her PCP regarding this however has been unsuccessful  She denies any recent changes in medications        Chest Pain  Pain location:  L chest  Pain quality: sharp and stabbing    Pain radiates to:  L arm  Pain radiates to the back: no    Pain severity:  Moderate  Onset quality:  Unable to specify  Duration:  10 hours  Timing:  Intermittent  Progression:  Waxing and waning  Chronicity:  New  Context: at rest    Relieved by:  None tried  Worsened by:  Nothing tried  Ineffective treatments:  None tried  Associated symptoms: shortness of breath    Associated symptoms: no abdominal pain, no AICD problem, no anorexia, no anxiety, no back pain, no claudication, no cough, no diaphoresis, no dizziness, no dysphagia, no fever, no heartburn, no nausea, no near-syncope, no numbness, no orthopnea, no palpitations, no syncope, not vomiting and no weakness    Shortness of breath:     Severity:  Moderate    Onset quality:  Unable to specify    Timing:  Intermittent    Progression:  Waxing and waning  Risk factors: high cholesterol, hypertension and surgery    Risk factors: no coronary artery disease and no diabetes mellitus        Prior to Admission Medications   Prescriptions Last Dose Informant Patient Reported? Taking? EPINEPHrine (EPIPEN) 0 3 mg/0 3 mL SOAJ   Yes Yes   Sig: For a severe reaction: Inject in outer thigh following instructions on package and go to the Emergency room     Multiple Vitamins-Minerals (MULTIVITAMIN WITH MINERALS) tablet  Self Yes Yes   Sig: Take 1 tablet by mouth daily   OMEPRAZOLE PO   Yes Yes   Sig: Take 20 mg by mouth daily   VITAMIN D, CHOLECALCIFEROL, PO   Yes No   Sig: daily   albuterol (2 5 mg/3 mL) 0 083 % nebulizer solution   Yes Yes   Sig: Inhale 2 5 mg as needed   albuterol (PROVENTIL HFA,VENTOLIN HFA) 90 mcg/act inhaler   Yes Yes   Sig: as needed   amLODIPine (NORVASC) 5 mg tablet   Yes Yes   Sig: Take 5 mg by mouth daily   aminocaproic acid (AMICAR) 500 mg tablet   Yes Yes   Sig: Take 500 mg by mouth as needed for bleeding    buPROPion (WELLBUTRIN SR) 150 mg 12 hr tablet   Yes Yes   Sig: Take 150 mg by mouth 2 (two) times a day   cetirizine (ZyrTEC) 10 mg tablet   Yes Yes   Sig: Take 10 mg by mouth daily   fluticasone (FLONASE) 50 mcg/act nasal spray   Yes Yes   Sig: daily at bedtime   hydrochlorothiazide (HYDRODIURIL) 25 mg tablet   Yes Yes   Sig: Take 25 mg by mouth daily    lisinopril (ZESTRIL) 20 mg tablet   Yes Yes   Sig: Take 20 mg by mouth daily    montelukast (SINGULAIR) 10 mg tablet   Yes Yes   Sig: Take 10 mg by mouth daily at bedtime   nortriptyline (PAMELOR) 10 mg capsule   Yes Yes   Sig: take 1 capsule by mouth at bedtime FOR 2 WEEKS, THEN INCREASE TO 2 tablets nightly THEREAFTER   venlafaxine (EFFEXOR-XR) 37 5 mg 24 hr capsule   Yes No   Sig: Take 37 5 mg by mouth daily      Facility-Administered Medications: None       Past Medical History:   Diagnosis Date    Heart murmur     Hypertension     Kidney stones     Kidney stones     Von Willebrand disease ([facility] [address] )        Past Surgical History:   Procedure Laterality Date    APPENDECTOMY      BREAST LUMPECTOMY Left     CARPAL TUNNEL RELEASE Bilateral     CHOLECYSTECTOMY      HYSTERECTOMY      KNEE SURGERY Left     PARTIAL HYSTERECTOMY      ROTATOR CUFF REPAIR Left     TONSILLECTOMY         Family History   Problem Relation Age of Onset    Cancer Mother     Cancer Sister     Stroke Brother      I have reviewed and agree with the history as documented  E-Cigarette/Vaping    E-Cigarette Use Current Some Day User      E-Cigarette/Vaping Substances    Nicotine Yes 2 cigarettes a week    THC No     CBD No     Flavoring Yes      Social History     Tobacco Use    Smoking status: Current Every Day Smoker     Packs/day: 0 25     Years: 25 00     Pack years: 6 25     Types: Cigarettes    Smokeless tobacco: Former User    Tobacco comment: few cigs/day   Vaping Use    Vaping Use: Some days    Substances: Nicotine (2 cigarettes a week), Flavoring   Substance Use Topics    Alcohol use: Not Currently    Drug use: Never       Review of Systems   Constitutional: Negative for chills, diaphoresis and fever  HENT: Negative for ear pain, sore throat and trouble swallowing  Eyes: Negative for pain and visual disturbance  Respiratory: Positive for shortness of breath  Negative for cough  Cardiovascular: Positive for chest pain  Negative for palpitations, orthopnea, claudication, syncope and near-syncope  Gastrointestinal: Negative for abdominal pain, anorexia, heartburn, nausea and vomiting  Genitourinary: Negative for dysuria and hematuria  Musculoskeletal: Negative for arthralgias and back pain  Skin: Negative for color change and rash  Neurological: Negative for dizziness, seizures, syncope, weakness and numbness  All other systems reviewed and are negative  Physical Exam  Physical Exam  Vitals and nursing note reviewed  Constitutional:       General: She is not in acute distress  Appearance: She is well-developed  HENT:      Head: Normocephalic and atraumatic  Eyes:      Extraocular Movements: Extraocular movements intact  Conjunctiva/sclera: Conjunctivae normal       Pupils: Pupils are equal, round, and reactive to light  Cardiovascular:      Rate and Rhythm: Normal rate and regular rhythm  Pulses:           Carotid pulses are 2+ on the right side and 2+ on the left side  Radial pulses are 2+ on the right side and 2+ on the left side  Dorsalis pedis pulses are 2+ on the right side and 2+ on the left side  Posterior tibial pulses are 2+ on the right side and 2+ on the left side  Heart sounds: Normal heart sounds  No murmur heard  Pulmonary:      Effort: Pulmonary effort is normal  No respiratory distress  Breath sounds: Normal breath sounds  No wheezing  Chest:      Chest wall: No tenderness or edema  Abdominal:      General: There is no abdominal bruit  Palpations: Abdomen is soft  Tenderness: There is no abdominal tenderness  There is no guarding  Musculoskeletal:         General: Normal range of motion  Cervical back: Normal range of motion and neck supple  Right lower leg: No tenderness  No edema  Left lower leg: No tenderness  No edema  Skin:     General: Skin is warm and dry  Capillary Refill: Capillary refill takes less than 2 seconds  Neurological:      General: No focal deficit present  Mental Status: She is alert and oriented to person, place, and time           Vital Signs  ED Triage Vitals   Temperature Pulse Respirations Blood Pressure SpO2   08/06/21 1128 08/06/21 1128 08/06/21 1128 08/06/21 1130 08/06/21 1128   97 8 °F (36 6 °C) 87 16 (!) 196/98 96 %      Temp Source Heart Rate Source Patient Position - Orthostatic VS BP Location FiO2 (%)   08/06/21 1128 08/06/21 1128 08/06/21 1130 08/06/21 1130 --   Temporal Monitor Lying Left arm       Pain Score       08/06/21 1131       6           Vitals:    08/06/21 1130 08/06/21 1219 08/06/21 1230 08/06/21 1300   BP: (!) 196/98 165/82 156/86 159/82   Pulse:  68 68 66   Patient Position - Orthostatic VS: Lying            Visual Acuity      ED Medications  Medications   aspirin tablet 325 mg (325 mg Oral Not Given 8/6/21 1141)   nitroglycerin (NITROSTAT) SL tablet 0 4 mg (0 4 mg Sublingual Given 8/6/21 1141)       Diagnostic Studies  Results Reviewed     Procedure Component Value Units Date/Time    Comprehensive metabolic panel [[id number]] Collected: 08/06/21 1141    Lab Status: Final result Specimen: Blood from Arm, Right Updated: 08/06/21 1213     Sodium 142 mmol/L      Potassium 3 8 mmol/L      Chloride 102 mmol/L      CO2 28 mmol/L      ANION GAP 12 mmol/L      BUN 10 mg/dL      Creatinine 0 88 mg/dL      Glucose 92 mg/dL      Calcium 8 8 mg/dL      AST 15 U/L      ALT 27 U/L      Alkaline Phosphatase 95 U/L      Total Protein 7 8 g/dL      Albumin 4 0 g/dL      Total Bilirubin 0 40 mg/dL      eGFR 73 ml/min/1 73sq m     Narrative:      Radha guidelines for Chronic Kidney Disease (CKD):     Stage 1 with normal or high GFR (GFR > 90 mL/min/1 73 square meters)    Stage 2 Mild CKD (GFR = 60-89 mL/min/1 73 square meters)    Stage 3A Moderate CKD (GFR = 45-59 mL/min/1 73 square meters)    Stage 3B Moderate CKD (GFR = 30-44 mL/min/1 73 square meters)    Stage 4 Severe CKD (GFR = 15-29 mL/min/1 73 square meters)    Stage 5 End Stage CKD (GFR <15 mL/min/1 73 square meters)  Note: GFR calculation is accurate only with a steady state creatinine    NT-BNP PRO [[id number]]  (Abnormal) Collected: 08/06/21 1141    Lab Status: Final result Specimen: Blood from Arm, Right Updated: 08/06/21 1213     NT-proBNP 138 pg/mL     Magnesium [[id number]]  (Normal) Collected: 08/06/21 1141    Lab Status: Final result Specimen: Blood from Arm, Right Updated: 08/06/21 1213     Magnesium 2 2 mg/dL     Troponin I [[id number]]  (Normal) Collected: 08/06/21 1141    Lab Status: Final result Specimen: Blood from Arm, Right Updated: 08/06/21 1212     Troponin I <0 02 ng/mL     Urine Microscopic [[id number]]  (Normal) Collected: 08/06/21 1147    Lab Status: Final result Specimen: Urine, Clean Catch Updated: 08/06/21 1209     RBC, UA 0-1 /hpf      WBC, UA 0-1 /hpf      Epithelial Cells Occasional /hpf      Bacteria, UA Occasional /hpf     UA w Reflex to Microscopic w Reflex to Culture [[id number]]  (Abnormal) Collected: 08/06/21 1147    Lab Status: Final result Specimen: Urine, Clean Catch Updated: 08/06/21 1200     Color, UA Yellow     Clarity, UA Clear     Specific Gravity, UA <=1 005     pH, UA 6 5     Leukocytes, UA Negative     Nitrite, UA Negative     Protein, UA Negative mg/dl      Glucose, UA Negative mg/dl      Ketones, UA Negative mg/dl      Urobilinogen, UA 0 2 E U /dl      Bilirubin, UA Negative     Blood, UA Small    CBC and differential [[id number]]  (Abnormal) Collected: 08/06/21 1141    Lab Status: Final result Specimen: Blood from Arm, Right Updated: 08/06/21 1151     WBC 10 36 Thousand/uL      RBC 5 09 Million/uL      Hemoglobin 13 5 g/dL      Hematocrit 42 4 %      MCV 83 fL      MCH 26 5 pg      MCHC 31 8 g/dL      RDW 13 0 %      MPV 11 6 fL      Platelets 309 Thousands/uL      nRBC 0 /100 WBCs      Neutrophils Relative 60 %      Immat GRANS % 1 %      Lymphocytes Relative 29 %      Monocytes Relative 8 %      Eosinophils Relative 1 %      Basophils Relative 1 %      Neutrophils Absolute 6 34 Thousands/µL      Immature Grans Absolute 0 07 Thousand/uL      Lymphocytes Absolute 2 96 Thousands/µL      Monocytes Absolute 0 82 Thousand/µL      Eosinophils Absolute 0 11 Thousand/µL      Basophils Absolute 0 06 Thousands/µL                  XR chest 1 view portable   Final Result by Elizabeth Willson MD (08/06 1226)   No acute cardiopulmonary disease  Findings are stable            Workstation performed: [id number]                    Procedures  ECG 12 Lead Documentation Only    Date/Time: 8/6/2021 11:39 AM  Performed by: Rosanne Thurston PA-C  Authorized by: Rosanne Thurston PA-C     Indications / Diagnosis:  Chest pain   ECG reviewed by me, the ED Provider: yes    Patient location:  ED  Previous ECG:     Previous ECG:  Unavailable    Comparison to cardiac monitor: Yes    Interpretation:     Interpretation: normal    Rate:     ECG rate:  73    ECG rate assessment: normal    Rhythm:     Rhythm: sinus rhythm    Conduction:     Conduction: normal    ST segments:     ST segments:  Normal  T waves:     T waves: normal               ED Course  ED Course as of Aug 06 1347   Fri Aug 06, 2021   1216 Troponin I: <0 02   1219 Pain relief       1225 Patient refused aspirin due to von Willebrand's disease      1306 Patient wanting to go prior to second troponin because her father being admitted to the hospital somewhere [facility] did recommend observation for cardiac rule out, she declinePatient is aware risks including, stroke heart failure permanent disability death                HEART Risk Score      Most Recent Value   Heart Score Risk Calculator   History  1 Filed at: 08/06/2021 1224   ECG  0 Filed at: 08/06/2021 1224   Age  1 Filed at: 08/06/2021 1224   Risk Factors  2 Filed at: 08/06/2021 1224   Troponin  0 Filed at: 08/06/2021 1224   HEART Score  4 Filed at: 08/06/2021 1224                      SBIRT 22yo+      Most Recent Value   SBIRT (25 yo +)   In order to provide better care to our patients, we are screening all of our patients for alcohol and drug use  Would it be okay to ask you these screening questions?   No Filed at: 08/06/2021 113 MDM  Number of Diagnoses or Management Options  Chest pain, unspecified type  Hypertensive urgency  Diagnosis management comments: Patient had heart score of 4, troponin was negative  Did express the recommendation admission/observation for cardiac rule  Patient has been admitted previously for cardiac workup never had follow-up stress test outpatient  Patient left AMA at that visit as well  Patient was educated on risk of heart attack, stroke trauma dysrhythmia, death, permanent disability  Patient's blood pressure did improve her pain was relieved upon discharge  Patient not willing to stay till 230 to have repeat 2nd troponin drawn  Patient left AMA accepted the above risks and expressed understanding  Was told to return if anything worsens to come back         Amount and/or Complexity of Data Reviewed  Clinical lab tests: ordered and reviewed  Tests in the radiology section of CPT®: ordered and reviewed  Decide to obtain previous medical records or to obtain history from someone other than the patient: yes  Review and summarize past medical records: yes  Independent visualization of images, tracings, or specimens: yes    Risk of Complications, Morbidity, and/or Mortality  Presenting problems: moderate  Diagnostic procedures: moderate  Management options: moderate    Patient Progress  Patient progress: stable      Disposition  Final diagnoses:   Chest pain, unspecified type   Hypertensive urgency     Time reflects when diagnosis was documented in both MDM as applicable and the Disposition within this note     Time User Action Codes Description Comment    8/6/2021  1:07 PM Joselyn Mckee Add [R07 9] Chest pain, unspecified type     8/6/2021  1:08 PM Joselyn Mckee Add [I16 0] Hypertensive urgency       ED Disposition     ED Disposition Condition Date/Time Comment    [facility]  Fri Aug 6, 2021  1:07 PM Date: 8/6/2021  Patient: Sonia Perez  Admitted: 8/6/2021 11:25 AM  Attending Provider: Con Renee Patito Carmona or her authorized caregiver has made the decision for the patient to leave the emergency department against the advice of  the emergency department staff  She or her authorized caregiver has been informed and understands the inherent risks, including death, MI, stroke, heart failure,permanent disability   She or her authorized caregiver has decided to accept the respon sibility for this decision  David Evans and all necessary parties have been advised that she may return for further evaluation or treatment  Her condition at time of discharge was alert and oriented and pain free   David Evans had current elina l signs as follows:  /82   Pulse 66   Temp 97 8 °F (36 6 °C) (Temporal)   Resp 22   Ht 5' 7" (1 702 m)   Wt 94 4 kg (208 lb 1 8 oz)         Follow-up Information    None         Discharge Medication List as of 8/6/2021  1:08 PM      CONTINUE these medications which have NOT CHANGED    Details   albuterol (2 5 mg/3 mL) 0 083 % nebulizer solution Inhale 2 5 mg as needed, Starting Thu 5/21/2020, Historical Med      albuterol (PROVENTIL HFA,VENTOLIN HFA) 90 mcg/act inhaler as needed, Starting Fri 9/8/2017, Historical Med      aminocaproic acid (AMICAR) 500 mg tablet Take 500 mg by mouth as needed for bleeding , Starting Wed 2/13/2019, Historical Med      amLODIPine (NORVASC) 5 mg tablet Take 5 mg by mouth daily, Historical Med      buPROPion (WELLBUTRIN SR) 150 mg 12 hr tablet Take 150 mg by mouth 2 (two) times a day, Starting Thu 12/5/2019, Historical Med      cetirizine (ZyrTEC) 10 mg tablet Take 10 mg by mouth daily, Starting Fri 9/8/2017, Historical Med      EPINEPHrine (EPIPEN) 0 3 mg/0 3 mL SOAJ For a severe reaction: Inject in outer thigh following instructions on package and go to the Emergency room  , Historical Med      fluticasone (FLONASE) 50 mcg/act nasal spray daily at bedtime, Starting Fri 9/8/2017, Historical Med      hydrochlorothiazide (HYDRODIURIL) 25 mg tablet Take 25 mg by mouth daily , Historical Med      lisinopril (ZESTRIL) 20 mg tablet Take 20 mg by mouth daily , Historical Med      montelukast (SINGULAIR) 10 mg tablet Take 10 mg by mouth daily at bedtime, Starting Wed 2/14/2018, Historical Med      Multiple Vitamins-Minerals (MULTIVITAMIN WITH MINERALS) tablet Take 1 tablet by mouth daily, Historical Med      nortriptyline (PAMELOR) 10 mg capsule take 1 capsule by mouth at bedtime FOR 2 WEEKS, THEN INCREASE TO 2 tablets nightly THEREAFTER, Historical Med      OMEPRAZOLE PO Take 20 mg by mouth daily, Starting Fri 5/29/2020, Historical Med      venlafaxine (EFFEXOR-XR) 37 5 mg 24 hr capsule Take 37 5 mg by mouth daily, Starting Wed 6/23/2021, Until Thu 6/23/2022, Historical Med      VITAMIN D, CHOLECALCIFEROL, PO daily, Starting Tue 1/19/2016, Historical Med           No discharge procedures on file      PDMP Review     None          ED Provider  Electronically Signed by           Kennedy Pritchett PA-C  08/06/21 3426

## 2021-08-13 LAB
ATRIAL RATE: 73 BPM
P AXIS: 62 DEGREES
PR INTERVAL: 150 MS
QRS AXIS: 79 DEGREES
QRSD INTERVAL: 78 MS
QT INTERVAL: 406 MS
QTC INTERVAL: 447 MS
T WAVE AXIS: 13 DEGREES
VENTRICULAR RATE: 73 BPM

## 2021-11-24 ENCOUNTER — HOSPITAL ENCOUNTER (EMERGENCY)
Facility: HOSPITAL | Age: 56
Discharge: HOME | End: 2021-11-25
Attending: EMERGENCY MEDICINE | Admitting: EMERGENCY MEDICINE
Payer: COMMERCIAL

## 2021-11-24 ENCOUNTER — APPOINTMENT (EMERGENCY)
Dept: RADIOLOGY | Facility: HOSPITAL | Age: 56
End: 2021-11-24
Payer: COMMERCIAL

## 2021-11-24 DIAGNOSIS — R07.9 CHEST PAIN, UNSPECIFIED TYPE: Primary | ICD-10-CM

## 2021-11-24 LAB
2HR DELTA HS TROPONIN: 1 NG/L
ALBUMIN SERPL BCP-MCNC: 3.8 G/DL (ref 3.5–5)
ALP SERPL-CCNC: 105 U/L (ref 46–116)
ALT SERPL W P-5'-P-CCNC: 31 U/L (ref 12–78)
ANION GAP SERPL CALCULATED.3IONS-SCNC: 8 MMOL/L (ref 4–13)
AST SERPL W P-5'-P-CCNC: 17 U/L (ref 5–45)
BASOPHILS # BLD MANUAL: 0 THOUSAND/UL (ref 0–0.1)
BASOPHILS NFR MAR MANUAL: 0 % (ref 0–1)
BILIRUB SERPL-MCNC: 0.15 MG/DL (ref 0.2–1)
BUN SERPL-MCNC: 12 MG/DL (ref 5–25)
CALCIUM SERPL-MCNC: 9.2 MG/DL (ref 8.3–10.1)
CARDIAC TROPONIN I PNL SERPL HS: 7 NG/L
CARDIAC TROPONIN I PNL SERPL HS: 8 NG/L
CHLORIDE SERPL-SCNC: 101 MMOL/L (ref 100–108)
CK MB SERPL-MCNC: 1.2 % (ref 0–2.5)
CK MB SERPL-MCNC: 1.6 NG/ML (ref 0–5)
CK SERPL-CCNC: 136 U/L (ref 26–192)
CO2 SERPL-SCNC: 30 MMOL/L (ref 21–32)
CREAT SERPL-MCNC: 0.86 MG/DL (ref 0.6–1.3)
EOSINOPHIL # BLD MANUAL: 0.21 THOUSAND/UL (ref 0–0.4)
EOSINOPHIL NFR BLD MANUAL: 2 % (ref 0–6)
ERYTHROCYTE [DISTWIDTH] IN BLOOD BY AUTOMATED COUNT: 13.6 % (ref 11.6–15.1)
GFR SERPL CREATININE-BSD FRML MDRD: 75 ML/MIN/1.73SQ M
GLUCOSE SERPL-MCNC: 94 MG/DL (ref 65–140)
HCT VFR BLD AUTO: 40.2 % (ref 34.8–46.1)
HGB BLD-MCNC: 12.8 G/DL (ref 11.5–15.4)
INR PPP: 0.85 (ref 0.84–1.19)
LIPASE SERPL-CCNC: 108 U/L (ref 73–393)
LYMPHOCYTES # BLD AUTO: 3.87 THOUSAND/UL (ref 0.6–4.47)
LYMPHOCYTES # BLD AUTO: 37 % (ref 14–44)
MAGNESIUM SERPL-MCNC: 2.1 MG/DL (ref 1.6–2.6)
MCH RBC QN AUTO: 26.3 PG (ref 26.8–34.3)
MCHC RBC AUTO-ENTMCNC: 31.8 G/DL (ref 31.4–37.4)
MCV RBC AUTO: 83 FL (ref 82–98)
MONOCYTES # BLD AUTO: 0.52 THOUSAND/UL (ref 0–1.22)
MONOCYTES NFR BLD: 5 % (ref 4–12)
NEUTROPHILS # BLD MANUAL: 5.02 THOUSAND/UL (ref 1.85–7.62)
NEUTS SEG NFR BLD AUTO: 48 % (ref 43–75)
PLATELET # BLD AUTO: 340 THOUSANDS/UL (ref 149–390)
PLATELET BLD QL SMEAR: ADEQUATE
PMV BLD AUTO: 11.5 FL (ref 8.9–12.7)
POTASSIUM SERPL-SCNC: 3.8 MMOL/L (ref 3.5–5.3)
PROT SERPL-MCNC: 7.8 G/DL (ref 6.4–8.2)
PROTHROMBIN TIME: 11.6 SECONDS (ref 11.6–14.5)
RBC # BLD AUTO: 4.87 MILLION/UL (ref 3.81–5.12)
RBC MORPH BLD: NORMAL
SODIUM SERPL-SCNC: 139 MMOL/L (ref 136–145)
VARIANT LYMPHS # BLD AUTO: 8 %
WBC # BLD AUTO: 10.45 THOUSAND/UL (ref 4.31–10.16)

## 2021-11-24 PROCEDURE — 96374 THER/PROPH/DIAG INJ IV PUSH: CPT

## 2021-11-24 PROCEDURE — 99285 EMERGENCY DEPT VISIT HI MDM: CPT

## 2021-11-24 PROCEDURE — 93005 ELECTROCARDIOGRAM TRACING: CPT

## 2021-11-24 PROCEDURE — 71045 X-RAY EXAM CHEST 1 VIEW: CPT

## 2021-11-24 PROCEDURE — 85025 COMPLETE CBC W/AUTO DIFF WBC: CPT | Performed by: EMERGENCY MEDICINE

## 2021-11-24 PROCEDURE — 85027 COMPLETE CBC AUTOMATED: CPT | Performed by: EMERGENCY MEDICINE

## 2021-11-24 PROCEDURE — 84484 ASSAY OF TROPONIN QUANT: CPT | Performed by: EMERGENCY MEDICINE

## 2021-11-24 PROCEDURE — 80053 COMPREHEN METABOLIC PANEL: CPT | Performed by: EMERGENCY MEDICINE

## 2021-11-24 PROCEDURE — 36415 COLL VENOUS BLD VENIPUNCTURE: CPT | Performed by: EMERGENCY MEDICINE

## 2021-11-24 PROCEDURE — 82550 ASSAY OF CK (CPK): CPT | Performed by: EMERGENCY MEDICINE

## 2021-11-24 PROCEDURE — 85610 PROTHROMBIN TIME: CPT | Performed by: EMERGENCY MEDICINE

## 2021-11-24 PROCEDURE — 83735 ASSAY OF MAGNESIUM: CPT | Performed by: EMERGENCY MEDICINE

## 2021-11-24 PROCEDURE — 99285 EMERGENCY DEPT VISIT HI MDM: CPT | Performed by: EMERGENCY MEDICINE

## 2021-11-24 PROCEDURE — 83690 ASSAY OF LIPASE: CPT | Performed by: EMERGENCY MEDICINE

## 2021-11-24 PROCEDURE — 85007 BL SMEAR W/DIFF WBC COUNT: CPT | Performed by: EMERGENCY MEDICINE

## 2021-11-24 PROCEDURE — 96361 HYDRATE IV INFUSION ADD-ON: CPT

## 2021-11-24 PROCEDURE — 82553 CREATINE MB FRACTION: CPT | Performed by: EMERGENCY MEDICINE

## 2021-11-24 RX ORDER — ONDANSETRON 2 MG/ML
4 INJECTION INTRAMUSCULAR; INTRAVENOUS ONCE
Status: COMPLETED | OUTPATIENT
Start: 2021-11-24 | End: 2021-11-24

## 2021-11-24 RX ORDER — SODIUM CHLORIDE 9 MG/ML
3 INJECTION INTRAVENOUS
Status: DISCONTINUED | OUTPATIENT
Start: 2021-11-24 | End: 2021-11-25 | Stop reason: HOSPADM

## 2021-11-24 RX ORDER — NITROGLYCERIN 0.4 MG/1
0.4 TABLET SUBLINGUAL ONCE
Status: COMPLETED | OUTPATIENT
Start: 2021-11-24 | End: 2021-11-24

## 2021-11-24 RX ADMIN — ONDANSETRON 4 MG: 2 INJECTION INTRAMUSCULAR; INTRAVENOUS at 21:06

## 2021-11-24 RX ADMIN — NITROGLYCERIN 0.4 MG: 0.4 TABLET SUBLINGUAL at 21:06

## 2021-11-24 RX ADMIN — SODIUM CHLORIDE 500 ML: 0.9 INJECTION, SOLUTION INTRAVENOUS at 21:11

## 2021-11-25 VITALS
OXYGEN SATURATION: 98 % | WEIGHT: 203.26 LBS | SYSTOLIC BLOOD PRESSURE: 161 MMHG | TEMPERATURE: 96.6 F | RESPIRATION RATE: 22 BRPM | DIASTOLIC BLOOD PRESSURE: 74 MMHG | HEART RATE: 61 BPM | BODY MASS INDEX: 31.84 KG/M2

## 2021-11-27 LAB
ATRIAL RATE: 59 BPM
ATRIAL RATE: 62 BPM
P AXIS: 23 DEGREES
P AXIS: 46 DEGREES
PR INTERVAL: 166 MS
PR INTERVAL: 180 MS
QRS AXIS: 15 DEGREES
QRS AXIS: 6 DEGREES
QRSD INTERVAL: 74 MS
QRSD INTERVAL: 80 MS
QT INTERVAL: 434 MS
QT INTERVAL: 460 MS
QTC INTERVAL: 440 MS
QTC INTERVAL: 455 MS
T WAVE AXIS: -1 DEGREES
T WAVE AXIS: 31 DEGREES
VENTRICULAR RATE: 59 BPM
VENTRICULAR RATE: 62 BPM

## 2021-11-27 PROCEDURE — 93010 ELECTROCARDIOGRAM REPORT: CPT | Performed by: INTERNAL MEDICINE

## 2021-12-01 ENCOUNTER — HOSPITAL ENCOUNTER (OUTPATIENT)
Dept: NON INVASIVE DIAGNOSTICS | Facility: HOSPITAL | Age: 56
Discharge: HOME | End: 2021-12-01
Payer: COMMERCIAL

## 2021-12-01 DIAGNOSIS — R07.9 CHEST PAIN, UNSPECIFIED TYPE: ICD-10-CM

## 2021-12-01 LAB
RATE PRESSURE PRODUCT: NORMAL
SL CV LV EF: 60
SL CV STRESS RECOVERY BP: NORMAL MMHG
SL CV STRESS RECOVERY HR: 89 BPM
SL CV STRESS RECOVERY O2 SAT: 98 %
STRESS ANGINA INDEX: 0
STRESS BASELINE BP: NORMAL MMHG
STRESS BASELINE HR: 67 BPM
STRESS O2 SAT REST: 97 %
STRESS PEAK HR: 129 BPM
STRESS PERCENT HR: 79 %
STRESS POST EXERCISE DUR MIN: 16 MIN
STRESS POST EXERCISE DUR SEC: 18 SEC
STRESS POST O2 SAT PEAK: 97 %
STRESS POST PEAK BP: 178 MMHG
STRESS TARGET HR: 129 BPM

## 2021-12-01 PROCEDURE — 93350 STRESS TTE ONLY: CPT

## 2021-12-01 RX ORDER — DOBUTAMINE HYDROCHLORIDE 200 MG/100ML
5-50 INJECTION INTRAVENOUS CONTINUOUS
Status: DISCONTINUED | OUTPATIENT
Start: 2021-12-01 | End: 2021-12-02 | Stop reason: HOSPADM

## 2021-12-01 RX ORDER — METOPROLOL TARTRATE 5 MG/5ML
2.5 INJECTION INTRAVENOUS ONCE
Status: COMPLETED | OUTPATIENT
Start: 2021-12-01 | End: 2021-12-01

## 2021-12-01 RX ORDER — ATROPINE SULFATE 0.1 MG/ML
1 INJECTION INTRAVENOUS ONCE
Status: COMPLETED | OUTPATIENT
Start: 2021-12-01 | End: 2021-12-01

## 2021-12-01 RX ADMIN — ATROPINE SULFATE 1 MG: 0.1 INJECTION, SOLUTION ENDOTRACHEAL; INTRAMUSCULAR; INTRAVENOUS; SUBCUTANEOUS at 12:03

## 2021-12-01 RX ADMIN — METOPROLOL TARTRATE 2.5 MG: 5 INJECTION INTRAVENOUS at 12:09

## 2021-12-01 RX ADMIN — DOBUTAMINE HYDROCHLORIDE 10 MCG/KG/MIN: 200 INJECTION INTRAVENOUS at 11:48

## 2021-12-13 ENCOUNTER — OPTICAL OFFICE (OUTPATIENT)
Dept: URBAN - NONMETROPOLITAN AREA CLINIC 4 | Facility: CLINIC | Age: 56
Setting detail: OPHTHALMOLOGY
End: 2021-12-13
Payer: COMMERCIAL

## 2021-12-13 DIAGNOSIS — H52.223: ICD-10-CM

## 2021-12-13 PROCEDURE — V2103 SPHEROCYLINDR 4.00D/12-2.00D: HCPCS | Performed by: OPTOMETRIST

## 2021-12-13 PROCEDURE — V2020 VISION SVCS FRAMES PURCHASES: HCPCS | Performed by: OPTOMETRIST

## 2021-12-13 PROCEDURE — V2784 LENS POLYCARB OR EQUAL: HCPCS | Performed by: OPTOMETRIST

## 2021-12-13 PROCEDURE — V2102 SINGL VISN SPHERE 7.12-20.00: HCPCS | Performed by: OPTOMETRIST

## 2021-12-15 ASSESSMENT — REFRACTION_MANIFEST
OS_VA2: 20/20-2
OD_AXIS: 115
OS_VA1: 20/20-2
OD_CYLINDER: -0.50
OU_VA: 20/20
OD_ADD: +2.50
OD_VA1: 20/20-2
OS_CYLINDER: -0.50
OS_SPHERE: -2.00
OD_VA2: 20/20-2
OS_AXIS: 085
OD_SPHERE: -2.00
OS_ADD: +2.50

## 2021-12-15 ASSESSMENT — REFRACTION_AUTOREFRACTION
OS_AXIS: 086
OS_SPHERE: -1.75
OD_CYLINDER: -0.75
OD_SPHERE: -1.75
OD_AXIS: 112
OS_CYLINDER: -0.50

## 2021-12-15 ASSESSMENT — REFRACTION_CURRENTRX
OD_SPHERE: -2.25
OS_AXIS: 111
OD_CYLINDER: -0.25
OD_OVR_VA: 20/
OS_OVR_VA: 20/
OD_VPRISM_DIRECTION: SV
OS_CYLINDER: -0.25
OS_SPHERE: -2.25
OD_AXIS: 108
OS_VPRISM_DIRECTION: SV

## 2021-12-15 ASSESSMENT — VISUAL ACUITY
OD_BCVA: 20/25
OS_BCVA: 20/20-2

## 2021-12-15 ASSESSMENT — AXIALLENGTH_DERIVED
OS_AL: 23.6884
OD_AL: 23.9192
OS_AL: 23.5906
OD_AL: 23.9694

## 2021-12-15 ASSESSMENT — KERATOMETRY
OD_AXISANGLE_DEGREES: 086
OS_K1POWER_DIOPTERS: 38.25
OD_K2POWER_DIOPTERS: 45.25
OS_K2POWER_DIOPTERS: 53.00
OS_AXISANGLE_DEGREES: 002
OD_K1POWER_DIOPTERS: 44.50

## 2021-12-15 ASSESSMENT — SPHEQUIV_DERIVED
OD_SPHEQUIV: -2.25
OS_SPHEQUIV: -2
OD_SPHEQUIV: -2.125
OS_SPHEQUIV: -2.25

## 2021-12-30 LAB
CHEST PAIN STATEMENT: NORMAL
MAX DIASTOLIC BP: 103 MMHG
MAX HEART RATE: 134 BPM
MAX PREDICTED HEART RATE: 163 BPM
MAX. SYSTOLIC BP: 183 MMHG
PROTOCOL NAME: NORMAL
REASON FOR TERMINATION: NORMAL
TARGET HR FORMULA: NORMAL
TEST INDICATION: NORMAL
TIME IN EXERCISE PHASE: NORMAL

## 2022-05-13 ENCOUNTER — HOSPITAL ENCOUNTER (EMERGENCY)
Facility: HOSPITAL | Age: 57
Discharge: HOME | End: 2022-05-13
Attending: EMERGENCY MEDICINE | Admitting: EMERGENCY MEDICINE
Payer: COMMERCIAL

## 2022-05-13 VITALS
DIASTOLIC BLOOD PRESSURE: 74 MMHG | HEIGHT: 67 IN | WEIGHT: 210.76 LBS | OXYGEN SATURATION: 97 % | BODY MASS INDEX: 33.08 KG/M2 | TEMPERATURE: 97.6 F | HEART RATE: 61 BPM | SYSTOLIC BLOOD PRESSURE: 162 MMHG | RESPIRATION RATE: 18 BRPM

## 2022-05-13 DIAGNOSIS — M79.602 PAIN IN BOTH UPPER EXTREMITIES: Primary | ICD-10-CM

## 2022-05-13 DIAGNOSIS — M79.601 PAIN IN BOTH UPPER EXTREMITIES: Primary | ICD-10-CM

## 2022-05-13 DIAGNOSIS — R58 ECCHYMOSIS: ICD-10-CM

## 2022-05-13 PROCEDURE — 99283 EMERGENCY DEPT VISIT LOW MDM: CPT

## 2022-05-13 PROCEDURE — 96372 THER/PROPH/DIAG INJ SC/IM: CPT

## 2022-05-13 PROCEDURE — 99284 EMERGENCY DEPT VISIT MOD MDM: CPT | Performed by: EMERGENCY MEDICINE

## 2022-05-13 RX ORDER — KETOROLAC TROMETHAMINE 30 MG/ML
15 INJECTION, SOLUTION INTRAMUSCULAR; INTRAVENOUS ONCE
Status: COMPLETED | OUTPATIENT
Start: 2022-05-13 | End: 2022-05-13

## 2022-05-13 RX ADMIN — KETOROLAC TROMETHAMINE 15 MG: 30 INJECTION, SOLUTION INTRAMUSCULAR at 00:36

## 2022-05-13 NOTE — ED PROVIDER NOTES
History  Chief Complaint   Patient presents with    Arm Pain     Pt was admitted to the hospital last week and discharged Tuesday  Pt states that Ivs in bilateral arms were "infected" and she is concerned about the iv sites  No redness, drainage noted      54-year-old female who presents to emergency department for evaluation for concerns of possible infections at bilateral IV sites after patient was admitted last week and discharged on Tuesday  States she has bilateral arm pain and wanted to make sure that she does not have any infection  No redness or drainage noted per patient  No fevers or chills  States she did have a rash over her left forearm initially hand was treated with Keflex which she completed  Denies any fevers or chills  No rash anymore  Son reports that her hand swelling has already improved but she wanted to make sure that she has no other concerns  Has not tried anything set Tylenol today so far for her arm pain  No other concerns  Prior to Admission Medications   Prescriptions Last Dose Informant Patient Reported? Taking? EPINEPHrine (EPIPEN) 0 3 mg/0 3 mL SOAJ   Yes No   Sig: For a severe reaction: Inject in outer thigh following instructions on package and go to the Emergency room     Multiple Vitamins-Minerals (MULTIVITAMIN WITH MINERALS) tablet  Self Yes No   Sig: Take 1 tablet by mouth daily   OMEPRAZOLE PO   Yes No   Sig: Take 20 mg by mouth daily   VITAMIN D, CHOLECALCIFEROL, PO   Yes No   Sig: daily   albuterol (2 5 mg/3 mL) 0 083 % nebulizer solution   Yes No   Sig: Inhale 2 5 mg as needed   albuterol (PROVENTIL HFA,VENTOLIN HFA) 90 mcg/act inhaler   Yes No   Sig: as needed   amLODIPine (NORVASC) 5 mg tablet   Yes No   Sig: Take 5 mg by mouth daily   aminocaproic acid (AMICAR) 500 mg tablet   Yes No   Sig: Take 500 mg by mouth as needed for bleeding    buPROPion (WELLBUTRIN SR) 150 mg 12 hr tablet   Yes No   Sig: Take 150 mg by mouth 2 (two) times a day   cetirizine (ZyrTEC) 10 mg tablet   Yes No   Sig: Take 10 mg by mouth daily   fluticasone (FLONASE) 50 mcg/act nasal spray   Yes No   Sig: daily at bedtime   lisinopril (ZESTRIL) 20 mg tablet   Yes No   Sig: Take 20 mg by mouth daily    montelukast (SINGULAIR) 10 mg tablet   Yes No   Sig: Take 10 mg by mouth daily at bedtime   nortriptyline (PAMELOR) 10 mg capsule   Yes No   Sig: take 1 capsule by mouth at bedtime FOR 2 WEEKS, THEN INCREASE TO 2 tablets nightly THEREAFTER   venlafaxine (EFFEXOR-XR) 37 5 mg 24 hr capsule   Yes No   Sig: Take 37 5 mg by mouth daily      Facility-Administered Medications: None       Past Medical History:   Diagnosis Date    Heart murmur     Hypertension     Kidney stones     Kidney stones     Von Willebrand disease ([facility] [address] )        Past Surgical History:   Procedure Laterality Date    APPENDECTOMY      BREAST LUMPECTOMY Left     CARPAL TUNNEL RELEASE Bilateral     CHOLECYSTECTOMY      HYSTERECTOMY      KNEE SURGERY Left     PARTIAL HYSTERECTOMY      ROTATOR CUFF REPAIR Left     TONSILLECTOMY         Family History   Problem Relation Age of Onset    Cancer Mother     Cancer Sister     Stroke Brother      I have reviewed and agree with the history as documented  E-Cigarette/Vaping    E-Cigarette Use Current Some Day User      E-Cigarette/Vaping Substances    Nicotine Yes 2 cigarettes a week    THC No     CBD No     Flavoring Yes      Social History     Tobacco Use    Smoking status: Current Every Day Smoker     Packs/day: 0 25     Years: 25 00     Pack years: 6 25     Types: Cigarettes    Smokeless tobacco: Former User    Tobacco comment: few cigs/day   Vaping Use    Vaping Use: Some days    Substances: Nicotine (2 cigarettes a week), Flavoring   Substance Use Topics    Alcohol use: Not Currently    Drug use: Never       Review of Systems   Constitutional: Negative for activity change, appetite change, chills and fever     HENT: Negative for congestion, rhinorrhea and sore throat  Eyes: Negative for visual disturbance  Respiratory: Negative for chest tightness, shortness of breath and wheezing  Cardiovascular: Negative for chest pain, palpitations and leg swelling  Gastrointestinal: Positive for nausea ( already on Phenergan at home)  Negative for abdominal pain, constipation, diarrhea and vomiting  Genitourinary: Negative for dysuria, flank pain and pelvic pain  Musculoskeletal: Negative for back pain and neck pain  Skin: Positive for color change  Negative for rash  With ecchymosis   Neurological: Positive for headaches (From concussion)  Negative for weakness and numbness  Psychiatric/Behavioral: Negative for behavioral problems  Physical Exam  Physical Exam  Vitals and nursing note reviewed  Constitutional:       General: She is not in acute distress  Appearance: She is well-developed  She is not diaphoretic  HENT:      Head:      Comments: Chaparro noted to the posterior head with bacitracin applied     Right Ear: External ear normal       Left Ear: External ear normal       Nose: Nose normal    Cardiovascular:      Rate and Rhythm: Normal rate and regular rhythm  Heart sounds: Normal heart sounds  Pulmonary:      Effort: Pulmonary effort is normal  No respiratory distress  Breath sounds: Normal breath sounds  No wheezing or rales  Abdominal:      General: Bowel sounds are normal       Palpations: Abdomen is soft  Tenderness: There is no abdominal tenderness  Musculoskeletal:         General: No tenderness or deformity  Normal range of motion  Cervical back: Normal range of motion and neck supple  Skin:     General: Skin is warm and dry  Capillary Refill: Capillary refill takes less than 2 seconds  Findings: Bruising (Multiple sites of bruising across the face, upper extremities) present        Comments: Compression stockings noted on the lower extremities; 2+ radial pulses bilaterally   Neurological: Mental Status: She is alert and oriented to person, place, and time  Motor: No abnormal muscle tone  Vital Signs  ED Triage Vitals [05/13/22 0009]   Temperature Pulse Respirations Blood Pressure SpO2   97 6 °F (36 4 °C) 63 18 162/74 96 %      Temp Source Heart Rate Source Patient Position - Orthostatic VS BP Location FiO2 (%)   Temporal Monitor Sitting Right arm --      Pain Score       10 - Worst Possible Pain           Vitals:    05/13/22 0009 05/13/22 0015   BP: 162/74 162/74   Pulse: 63 61   Patient Position - Orthostatic VS: Sitting Lying         Visual Acuity      ED Medications  Medications   ketorolac (TORADOL) injection 15 mg (15 mg Intramuscular Given 5/13/22 0036)       Diagnostic Studies  Results Reviewed     None                 No orders to display              Procedures  Procedures         ED Course           MDM  Number of Diagnoses or Management Options  Ecchymosis  Pain in both upper extremities  Diagnosis management comments: 54-year-old female who presents to emergency department for evaluation for concerns of possible infections at bilateral IV sites after patient was admitted last week and discharged on Tuesday  States she has bilateral arm pain and wanted to make sure that she does not have any infection  No redness or drainage noted per patient  No fevers or chills  Vital signs on arrival here are not concerning  Patient does have significant ecchymosis and was already evaluated for her previous trauma  Patient does not have any signs of infections at her IV sites  Patient already completed a course of Keflex and reported improvement  Advised to continuing warm compresses at the areas and following up with family physician  Toradol given here for supportive care  Understands return precautions         Amount and/or Complexity of Data Reviewed  Review and summarize past medical records: yes    Risk of Complications, Morbidity, and/or Mortality  Presenting problems: moderate  Diagnostic procedures: moderate  Management options: moderate        Disposition  Final diagnoses:   Pain in both upper extremities   Ecchymosis     Time reflects when diagnosis was documented in both MDM as applicable and the Disposition within this note     Time User Action Codes Description Comment    5/13/2022 12:35 AM Kathleen Saenz Add [S07 734,  M79 [id number]] Pain in both upper extremities     5/13/2022 12:35 AM Kathleen Saenz Add [R58] Ecchymosis       ED Disposition     ED Disposition   Discharge    Condition   Stable    Date/Time   Fri May 13, 2022 12:35 AM    Comment   Roney Avalos discharge to home/self care  Follow-up Information     Follow up With Specialties Details Why Molly Trevino MD Family Medicine   [address] [place] [address]  [phone]            Patient's Medications   Discharge Prescriptions    No medications on file       No discharge procedures on file      PDMP Review     None          ED Provider  Electronically Signed by           Ale Hardin MD  05/13/22 0040

## 2022-05-13 NOTE — DISCHARGE INSTRUCTIONS
Thank you for letting us take care of you  You have been evaluated for arm pain and bruising  Please follow-up with your scheduled follow-up appointments  Please return for worsening symptoms  At this time, you have no clinical evidence of symptoms or problems that will require hospitalization, however you should be evaluated soon by a primary care physician, and contact information has been provided  Follow up with your primary care physician  This is important as many medical conditions can be managed as an outpatient, in addition to routine health screening  Seeing your primary doctor often can help identify changes in the medical issue that brought you to the ED for care today  If you experience any new symptoms or acute worsening of current symptoms, please return to the ED

## 2022-08-03 ENCOUNTER — HOSPITAL ENCOUNTER (EMERGENCY)
Facility: HOSPITAL | Age: 57
Discharge: HOME | End: 2022-08-03
Attending: EMERGENCY MEDICINE
Payer: COMMERCIAL

## 2022-08-03 ENCOUNTER — APPOINTMENT (EMERGENCY)
Dept: CT IMAGING | Facility: HOSPITAL | Age: 57
End: 2022-08-03
Payer: COMMERCIAL

## 2022-08-03 VITALS
RESPIRATION RATE: 20 BRPM | HEIGHT: 67 IN | OXYGEN SATURATION: 93 % | DIASTOLIC BLOOD PRESSURE: 77 MMHG | TEMPERATURE: 97.7 F | BODY MASS INDEX: 35.4 KG/M2 | HEART RATE: 80 BPM | WEIGHT: 225.53 LBS | SYSTOLIC BLOOD PRESSURE: 155 MMHG

## 2022-08-03 DIAGNOSIS — S06.0X0A CONCUSSION WITHOUT LOSS OF CONSCIOUSNESS, INITIAL ENCOUNTER: Primary | ICD-10-CM

## 2022-08-03 DIAGNOSIS — R42 VERTIGO: ICD-10-CM

## 2022-08-03 LAB
ALBUMIN SERPL BCP-MCNC: 4.1 G/DL (ref 3.5–5)
ALP SERPL-CCNC: 108 U/L (ref 46–116)
ALT SERPL W P-5'-P-CCNC: 38 U/L (ref 12–78)
ANION GAP SERPL CALCULATED.3IONS-SCNC: 12 MMOL/L (ref 4–13)
AST SERPL W P-5'-P-CCNC: 18 U/L (ref 5–45)
BASOPHILS # BLD AUTO: 0.05 THOUSANDS/ΜL (ref 0–0.1)
BASOPHILS NFR BLD AUTO: 1 % (ref 0–1)
BILIRUB SERPL-MCNC: 0.41 MG/DL (ref 0.2–1)
BUN SERPL-MCNC: 7 MG/DL (ref 5–25)
CALCIUM SERPL-MCNC: 9.1 MG/DL (ref 8.3–10.1)
CARDIAC TROPONIN I PNL SERPL HS: 5 NG/L
CHLORIDE SERPL-SCNC: 97 MMOL/L (ref 96–108)
CO2 SERPL-SCNC: 25 MMOL/L (ref 21–32)
CREAT SERPL-MCNC: 0.99 MG/DL (ref 0.6–1.3)
EOSINOPHIL # BLD AUTO: 0.03 THOUSAND/ΜL (ref 0–0.61)
EOSINOPHIL NFR BLD AUTO: 0 % (ref 0–6)
ERYTHROCYTE [DISTWIDTH] IN BLOOD BY AUTOMATED COUNT: 13.4 % (ref 11.6–15.1)
FLUAV RNA RESP QL NAA+PROBE: NEGATIVE
FLUBV RNA RESP QL NAA+PROBE: NEGATIVE
GFR SERPL CREATININE-BSD FRML MDRD: 63 ML/MIN/1.73SQ M
GLUCOSE SERPL-MCNC: 114 MG/DL (ref 65–140)
HCT VFR BLD AUTO: 44 % (ref 34.8–46.1)
HGB BLD-MCNC: 14.1 G/DL (ref 11.5–15.4)
IMM GRANULOCYTES # BLD AUTO: 0.03 THOUSAND/UL (ref 0–0.2)
IMM GRANULOCYTES NFR BLD AUTO: 0 % (ref 0–2)
LYMPHOCYTES # BLD AUTO: 1.27 THOUSANDS/ΜL (ref 0.6–4.47)
LYMPHOCYTES NFR BLD AUTO: 12 % (ref 14–44)
MCH RBC QN AUTO: 26.3 PG (ref 26.8–34.3)
MCHC RBC AUTO-ENTMCNC: 32 G/DL (ref 31.4–37.4)
MCV RBC AUTO: 82 FL (ref 82–98)
MONOCYTES # BLD AUTO: 0.67 THOUSAND/ΜL (ref 0.17–1.22)
MONOCYTES NFR BLD AUTO: 7 % (ref 4–12)
NEUTROPHILS # BLD AUTO: 8.33 THOUSANDS/ΜL (ref 1.85–7.62)
NEUTS SEG NFR BLD AUTO: 80 % (ref 43–75)
NRBC BLD AUTO-RTO: 0 /100 WBCS
PLATELET # BLD AUTO: 271 THOUSANDS/UL (ref 149–390)
PMV BLD AUTO: 11.1 FL (ref 8.9–12.7)
POTASSIUM SERPL-SCNC: 3.7 MMOL/L (ref 3.5–5.3)
PROT SERPL-MCNC: 8 G/DL (ref 6.4–8.4)
RBC # BLD AUTO: 5.37 MILLION/UL (ref 3.81–5.12)
RSV RNA RESP QL NAA+PROBE: NEGATIVE
SARS-COV-2 RNA RESP QL NAA+PROBE: NEGATIVE
SODIUM SERPL-SCNC: 134 MMOL/L (ref 135–147)
WBC # BLD AUTO: 10.38 THOUSAND/UL (ref 4.31–10.16)

## 2022-08-03 PROCEDURE — 36415 COLL VENOUS BLD VENIPUNCTURE: CPT | Performed by: EMERGENCY MEDICINE

## 2022-08-03 PROCEDURE — 84484 ASSAY OF TROPONIN QUANT: CPT | Performed by: EMERGENCY MEDICINE

## 2022-08-03 PROCEDURE — 0241U HB NFCT DS VIR RESP RNA 4 TRGT: CPT | Performed by: EMERGENCY MEDICINE

## 2022-08-03 PROCEDURE — 85025 COMPLETE CBC W/AUTO DIFF WBC: CPT | Performed by: EMERGENCY MEDICINE

## 2022-08-03 PROCEDURE — 96374 THER/PROPH/DIAG INJ IV PUSH: CPT

## 2022-08-03 PROCEDURE — 96361 HYDRATE IV INFUSION ADD-ON: CPT

## 2022-08-03 PROCEDURE — 93005 ELECTROCARDIOGRAM TRACING: CPT

## 2022-08-03 PROCEDURE — G1004 CDSM NDSC: HCPCS

## 2022-08-03 PROCEDURE — 99285 EMERGENCY DEPT VISIT HI MDM: CPT | Performed by: EMERGENCY MEDICINE

## 2022-08-03 PROCEDURE — 80053 COMPREHEN METABOLIC PANEL: CPT | Performed by: EMERGENCY MEDICINE

## 2022-08-03 PROCEDURE — 99284 EMERGENCY DEPT VISIT MOD MDM: CPT

## 2022-08-03 PROCEDURE — 96375 TX/PRO/DX INJ NEW DRUG ADDON: CPT

## 2022-08-03 PROCEDURE — 70450 CT HEAD/BRAIN W/O DYE: CPT

## 2022-08-03 RX ORDER — MECLIZINE HYDROCHLORIDE 25 MG/1
25 TABLET ORAL ONCE
Status: COMPLETED | OUTPATIENT
Start: 2022-08-03 | End: 2022-08-03

## 2022-08-03 RX ORDER — TRAMADOL HYDROCHLORIDE 50 MG/1
50 TABLET ORAL EVERY 6 HOURS PRN
Qty: 10 TABLET | Refills: 0 | Status: SHIPPED | OUTPATIENT
Start: 2022-08-03

## 2022-08-03 RX ORDER — DIAZEPAM 5 MG/ML
2.5 INJECTION, SOLUTION INTRAMUSCULAR; INTRAVENOUS ONCE
Status: COMPLETED | OUTPATIENT
Start: 2022-08-03 | End: 2022-08-03

## 2022-08-03 RX ORDER — DIPHENHYDRAMINE HYDROCHLORIDE 50 MG/ML
12.5 INJECTION INTRAMUSCULAR; INTRAVENOUS ONCE
Status: COMPLETED | OUTPATIENT
Start: 2022-08-03 | End: 2022-08-03

## 2022-08-03 RX ORDER — ONDANSETRON 2 MG/ML
4 INJECTION INTRAMUSCULAR; INTRAVENOUS ONCE
Status: COMPLETED | OUTPATIENT
Start: 2022-08-03 | End: 2022-08-03

## 2022-08-03 RX ORDER — KETOROLAC TROMETHAMINE 30 MG/ML
15 INJECTION, SOLUTION INTRAMUSCULAR; INTRAVENOUS ONCE
Status: COMPLETED | OUTPATIENT
Start: 2022-08-03 | End: 2022-08-03

## 2022-08-03 RX ORDER — ONDANSETRON 4 MG/1
4 TABLET, ORALLY DISINTEGRATING ORAL EVERY 6 HOURS PRN
Qty: 20 TABLET | Refills: 0 | Status: SHIPPED | OUTPATIENT
Start: 2022-08-03

## 2022-08-03 RX ORDER — METOCLOPRAMIDE HYDROCHLORIDE 5 MG/ML
10 INJECTION INTRAMUSCULAR; INTRAVENOUS ONCE
Status: COMPLETED | OUTPATIENT
Start: 2022-08-03 | End: 2022-08-03

## 2022-08-03 RX ADMIN — DIPHENHYDRAMINE HYDROCHLORIDE 12.5 MG: 50 INJECTION, SOLUTION INTRAMUSCULAR; INTRAVENOUS at 10:58

## 2022-08-03 RX ADMIN — ONDANSETRON 4 MG: 2 INJECTION INTRAMUSCULAR; INTRAVENOUS at 10:18

## 2022-08-03 RX ADMIN — DIAZEPAM 2.5 MG: 10 INJECTION, SOLUTION INTRAMUSCULAR; INTRAVENOUS at 10:18

## 2022-08-03 RX ADMIN — MECLIZINE HYDROCHLORIDE 25 MG: 25 TABLET ORAL at 10:18

## 2022-08-03 RX ADMIN — SODIUM CHLORIDE 1000 ML: 0.9 INJECTION, SOLUTION INTRAVENOUS at 10:17

## 2022-08-03 RX ADMIN — METOCLOPRAMIDE HYDROCHLORIDE 10 MG: 5 INJECTION INTRAMUSCULAR; INTRAVENOUS at 11:00

## 2022-08-03 RX ADMIN — KETOROLAC TROMETHAMINE 15 MG: 30 INJECTION, SOLUTION INTRAMUSCULAR at 10:55

## 2022-08-03 NOTE — ED PROVIDER NOTES
History  Chief Complaint   Patient presents with    Headache     Pt c/o headache w/dizziness starting 4 days ago after hitting head on bathtub spout washing hair  Pt c/o episode of vomiting yesterday  Seen by fmd yesterday ordering rx per meclizine-pt has not started medication yet  Patient states she hit her in the shower on Saturday and Sunday  Since Tuesday now has been having headaches  Complains of dizziness  Feels like the room is spinning  Worse with movement  Feels better not moving around  Has nausea and vomiting  Family doctor prescribed meclizine yesterday  Has not picked up yet  No fevers but has chills  No recent cough or cold symptoms  No rash  History provided by:  Patient   used: No    Dizziness  Quality:  Head spinning  Severity:  Mild  Onset quality:  Gradual  Duration:  2 days  Timing:  Constant  Progression:  Unchanged  Chronicity:  New  Context: head movement    Context: not with bowel movement, not with ear pain, not with inactivity, not with loss of consciousness and not when standing up    Relieved by:  Being still  Worsened by: Movement and sitting upright  Ineffective treatments:  None tried  Associated symptoms: nausea and vomiting    Associated symptoms: no blood in stool, no chest pain, no diarrhea, no headaches, no hearing loss, no palpitations, no shortness of breath and no vision changes        Prior to Admission Medications   Prescriptions Last Dose Informant Patient Reported? Taking? EPINEPHrine (EPIPEN) 0 3 mg/0 3 mL SOAJ   Yes No   Sig: For a severe reaction: Inject in outer thigh following instructions on package and go to the Emergency room     Multiple Vitamins-Minerals (MULTIVITAMIN WITH MINERALS) tablet  Self Yes No   Sig: Take 1 tablet by mouth daily   OMEPRAZOLE PO   Yes No   Sig: Take 20 mg by mouth daily   VITAMIN D, CHOLECALCIFEROL, PO   Yes No   Sig: daily   albuterol (2 5 mg/3 mL) 0 083 % nebulizer solution   Yes No   Sig: Inhale 2 5 mg as needed   albuterol (PROVENTIL HFA,VENTOLIN HFA) 90 mcg/act inhaler   Yes No   Sig: as needed   amLODIPine (NORVASC) 5 mg tablet   Yes No   Sig: Take 5 mg by mouth daily   aminocaproic acid (AMICAR) 500 mg tablet   Yes No   Sig: Take 500 mg by mouth as needed for bleeding    buPROPion (WELLBUTRIN SR) 150 mg 12 hr tablet   Yes No   Sig: Take 150 mg by mouth 2 (two) times a day   cetirizine (ZyrTEC) 10 mg tablet   Yes No   Sig: Take 10 mg by mouth daily   fluticasone (FLONASE) 50 mcg/act nasal spray   Yes No   Sig: daily at bedtime   lisinopril (ZESTRIL) 20 mg tablet   Yes No   Sig: Take 20 mg by mouth daily    montelukast (SINGULAIR) 10 mg tablet   Yes No   Sig: Take 10 mg by mouth daily at bedtime   nortriptyline (PAMELOR) 10 mg capsule   Yes No   Sig: take 1 capsule by mouth at bedtime FOR 2 WEEKS, THEN INCREASE TO 2 tablets nightly THEREAFTER      Facility-Administered Medications: None       Past Medical History:   Diagnosis Date    Heart murmur     Hypertension     Kidney stones     Kidney stones     Von Willebrand disease ([address] )        Past Surgical History:   Procedure Laterality Date    APPENDECTOMY      BREAST LUMPECTOMY Left     CARPAL TUNNEL RELEASE Bilateral     CHOLECYSTECTOMY      HYSTERECTOMY      KNEE SURGERY Left     PARTIAL HYSTERECTOMY      ROTATOR CUFF REPAIR Left     TONSILLECTOMY         Family History   Problem Relation Age of Onset    Cancer Mother     Cancer Sister     Stroke Brother      I have reviewed and agree with the history as documented      E-Cigarette/Vaping    E-Cigarette Use Current Some Day User      E-Cigarette/Vaping Substances    Nicotine Yes 2 cigarettes a week    THC No     CBD No     Flavoring Yes      Social History     Tobacco Use    Smoking status: Current Every Day Smoker     Packs/day: 0 25     Years: 25 00     Pack years: 6 25     Types: Cigarettes    Smokeless tobacco: Former User    Tobacco comment: few cigs/day   Vaping Use  Vaping Use: Some days    Substances: Nicotine (2 cigarettes a week), Flavoring   Substance Use Topics    Alcohol use: Not Currently    Drug use: Never       Review of Systems   Constitutional: Negative for chills and fever  HENT: Negative for ear pain, hearing loss, sore throat, trouble swallowing and voice change  Eyes: Negative for pain and discharge  Respiratory: Negative for cough, shortness of breath and wheezing  Cardiovascular: Negative for chest pain and palpitations  Gastrointestinal: Positive for nausea and vomiting  Negative for abdominal pain, blood in stool, constipation and diarrhea  Genitourinary: Negative for dysuria, flank pain, frequency and hematuria  Musculoskeletal: Negative for joint swelling, neck pain and neck stiffness  Skin: Negative for rash and wound  Neurological: Positive for dizziness  Negative for seizures, syncope, facial asymmetry and headaches  Psychiatric/Behavioral: Negative for hallucinations, self-injury and suicidal ideas  All other systems reviewed and are negative  Physical Exam  Physical Exam  Vitals and nursing note reviewed  Constitutional:       General: She is not in acute distress  Appearance: She is well-developed  HENT:      Head: Normocephalic and atraumatic  Right Ear: External ear normal       Left Ear: External ear normal    Eyes:      General: No scleral icterus  Right eye: No discharge  Left eye: No discharge  Extraocular Movements: Extraocular movements intact  Conjunctiva/sclera: Conjunctivae normal       Comments: Lateral nystagmus noted   Cardiovascular:      Rate and Rhythm: Normal rate and regular rhythm  Heart sounds: Normal heart sounds  No murmur heard  Pulmonary:      Effort: Pulmonary effort is normal       Breath sounds: Normal breath sounds  No wheezing or rales  Abdominal:      General: Bowel sounds are normal  There is no distension        Palpations: Abdomen is soft       Tenderness: There is no abdominal tenderness  There is no guarding or rebound  Musculoskeletal:         General: No deformity  Normal range of motion  Cervical back: Normal range of motion and neck supple  Skin:     General: Skin is warm and dry  Findings: No rash  Neurological:      General: No focal deficit present  Mental Status: She is alert and oriented to person, place, and time  Cranial Nerves: No cranial nerve deficit  Psychiatric:         Mood and Affect: Mood normal          Behavior: Behavior normal          Thought Content:  Thought content normal          Judgment: Judgment normal          Vital Signs  ED Triage Vitals [08/03/22 0947]   Temperature Pulse Respirations Blood Pressure SpO2   97 7 °F (36 5 °C) 95 17 (!) 190/88 96 %      Temp Source Heart Rate Source Patient Position - Orthostatic VS BP Location FiO2 (%)   Temporal Monitor Lying Right arm --      Pain Score       10 - Worst Possible Pain           Vitals:    08/03/22 0947 08/03/22 1045   BP: (!) 190/88 153/79   Pulse: 95 83   Patient Position - Orthostatic VS: Lying Lying         Visual Acuity      ED Medications  Medications   sodium chloride 0 9 % bolus 1,000 mL (1,000 mL Intravenous New Bag 8/3/22 1017)   ondansetron (ZOFRAN) injection 4 mg (4 mg Intravenous Given 8/3/22 1018)   diazepam (VALIUM) injection 2 5 mg (2 5 mg Intravenous Given 8/3/22 1018)   meclizine (ANTIVERT) tablet 25 mg (25 mg Oral Given 8/3/22 1018)   ketorolac (TORADOL) injection 15 mg (15 mg Intravenous Given 8/3/22 1055)   metoclopramide (REGLAN) injection 10 mg (10 mg Intravenous Given 8/3/22 1100)   diphenhydrAMINE (BENADRYL) injection 12 5 mg (12 5 mg Intravenous Given 8/3/22 1058)       Diagnostic Studies  Results Reviewed     Procedure Component Value Units Date/Time    FLU/RSV/COVID - if FLU/RSV clinically relevant [[id number]]  (Normal) Collected: 08/03/22 1006    Lab Status: Final result Specimen: Nares from Nose Updated: 08/03/22 1101     SARS-CoV-2 Negative     INFLUENZA A PCR Negative     INFLUENZA B PCR Negative     RSV PCR Negative    Narrative:      FOR PEDIATRIC PATIENTS - copy/paste COVID Guidelines URL to browser: [email or web address]  [email or web address]    SARS-CoV-2 assay is a Nucleic Acid Amplification assay intended for the  qualitative detection of nucleic acid from SARS-CoV-2 in nasopharyngeal  swabs  Results are for the presumptive identification of SARS-CoV-2 RNA  Positive results are indicative of infection with SARS-CoV-2, the virus  causing COVID-19, but do not rule out bacterial infection or co-infection  with other viruses  Laboratories within the [place] and its  territories are required to report all positive results to the appropriate  public health authorities  Negative results do not preclude SARS-CoV-2  infection and should not be used as the sole basis for treatment or other  patient management decisions  Negative results must be combined with  clinical observations, patient history, and epidemiological information  This test has not been FDA cleared or approved  This test has been authorized by FDA under an Emergency Use Authorization  (EUA)  This test is only authorized for the duration of time the  declaration that circumstances exist justifying the authorization of the  emergency use of an in vitro diagnostic tests for detection of SARS-CoV-2  virus and/or diagnosis of COVID-19 infection under section 564(b)(1) of  the Act, 21 U  S C  245WWF-1(N)(1), unless the authorization is terminated  or revoked sooner  The test has been validated but independent review by FDA  and CLIA is pending  Test performed using [employer] GeneXpert: This RT-PCR assay targets N2,  a region unique to SARS-CoV-2  A conserved region in the E-gene was chosen  for pan-Sarbecovirus detection which includes SARS-CoV-2      HS Troponin 0hr (reflex protocol) [[id number]]  (Normal) Collected: 08/03/22 1003    Lab Status: Final result Specimen: Blood from Arm, Left Updated: 08/03/22 1038     hs TnI 0hr 5 ng/L     Comprehensive metabolic panel [[id number]]  (Abnormal) Collected: 08/03/22 1003    Lab Status: Final result Specimen: Blood from Arm, Left Updated: 08/03/22 1032     Sodium 134 mmol/L      Potassium 3 7 mmol/L      Chloride 97 mmol/L      CO2 25 mmol/L      ANION GAP 12 mmol/L      BUN 7 mg/dL      Creatinine 0 99 mg/dL      Glucose 114 mg/dL      Calcium 9 1 mg/dL      AST 18 U/L      ALT 38 U/L      Alkaline Phosphatase 108 U/L      Total Protein 8 0 g/dL      Albumin 4 1 g/dL      Total Bilirubin 0 41 mg/dL      eGFR 63 ml/min/1 73sq m     Narrative:      [place] guidelines for Chronic Kidney Disease (CKD):     Stage 1 with normal or high GFR (GFR > 90 mL/min/1 73 square meters)    Stage 2 Mild CKD (GFR = 60-89 mL/min/1 73 square meters)    Stage 3A Moderate CKD (GFR = 45-59 mL/min/1 73 square meters)    Stage 3B Moderate CKD (GFR = 30-44 mL/min/1 73 square meters)    Stage 4 Severe CKD (GFR = 15-29 mL/min/1 73 square meters)    Stage 5 End Stage CKD (GFR <15 mL/min/1 73 square meters)  Note: GFR calculation is accurate only with a steady state creatinine    CBC and differential [[id number]]  (Abnormal) Collected: 08/03/22 1003    Lab Status: Final result Specimen: Blood from Arm, Left Updated: 08/03/22 1013     WBC 10 38 Thousand/uL      RBC 5 37 Million/uL      Hemoglobin 14 1 g/dL      Hematocrit 44 0 %      MCV 82 fL      MCH 26 3 pg      MCHC 32 0 g/dL      RDW 13 4 %      MPV 11 1 fL      Platelets 807 Thousands/uL      nRBC 0 /100 WBCs      Neutrophils Relative 80 %      Immat GRANS % 0 %      Lymphocytes Relative 12 %      Monocytes Relative 7 %      Eosinophils Relative 0 %      Basophils Relative 1 %      Neutrophils Absolute 8 33 Thousands/µL      Immature Grans Absolute 0 03 Thousand/uL      Lymphocytes Absolute 1 27 Thousands/µL Monocytes Absolute 0 67 Thousand/µL      Eosinophils Absolute 0 03 Thousand/µL      Basophils Absolute 0 05 Thousands/µL                  CT head without contrast   Final Result by Isaiah Kirkpatrick MD (08/03 1045)      No acute intracranial abnormality  Workstation performed: [id number]                    Procedures  ECG 12 Lead Documentation Only    Date/Time: 8/3/2022 10:04 AM  Performed by: Tello Vallejo MD  Authorized by: Tello Vallejo MD     ECG reviewed by me, the ED Provider: yes    Patient location:  ED  Previous ECG:     Previous ECG:  Compared to current    Similarity:  No change  Rate:     ECG rate:  90  Rhythm:     Rhythm: sinus rhythm    Ectopy:     Ectopy: none    QRS:     QRS axis:  Normal             ED Course  ED Course as of 08/03/22 1111   Wed Aug 03, 2022   1107 Patient ambulating in the harmon without any difficulty  Neurologic exam is nonfocal   Vertigo could be secondary to closed head injury from earlier this week  SBIRT 22yo+    Flowsheet Row Most Recent Value   SBIRT (25 yo +)    In order to provide better care to our patients, we are screening all of our patients for alcohol and drug use  Would it be okay to ask you these screening questions? Yes Filed at: 08/03/2022 1041   Initial Alcohol Screen: US AUDIT-C     1  How often do you have a drink containing alcohol? 0 Filed at: 08/03/2022 1041   2  How many drinks containing alcohol do you have on a typical day you are drinking? 0 Filed at: 08/03/2022 1041   3b  FEMALE Any Age, or MALE 65+: How often do you have 4 or more drinks on one occassion? 0 Filed at: 08/03/2022 1041   Audit-C Score 0 Filed at: 08/03/2022 1041   CRISTINO: How many times in the past year have you    Used an illegal drug or used a prescription medication for non-medical reasons?  Never Filed at: 08/03/2022 1041                    MDM  Number of Diagnoses or Management Options     Amount and/or Complexity of Data Reviewed  Clinical lab tests: reviewed  Review and summarize past medical records: yes        Disposition  Final diagnoses:   Concussion without loss of consciousness, initial encounter   Vertigo     Time reflects when diagnosis was documented in both MDM as applicable and the Disposition within this note     Time User Action Codes Description Comment    8/3/2022 11:09 AM Jose Langford Add [S06 [id number]] Concussion without loss of consciousness, initial encounter     8/3/2022 11:09 AM Jose Langford Add [R42] Vertigo       ED Disposition     ED Disposition   Discharge    Condition   Stable    Date/Time   Wed Aug 3, 2022 11:09 AM    Comment   Mica Avalos discharge to home/self care  Follow-up Information     Follow up With Specialties Details Why Contact Colon Schirmer, MD Family Medicine Call in 1 day  JAMIE [phone]            Patient's Medications   Discharge Prescriptions    ONDANSETRON (ZOFRAN ODT) 4 MG DISINTEGRATING TABLET    Take 1 tablet (4 mg total) by mouth every 6 (six) hours as needed for nausea or vomiting       Start Date: 8/3/2022  End Date: --       Order Dose: 4 mg       Quantity: 20 tablet    Refills: 0    TRAMADOL (ULTRAM) 50 MG TABLET    Take 1 tablet (50 mg total) by mouth every 6 (six) hours as needed for moderate pain for up to 10 doses       Start Date: 8/3/2022  End Date: --       Order Dose: 50 mg       Quantity: 10 tablet    Refills: 0       No discharge procedures on file      PDMP Review     None          ED Provider  Electronically Signed by           Clarissa Springer MD  08/03/22 0689

## 2022-08-03 NOTE — ED NOTES
Pt ambulated throughout hallway without difficulty  Dr Signa Goldberg aware       Kristi Bello  08/03/22 9073

## 2022-08-07 LAB
ATRIAL RATE: 87 BPM
P AXIS: 52 DEGREES
PR INTERVAL: 146 MS
QRS AXIS: 38 DEGREES
QRSD INTERVAL: 84 MS
QT INTERVAL: 394 MS
QTC INTERVAL: 474 MS
T WAVE AXIS: 21 DEGREES
VENTRICULAR RATE: 87 BPM

## 2022-08-07 PROCEDURE — 93010 ELECTROCARDIOGRAM REPORT: CPT | Performed by: INTERNAL MEDICINE

## 2022-10-21 ENCOUNTER — DOCTOR'S OFFICE (OUTPATIENT)
Dept: URBAN - NONMETROPOLITAN AREA CLINIC 1 | Facility: CLINIC | Age: 57
Setting detail: OPHTHALMOLOGY
End: 2022-10-21
Payer: COMMERCIAL

## 2022-10-21 DIAGNOSIS — S02.3XA: ICD-10-CM

## 2022-10-21 DIAGNOSIS — H43.813: ICD-10-CM

## 2022-10-21 DIAGNOSIS — H25.13: ICD-10-CM

## 2022-10-21 PROCEDURE — 92134 CPTRZ OPH DX IMG PST SGM RTA: CPT | Performed by: OPHTHALMOLOGY

## 2022-10-21 PROCEDURE — 99214 OFFICE O/P EST MOD 30 MIN: CPT | Performed by: OPHTHALMOLOGY

## 2022-10-21 ASSESSMENT — CONFRONTATIONAL VISUAL FIELD TEST (CVF)
OD_FINDINGS: FULL
OS_FINDINGS: FULL

## 2022-10-25 ASSESSMENT — REFRACTION_AUTOREFRACTION
OD_CYLINDER: -1.75
OS_AXIS: 150
OS_CYLINDER: -0.50
OS_SPHERE: -1.75
OD_SPHERE: -1.25
OD_AXIS: 097

## 2022-10-25 ASSESSMENT — REFRACTION_CURRENTRX
OS_OVR_VA: 20/
OD_VPRISM_DIRECTION: SV
OD_AXIS: 108
OS_CYLINDER: -0.25
OS_VPRISM_DIRECTION: SV
OD_CYLINDER: -0.25
OD_OVR_VA: 20/
OS_AXIS: 111
OD_SPHERE: -2.25
OS_SPHERE: -2.25

## 2022-10-25 ASSESSMENT — SPHEQUIV_DERIVED
OD_SPHEQUIV: -2.125
OD_SPHEQUIV: -2.25
OS_SPHEQUIV: -2.25
OS_SPHEQUIV: -2

## 2022-10-25 ASSESSMENT — KERATOMETRY
OS_K1POWER_DIOPTERS: 44.50
OD_K2POWER_DIOPTERS: 45.00
OS_AXISANGLE_DEGREES: 080
OS_K2POWER_DIOPTERS: 45.25
OD_K1POWER_DIOPTERS: 45.00

## 2022-10-25 ASSESSMENT — AXIALLENGTH_DERIVED
OS_AL: 23.9694
OD_AL: 23.9221
OD_AL: 23.8721
OS_AL: 23.8693

## 2022-10-25 ASSESSMENT — REFRACTION_MANIFEST
OS_VA2: 20/20-2
OS_AXIS: 085
OS_SPHERE: -2.00
OU_VA: 20/20
OD_VA2: 20/20-2
OS_VA1: 20/20-2
OD_AXIS: 115
OD_SPHERE: -2.00
OD_ADD: +2.50
OD_VA1: 20/20-2
OS_CYLINDER: -0.50
OS_ADD: +2.50
OD_CYLINDER: -0.50

## 2022-10-25 ASSESSMENT — VISUAL ACUITY
OS_BCVA: 20/25-2
OD_BCVA: 20/30-2

## 2022-10-31 ENCOUNTER — OPTICAL OFFICE (OUTPATIENT)
Dept: URBAN - NONMETROPOLITAN AREA CLINIC 4 | Facility: CLINIC | Age: 57
Setting detail: OPHTHALMOLOGY
End: 2022-10-31
Payer: COMMERCIAL

## 2022-10-31 ENCOUNTER — DOCTOR'S OFFICE (OUTPATIENT)
Dept: URBAN - NONMETROPOLITAN AREA CLINIC 1 | Facility: CLINIC | Age: 57
Setting detail: OPHTHALMOLOGY
End: 2022-10-31
Payer: COMMERCIAL

## 2022-10-31 DIAGNOSIS — H52.4: ICD-10-CM

## 2022-10-31 DIAGNOSIS — H52.13: ICD-10-CM

## 2022-10-31 PROBLEM — H43.813 POSTERIOR VITREOUS DETACHMENT; BOTH EYES: Status: ACTIVE | Noted: 2022-10-21

## 2022-10-31 PROBLEM — H25.13 CATARACT NUCLEAR SCLEROSIS; BOTH EYES: Status: ACTIVE | Noted: 2022-10-21

## 2022-10-31 PROCEDURE — 92310 CONTACT LENS FITTING OU: CPT | Performed by: OPTOMETRIST

## 2022-10-31 PROCEDURE — V2103 SPHEROCYLINDR 4.00D/12-2.00D: HCPCS | Performed by: OPTOMETRIST

## 2022-10-31 PROCEDURE — V2102 SINGL VISN SPHERE 7.12-20.00: HCPCS | Performed by: OPTOMETRIST

## 2022-10-31 PROCEDURE — 92015 DETERMINE REFRACTIVE STATE: CPT | Performed by: OPTOMETRIST

## 2022-10-31 PROCEDURE — V2784 LENS POLYCARB OR EQUAL: HCPCS | Performed by: OPTOMETRIST

## 2022-10-31 PROCEDURE — V2020 VISION SVCS FRAMES PURCHASES: HCPCS | Performed by: OPTOMETRIST

## 2022-10-31 ASSESSMENT — REFRACTION_AUTOREFRACTION
OD_SPHERE: -2.50
OD_AXIS: 118
OS_AXIS: 138
OD_CYLINDER: -0.25
OS_CYLINDER: -0.75
OS_SPHERE: -2.00

## 2022-10-31 ASSESSMENT — REFRACTION_MANIFEST
OS_VA1: 20/20-2
OD_SPHERE: -2.50
OD_CYLINDER: -0.50
OS_VA2: 20/20-2
OS_SPHERE: -2.00
OU_VA: 20/20
OS_CYLINDER: -0.50
OD_VA1: 20/20-2
OD_VA2: 20/20-2
OS_ADD: +2.50
OS_AXIS: 085
OD_ADD: +2.50
OD_AXIS: 115

## 2022-10-31 ASSESSMENT — CONFRONTATIONAL VISUAL FIELD TEST (CVF)
OD_FINDINGS: FULL
OS_FINDINGS: FULL

## 2022-10-31 ASSESSMENT — REFRACTION_CURRENTRX
OD_AXIS: 112
OD_SPHERE: -2.00
OD_VPRISM_DIRECTION: SV
OS_AXIS: 87
OS_CYLINDER: -0.50
OS_SPHERE: -2.00
OD_OVR_VA: 20/
OS_VPRISM_DIRECTION: SV
OD_CYLINDER: -0.50
OS_OVR_VA: 20/

## 2022-10-31 ASSESSMENT — SPHEQUIV_DERIVED
OS_SPHEQUIV: -2.375
OD_SPHEQUIV: -2.625
OD_SPHEQUIV: -2.75
OS_SPHEQUIV: -2.25

## 2022-10-31 ASSESSMENT — VISUAL ACUITY
OS_BCVA: 20/40
OD_BCVA: 20/30-2

## 2022-11-01 ENCOUNTER — HOSPITAL ENCOUNTER (EMERGENCY)
Facility: HOSPITAL | Age: 57
Discharge: HOME | End: 2022-11-01
Attending: EMERGENCY MEDICINE

## 2022-11-01 ENCOUNTER — APPOINTMENT (EMERGENCY)
Dept: CT IMAGING | Facility: HOSPITAL | Age: 57
End: 2022-11-01

## 2022-11-01 VITALS
WEIGHT: 233.69 LBS | TEMPERATURE: 97.3 F | HEIGHT: 67 IN | OXYGEN SATURATION: 95 % | HEART RATE: 66 BPM | DIASTOLIC BLOOD PRESSURE: 74 MMHG | RESPIRATION RATE: 37 BRPM | SYSTOLIC BLOOD PRESSURE: 168 MMHG | BODY MASS INDEX: 36.68 KG/M2

## 2022-11-01 DIAGNOSIS — R55 SYNCOPE: Primary | ICD-10-CM

## 2022-11-01 LAB
ALBUMIN SERPL BCP-MCNC: 3.9 G/DL (ref 3.5–5)
ALP SERPL-CCNC: 94 U/L (ref 46–116)
ALT SERPL W P-5'-P-CCNC: 51 U/L (ref 12–78)
ANION GAP SERPL CALCULATED.3IONS-SCNC: 8 MMOL/L (ref 4–13)
AST SERPL W P-5'-P-CCNC: 25 U/L (ref 5–45)
BACTERIA UR QL AUTO: NORMAL /HPF
BASOPHILS # BLD AUTO: 0.06 THOUSANDS/ÂΜL (ref 0–0.1)
BASOPHILS NFR BLD AUTO: 1 % (ref 0–1)
BILIRUB SERPL-MCNC: 0.18 MG/DL (ref 0.2–1)
BILIRUB UR QL STRIP: NEGATIVE
BUN SERPL-MCNC: 10 MG/DL (ref 5–25)
CALCIUM SERPL-MCNC: 8.7 MG/DL (ref 8.3–10.1)
CARDIAC TROPONIN I PNL SERPL HS: 5 NG/L
CHLORIDE SERPL-SCNC: 100 MMOL/L (ref 96–108)
CLARITY UR: CLEAR
CO2 SERPL-SCNC: 30 MMOL/L (ref 21–32)
COLOR UR: YELLOW
CREAT SERPL-MCNC: 0.85 MG/DL (ref 0.6–1.3)
EOSINOPHIL # BLD AUTO: 0.14 THOUSAND/ÂΜL (ref 0–0.61)
EOSINOPHIL NFR BLD AUTO: 2 % (ref 0–6)
ERYTHROCYTE [DISTWIDTH] IN BLOOD BY AUTOMATED COUNT: 13.6 % (ref 11.6–15.1)
FLUAV RNA RESP QL NAA+PROBE: NEGATIVE
FLUBV RNA RESP QL NAA+PROBE: NEGATIVE
GFR SERPL CREATININE-BSD FRML MDRD: 75 ML/MIN/1.73SQ M
GLUCOSE SERPL-MCNC: 130 MG/DL (ref 65–140)
GLUCOSE UR STRIP-MCNC: NEGATIVE MG/DL
HCT VFR BLD AUTO: 41.7 % (ref 34.8–46.1)
HGB BLD-MCNC: 13.4 G/DL (ref 11.5–15.4)
HGB UR QL STRIP.AUTO: ABNORMAL
IMM GRANULOCYTES # BLD AUTO: 0.03 THOUSAND/UL (ref 0–0.2)
IMM GRANULOCYTES NFR BLD AUTO: 0 % (ref 0–2)
KETONES UR STRIP-MCNC: NEGATIVE MG/DL
LACTATE SERPL-SCNC: 1.1 MMOL/L (ref 0.5–2)
LEUKOCYTE ESTERASE UR QL STRIP: NEGATIVE
LIPASE SERPL-CCNC: 100 U/L (ref 73–393)
LYMPHOCYTES # BLD AUTO: 3.41 THOUSANDS/ÂΜL (ref 0.6–4.47)
LYMPHOCYTES NFR BLD AUTO: 37 % (ref 14–44)
MAGNESIUM SERPL-MCNC: 1.7 MG/DL (ref 1.6–2.6)
MCH RBC QN AUTO: 26.4 PG (ref 26.8–34.3)
MCHC RBC AUTO-ENTMCNC: 32.1 G/DL (ref 31.4–37.4)
MCV RBC AUTO: 82 FL (ref 82–98)
MONOCYTES # BLD AUTO: 0.65 THOUSAND/ÂΜL (ref 0.17–1.22)
MONOCYTES NFR BLD AUTO: 7 % (ref 4–12)
NEUTROPHILS # BLD AUTO: 5.04 THOUSANDS/ÂΜL (ref 1.85–7.62)
NEUTS SEG NFR BLD AUTO: 53 % (ref 43–75)
NITRITE UR QL STRIP: NEGATIVE
NON-SQ EPI CELLS URNS QL MICRO: NORMAL /HPF
NRBC BLD AUTO-RTO: 0 /100 WBCS
NT-PROBNP SERPL-MCNC: 69 PG/ML
PH UR STRIP.AUTO: 6.5 [PH]
PLATELET # BLD AUTO: 272 THOUSANDS/UL (ref 149–390)
PMV BLD AUTO: 11.4 FL (ref 8.9–12.7)
POTASSIUM SERPL-SCNC: 3.5 MMOL/L (ref 3.5–5.3)
PROT SERPL-MCNC: 7.7 G/DL (ref 6.4–8.4)
PROT UR STRIP-MCNC: NEGATIVE MG/DL
RBC # BLD AUTO: 5.07 MILLION/UL (ref 3.81–5.12)
RBC #/AREA URNS AUTO: NORMAL /HPF
RSV RNA RESP QL NAA+PROBE: NEGATIVE
SARS-COV-2 RNA RESP QL NAA+PROBE: NEGATIVE
SODIUM SERPL-SCNC: 138 MMOL/L (ref 135–147)
SP GR UR STRIP.AUTO: <=1.005 (ref 1–1.03)
UROBILINOGEN UR QL STRIP.AUTO: 0.2 E.U./DL
WBC # BLD AUTO: 9.33 THOUSAND/UL (ref 4.31–10.16)
WBC #/AREA URNS AUTO: NORMAL /HPF

## 2022-11-01 RX ORDER — ONDANSETRON 2 MG/ML
4 INJECTION INTRAMUSCULAR; INTRAVENOUS ONCE
Status: COMPLETED | OUTPATIENT
Start: 2022-11-01 | End: 2022-11-01

## 2022-11-01 RX ADMIN — ONDANSETRON 4 MG: 2 INJECTION INTRAMUSCULAR; INTRAVENOUS at 22:07

## 2022-11-01 RX ADMIN — IOHEXOL 100 ML: 350 INJECTION, SOLUTION INTRAVENOUS at 22:33

## 2022-11-01 RX ADMIN — SODIUM CHLORIDE 1000 ML: 0.9 INJECTION, SOLUTION INTRAVENOUS at 22:08

## 2022-11-02 NOTE — ED PROVIDER NOTES
History  Chief Complaint   Patient presents with   • Syncope     Pt reports nausea and vomiting all day today, had an episode of chest pain this afternoon that resolved with use of SL nitro, at 1930 pt was at home and stated they "did not feel well" and had a witnessed syncopal epsiode, pt did not strike head      Patient states she has been feeling ill for 2 days with nausea and vomiting, today had several episodes of vomiting, chest discomfort, had a syncopal episode  She had had several episodes of vomiting just prior, walk downstairs to the couch, sat down, and while seated had a syncopal episode  She was witnessed to syncopized while seated, fall to the ground without striking her head  She was unconscious for less than 1 minute and awoke spontaneously  She had no seizure-like movements and she had no postictal state  She continues to complain of nausea and chest discomfort  She denies headache or long bone pain  History provided by:  Patient   used: No    Syncope  Episode history:  Single  Most recent episode: Today  Duration:  1 minute  Timing:  Constant  Progression:  Unchanged  Chronicity:  New  Context comment:  Vomiting  Witnessed: yes    Relieved by:  Nothing  Worsened by:  Nothing  Ineffective treatments:  None tried  Associated symptoms: chest pain, malaise/fatigue, nausea and vomiting    Associated symptoms: no confusion, no difficulty breathing, no dizziness, no fever, no focal weakness, no headaches, no palpitations, no rectal bleeding, no seizures and no shortness of breath        Prior to Admission Medications   Prescriptions Last Dose Informant Patient Reported? Taking? EPINEPHrine (EPIPEN) 0 3 mg/0 3 mL SOAJ   Yes No   Sig: For a severe reaction: Inject in outer thigh following instructions on package and go to the Emergency room     Multiple Vitamins-Minerals (MULTIVITAMIN WITH MINERALS) tablet  Self Yes No   Sig: Take 1 tablet by mouth daily   OMEPRAZOLE PO Yes No   Sig: Take 20 mg by mouth daily   VITAMIN D, CHOLECALCIFEROL, PO   Yes No   Sig: daily   albuterol (2 5 mg/3 mL) 0 083 % nebulizer solution   Yes No   Sig: Inhale 2 5 mg as needed   albuterol (PROVENTIL HFA,VENTOLIN HFA) 90 mcg/act inhaler   Yes No   Sig: as needed   amLODIPine (NORVASC) 5 mg tablet   Yes No   Sig: Take 5 mg by mouth daily   aminocaproic acid (AMICAR) 500 mg tablet   Yes No   Sig: Take 500 mg by mouth as needed for bleeding    buPROPion (WELLBUTRIN SR) 150 mg 12 hr tablet   Yes No   Sig: Take 150 mg by mouth 2 (two) times a day   cetirizine (ZyrTEC) 10 mg tablet   Yes No   Sig: Take 10 mg by mouth daily   fluticasone (FLONASE) 50 mcg/act nasal spray   Yes No   Sig: daily at bedtime   lisinopril (ZESTRIL) 20 mg tablet   Yes No   Sig: Take 20 mg by mouth daily    montelukast (SINGULAIR) 10 mg tablet   Yes No   Sig: Take 10 mg by mouth daily at bedtime   nortriptyline (PAMELOR) 10 mg capsule   Yes No   Sig: take 1 capsule by mouth at bedtime FOR 2 WEEKS, THEN INCREASE TO 2 tablets nightly THEREAFTER   ondansetron (Zofran ODT) 4 mg disintegrating tablet   No No   Sig: Take 1 tablet (4 mg total) by mouth every 6 (six) hours as needed for nausea or vomiting   traMADol (Ultram) 50 mg tablet   No No   Sig: Take 1 tablet (50 mg total) by mouth every 6 (six) hours as needed for moderate pain for up to 10 doses      Facility-Administered Medications: None       Past Medical History:   Diagnosis Date   • Heart murmur    • Hypertension    • Kidney stones    • Kidney stones    • Subdural hematoma    • Von Willebrand disease        Past Surgical History:   Procedure Laterality Date   • APPENDECTOMY     • BREAST LUMPECTOMY Left    • CARPAL TUNNEL RELEASE Bilateral    • CHOLECYSTECTOMY     • HYSTERECTOMY     • KNEE SURGERY Left    • PARTIAL HYSTERECTOMY     • ROTATOR CUFF REPAIR Left    • TONSILLECTOMY         Family History   Problem Relation Age of Onset   • Cancer Mother    • Cancer Sister    • Stroke Brother      I have reviewed and agree with the history as documented  E-Cigarette/Vaping   • E-Cigarette Use Current Some Day User      E-Cigarette/Vaping Substances   • Nicotine Yes 2 cigarettes a week   • THC No    • CBD No    • Flavoring Yes      Social History     Tobacco Use   • Smoking status: Current Every Day Smoker     Packs/day: 0 25     Years: 25 00     Pack years: 6 25     Types: Cigarettes   • Smokeless tobacco: Former User   • Tobacco comment: few cigs/day   Vaping Use   • Vaping Use: Some days   • Substances: Nicotine (2 cigarettes a week), Flavoring   Substance Use Topics   • Alcohol use: Not Currently   • Drug use: Never       Review of Systems   Constitutional: Positive for malaise/fatigue  Negative for chills and fever  HENT: Negative for ear pain, hearing loss, sore throat, trouble swallowing and voice change  Eyes: Negative for pain and discharge  Respiratory: Negative for cough, shortness of breath and wheezing  Cardiovascular: Positive for chest pain and syncope  Negative for palpitations  Gastrointestinal: Positive for nausea and vomiting  Negative for abdominal pain, blood in stool, constipation and diarrhea  Genitourinary: Negative for dysuria, flank pain, frequency and hematuria  Musculoskeletal: Negative for joint swelling, neck pain and neck stiffness  Skin: Negative for rash and wound  Neurological: Negative for dizziness, focal weakness, seizures, syncope, facial asymmetry and headaches  Psychiatric/Behavioral: Negative for confusion, hallucinations, self-injury and suicidal ideas  All other systems reviewed and are negative  Physical Exam  Physical Exam  Vitals and nursing note reviewed  Constitutional:       General: She is not in acute distress  Appearance: She is well-developed  HENT:      Head: Normocephalic and atraumatic  Right Ear: External ear normal       Left Ear: External ear normal    Eyes:      General: No scleral icterus  Right eye: No discharge  Left eye: No discharge  Extraocular Movements: Extraocular movements intact  Conjunctiva/sclera: Conjunctivae normal    Cardiovascular:      Rate and Rhythm: Normal rate and regular rhythm  Heart sounds: Normal heart sounds  No murmur heard  Pulmonary:      Effort: Pulmonary effort is normal       Breath sounds: Normal breath sounds  No wheezing or rales  Abdominal:      General: Bowel sounds are normal  There is no distension  Palpations: Abdomen is soft  Tenderness: There is no abdominal tenderness  There is no guarding or rebound  Musculoskeletal:         General: No deformity  Normal range of motion  Cervical back: Normal range of motion and neck supple  Skin:     General: Skin is warm and dry  Findings: No rash  Neurological:      General: No focal deficit present  Mental Status: She is alert and oriented to person, place, and time  Cranial Nerves: No cranial nerve deficit  Psychiatric:         Mood and Affect: Mood normal          Behavior: Behavior normal          Thought Content:  Thought content normal          Judgment: Judgment normal          Vital Signs  ED Triage Vitals   Temperature Pulse Respirations Blood Pressure SpO2   11/01/22 2103 11/01/22 2103 11/01/22 2103 11/01/22 2103 11/01/22 2103   (!) 97 3 °F (36 3 °C) 71 20 (!) 229/106 97 %      Temp Source Heart Rate Source Patient Position - Orthostatic VS BP Location FiO2 (%)   11/01/22 2103 11/01/22 2103 11/01/22 2130 11/01/22 2130 --   Temporal Monitor Lying Right arm       Pain Score       --                  Vitals:    11/01/22 2130 11/01/22 2145 11/01/22 2200 11/01/22 2215   BP: (!) 195/85 (!) 179/80 (!) 177/75 168/74   Pulse: 68 67 67 66   Patient Position - Orthostatic VS: Lying  Lying Lying         Visual Acuity      ED Medications  Medications   sodium chloride 0 9 % bolus 1,000 mL (1,000 mL Intravenous New Bag 11/1/22 2208)   ondansetron (ZOFRAN) injection 4 mg (4 mg Intravenous Given 11/1/22 2207)   iohexol (OMNIPAQUE) 350 MG/ML injection (SINGLE-DOSE) 100 mL (100 mL Intravenous Given 11/1/22 2233)       Diagnostic Studies  Results Reviewed     Procedure Component Value Units Date/Time    Urine Microscopic [[id number]]  (Normal) Collected: 11/01/22 2321    Lab Status: Final result Specimen: Urine Updated: 11/01/22 2323     RBC, UA 2-4 /hpf      WBC, UA 1-2 /hpf      Epithelial Cells Occasional /hpf      Bacteria, UA Occasional /hpf     UA w Reflex to Microscopic w Reflex to Culture [[id number]]  (Abnormal) Collected: 11/01/22 2321    Lab Status: Final result Specimen: Urine Updated: 11/01/22 2321     Color, UA Yellow     Clarity, UA Clear     Specific Gravity, UA <=1 005     pH, UA 6 5     Leukocytes, UA Negative     Nitrite, UA Negative     Protein, UA Negative mg/dl      Glucose, UA Negative mg/dl      Ketones, UA Negative mg/dl      Urobilinogen, UA 0 2 E U /dl      Bilirubin, UA Negative     Occult Blood, UA Trace-Intact    COVID19, Influ[facility] A/B, [facility] [[id number]]  (Normal) Collected: 11/01/22 2125    Lab Status: Final result Specimen: Nares from Nose Updated: 11/01/22 2214     SARS-CoV-2 Negative     INFLUENZA A PCR Negative     INFLUENZA B PCR Negative     RSV PCR Negative    Narrative:      FOR PEDIATRIC PATIENTS - copy/paste COVID Guidelines URL to browser: [email or web address]    SARS-CoV-2 assay is a Nucleic Acid Amplification assay intended for the  qualitative detection of nucleic acid from SARS-CoV-2 in nasopharyngeal  swabs  Results are for the presumptive identification of SARS-CoV-2 RNA  Positive results are indicative of infection with SARS-CoV-2, the virus  causing COVID-19, but do not rule out bacterial infection or co-infection  with other viruses   Laboratories within the [place] and its  territories are required to report all positive results to the appropriate  public health authorities  Negative results do not preclude SARS-CoV-2  infection and should not be used as the sole basis for treatment or other  patient management decisions  Negative results must be combined with  clinical observations, patient history, and epidemiological information  This test has not been FDA cleared or approved  This test has been authorized by FDA under an Emergency Use Authorization  (EUA)  This test is only authorized for the duration of time the  declaration that circumstances exist justifying the authorization of the  emergency use of an in vitro diagnostic tests for detection of SARS-CoV-2  virus and/or diagnosis of COVID-19 infection under section 564(b)(1) of  the Act, 21 U  S C  708SRN-0(N)(1), unless the authorization is terminated  or revoked sooner  The test has been validated but independent review by FDA  and CLIA is pending  Test performed using [employer] Gene[employer]pert: This RT-PCR assay targets N2,  a region unique to SARS-CoV-2  A conserved region in the E-gene was chosen  for pan-Sarbecovirus detection which includes SARS-CoV-2  According to CMS-2020-01-R, this platform meets the definition of high-throughput technology      Comprehensive metabolic panel [[id number]]  (Abnormal) Collected: 11/01/22 2125    Lab Status: Final result Specimen: Blood from Arm, Left Updated: 11/01/22 2201     Sodium 138 mmol/L      Potassium 3 5 mmol/L      Chloride 100 mmol/L      CO2 30 mmol/L      ANION GAP 8 mmol/L      BUN 10 mg/dL      Creatinine 0 85 mg/dL      Glucose 130 mg/dL      Calcium 8 7 mg/dL      AST 25 U/L      ALT 51 U/L      Alkaline Phosphatase 94 U/L      Total Protein 7 7 g/dL      Albumin 3 9 g/dL      Total Bilirubin 0 18 mg/dL      eGFR 75 ml/min/1 73sq m     Narrative:      [place] guidelines for Chronic Kidney Disease (CKD):   •  Stage 1 with normal or high GFR (GFR > 90 mL/min/1 73 square meters)  •  Stage 2 Mild CKD (GFR = 60-89 mL/min/1 73 square meters)  •  Stage 3A Moderate CKD (GFR = 45-59 mL/min/1 73 square meters)  •  Stage 3B Moderate CKD (GFR = 30-44 mL/min/1 73 square meters)  •  Stage 4 Severe CKD (GFR = 15-29 mL/min/1 73 square meters)  •  Stage 5 End Stage CKD (GFR <15 mL/min/1 73 square meters)  Note: GFR calculation is accurate only with a steady state creatinine    Lipase [[id number]]  (Normal) Collected: 11/01/22 2125    Lab Status: Final result Specimen: Blood from Arm, Left Updated: 11/01/22 2201     Lipase 100 u/L     NT-BNP PRO [[id number]]  (Normal) Collected: 11/01/22 2125    Lab Status: Final result Specimen: Blood from Arm, Left Updated: 11/01/22 2201     NT-proBNP 69 pg/mL     Magnesium [[id number]]  (Normal) Collected: 11/01/22 2125    Lab Status: Final result Specimen: Blood from Arm, Left Updated: 11/01/22 2201     Magnesium 1 7 mg/dL     HS Troponin 0hr (reflex protocol) [[id number]]  (Normal) Collected: 11/01/22 2125    Lab Status: Final result Specimen: Blood from Arm, Left Updated: 11/01/22 2201     hs TnI 0hr 5 ng/L     Lactic acid, plasma [[id number]]  (Normal) Collected: 11/01/22 2125    Lab Status: Final result Specimen: Blood from Arm, Left Updated: 11/01/22 2200     LACTIC ACID 1 1 mmol/L     Narrative:      Result may be elevated if tourniquet was used during collection      CBC and differential [[id number]]  (Abnormal) Collected: 11/01/22 2125    Lab Status: Final result Specimen: Blood from Arm, Left Updated: 11/01/22 2137     WBC 9 33 Thousand/uL      RBC 5 07 Million/uL      Hemoglobin 13 4 g/dL      Hematocrit 41 7 %      MCV 82 fL      MCH 26 4 pg      MCHC 32 1 g/dL      RDW 13 6 %      MPV 11 4 fL      Platelets 818 Thousands/uL      nRBC 0 /100 WBCs      Neutrophils Relative 53 %      Immat GRANS % 0 %      Lymphocytes Relative 37 %      Monocytes Relative 7 %      Eosinophils Relative 2 %      Basophils Relative 1 %      Neutrophils Absolute 5 04 Thousands/µL      Immature Grans Absolute 0 03 Thousand/uL      Lymphocytes Absolute 3 41 Thousands/µL      Monocytes Absolute 0 65 Thousand/µL      Eosinophils Absolute 0 14 Thousand/µL      Basophils Absolute 0 06 Thousands/µL                  CT head wo contrast   Final Result by Alejo Lozoya MD (11/01 2321)      No acute intracranial abnormality  Workstation performed: [id number]         CT chest abdomen pelvis w contrast   Final Result by Alejo Lozoya MD (11/01 2319)      1  No acute findings in the chest, abdomen or pelvis  2   Hepatic steatosis  3   Colonic diverticulosis without evidence of acute diverticulitis  Workstation performed: [id number]                    Procedures  ECG 12 Lead Documentation Only    Date/Time: 11/1/2022 9:18 PM  Performed by: Erick Malin MD  Authorized by: Erick Malin MD     ECG reviewed by me, the ED Provider: yes    Patient location:  ED  Previous ECG:     Previous ECG:  Unavailable  Interpretation:     Interpretation: normal    Rate:     ECG rate:  70    ECG rate assessment: normal    Rhythm:     Rhythm: sinus rhythm    Ectopy:     Ectopy: none    QRS:     QRS axis:  Normal    QRS intervals:  Normal  Conduction:     Conduction: normal    ST segments:     ST segments:  Normal  T waves:     T waves: normal               ED Course  ED Course as of 11/01/22 2329 Tue Nov 01, 2022 2322 Workup in emergency department is negative including CT head and CT chest abdomen pelvis  Discussed finding of diverticulosis with patient  Lab work is negative  EKG is normal   Patient has had no ectopy on the monitor  Episode of syncope likely vasovagal in nature after episodes of vomiting  Stable for discharge  Patient advised to follow-up with her cardiologist   She is agreeable                                               MDM  Number of Diagnoses or Management Options     Amount and/or Complexity of Data Reviewed  Clinical lab tests: ordered and reviewed  Tests in the radiology section of CPT®: ordered and reviewed  Decide to obtain previous medical records or to obtain history from someone other than the patient: yes  Review and summarize past medical records: yes  Independent visualization of images, tracings, or specimens: yes        Disposition  Final diagnoses:   Syncope     Time reflects when diagnosis was documented in both MDM as applicable and the Disposition within this note     Time User Action Codes Description Comment    11/1/2022 11:28 PM Senaanat Linn Add [R55] Syncope       ED Disposition     ED Disposition   Discharge    Condition   Stable    Date/Time   Tue Nov 1, 2022 11:28 PM    Comment   Chavo Avalos discharge to home/self care  Follow-up Information     Follow up With Specialties Details Why Molly Trevino MD Family Medicine   [address] [place] [address]  [phone]            Patient's Medications   Discharge Prescriptions    No medications on file       No discharge procedures on file      PDMP Review     None          ED Provider  Electronically Signed by           Monica Ayala MD  11/01/22 4705

## 2022-11-03 LAB
ATRIAL RATE: 70 BPM
ATRIAL RATE: 70 BPM
P AXIS: 44 DEGREES
P AXIS: 44 DEGREES
PR INTERVAL: 158 MS
PR INTERVAL: 158 MS
QRS AXIS: 19 DEGREES
QRS AXIS: 19 DEGREES
QRSD INTERVAL: 88 MS
QRSD INTERVAL: 88 MS
QT INTERVAL: 420 MS
QT INTERVAL: 420 MS
QTC INTERVAL: 453 MS
QTC INTERVAL: 453 MS
T WAVE AXIS: 40 DEGREES
T WAVE AXIS: 40 DEGREES
VENTRICULAR RATE: 70 BPM
VENTRICULAR RATE: 70 BPM

## 2022-11-04 ENCOUNTER — TELEPHONE (OUTPATIENT)
Dept: BEHAVIORAL/MENTAL HEALTH CLINIC | Facility: CLINIC | Age: 57
End: 2022-11-04

## 2022-11-04 NOTE — TELEPHONE ENCOUNTER
Attempted to contact the Client for mental health therapy, message was left on her voicemail to contact this therapist at the [address] [place] [address] [phone]
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
## 2022-12-04 ENCOUNTER — APPOINTMENT (EMERGENCY)
Dept: CT IMAGING | Facility: HOSPITAL | Age: 57
End: 2022-12-04

## 2022-12-04 ENCOUNTER — HOSPITAL ENCOUNTER (EMERGENCY)
Facility: HOSPITAL | Age: 57
Discharge: HOME | End: 2022-12-04
Attending: EMERGENCY MEDICINE

## 2022-12-04 VITALS
RESPIRATION RATE: 18 BRPM | HEART RATE: 65 BPM | DIASTOLIC BLOOD PRESSURE: 61 MMHG | TEMPERATURE: 97.9 F | SYSTOLIC BLOOD PRESSURE: 134 MMHG | OXYGEN SATURATION: 98 %

## 2022-12-04 DIAGNOSIS — I10 HTN (HYPERTENSION): ICD-10-CM

## 2022-12-04 DIAGNOSIS — R51.9 HEADACHE: Primary | ICD-10-CM

## 2022-12-04 LAB
ALBUMIN SERPL BCP-MCNC: 4 G/DL (ref 3.5–5)
ALP SERPL-CCNC: 101 U/L (ref 46–116)
ALT SERPL W P-5'-P-CCNC: 59 U/L (ref 12–78)
ANION GAP SERPL CALCULATED.3IONS-SCNC: 5 MMOL/L (ref 4–13)
APTT PPP: 32 SECONDS (ref 23–37)
AST SERPL W P-5'-P-CCNC: 27 U/L (ref 5–45)
BASOPHILS # BLD AUTO: 0.07 THOUSANDS/ÂΜL (ref 0–0.1)
BASOPHILS NFR BLD AUTO: 1 % (ref 0–1)
BILIRUB SERPL-MCNC: 0.18 MG/DL (ref 0.2–1)
BUN SERPL-MCNC: 11 MG/DL (ref 5–25)
CALCIUM SERPL-MCNC: 9.4 MG/DL (ref 8.3–10.1)
CHLORIDE SERPL-SCNC: 102 MMOL/L (ref 96–108)
CO2 SERPL-SCNC: 32 MMOL/L (ref 21–32)
CREAT SERPL-MCNC: 0.94 MG/DL (ref 0.6–1.3)
EOSINOPHIL # BLD AUTO: 0.13 THOUSAND/ÂΜL (ref 0–0.61)
EOSINOPHIL NFR BLD AUTO: 1 % (ref 0–6)
ERYTHROCYTE [DISTWIDTH] IN BLOOD BY AUTOMATED COUNT: 14 % (ref 11.6–15.1)
GFR SERPL CREATININE-BSD FRML MDRD: 67 ML/MIN/1.73SQ M
GLUCOSE SERPL-MCNC: 68 MG/DL (ref 65–140)
HCT VFR BLD AUTO: 45 % (ref 34.8–46.1)
HGB BLD-MCNC: 13.7 G/DL (ref 11.5–15.4)
IMM GRANULOCYTES # BLD AUTO: 0.13 THOUSAND/UL (ref 0–0.2)
IMM GRANULOCYTES NFR BLD AUTO: 1 % (ref 0–2)
INR PPP: 0.81 (ref 0.84–1.19)
LYMPHOCYTES # BLD AUTO: 4.22 THOUSANDS/ÂΜL (ref 0.6–4.47)
LYMPHOCYTES NFR BLD AUTO: 35 % (ref 14–44)
MCH RBC QN AUTO: 25.9 PG (ref 26.8–34.3)
MCHC RBC AUTO-ENTMCNC: 30.4 G/DL (ref 31.4–37.4)
MCV RBC AUTO: 85 FL (ref 82–98)
MONOCYTES # BLD AUTO: 0.81 THOUSAND/ÂΜL (ref 0.17–1.22)
MONOCYTES NFR BLD AUTO: 7 % (ref 4–12)
NEUTROPHILS # BLD AUTO: 6.63 THOUSANDS/ÂΜL (ref 1.85–7.62)
NEUTS SEG NFR BLD AUTO: 55 % (ref 43–75)
NRBC BLD AUTO-RTO: 0 /100 WBCS
PLATELET # BLD AUTO: 272 THOUSANDS/UL (ref 149–390)
PMV BLD AUTO: 10.8 FL (ref 8.9–12.7)
POTASSIUM SERPL-SCNC: 3.6 MMOL/L (ref 3.5–5.3)
PROT SERPL-MCNC: 7.8 G/DL (ref 6.4–8.4)
PROTHROMBIN TIME: 11.3 SECONDS (ref 11.6–14.5)
RBC # BLD AUTO: 5.29 MILLION/UL (ref 3.81–5.12)
SODIUM SERPL-SCNC: 139 MMOL/L (ref 135–147)
WBC # BLD AUTO: 11.99 THOUSAND/UL (ref 4.31–10.16)

## 2022-12-04 RX ORDER — DIPHENHYDRAMINE HYDROCHLORIDE 50 MG/ML
25 INJECTION INTRAMUSCULAR; INTRAVENOUS ONCE
Status: COMPLETED | OUTPATIENT
Start: 2022-12-04 | End: 2022-12-04

## 2022-12-04 RX ORDER — CLONIDINE HYDROCHLORIDE 0.1 MG/1
0.2 TABLET ORAL ONCE
Status: COMPLETED | OUTPATIENT
Start: 2022-12-04 | End: 2022-12-04

## 2022-12-04 RX ORDER — METOCLOPRAMIDE HYDROCHLORIDE 5 MG/ML
10 INJECTION INTRAMUSCULAR; INTRAVENOUS ONCE
Status: COMPLETED | OUTPATIENT
Start: 2022-12-04 | End: 2022-12-04

## 2022-12-04 RX ORDER — DEXAMETHASONE SODIUM PHOSPHATE 4 MG/ML
10 INJECTION, SOLUTION INTRA-ARTICULAR; INTRALESIONAL; INTRAMUSCULAR; INTRAVENOUS; SOFT TISSUE ONCE
Status: COMPLETED | OUTPATIENT
Start: 2022-12-04 | End: 2022-12-04

## 2022-12-04 RX ORDER — KETOROLAC TROMETHAMINE 30 MG/ML
30 INJECTION, SOLUTION INTRAMUSCULAR; INTRAVENOUS ONCE
Status: DISCONTINUED | OUTPATIENT
Start: 2022-12-04 | End: 2022-12-04

## 2022-12-04 RX ADMIN — CLONIDINE HYDROCHLORIDE 0.2 MG: 0.1 TABLET ORAL at 15:55

## 2022-12-04 RX ADMIN — SODIUM CHLORIDE 1000 ML: 0.9 INJECTION, SOLUTION INTRAVENOUS at 15:52

## 2022-12-04 RX ADMIN — DIPHENHYDRAMINE HYDROCHLORIDE 25 MG: 50 INJECTION, SOLUTION INTRAMUSCULAR; INTRAVENOUS at 16:02

## 2022-12-04 RX ADMIN — DEXAMETHASONE SODIUM PHOSPHATE 10 MG: 4 INJECTION, SOLUTION INTRAMUSCULAR; INTRAVENOUS at 15:56

## 2022-12-04 RX ADMIN — METOCLOPRAMIDE 10 MG: 5 INJECTION, SOLUTION INTRAMUSCULAR; INTRAVENOUS at 16:05
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
## 2023-01-24 DIAGNOSIS — M25.562 PAIN IN LEFT KNEE: ICD-10-CM

## 2023-02-06 ENCOUNTER — HOSPITAL ENCOUNTER (OUTPATIENT)
Dept: MRI IMAGING | Facility: HOSPITAL | Age: 58
Discharge: HOME | End: 2023-02-06

## 2023-02-06 DIAGNOSIS — M25.562 PAIN IN LEFT KNEE: ICD-10-CM

## 2023-04-19 ENCOUNTER — OPTICAL OFFICE (OUTPATIENT)
Dept: URBAN - NONMETROPOLITAN AREA CLINIC 4 | Facility: CLINIC | Age: 58
Setting detail: OPHTHALMOLOGY
End: 2023-04-19
Payer: COMMERCIAL

## 2023-04-19 DIAGNOSIS — H52.13: ICD-10-CM

## 2023-04-19 PROCEDURE — V2103 SPHEROCYLINDR 4.00D/12-2.00D: HCPCS | Performed by: OPTOMETRIST

## 2023-04-19 PROCEDURE — V2750 ANTI-REFLECTIVE COATING: HCPCS | Performed by: OPTOMETRIST

## 2023-04-19 PROCEDURE — V2755 UV LENS/ES: HCPCS | Performed by: OPTOMETRIST

## 2023-04-19 PROCEDURE — V2020 VISION SVCS FRAMES PURCHASES: HCPCS | Performed by: OPTOMETRIST

## 2023-04-20 ENCOUNTER — OPTICAL OFFICE (OUTPATIENT)
Dept: URBAN - NONMETROPOLITAN AREA CLINIC 4 | Facility: CLINIC | Age: 58
Setting detail: OPHTHALMOLOGY
End: 2023-04-20
Payer: COMMERCIAL

## 2023-04-20 DIAGNOSIS — H52.13: ICD-10-CM

## 2023-04-20 PROCEDURE — S0500 DISPOS CONT LENS: HCPCS | Performed by: OPTOMETRIST

## 2023-04-21 ENCOUNTER — DOCTOR'S OFFICE (OUTPATIENT)
Dept: URBAN - NONMETROPOLITAN AREA CLINIC 1 | Facility: CLINIC | Age: 58
Setting detail: OPHTHALMOLOGY
End: 2023-04-21
Payer: MEDICARE

## 2023-04-21 DIAGNOSIS — H43.813: ICD-10-CM

## 2023-04-21 DIAGNOSIS — H25.13: ICD-10-CM

## 2023-04-21 PROCEDURE — 92134 CPTRZ OPH DX IMG PST SGM RTA: CPT | Performed by: OPHTHALMOLOGY

## 2023-04-21 PROCEDURE — DEFER/DELA DEFER/DELAY: Performed by: OPHTHALMOLOGY

## 2023-04-21 PROCEDURE — 99214 OFFICE O/P EST MOD 30 MIN: CPT | Performed by: OPHTHALMOLOGY

## 2023-04-21 ASSESSMENT — KERATOMETRY
OD_K2POWER_DIOPTERS: 45.00
OS_AXISANGLE_DEGREES: 080
OS_K2POWER_DIOPTERS: 45.25
OD_K1POWER_DIOPTERS: 45.00
OS_K1POWER_DIOPTERS: 44.50

## 2023-04-21 ASSESSMENT — REFRACTION_AUTOREFRACTION
OD_AXIS: 142
OS_CYLINDER: -0.75
OD_SPHERE: -1.75
OS_SPHERE: -1.25
OS_AXIS: 122
OD_CYLINDER: -0.25

## 2023-04-21 ASSESSMENT — AXIALLENGTH_DERIVED
OS_AL: 23.7207
OD_AL: 23.7728
OS_AL: 23.9694
OD_AL: 24.1241

## 2023-04-21 ASSESSMENT — SPHEQUIV_DERIVED
OS_SPHEQUIV: -2.25
OS_SPHEQUIV: -1.625
OD_SPHEQUIV: -1.875
OD_SPHEQUIV: -2.75

## 2023-04-21 ASSESSMENT — CONFRONTATIONAL VISUAL FIELD TEST (CVF)
OS_FINDINGS: FULL
OD_FINDINGS: FULL

## 2023-04-21 ASSESSMENT — REFRACTION_MANIFEST
OS_ADD: +2.50
OS_VA2: 20/20-2
OS_AXIS: 085
OS_VA1: 20/20-2
OD_SPHERE: -2.50
OD_AXIS: 115
OD_VA1: 20/20-2
OU_VA: 20/20
OS_SPHERE: -2.00
OS_CYLINDER: -0.50
OD_ADD: +2.50
OD_VA2: 20/20-2
OD_CYLINDER: -0.50

## 2023-04-21 ASSESSMENT — VISUAL ACUITY
OS_BCVA: 20/25-1
OD_BCVA: 20/25+2

## 2023-04-21 ASSESSMENT — REFRACTION_CURRENTRX
OD_OVR_VA: 20/
OS_CYLINDER: -0.50
OD_CYLINDER: -0.50
OS_VPRISM_DIRECTION: SV
OD_AXIS: 112
OD_VPRISM_DIRECTION: SV
OS_SPHERE: -2.00
OS_OVR_VA: 20/
OD_SPHERE: -2.00
OS_AXIS: 87

## 2023-09-18 ENCOUNTER — OFFICE VISIT (OUTPATIENT)
Dept: URGENT CARE | Facility: CLINIC | Age: 58
End: 2023-09-18
Payer: COMMERCIAL

## 2023-09-18 VITALS
DIASTOLIC BLOOD PRESSURE: 76 MMHG | BODY MASS INDEX: 37.45 KG/M2 | TEMPERATURE: 98.1 F | OXYGEN SATURATION: 94 % | SYSTOLIC BLOOD PRESSURE: 138 MMHG | RESPIRATION RATE: 18 BRPM | WEIGHT: 233 LBS | HEART RATE: 61 BPM | HEIGHT: 66 IN

## 2023-09-18 DIAGNOSIS — J04.0 ACUTE LARYNGITIS: ICD-10-CM

## 2023-09-18 DIAGNOSIS — J02.9 SORE THROAT: Primary | ICD-10-CM

## 2023-09-18 LAB — S PYO AG THROAT QL: NEGATIVE

## 2023-09-18 PROCEDURE — 87070 CULTURE OTHR SPECIMN AEROBIC: CPT

## 2023-09-18 PROCEDURE — 99213 OFFICE O/P EST LOW 20 MIN: CPT

## 2023-09-18 PROCEDURE — 87880 STREP A ASSAY W/OPTIC: CPT

## 2023-09-18 PROCEDURE — S9088 SERVICES PROVIDED IN URGENT: HCPCS

## 2023-09-18 RX ORDER — PREDNISONE 10 MG/1
TABLET ORAL
Qty: 21 TABLET | Refills: 0 | Status: SHIPPED | OUTPATIENT
Start: 2023-09-18

## 2023-09-18 NOTE — PROGRESS NOTES
[facility]        NAME: Alfredo Win is a 60 y.o. female  : 1965    MRN: [id number]  DATE: 2023  TIME: 1:01 PM    Assessment and Plan   Sore throat [J02.9]  1. Sore throat  POCT rapid strepA    Throat culture      2. Acute laryngitis  predniSONE 10 mg tablet        Rapid strep: neg    Patient Instructions     Take steroids as prescribed  Rapid strep test completed and negative. Will send for culture and call patient if positive. Infection appears viral. Recommend symptomatic treatment. Can take ibuprofen or tylenol as needed for pain or fever. Over the counter cough and cold medications to help with symptoms. Use salt water gargles for sore throat and throat lozenges. Cough drops as needed. Wash hands frequently to prevent the spread of infection. If not improving over the next 7-10 days, follow up with PCP. Symptoms may persist for 10-14 days. To present to the ER if symptoms worsen. Chief Complaint     Chief Complaint   Patient presents with   • Sore Throat     Sore throat x 2 days. Voice hoarse         History of Present Illness       Sore Throat   This is a new problem. Episode onset: 2 days. There has been no fever. Associated symptoms include ear pain (R ear) and a hoarse voice. Pertinent negatives include no congestion, coughing, headaches, shortness of breath or trouble swallowing. Review of Systems   Review of Systems   Constitutional: Positive for chills. Negative for appetite change and fever. HENT: Positive for ear pain (R ear), hoarse voice, sore throat and voice change (hoarse voice). Negative for congestion, postnasal drip, rhinorrhea, sinus pressure and trouble swallowing. Respiratory: Negative for cough, chest tightness, shortness of breath and wheezing. Cardiovascular: Negative for chest pain. Skin: Negative for color change and pallor. Neurological: Negative for dizziness, light-headedness and headaches.          Current Medications Current Outpatient Medications:   •  albuterol (2.5 mg/3 mL) 0.083 % nebulizer solution, Inhale 2.5 mg as needed, Disp: , Rfl:   •  albuterol (PROVENTIL HFA,VENTOLIN HFA) 90 mcg/act inhaler, as needed, Disp: , Rfl:   •  aminocaproic acid (AMICAR) 500 mg tablet, Take 500 mg by mouth as needed for bleeding , Disp: , Rfl:   •  amLODIPine (NORVASC) 5 mg tablet, Take 5 mg by mouth daily, Disp: , Rfl:   •  buPROPion (WELLBUTRIN SR) 150 mg 12 hr tablet, Take 150 mg by mouth 2 (two) times a day, Disp: , Rfl:   •  cetirizine (ZyrTEC) 10 mg tablet, Take 10 mg by mouth daily, Disp: , Rfl:   •  EPINEPHrine (EPIPEN) 0.3 mg/0.3 mL SOAJ, For a severe reaction: Inject in outer thigh following instructions on package and go to the Emergency room. , Disp: , Rfl:   •  fluticasone (FLONASE) 50 mcg/act nasal spray, daily at bedtime, Disp: , Rfl:   •  lisinopril (ZESTRIL) 20 mg tablet, Take 20 mg by mouth daily , Disp: , Rfl:   •  montelukast (SINGULAIR) 10 mg tablet, Take 10 mg by mouth daily at bedtime, Disp: , Rfl:   •  Multiple Vitamins-Minerals (MULTIVITAMIN WITH MINERALS) tablet, Take 1 tablet by mouth daily, Disp: , Rfl:   •  nortriptyline (PAMELOR) 10 mg capsule, take 1 capsule by mouth at bedtime FOR 2 WEEKS, THEN INCREASE TO 2 tablets nightly THEREAFTER, Disp: , Rfl:   •  OMEPRAZOLE PO, Take 20 mg by mouth daily, Disp: , Rfl:   •  predniSONE 10 mg tablet, Take six pills on day 1, take five pills on day 2, take four pills on day 3, take three pills on day 4, take two pills on day 5, take one pill on day 6, Disp: 21 tablet, Rfl: 0  •  traMADol (Ultram) 50 mg tablet, Take 1 tablet (50 mg total) by mouth every 6 (six) hours as needed for moderate pain for up to 10 doses, Disp: 10 tablet, Rfl: 0  •  VITAMIN D, CHOLECALCIFEROL, PO, daily, Disp: , Rfl:   •  ondansetron (Zofran ODT) 4 mg disintegrating tablet, Take 1 tablet (4 mg total) by mouth every 6 (six) hours as needed for nausea or vomiting, Disp: 20 tablet, Rfl: 0    Current Allergies     Allergies as of 09/18/2023 - Reviewed 09/18/2023   Allergen Reaction Noted   • Azithromycin GI Intolerance and Hives 05/03/2022   • Erythromycin Vomiting 07/02/2020   • Morphine Palpitations 07/02/2020            The following portions of the patient's history were reviewed and updated as appropriate: allergies, current medications, past family history, past medical history, past social history, past surgical history and problem list.     Past Medical History:   Diagnosis Date   • Heart murmur    • Hypertension    • Kidney stones    • Subdural hematoma ([address])    • Von Willebrand disease ([address])        Past Surgical History:   Procedure Laterality Date   • APPENDECTOMY     • BREAST LUMPECTOMY Left    • CARPAL TUNNEL RELEASE Bilateral    • CHOLECYSTECTOMY     • KNEE SURGERY Left    • PARTIAL HYSTERECTOMY     • ROTATOR CUFF REPAIR Left    • TONSILLECTOMY         Family History   Problem Relation Age of Onset   • Cancer Mother    • Allergies Father    • Cancer Sister    • Stroke Brother          Medications have been verified. Objective   /76   Pulse 61   Temp 98.1 °F (36.7 °C)   Resp 18   Ht 5' 6" (1.676 m)   Wt 106 kg (233 lb)   SpO2 94%   BMI 37.61 kg/m²        Physical Exam     Physical Exam  Constitutional:       General: She is not in acute distress. Appearance: Normal appearance. She is not ill-appearing. HENT:      Head: Normocephalic. Right Ear: Tympanic membrane, ear canal and external ear normal.      Left Ear: Tympanic membrane, ear canal and external ear normal.      Nose: No congestion. Mouth/Throat:      Mouth: Mucous membranes are moist.      Pharynx: Oropharynx is clear. Uvula midline. Posterior oropharyngeal erythema present. No pharyngeal swelling or oropharyngeal exudate. Tonsils: No tonsillar exudate or tonsillar abscesses. Cardiovascular:      Rate and Rhythm: Normal rate and regular rhythm. Pulses: Normal pulses.       Heart sounds: Normal heart sounds. Pulmonary:      Effort: Pulmonary effort is normal. No respiratory distress. Breath sounds: Normal breath sounds. No stridor. No wheezing, rhonchi or rales. Musculoskeletal:      Cervical back: Normal range of motion. No tenderness. Lymphadenopathy:      Cervical: Cervical adenopathy present. Skin:     General: Skin is warm and dry. Neurological:      General: No focal deficit present. Mental Status: She is alert and oriented to person, place, and time. Mental status is at baseline. Psychiatric:         Mood and Affect: Mood normal.         Behavior: Behavior normal.         Thought Content:  Thought content normal.         Judgment: Judgment normal.

## 2023-09-18 NOTE — PATIENT INSTRUCTIONS
Take steroids as prescribed  Rapid strep test completed and negative. Will send for culture and call patient if positive. Infection appears viral. Recommend symptomatic treatment. Can take ibuprofen or tylenol as needed for pain or fever. Over the counter cough and cold medications to help with symptoms. Use salt water gargles for sore throat and throat lozenges. Cough drops as needed. Wash hands frequently to prevent the spread of infection. If not improving over the next 7-10 days, follow up with PCP. Symptoms may persist for 10-14 days. To present to the ER if symptoms worsen.

## 2023-09-21 LAB — BACTERIA THROAT CULT: NORMAL

## 2023-11-01 ENCOUNTER — DOCTOR'S OFFICE (OUTPATIENT)
Dept: URBAN - NONMETROPOLITAN AREA CLINIC 1 | Facility: CLINIC | Age: 58
Setting detail: OPHTHALMOLOGY
End: 2023-11-01
Payer: COMMERCIAL

## 2023-11-01 DIAGNOSIS — H52.4: ICD-10-CM

## 2023-11-01 PROCEDURE — 92014 COMPRE OPH EXAM EST PT 1/>: CPT | Performed by: OPTOMETRIST

## 2023-11-01 ASSESSMENT — REFRACTION_AUTOREFRACTION
OD_AXIS: 111
OS_CYLINDER: -1.00
OS_AXIS: 102
OS_SPHERE: -2.25
OD_SPHERE: -2.25
OD_CYLINDER: -0.75

## 2023-11-01 ASSESSMENT — TONOMETRY
OS_IOP_MMHG: 17
OD_IOP_MMHG: 17

## 2023-11-01 ASSESSMENT — SPHEQUIV_DERIVED
OD_SPHEQUIV: -2.625
OS_SPHEQUIV: -2.75
OS_SPHEQUIV: -2.625
OD_SPHEQUIV: -2.625

## 2023-11-01 ASSESSMENT — REFRACTION_CURRENTRX
OS_CYLINDER: -0.50
OD_OVR_VA: 20/
OD_AXIS: 112
OS_AXIS: 82
OD_SPHERE: -2.50
OS_SPHERE: -2.00
OD_CYLINDER: -0.50
OS_OVR_VA: 20/
OD_VPRISM_DIRECTION: SV
OS_VPRISM_DIRECTION: SV

## 2023-11-01 ASSESSMENT — KERATOMETRY
OD_K1POWER_DIOPTERS: 45.00
OS_AXISANGLE_DEGREES: 080
OS_K1POWER_DIOPTERS: 44.50
OD_K2POWER_DIOPTERS: 45.00
OS_K2POWER_DIOPTERS: 45.25

## 2023-11-01 ASSESSMENT — REFRACTION_MANIFEST
OS_VA1: 20/20-2
OS_ADD: +2.50
OS_CYLINDER: -0.75
OS_VA2: 20/20-2
OD_CYLINDER: -0.75
OS_SPHERE: -2.25
OS_AXIS: 085
OD_AXIS: 115
OU_VA: 20/20
OD_VA1: 20/20-2
OD_SPHERE: -2.25
OD_ADD: +2.50
OD_VA2: 20/20-2

## 2023-11-01 ASSESSMENT — AXIALLENGTH_DERIVED
OD_AL: 24.0733
OS_AL: 24.1722
OS_AL: 24.1212
OD_AL: 24.0733

## 2023-11-01 ASSESSMENT — CONFRONTATIONAL VISUAL FIELD TEST (CVF)
OS_FINDINGS: FULL
OD_FINDINGS: FULL

## 2023-11-01 ASSESSMENT — VISUAL ACUITY
OD_BCVA: 20/25-2
OS_BCVA: 20/25+1

## 2023-11-07 ENCOUNTER — OPTICAL OFFICE (OUTPATIENT)
Dept: URBAN - NONMETROPOLITAN AREA CLINIC 4 | Facility: CLINIC | Age: 58
Setting detail: OPHTHALMOLOGY
End: 2023-11-07
Payer: COMMERCIAL

## 2023-11-07 DIAGNOSIS — H52.4: ICD-10-CM

## 2023-11-07 PROCEDURE — V2020 VISION SVCS FRAMES PURCHASES: HCPCS | Performed by: OPTOMETRIST

## 2023-11-07 PROCEDURE — V2750 ANTI-REFLECTIVE COATING: HCPCS | Mod: LT | Performed by: OPTOMETRIST

## 2023-11-07 PROCEDURE — V2103 SPHEROCYLINDR 4.00D/12-2.00D: HCPCS | Mod: LT | Performed by: OPTOMETRIST

## 2023-11-07 PROCEDURE — V2784 LENS POLYCARB OR EQUAL: HCPCS | Performed by: OPTOMETRIST

## 2023-11-07 PROCEDURE — V2103 SPHEROCYLINDR 4.00D/12-2.00D: HCPCS | Performed by: OPTOMETRIST

## 2023-11-07 PROCEDURE — V2784 LENS POLYCARB OR EQUAL: HCPCS | Mod: LT | Performed by: OPTOMETRIST

## 2023-11-07 PROCEDURE — V2750 ANTI-REFLECTIVE COATING: HCPCS | Performed by: OPTOMETRIST

## 2023-11-11 ENCOUNTER — HOSPITAL ENCOUNTER (OUTPATIENT)
Dept: RADIOLOGY | Facility: CLINIC | Age: 58
Discharge: HOME | End: 2023-11-11
Payer: COMMERCIAL

## 2023-11-11 ENCOUNTER — OFFICE VISIT (OUTPATIENT)
Dept: URGENT CARE | Facility: CLINIC | Age: 58
End: 2023-11-11
Payer: COMMERCIAL

## 2023-11-11 VITALS
OXYGEN SATURATION: 97 % | DIASTOLIC BLOOD PRESSURE: 90 MMHG | TEMPERATURE: 96.9 F | SYSTOLIC BLOOD PRESSURE: 140 MMHG | HEART RATE: 79 BPM | RESPIRATION RATE: 18 BRPM

## 2023-11-11 DIAGNOSIS — M25.571 ACUTE RIGHT ANKLE PAIN: Primary | ICD-10-CM

## 2023-11-11 DIAGNOSIS — M25.571 ACUTE RIGHT ANKLE PAIN: ICD-10-CM

## 2023-11-11 DIAGNOSIS — S93.401A SPRAIN OF RIGHT ANKLE, UNSPECIFIED LIGAMENT, INITIAL ENCOUNTER: ICD-10-CM

## 2023-11-11 PROCEDURE — 99213 OFFICE O/P EST LOW 20 MIN: CPT

## 2023-11-11 PROCEDURE — 73610 X-RAY EXAM OF ANKLE: CPT

## 2023-11-11 PROCEDURE — S9088 SERVICES PROVIDED IN URGENT: HCPCS

## 2023-11-11 PROCEDURE — 73630 X-RAY EXAM OF FOOT: CPT

## 2023-11-11 NOTE — PROGRESS NOTES
[facility]        NAME: Mindy Olmstead is a 60 y.o. female  : 1965    MRN: [id number]  DATE: 2023  TIME: 2:33 PM    Assessment and Plan   Acute right ankle pain [M25.571]  1. Acute right ankle pain  XR ankle 3+ vw right    CANCELED: XR foot 2 vw right    CANCELED: XR foot 2 vw right      2. Sprain of right ankle, unspecified ligament, initial encounter  Ambulatory Referral to Orthopedic Surgery        X-ray interpreted by myself. Heel spur No acute osseous abnormality. Pending radiology review. Orthopedic injury treatment    Date/Time: 2023 2:00 PM    Performed by: ANAY Estevez  Authorized by: ANAY Estevez    Patient Location:  Bedside    Injury location:  Ankle  Location details:  Right ankle  Injury type: Soft tissue  Neurovascular status: Neurovascularly intact    Distal perfusion: normal    Neurological function: normal    Range of motion: normal    Immobilization:  Brace  Neurovascular status: Neurovascularly intact    Distal perfusion: normal    Neurological function: normal    Range of motion: normal    Patient tolerance:  Patient tolerated the procedure well with no immediate complications         Patient Instructions   Keep the splint in place for support  Take ibuprofen as needed for pain  Apply ice as tolerated. Keep your ankle elevated. Follow-up with ortho    Follow up with PCP in 3-5 days. Proceed to  ER if symptoms worsen. Chief Complaint     Chief Complaint   Patient presents with    Ankle Pain     C/o right ankle pain/swelling after twisting it yesterday         History of Present Illness       Patient is [phone] presenting with right foot and ankle pain after she twisted it last night on her porch. Ankle Pain         Review of Systems   Review of Systems   Musculoskeletal:  Positive for arthralgias.          Current Medications       Current Outpatient Medications:     albuterol (2.5 mg/3 mL) 0.083 % nebulizer solution, Inhale 2.5 mg as needed, Disp: , Rfl:     albuterol (PROVENTIL HFA,VENTOLIN HFA) 90 mcg/act inhaler, as needed, Disp: , Rfl:     aminocaproic acid (AMICAR) 500 mg tablet, Take 500 mg by mouth as needed for bleeding , Disp: , Rfl:     amLODIPine (NORVASC) 5 mg tablet, Take 5 mg by mouth daily, Disp: , Rfl:     buPROPion (WELLBUTRIN SR) 150 mg 12 hr tablet, Take 150 mg by mouth 2 (two) times a day, Disp: , Rfl:     cetirizine (ZyrTEC) 10 mg tablet, Take 10 mg by mouth daily, Disp: , Rfl:     EPINEPHrine (EPIPEN) 0.3 mg/0.3 mL SOAJ, For a severe reaction: Inject in outer thigh following instructions on package and go to the Emergency room. , Disp: , Rfl:     fluticasone (FLONASE) 50 mcg/act nasal spray, daily at bedtime, Disp: , Rfl:     lisinopril (ZESTRIL) 20 mg tablet, Take 20 mg by mouth daily , Disp: , Rfl:     montelukast (SINGULAIR) 10 mg tablet, Take 10 mg by mouth daily at bedtime, Disp: , Rfl:     Multiple Vitamins-Minerals (MULTIVITAMIN WITH MINERALS) tablet, Take 1 tablet by mouth daily, Disp: , Rfl:     nortriptyline (PAMELOR) 10 mg capsule, take 1 capsule by mouth at bedtime FOR 2 WEEKS, THEN INCREASE TO 2 tablets nightly THEREAFTER, Disp: , Rfl:     OMEPRAZOLE PO, Take 20 mg by mouth daily, Disp: , Rfl:     ondansetron (Zofran ODT) 4 mg disintegrating tablet, Take 1 tablet (4 mg total) by mouth every 6 (six) hours as needed for nausea or vomiting, Disp: 20 tablet, Rfl: 0    predniSONE 10 mg tablet, Take six pills on day 1, take five pills on day 2, take four pills on day 3, take three pills on day 4, take two pills on day 5, take one pill on day 6, Disp: 21 tablet, Rfl: 0    traMADol (Ultram) 50 mg tablet, Take 1 tablet (50 mg total) by mouth every 6 (six) hours as needed for moderate pain for up to 10 doses, Disp: 10 tablet, Rfl: 0    VITAMIN D, CHOLECALCIFEROL, PO, daily, Disp: , Rfl:     Current Allergies     Allergies as of 11/11/2023 - Reviewed 11/11/2023   Allergen Reaction Noted    Azithromycin GI Intolerance and Hives 05/03/2022    Erythromycin Vomiting 07/02/2020    Morphine Palpitations 07/02/2020            The following portions of the patient's history were reviewed and updated as appropriate: allergies, current medications, past family history, past medical history, past social history, past surgical history and problem list.     Past Medical History:   Diagnosis Date    Heart murmur     Hypertension     Kidney stones     Subdural hematoma ([address])     Von Willebrand disease ([address])        Past Surgical History:   Procedure Laterality Date    APPENDECTOMY      BREAST LUMPECTOMY Left     CARPAL TUNNEL RELEASE Bilateral     CHOLECYSTECTOMY      KNEE SURGERY Left     PARTIAL HYSTERECTOMY      ROTATOR CUFF REPAIR Left     TONSILLECTOMY         Family History   Problem Relation Age of Onset    Cancer Mother     Allergies Father     Cancer Sister     Stroke Brother          Medications have been verified. Objective   /90   Pulse 79   Temp (!) 96.9 °F (36.1 °C)   Resp 18   SpO2 97%        Physical Exam     Physical Exam  Vitals and nursing note reviewed. Constitutional:       General: She is not in acute distress. Appearance: Normal appearance. She is normal weight. She is not ill-appearing or toxic-appearing. Musculoskeletal:      Right ankle: Swelling present. Tenderness present over the lateral malleolus and base of 5th metatarsal. Decreased range of motion. Right Achilles Tendon: Normal.      Right foot: Decreased range of motion. Swelling and tenderness present. No deformity. Neurological:      Mental Status: She is alert.

## 2023-11-11 NOTE — PATIENT INSTRUCTIONS
Keep the splint in place for support  Take ibuprofen as needed for pain  Apply ice as tolerated. Keep your ankle elevated.   Follow-up with ortho

## 2023-11-19 ENCOUNTER — OFFICE VISIT (OUTPATIENT)
Dept: URGENT CARE | Facility: CLINIC | Age: 58
End: 2023-11-19
Payer: COMMERCIAL

## 2023-11-19 VITALS
BODY MASS INDEX: 36.1 KG/M2 | WEIGHT: 230 LBS | DIASTOLIC BLOOD PRESSURE: 80 MMHG | TEMPERATURE: 97.3 F | RESPIRATION RATE: 18 BRPM | SYSTOLIC BLOOD PRESSURE: 148 MMHG | HEART RATE: 88 BPM | OXYGEN SATURATION: 97 % | HEIGHT: 67 IN

## 2023-11-19 DIAGNOSIS — U07.1 COVID-19: Primary | ICD-10-CM

## 2023-11-19 LAB
SARS-COV-2 AG UPPER RESP QL IA: POSITIVE
VALID CONTROL: ABNORMAL

## 2023-11-19 PROCEDURE — 99213 OFFICE O/P EST LOW 20 MIN: CPT

## 2023-11-19 PROCEDURE — S9088 SERVICES PROVIDED IN URGENT: HCPCS

## 2023-11-19 PROCEDURE — 87811 SARS-COV-2 COVID19 W/OPTIC: CPT

## 2023-11-19 RX ORDER — ONDANSETRON 4 MG/1
4 TABLET, FILM COATED ORAL EVERY 8 HOURS PRN
Qty: 10 TABLET | Refills: 0 | Status: SHIPPED | OUTPATIENT
Start: 2023-11-19

## 2023-11-19 NOTE — PROGRESS NOTES
[facility]        NAME: Aviva Navarrete is a 60 y.o. female  : 1965    MRN: [id number]  DATE: 2023  TIME: 1:12 PM    Assessment and Plan   COVID-19 [U07.1]  1. COVID-19  Poct Covid 19 Rapid Antigen Test    ondansetron (ZOFRAN) 4 mg tablet        Rapid POC COVID testing positive. Follow CDC guidelines for isolation/quarantine. Zofran PRN for nausea. Encouraged continued supportive measures. Paxlovid therapy deferred as no recent laboratory studies or eGFR. Follow up with PCP in 3-5 days or proceed to emergency department for worsening symptoms. Patient verbalized understanding of instructions given. Patient Instructions     Patient Instructions   Rapid POC COVID testing positive  Follow CDC guidelines for isolation/quarantine  Continue with supportive measures, OTC Tylenol, nasal decongestants, and cough suppressants   Cool mist humidifiers, throat lozenges, increased fluid intake and rest   Follow up with PCP in 3-5 days  Present to ER if symptoms worsen     COVID-19 (Coronavirus Disease 2019)   AMBULATORY CARE:   What you need to know about COVID-19:  COVID-19 is the disease caused by a coronavirus first discovered in 2019. The virus can be spread starting 2 to 3 days before symptoms begin. The virus has changed into several new forms (called variants) since it was discovered. A variant may be more easily spread or cause more severe illness than the original form. Signs and symptoms of COVID-19  Signs and symptoms usually start about 5 days after infection but can take 2 to 14 days. You may feel like you have the flu or a bad cold. Some signs and symptoms go away in a few days. Others can last weeks, months, or possibly years.  You may have any of the following:  A cough or sore throat    Shortness of breath or trouble breathing that may become severe    A fever, possibly with chills and shaking    Muscle pain, body aches, or a headache    Sudden changes or loss of your taste or smell    Feeling mentally and physically tired (fatigue)    Congestion (stuffy head and nose), or a runny nose    Diarrhea, nausea, or vomiting    Call your local emergency number (911 in the [address]) if:   You have trouble breathing or shortness of breath at rest.    You have chest pain or pressure that lasts longer than 5 minutes. You become confused or hard to wake. Seek care immediately if:   The skin on your face, fingers, or toes look blue or darker than usual.      Call your doctor if:   You have a fever. You have questions or concerns about your condition or care. How COVID-19 is diagnosed: Your healthcare provider will examine you and ask about your symptoms. Tell your provider if you have any health conditions or are taking any medicines. Your provider can help you create a COVID-19 plan if you are at high risk for severe illness. Any of the following tests may be used:  A viral test  shows if you have a current infection. Some tests are available for you to do at home. Most use a nasal swab and give the result within 15 minutes. Your provider may want you to keep a supply of tests at home as part of your COVID-19 plan. Testing sites may also be available. A sample is taken from your nose or throat with a swab. You may need to quarantine until you get the results from a testing site. An antigen test  shows if you have a protein from the COVID-19 virus. This test is often called a rapid test because the results are available in 30 minutes or less. An antibody test  shows if you had a recent or past infection. Blood samples are used for this test. Antibodies are made by your immune system to fight the virus that causes COVID-19. Antibodies form 1 to 3 weeks after you are infected. This test is not used to show if you are immune to the virus. A CT, MRI, ultrasound, or x-ray  may show complications of NVIYY-40, such as pneumonia or blood clots.     Treatment  may include any of the following:  Mild symptoms  may get better on their own. Some treatments have emergency use authorization (EUA). Examples include monoclonal antibodies and convalescent plasma. These may be given to help prevent worsening of your symptoms. You may also need any of the following:    Decongestants  help reduce nasal congestion and help you breathe more easily. If you take decongestant pills, they may make you feel restless or cause problems with your sleep. Do not use decongestant sprays for more than a few days. Cough suppressants  help reduce coughing. Ask your healthcare provider which type of cough medicine is best for you. To soothe a sore throat,  gargle with warm salt water, or use throat lozenges or a throat spray. Drink more liquids to thin and loosen mucus and to prevent dehydration. NSAIDs or acetaminophen  can help lower a fever and relieve body aches or a headache. Follow directions. If not taken correctly, NSAIDs can cause kidney damage and acetaminophen can cause liver damage. Severe or life-threatening symptoms  are treated in the hospital. You may need any of the following:     Medicines  may be given to fight the virus or treat inflammation. Blood thinners  help prevent or treat blood clots. If you have a deep vein thrombosis (DVT) or pulmonary embolism (PE), you may need to use blood thinners for at least 3 months. Extra oxygen  may be given if you have respiratory failure. This means your lungs cannot get enough oxygen into your blood and out to your organs. A ventilator  may be used to help you breathe. What you need to know about long COVID:  Lalo Munda is a term to describe health problems the virus may cause. Certain conditions increase your risk for long COVID. Your risk is higher if you are 72 or older or a current or former smoker. A weak immune system, obesity, diabetes, or kidney, heart, or lung disease can also increase your risk.     Long COVID may cause severe or chronic health problems. The most common problem is trouble thinking, remembering, or concentrating. You may also develop shortness of breath or a serious respiratory condition such as pneumonia. Blood clots may form and lead to organ damage. You may have nerve pain, trouble sleeping, or mood changes. COVID-19 can lead to severe inflammation in your organs. This is also called multisymptom inflammatory syndrome in adults (MIS-A) or MIS-C in children. Inflammation may affect the heart, digestive system, skin, or brain. What you need to know about COVID-19 vaccines:  Healthcare providers recommend vaccination, even if you already had COVID-19. Get a COVID-19 vaccine as directed. Vaccination is recommended for everyone 6 months or older. COVID-19 vaccines are given as a shot in 1 to 3 doses as a primary series. This depends on the vaccine brand and the age of the person who receives it. A booster dose is recommended for everyone 5 years or older after the primary series is complete. A second booster  is recommended for all adults 48 or older and for immunocompromised adolescents. The second booster is also recommended for anyone who got the 1-dose brand of vaccine for the first dose and a booster. Your provider can give you more information on boosters and help you schedule all needed doses. Continue to protect yourself and others. You can become infected even after you get the vaccine. You may also be able to pass the virus to others without knowing you are infected. After you get the vaccine, check local, national, and international travel rules. You may need to be tested before you travel. Some countries require proof of a negative test before you travel. You may also need to quarantine after you return. Medicine may be given to prevent infection. The medicine can be given if you are at high risk for infection and cannot get the vaccine.  It can also be given if your immune system does not respond well to the vaccine. How the 2019 coronavirus spreads:  Close personal contact with an infected person increases your risk for infection. This means being within 6 feet (2 meters) of the person for at least 15 minutes over 24 hours. The virus travels in droplets that form when a person talks, sings, coughs, or sneezes. The droplets can also float in the air for minutes or hours. Infection happens when you breathe in the droplets or get them in your eyes or nose. Person-to-person contact can spread the virus. For example, a person with the virus on his or her hands can spread it by shaking hands with someone. The virus can stay on objects and surfaces for hours to days. You may become infected by touching the object or surface and then touching your eyes or mouth. Help lower your risk for COVID-19 during an active outbreak:   Stay home if you are sick or think you may have COVID-19. It is important to stay home if you are waiting for a testing appointment or for test results. Wash your hands often throughout the day. Use soap and water. Rub your soapy hands together, lacing your fingers, for at least 20 seconds. Rinse with warm, running water. Dry your hands with a clean towel or paper towel. Use hand  that contains alcohol if soap and water are not available. Teach children how to wash their hands and use hand . Cover sneezes and coughs. Turn your face away and cover your mouth and nose with a tissue. Throw the tissue away. Use the bend of your arm if a tissue is not available. Then wash your hands well with soap and water or use hand . Teach children how to cover a cough or sneeze. Wear a face covering (mask) when needed. Use a cloth covering with at least 2 layers. You can also create layers by putting a cloth covering over a disposable non-medical mask. Cover your mouth and your nose.          Try to keep space between you and others when you are out of the house. Avoid crowds as much as possible. Wear a face covering when you must be around a large group and cannot keep space between you and others. Clean and disinfect high-touch surfaces and objects often. Use disinfecting wipes, or make a solution of 4 teaspoons of bleach in 1 quart (4 cups) of water. Ask about other vaccines you may need. Get the influenza (flu) vaccine as soon as recommended each year, usually starting in September or October. Get the pneumonia vaccine if recommended. Your healthcare provider can tell you if you should also get other vaccines, and when to get them. Follow up with your doctor as directed:  Write down your questions so you remember to ask them during your visits. For more information:   [facility] [employer] [facility] Prevention  [address] [place] [address]  [facility]  Phone: [phone] [phone]  Web Address: [employer]    © Copyright [facility] 2023 Information is for End User's use only and may not be sold, redistributed or otherwise used for commercial purposes. The above information is an  only. It is not intended as medical advice for individual conditions or treatments. Talk to your doctor, nurse or pharmacist before following any medical regimen to see if it is safe and effective for you. Chief Complaint     Chief Complaint   Patient presents with    Possible virus     C/o nausea and vomiting associated with headache and fever, cough, nasal congestion. Onset 2 days ago. No taste. History of Present Illness       58-year-old female with a past medical history significant for hypertension, von Willebrand's disease, and asthma presents with complaints of headache, fever, nasal congestion, cough, nausea, and body aches x3 days. Also reporting loss of taste. No known sick contacts or exposures. Decreased appetite but no vomiting or diarrhea.         Review of Systems   Review of Systems   Constitutional: Positive for chills and fever. HENT:  Positive for congestion and rhinorrhea. Negative for ear discharge, ear pain, sore throat, trouble swallowing and voice change. Eyes:  Negative for discharge. Respiratory:  Positive for cough. Negative for shortness of breath and wheezing. Cardiovascular:  Negative for chest pain. Gastrointestinal:  Positive for nausea. Negative for abdominal pain, diarrhea and vomiting. Musculoskeletal:  Positive for myalgias. Skin:  Negative for rash. Neurological:  Positive for headaches.          Current Medications       Current Outpatient Medications:     albuterol (2.5 mg/3 mL) 0.083 % nebulizer solution, Inhale 2.5 mg as needed, Disp: , Rfl:     albuterol (PROVENTIL HFA,VENTOLIN HFA) 90 mcg/act inhaler, as needed, Disp: , Rfl:     amLODIPine (NORVASC) 5 mg tablet, Take 5 mg by mouth daily, Disp: , Rfl:     buPROPion (WELLBUTRIN SR) 150 mg 12 hr tablet, Take 150 mg by mouth 2 (two) times a day, Disp: , Rfl:     cetirizine (ZyrTEC) 10 mg tablet, Take 10 mg by mouth daily, Disp: , Rfl:     fluticasone (FLONASE) 50 mcg/act nasal spray, daily at bedtime, Disp: , Rfl:     lisinopril (ZESTRIL) 20 mg tablet, Take 20 mg by mouth daily , Disp: , Rfl:     montelukast (SINGULAIR) 10 mg tablet, Take 10 mg by mouth daily at bedtime, Disp: , Rfl:     Multiple Vitamins-Minerals (MULTIVITAMIN WITH MINERALS) tablet, Take 1 tablet by mouth daily, Disp: , Rfl:     nortriptyline (PAMELOR) 10 mg capsule, take 1 capsule by mouth at bedtime FOR 2 WEEKS, THEN INCREASE TO 2 tablets nightly THEREAFTER, Disp: , Rfl:     OMEPRAZOLE PO, Take 20 mg by mouth daily, Disp: , Rfl:     ondansetron (ZOFRAN) 4 mg tablet, Take 1 tablet (4 mg total) by mouth every 8 (eight) hours as needed for nausea or vomiting, Disp: 10 tablet, Rfl: 0    aminocaproic acid (AMICAR) 500 mg tablet, Take 500 mg by mouth as needed for bleeding  (Patient not taking: Reported on 11/19/2023), Disp: , Rfl:     EPINEPHrine (EPIPEN) 0.3 mg/0.3 mL SOAJ, For a severe reaction: Inject in outer thigh following instructions on package and go to the Emergency room. (Patient not taking: Reported on 11/19/2023), Disp: , Rfl:     predniSONE 10 mg tablet, Take six pills on day 1, take five pills on day 2, take four pills on day 3, take three pills on day 4, take two pills on day 5, take one pill on day 6 (Patient not taking: Reported on 11/19/2023), Disp: 21 tablet, Rfl: 0    traMADol (Ultram) 50 mg tablet, Take 1 tablet (50 mg total) by mouth every 6 (six) hours as needed for moderate pain for up to 10 doses (Patient not taking: Reported on 11/19/2023), Disp: 10 tablet, Rfl: 0    VITAMIN D, CHOLECALCIFEROL, PO, daily (Patient not taking: Reported on 11/19/2023), Disp: , Rfl:     Current Allergies     Allergies as of 11/19/2023 - Reviewed 11/19/2023   Allergen Reaction Noted    Azithromycin GI Intolerance and Hives 05/03/2022    Erythromycin Vomiting 07/02/2020    Morphine Palpitations 07/02/2020            The following portions of the patient's history were reviewed and updated as appropriate: allergies, current medications, past family history, past medical history, past social history, past surgical history and problem list.     Past Medical History:   Diagnosis Date    Breast cancer ([address])     Cancer ([address])     Heart murmur     Hypertension     Kidney stones     Subdural hematoma ([address])     Von Willebrand disease ([address])        Past Surgical History:   Procedure Laterality Date    APPENDECTOMY      BREAST LUMPECTOMY Left     CARPAL TUNNEL RELEASE Bilateral     CHOLECYSTECTOMY      KNEE SURGERY Left     PARTIAL HYSTERECTOMY      ROTATOR CUFF REPAIR Left     TONSILLECTOMY         Family History   Problem Relation Age of Onset    Cancer Mother     Allergies Father     Cancer Sister     Stroke Brother          Medications have been verified.         Objective   /80   Pulse 88   Temp (!) 97.3 °F (36.3 °C)   Resp 18   Ht 5' 7" (1.702 m)   Wt 104 kg (230 lb)   SpO2 97%   BMI 36.02 kg/m²   No LMP recorded. Patient has had a hysterectomy. Physical Exam     Physical Exam  Vitals and nursing note reviewed. Constitutional:       General: She is not in acute distress. Appearance: She is not toxic-appearing. HENT:      Head: Normocephalic. Right Ear: Tympanic membrane, ear canal and external ear normal.      Left Ear: Tympanic membrane, ear canal and external ear normal.      Nose: Congestion present. Mouth/Throat:      Mouth: Mucous membranes are moist.      Pharynx: Oropharynx is clear. Eyes:      Conjunctiva/sclera: Conjunctivae normal.   Cardiovascular:      Rate and Rhythm: Normal rate and regular rhythm. Heart sounds: Normal heart sounds. Pulmonary:      Effort: Pulmonary effort is normal. No respiratory distress. Breath sounds: Normal breath sounds. No stridor. No wheezing, rhonchi or rales. Lymphadenopathy:      Cervical: No cervical adenopathy. Skin:     General: Skin is warm and dry. Neurological:      Mental Status: She is alert and oriented to person, place, and time. Gait: Gait is intact.    Psychiatric:         Mood and Affect: Mood normal.         Behavior: Behavior normal.

## 2023-11-19 NOTE — PATIENT INSTRUCTIONS
Rapid POC COVID testing positive  Follow CDC guidelines for isolation/quarantine  Continue with supportive measures, OTC Tylenol, nasal decongestants, and cough suppressants   Cool mist humidifiers, throat lozenges, increased fluid intake and rest   Follow up with PCP in 3-5 days  Present to ER if symptoms worsen     COVID-19 (Coronavirus Disease 2019)   AMBULATORY CARE:   What you need to know about COVID-19:  COVID-19 is the disease caused by a coronavirus first discovered in December 2019. The virus can be spread starting 2 to 3 days before symptoms begin. The virus has changed into several new forms (called variants) since it was discovered. A variant may be more easily spread or cause more severe illness than the original form. Signs and symptoms of COVID-19  Signs and symptoms usually start about 5 days after infection but can take 2 to 14 days. You may feel like you have the flu or a bad cold. Some signs and symptoms go away in a few days. Others can last weeks, months, or possibly years. You may have any of the following:  A cough or sore throat    Shortness of breath or trouble breathing that may become severe    A fever, possibly with chills and shaking    Muscle pain, body aches, or a headache    Sudden changes or loss of your taste or smell    Feeling mentally and physically tired (fatigue)    Congestion (stuffy head and nose), or a runny nose    Diarrhea, nausea, or vomiting    Call your local emergency number (911 in the [address]) if:   You have trouble breathing or shortness of breath at rest.    You have chest pain or pressure that lasts longer than 5 minutes. You become confused or hard to wake. Seek care immediately if:   The skin on your face, fingers, or toes look blue or darker than usual.      Call your doctor if:   You have a fever. You have questions or concerns about your condition or care. How COVID-19 is diagnosed:   Your healthcare provider will examine you and ask about your symptoms. Tell your provider if you have any health conditions or are taking any medicines. Your provider can help you create a COVID-19 plan if you are at high risk for severe illness. Any of the following tests may be used:  A viral test  shows if you have a current infection. Some tests are available for you to do at home. Most use a nasal swab and give the result within 15 minutes. Your provider may want you to keep a supply of tests at home as part of your COVID-19 plan. Testing sites may also be available. A sample is taken from your nose or throat with a swab. You may need to quarantine until you get the results from a testing site. An antigen test  shows if you have a protein from the COVID-19 virus. This test is often called a rapid test because the results are available in 30 minutes or less. An antibody test  shows if you had a recent or past infection. Blood samples are used for this test. Antibodies are made by your immune system to fight the virus that causes COVID-19. Antibodies form 1 to 3 weeks after you are infected. This test is not used to show if you are immune to the virus. A CT, MRI, ultrasound, or x-ray  may show complications of FFLBP-13, such as pneumonia or blood clots. Treatment  may include any of the following:  Mild symptoms  may get better on their own. Some treatments have emergency use authorization (EUA). Examples include monoclonal antibodies and convalescent plasma. These may be given to help prevent worsening of your symptoms. You may also need any of the following:    Decongestants  help reduce nasal congestion and help you breathe more easily. If you take decongestant pills, they may make you feel restless or cause problems with your sleep. Do not use decongestant sprays for more than a few days. Cough suppressants  help reduce coughing. Ask your healthcare provider which type of cough medicine is best for you.     To soothe a sore throat,  gargle with warm salt water, or use throat lozenges or a throat spray. Drink more liquids to thin and loosen mucus and to prevent dehydration. NSAIDs or acetaminophen  can help lower a fever and relieve body aches or a headache. Follow directions. If not taken correctly, NSAIDs can cause kidney damage and acetaminophen can cause liver damage. Severe or life-threatening symptoms  are treated in the hospital. You may need any of the following:     Medicines  may be given to fight the virus or treat inflammation. Blood thinners  help prevent or treat blood clots. If you have a deep vein thrombosis (DVT) or pulmonary embolism (PE), you may need to use blood thinners for at least 3 months. Extra oxygen  may be given if you have respiratory failure. This means your lungs cannot get enough oxygen into your blood and out to your organs. A ventilator  may be used to help you breathe. What you need to know about long COVID:  [place] is a term to describe health problems the virus may cause. Certain conditions increase your risk for long COVID. Your risk is higher if you are 72 or older or a current or former smoker. A weak immune system, obesity, diabetes, or kidney, heart, or lung disease can also increase your risk. Long COVID may cause severe or chronic health problems. The most common problem is trouble thinking, remembering, or concentrating. You may also develop shortness of breath or a serious respiratory condition such as pneumonia. Blood clots may form and lead to organ damage. You may have nerve pain, trouble sleeping, or mood changes. COVID-19 can lead to severe inflammation in your organs. This is also called multisymptom inflammatory syndrome in adults (MIS-A) or MIS-C in children. Inflammation may affect the heart, digestive system, skin, or brain. What you need to know about COVID-19 vaccines:  Healthcare providers recommend vaccination, even if you already had COVID-19.   Get a COVID-19 vaccine as directed. Vaccination is recommended for everyone 6 months or older. COVID-19 vaccines are given as a shot in 1 to 3 doses as a primary series. This depends on the vaccine brand and the age of the person who receives it. A booster dose is recommended for everyone 5 years or older after the primary series is complete. A second booster  is recommended for all adults 48 or older and for immunocompromised adolescents. The second booster is also recommended for anyone who got the 1-dose brand of vaccine for the first dose and a booster. Your provider can give you more information on boosters and help you schedule all needed doses. Continue to protect yourself and others. You can become infected even after you get the vaccine. You may also be able to pass the virus to others without knowing you are infected. After you get the vaccine, check local, national, and international travel rules. You may need to be tested before you travel. Some countries require proof of a negative test before you travel. You may also need to quarantine after you return. Medicine may be given to prevent infection. The medicine can be given if you are at high risk for infection and cannot get the vaccine. It can also be given if your immune system does not respond well to the vaccine. How the 2019 coronavirus spreads:  Close personal contact with an infected person increases your risk for infection. This means being within 6 feet (2 meters) of the person for at least 15 minutes over 24 hours. The virus travels in droplets that form when a person talks, sings, coughs, or sneezes. The droplets can also float in the air for minutes or hours. Infection happens when you breathe in the droplets or get them in your eyes or nose. Person-to-person contact can spread the virus. For example, a person with the virus on his or her hands can spread it by shaking hands with someone.     The virus can stay on objects and surfaces for hours to days. You may become infected by touching the object or surface and then touching your eyes or mouth. Help lower your risk for COVID-19 during an active outbreak:   Stay home if you are sick or think you may have COVID-19. It is important to stay home if you are waiting for a testing appointment or for test results. Wash your hands often throughout the day. Use soap and water. Rub your soapy hands together, lacing your fingers, for at least 20 seconds. Rinse with warm, running water. Dry your hands with a clean towel or paper towel. Use hand  that contains alcohol if soap and water are not available. Teach children how to wash their hands and use hand . Cover sneezes and coughs. Turn your face away and cover your mouth and nose with a tissue. Throw the tissue away. Use the bend of your arm if a tissue is not available. Then wash your hands well with soap and water or use hand . Teach children how to cover a cough or sneeze. Wear a face covering (mask) when needed. Use a cloth covering with at least 2 layers. You can also create layers by putting a cloth covering over a disposable non-medical mask. Cover your mouth and your nose. Try to keep space between you and others when you are out of the house. Avoid crowds as much as possible. Wear a face covering when you must be around a large group and cannot keep space between you and others. Clean and disinfect high-touch surfaces and objects often. Use disinfecting wipes, or make a solution of 4 teaspoons of bleach in 1 quart (4 cups) of water. Ask about other vaccines you may need. Get the influenza (flu) vaccine as soon as recommended each year, usually starting in September or October. Get the pneumonia vaccine if recommended. Your healthcare provider can tell you if you should also get other vaccines, and when to get them.        Follow up with your doctor as directed:  Write down your questions so you remember to ask them during your visits. For more information:   [facility]  [address]  [place]  Phone: 3- 226 - [phone]  Web Address: [employer]    © Copyright [employer] 2023 Information is for End User's use only and may not be sold, redistributed or otherwise used for commercial purposes. The above information is an  only. It is not intended as medical advice for individual conditions or treatments. Talk to your doctor, nurse or pharmacist before following any medical regimen to see if it is safe and effective for you.

## 2024-01-31 ENCOUNTER — ANESTHESIA EVENT (OUTPATIENT)
Dept: PERIOP | Facility: HOSPITAL | Age: 59
DRG: 580 | End: 2024-01-31
Payer: COMMERCIAL

## 2024-01-31 RX ORDER — FLUTICASONE PROPIONATE AND SALMETEROL XINAFOATE 115; 21 UG/1; UG/1
2 AEROSOL, METERED RESPIRATORY (INHALATION) 2 TIMES DAILY
COMMUNITY

## 2024-01-31 RX ORDER — PRAZOSIN HYDROCHLORIDE 2 MG/1
2 CAPSULE ORAL
COMMUNITY

## 2024-01-31 RX ORDER — TRAZODONE HYDROCHLORIDE 300 MG/1
300 TABLET ORAL
COMMUNITY

## 2024-01-31 RX ORDER — CHLORTHALIDONE 25 MG/1
25 TABLET ORAL DAILY
COMMUNITY

## 2024-01-31 RX ORDER — ACETAMINOPHEN 500 MG
1000 TABLET ORAL EVERY 6 HOURS PRN
COMMUNITY
End: 2024-02-07

## 2024-01-31 RX ORDER — BUSPIRONE HYDROCHLORIDE 15 MG/1
15 TABLET ORAL 3 TIMES DAILY PRN
COMMUNITY

## 2024-01-31 RX ORDER — QUETIAPINE FUMARATE 200 MG/1
200 TABLET, FILM COATED ORAL
COMMUNITY

## 2024-01-31 RX ORDER — ATORVASTATIN CALCIUM 40 MG/1
40 TABLET, FILM COATED ORAL DAILY
COMMUNITY

## 2024-01-31 NOTE — PRE-PROCEDURE INSTRUCTIONS
Pre-Surgery Instructions:   Medication Instructions    acetaminophen (TYLENOL) 500 mg tablet Uses PRN- OK to take day of surgery    albuterol (2.5 mg/3 mL) 0.083 % nebulizer solution Uses PRN- OK to take day of surgery    albuterol (PROVENTIL HFA,VENTOLIN HFA) 90 mcg/act inhaler Uses PRN- OK to take day of surgery bring on day of surgery    atorvastatin (LIPITOR) 40 mg tablet Take day of surgery.    buPROPion (WELLBUTRIN SR) 150 mg 12 hr tablet Take day of surgery.    busPIRone (BUSPAR) 15 mg tablet Uses PRN- OK to take day of surgery    cetirizine (ZyrTEC) 10 mg tablet Take night before surgery    chlorthalidone 25 mg tablet Hold day of surgery.    fluticasone (FLONASE) 50 mcg/act nasal spray Take night before surgery    fluticasone-salmeterol (ADVAIR HFA) 115-21 MCG/ACT inhaler Take day of surgery.    lisinopril (ZESTRIL) 20 mg tablet Hold day of surgery.    montelukast (SINGULAIR) 10 mg tablet Take night before surgery    Multiple Vitamins-Minerals (MULTIVITAMIN WITH MINERALS) tablet Stop taking 7 days prior to surgery.    nortriptyline (PAMELOR) 10 mg capsule Take night before surgery    OMEPRAZOLE PO Take day of surgery.    ondansetron (ZOFRAN) 4 mg tablet Uses PRN- OK to take day of surgery    prazosin (MINIPRESS) 2 mg capsule Take night before surgery    QUEtiapine (SEROquel) 200 mg tablet Take night before surgery    traZODone (DESYREL) 300 MG tablet Take night before surgery   Medication instructions for day surgery reviewed. Please use only a sip of water to take your instructed medications. Avoid all over the counter vitamins, supplements and NSAIDS for one week prior to surgery per anesthesia guidelines. Tylenol is ok to take as needed.     You will receive a call one business day prior to surgery with an arrival time and hospital directions. If your surgery is scheduled on a Monday, the hospital will be calling you on the Friday prior to your surgery. If you have not heard from anyone by 8pm, please  call the hospital supervisor through the hospital  at [phone]. ([place] [phone]).    Do not eat or drink anything after midnight the night before your surgery, including candy, mints, lifesavers, or chewing gum. Do not drink alcohol 24hrs before your surgery. Try not to smoke at least 24hrs before your surgery.       Follow the pre surgery showering instructions as listed in the “My Surgical Experience Booklet” or otherwise provided by your surgeon's office. Do not use a blade to shave the surgical area 1 week before surgery. It is okay to use a clean electric clippers up to 24 hours before surgery. Do not apply any lotions, creams, including makeup, cologne, deodorant, or perfumes after showering on the day of your surgery. Do not use dry shampoo, hair spray, hair gel, or any type of hair products.     No contact lenses, eye make-up, or artificial eyelashes. Remove nail polish, including gel polish, and any artificial, gel, or acrylic nails if possible. Remove all jewelry including rings and body piercing jewelry.     Wear causal clothing that is easy to take on and off. Consider your type of surgery.    Keep any valuables, jewelry, piercings at home. Please bring any specially ordered equipment (sling, braces) if indicated.    Arrange for a responsible person to drive you to and from the hospital on the day of your surgery. Visitor Guidelines discussed.     Call the surgeon's office with any new illnesses, exposures, or additional questions prior to surgery.    Please reference your “My Surgical Experience Booklet” for additional information to prepare for your upcoming surgery.

## 2024-02-05 RX ORDER — ISOSULFAN BLUE 50 MG/5ML
4 INJECTION, SOLUTION SUBCUTANEOUS ONCE
Status: CANCELLED | OUTPATIENT
Start: 2024-02-05 | End: 2024-02-05

## 2024-02-05 NOTE — ANESTHESIA PREPROCEDURE EVALUATION
Procedure:  BREAST MASTECTOMY (Bilateral: Breast)  AXILLARY SENTINEL NODE BIOPSY (NUC MED INJECTION AT 0730) (Right: Axilla)    Relevant Problems   CARDIO   (+) Chest pain   (+) Essential hypertension      HEMATOLOGY   (+) Von Willebrand disease (HCC)      NEURO/PSYCH   (+) Numbness and tingling in left arm      PULMONARY   (+) Mild intermittent asthma without complication        Physical Exam    Airway    Mallampati score: II  TM Distance: >3 FB  Neck ROM: full     Dental   No notable dental hx     Cardiovascular  Rhythm: regular, Rate: normal    Pulmonary   Breath sounds clear to auscultation    Other Findings  post-pubertal.      Anesthesia Plan  ASA Score- 3     Anesthesia Type- general with ASA Monitors.         Additional Monitors:     Airway Plan:     Comment: Pec blocks.       Plan Factors-Exercise tolerance (METS): >4 METS.    Chart reviewed. EKG reviewed. Imaging results reviewed. Existing labs reviewed. Patient summary reviewed.    Patient is not a current smoker. Patient not instructed to abstain from smoking on day of procedure. Patient did not smoke on day of surgery.    Obstructive sleep apnea risk education given perioperatively.        Induction-     Postoperative Plan-     Informed Consent- Anesthetic plan and risks discussed with patient.  I personally reviewed this patient with the CRNA. Discussed and agreed on the Anesthesia Plan with the CRNA..

## 2024-02-06 ENCOUNTER — HOSPITAL ENCOUNTER (INPATIENT)
Facility: HOSPITAL | Age: 59
LOS: 1 days | Discharge: HOME | DRG: 580 | End: 2024-02-07
Attending: STUDENT IN AN ORGANIZED HEALTH CARE EDUCATION/TRAINING PROGRAM | Admitting: STUDENT IN AN ORGANIZED HEALTH CARE EDUCATION/TRAINING PROGRAM
Payer: COMMERCIAL

## 2024-02-06 ENCOUNTER — HOSPITAL ENCOUNTER (OUTPATIENT)
Dept: NUCLEAR MEDICINE | Facility: HOSPITAL | Age: 59
Discharge: HOME | DRG: 580 | End: 2024-02-06
Payer: COMMERCIAL

## 2024-02-06 ENCOUNTER — ANESTHESIA (OUTPATIENT)
Dept: PERIOP | Facility: HOSPITAL | Age: 59
DRG: 580 | End: 2024-02-06
Payer: COMMERCIAL

## 2024-02-06 DIAGNOSIS — Z17.0 ESTROGEN RECEPTOR POSITIVE STATUS (ER+): ICD-10-CM

## 2024-02-06 DIAGNOSIS — C50.411 MALIGNANT NEOPLASM OF UPPER-OUTER QUADRANT OF RIGHT FEMALE BREAST (HCC): ICD-10-CM

## 2024-02-06 DIAGNOSIS — C50.911 MALIGNANT NEOPLASM OF UNSPECIFIED SITE OF RIGHT FEMALE BREAST (HCC): ICD-10-CM

## 2024-02-06 DIAGNOSIS — C50.111 MALIGNANT NEOPLASM OF CENTRAL PORTION OF RIGHT BREAST IN FEMALE, ESTROGEN RECEPTOR POSITIVE: Primary | ICD-10-CM

## 2024-02-06 DIAGNOSIS — Z15.01 GENETIC SUSCEPTIBILITY TO MALIGNANT NEOPLASM OF BREAST: ICD-10-CM

## 2024-02-06 DIAGNOSIS — Z17.0 MALIGNANT NEOPLASM OF CENTRAL PORTION OF RIGHT BREAST IN FEMALE, ESTROGEN RECEPTOR POSITIVE: Primary | ICD-10-CM

## 2024-02-06 PROCEDURE — 88342 IMHCHEM/IMCYTCHM 1ST ANTB: CPT | Performed by: PATHOLOGY

## 2024-02-06 PROCEDURE — A9541 TC99M SULFUR COLLOID: HCPCS

## 2024-02-06 PROCEDURE — 0HTV0ZZ RESECTION OF BILATERAL BREAST, OPEN APPROACH: ICD-10-PCS | Performed by: STUDENT IN AN ORGANIZED HEALTH CARE EDUCATION/TRAINING PROGRAM

## 2024-02-06 PROCEDURE — 38792 RA TRACER ID OF SENTINL NODE: CPT

## 2024-02-06 PROCEDURE — 07B50ZX EXCISION OF RIGHT AXILLARY LYMPHATIC, OPEN APPROACH, DIAGNOSTIC: ICD-10-PCS | Performed by: STUDENT IN AN ORGANIZED HEALTH CARE EDUCATION/TRAINING PROGRAM

## 2024-02-06 PROCEDURE — 88307 TISSUE EXAM BY PATHOLOGIST: CPT | Performed by: PATHOLOGY

## 2024-02-06 PROCEDURE — 88341 IMHCHEM/IMCYTCHM EA ADD ANTB: CPT | Performed by: PATHOLOGY

## 2024-02-06 RX ORDER — NORTRIPTYLINE HYDROCHLORIDE 10 MG/1
10 CAPSULE ORAL
Status: DISCONTINUED | OUTPATIENT
Start: 2024-02-06 | End: 2024-02-07 | Stop reason: HOSPADM

## 2024-02-06 RX ORDER — HYDROMORPHONE HCL IN WATER/PF 6 MG/30 ML
0.2 PATIENT CONTROLLED ANALGESIA SYRINGE INTRAVENOUS
Status: DISCONTINUED | OUTPATIENT
Start: 2024-02-06 | End: 2024-02-06 | Stop reason: HOSPADM

## 2024-02-06 RX ORDER — KETOROLAC TROMETHAMINE 30 MG/ML
INJECTION, SOLUTION INTRAMUSCULAR; INTRAVENOUS AS NEEDED
Status: DISCONTINUED | OUTPATIENT
Start: 2024-02-06 | End: 2024-02-06

## 2024-02-06 RX ORDER — ONDANSETRON 2 MG/ML
4 INJECTION INTRAMUSCULAR; INTRAVENOUS EVERY 6 HOURS PRN
Status: DISCONTINUED | OUTPATIENT
Start: 2024-02-06 | End: 2024-02-07 | Stop reason: HOSPADM

## 2024-02-06 RX ORDER — MAGNESIUM HYDROXIDE 1200 MG/15ML
LIQUID ORAL AS NEEDED
Status: DISCONTINUED | OUTPATIENT
Start: 2024-02-06 | End: 2024-02-06 | Stop reason: HOSPADM

## 2024-02-06 RX ORDER — GLYCOPYRROLATE 0.2 MG/ML
INJECTION INTRAMUSCULAR; INTRAVENOUS AS NEEDED
Status: DISCONTINUED | OUTPATIENT
Start: 2024-02-06 | End: 2024-02-06

## 2024-02-06 RX ORDER — ISOSULFAN BLUE 50 MG/5ML
INJECTION, SOLUTION SUBCUTANEOUS AS NEEDED
Status: DISCONTINUED | OUTPATIENT
Start: 2024-02-06 | End: 2024-02-06 | Stop reason: HOSPADM

## 2024-02-06 RX ORDER — PANTOPRAZOLE SODIUM 20 MG/1
20 TABLET, DELAYED RELEASE ORAL
Status: DISCONTINUED | OUTPATIENT
Start: 2024-02-06 | End: 2024-02-07 | Stop reason: HOSPADM

## 2024-02-06 RX ORDER — MONTELUKAST SODIUM 10 MG/1
10 TABLET ORAL
Status: DISCONTINUED | OUTPATIENT
Start: 2024-02-06 | End: 2024-02-07 | Stop reason: HOSPADM

## 2024-02-06 RX ORDER — TRAZODONE HYDROCHLORIDE 100 MG/1
300 TABLET ORAL
Status: DISCONTINUED | OUTPATIENT
Start: 2024-02-06 | End: 2024-02-07 | Stop reason: HOSPADM

## 2024-02-06 RX ORDER — PROPOFOL 10 MG/ML
INJECTION, EMULSION INTRAVENOUS CONTINUOUS PRN
Status: DISCONTINUED | OUTPATIENT
Start: 2024-02-06 | End: 2024-02-06

## 2024-02-06 RX ORDER — DEXAMETHASONE SODIUM PHOSPHATE 10 MG/ML
INJECTION, SOLUTION INTRAMUSCULAR; INTRAVENOUS AS NEEDED
Status: DISCONTINUED | OUTPATIENT
Start: 2024-02-06 | End: 2024-02-06

## 2024-02-06 RX ORDER — ONDANSETRON 2 MG/ML
INJECTION INTRAMUSCULAR; INTRAVENOUS AS NEEDED
Status: DISCONTINUED | OUTPATIENT
Start: 2024-02-06 | End: 2024-02-06

## 2024-02-06 RX ORDER — SODIUM CHLORIDE, SODIUM LACTATE, POTASSIUM CHLORIDE, CALCIUM CHLORIDE 600; 310; 30; 20 MG/100ML; MG/100ML; MG/100ML; MG/100ML
INJECTION, SOLUTION INTRAVENOUS CONTINUOUS PRN
Status: DISCONTINUED | OUTPATIENT
Start: 2024-02-06 | End: 2024-02-06

## 2024-02-06 RX ORDER — FENTANYL CITRATE/PF 50 MCG/ML
50 SYRINGE (ML) INJECTION
Status: DISCONTINUED | OUTPATIENT
Start: 2024-02-06 | End: 2024-02-06 | Stop reason: HOSPADM

## 2024-02-06 RX ORDER — NICOTINE 21 MG/24HR
1 PATCH, TRANSDERMAL 24 HOURS TRANSDERMAL DAILY
Status: DISCONTINUED | OUTPATIENT
Start: 2024-02-07 | End: 2024-02-07 | Stop reason: HOSPADM

## 2024-02-06 RX ORDER — LIDOCAINE HYDROCHLORIDE AND EPINEPHRINE 10; 10 MG/ML; UG/ML
INJECTION, SOLUTION INFILTRATION; PERINEURAL AS NEEDED
Status: DISCONTINUED | OUTPATIENT
Start: 2024-02-06 | End: 2024-02-06 | Stop reason: HOSPADM

## 2024-02-06 RX ORDER — ALBUTEROL SULFATE 90 UG/1
AEROSOL, METERED RESPIRATORY (INHALATION) AS NEEDED
Status: DISCONTINUED | OUTPATIENT
Start: 2024-02-06 | End: 2024-02-06

## 2024-02-06 RX ORDER — LISINOPRIL 20 MG/1
40 TABLET ORAL DAILY
Status: DISCONTINUED | OUTPATIENT
Start: 2024-02-07 | End: 2024-02-07 | Stop reason: HOSPADM

## 2024-02-06 RX ORDER — ONDANSETRON 2 MG/ML
4 INJECTION INTRAMUSCULAR; INTRAVENOUS ONCE AS NEEDED
Status: COMPLETED | OUTPATIENT
Start: 2024-02-06 | End: 2024-02-06

## 2024-02-06 RX ORDER — KETAMINE HCL IN NACL, ISO-OSM 100MG/10ML
SYRINGE (ML) INJECTION AS NEEDED
Status: DISCONTINUED | OUTPATIENT
Start: 2024-02-06 | End: 2024-02-06

## 2024-02-06 RX ORDER — CEFAZOLIN SODIUM 2 G/50ML
2000 SOLUTION INTRAVENOUS ONCE
Status: COMPLETED | OUTPATIENT
Start: 2024-02-06 | End: 2024-02-06

## 2024-02-06 RX ORDER — IBUPROFEN 600 MG/1
600 TABLET ORAL EVERY 6 HOURS PRN
Status: DISCONTINUED | OUTPATIENT
Start: 2024-02-06 | End: 2024-02-07 | Stop reason: HOSPADM

## 2024-02-06 RX ORDER — LABETALOL HYDROCHLORIDE 5 MG/ML
INJECTION, SOLUTION INTRAVENOUS AS NEEDED
Status: DISCONTINUED | OUTPATIENT
Start: 2024-02-06 | End: 2024-02-06

## 2024-02-06 RX ORDER — BUPROPION HYDROCHLORIDE 150 MG/1
450 TABLET ORAL DAILY
Status: DISCONTINUED | OUTPATIENT
Start: 2024-02-07 | End: 2024-02-07 | Stop reason: HOSPADM

## 2024-02-06 RX ORDER — HYDROMORPHONE HCL/PF 1 MG/ML
SYRINGE (ML) INJECTION AS NEEDED
Status: DISCONTINUED | OUTPATIENT
Start: 2024-02-06 | End: 2024-02-06

## 2024-02-06 RX ORDER — SODIUM CHLORIDE, SODIUM LACTATE, POTASSIUM CHLORIDE, CALCIUM CHLORIDE 600; 310; 30; 20 MG/100ML; MG/100ML; MG/100ML; MG/100ML
75 INJECTION, SOLUTION INTRAVENOUS CONTINUOUS
Status: DISCONTINUED | OUTPATIENT
Start: 2024-02-06 | End: 2024-02-07 | Stop reason: HOSPADM

## 2024-02-06 RX ORDER — CEFAZOLIN SODIUM 1 G/3ML
INJECTION, POWDER, FOR SOLUTION INTRAMUSCULAR; INTRAVENOUS AS NEEDED
Status: DISCONTINUED | OUTPATIENT
Start: 2024-02-06 | End: 2024-02-06

## 2024-02-06 RX ORDER — HYDROMORPHONE HCL/PF 1 MG/ML
0.5 SYRINGE (ML) INJECTION
Status: DISCONTINUED | OUTPATIENT
Start: 2024-02-06 | End: 2024-02-07 | Stop reason: HOSPADM

## 2024-02-06 RX ORDER — FENTANYL CITRATE 50 UG/ML
INJECTION, SOLUTION INTRAMUSCULAR; INTRAVENOUS AS NEEDED
Status: DISCONTINUED | OUTPATIENT
Start: 2024-02-06 | End: 2024-02-06

## 2024-02-06 RX ORDER — QUETIAPINE FUMARATE 200 MG/1
200 TABLET, FILM COATED ORAL
Status: DISCONTINUED | OUTPATIENT
Start: 2024-02-06 | End: 2024-02-07 | Stop reason: HOSPADM

## 2024-02-06 RX ORDER — OXYCODONE HYDROCHLORIDE 5 MG/1
5 TABLET ORAL EVERY 6 HOURS PRN
Status: DISCONTINUED | OUTPATIENT
Start: 2024-02-06 | End: 2024-02-07 | Stop reason: HOSPADM

## 2024-02-06 RX ORDER — ALBUTEROL SULFATE 2.5 MG/3ML
2.5 SOLUTION RESPIRATORY (INHALATION) EVERY 6 HOURS PRN
Status: DISCONTINUED | OUTPATIENT
Start: 2024-02-06 | End: 2024-02-07 | Stop reason: HOSPADM

## 2024-02-06 RX ORDER — ROCURONIUM BROMIDE 10 MG/ML
INJECTION, SOLUTION INTRAVENOUS AS NEEDED
Status: DISCONTINUED | OUTPATIENT
Start: 2024-02-06 | End: 2024-02-06

## 2024-02-06 RX ORDER — MIDAZOLAM HYDROCHLORIDE 2 MG/2ML
INJECTION, SOLUTION INTRAMUSCULAR; INTRAVENOUS AS NEEDED
Status: DISCONTINUED | OUTPATIENT
Start: 2024-02-06 | End: 2024-02-06

## 2024-02-06 RX ORDER — SODIUM CHLORIDE 9 MG/ML
INJECTION, SOLUTION INTRAVENOUS CONTINUOUS PRN
Status: DISCONTINUED | OUTPATIENT
Start: 2024-02-06 | End: 2024-02-06

## 2024-02-06 RX ORDER — PRAZOSIN HYDROCHLORIDE 2 MG/1
2 CAPSULE ORAL
Status: DISCONTINUED | OUTPATIENT
Start: 2024-02-06 | End: 2024-02-07 | Stop reason: HOSPADM

## 2024-02-06 RX ORDER — ACETAMINOPHEN 325 MG/1
650 TABLET ORAL EVERY 6 HOURS SCHEDULED
Status: DISCONTINUED | OUTPATIENT
Start: 2024-02-06 | End: 2024-02-07 | Stop reason: HOSPADM

## 2024-02-06 RX ADMIN — DESMOPRESSIN ACETATE 20 MCG: 4 SOLUTION INTRAVENOUS at 11:02

## 2024-02-06 RX ADMIN — Medication 20 MG: at 12:25

## 2024-02-06 RX ADMIN — SODIUM CHLORIDE 8 MCG: 9 INJECTION, SOLUTION INTRAVENOUS at 13:04

## 2024-02-06 RX ADMIN — SODIUM CHLORIDE: 0.9 INJECTION, SOLUTION INTRAVENOUS at 11:21

## 2024-02-06 RX ADMIN — SODIUM CHLORIDE 8 MCG: 9 INJECTION, SOLUTION INTRAVENOUS at 12:47

## 2024-02-06 RX ADMIN — FENTANYL CITRATE 50 MCG: 50 INJECTION INTRAMUSCULAR; INTRAVENOUS at 11:08

## 2024-02-06 RX ADMIN — SODIUM CHLORIDE 12 MCG: 9 INJECTION, SOLUTION INTRAVENOUS at 14:22

## 2024-02-06 RX ADMIN — SODIUM CHLORIDE: 0.9 INJECTION, SOLUTION INTRAVENOUS at 12:52

## 2024-02-06 RX ADMIN — ROCURONIUM BROMIDE 50 MG: 10 INJECTION, SOLUTION INTRAVENOUS at 11:08

## 2024-02-06 RX ADMIN — FENTANYL CITRATE 50 MCG: 50 INJECTION INTRAMUSCULAR; INTRAVENOUS at 12:14

## 2024-02-06 RX ADMIN — OXYCODONE HYDROCHLORIDE 5 MG: 5 TABLET ORAL at 19:26

## 2024-02-06 RX ADMIN — CEFAZOLIN SODIUM 2000 MG: 2 SOLUTION INTRAVENOUS at 11:16

## 2024-02-06 RX ADMIN — ROCURONIUM BROMIDE 20 MG: 10 INJECTION, SOLUTION INTRAVENOUS at 12:02

## 2024-02-06 RX ADMIN — ROCURONIUM BROMIDE 10 MG: 10 INJECTION, SOLUTION INTRAVENOUS at 12:27

## 2024-02-06 RX ADMIN — Medication 10 MG: at 13:32

## 2024-02-06 RX ADMIN — HYDROMORPHONE HYDROCHLORIDE 0.5 MG: 1 INJECTION, SOLUTION INTRAMUSCULAR; INTRAVENOUS; SUBCUTANEOUS at 12:49

## 2024-02-06 RX ADMIN — KETOROLAC TROMETHAMINE 30 MG: 30 INJECTION, SOLUTION INTRAMUSCULAR; INTRAVENOUS at 15:09

## 2024-02-06 RX ADMIN — Medication 5 MG: at 13:11

## 2024-02-06 RX ADMIN — ALBUTEROL SULFATE 12 PUFF: 90 AEROSOL, METERED RESPIRATORY (INHALATION) at 15:09

## 2024-02-06 RX ADMIN — ONDANSETRON 4 MG: 2 INJECTION INTRAMUSCULAR; INTRAVENOUS at 17:01

## 2024-02-06 RX ADMIN — PRAZOSIN HYDROCHLORIDE 2 MG: 2 CAPSULE ORAL at 22:27

## 2024-02-06 RX ADMIN — SUGAMMADEX 200 MG: 100 INJECTION, SOLUTION INTRAVENOUS at 15:15

## 2024-02-06 RX ADMIN — ROCURONIUM BROMIDE 20 MG: 10 INJECTION, SOLUTION INTRAVENOUS at 14:17

## 2024-02-06 RX ADMIN — QUETIAPINE FUMARATE 200 MG: 200 TABLET ORAL at 22:27

## 2024-02-06 RX ADMIN — MONTELUKAST 10 MG: 10 TABLET, FILM COATED ORAL at 22:27

## 2024-02-06 RX ADMIN — SODIUM CHLORIDE, SODIUM LACTATE, POTASSIUM CHLORIDE, AND CALCIUM CHLORIDE: .6; .31; .03; .02 INJECTION, SOLUTION INTRAVENOUS at 11:05

## 2024-02-06 RX ADMIN — TRAZODONE HYDROCHLORIDE 300 MG: 100 TABLET ORAL at 22:27

## 2024-02-06 RX ADMIN — IBUPROFEN 600 MG: 600 TABLET ORAL at 22:31

## 2024-02-06 RX ADMIN — GLYCOPYRROLATE 0.2 MG: 0.2 INJECTION, SOLUTION INTRAMUSCULAR; INTRAVENOUS at 12:26

## 2024-02-06 RX ADMIN — SODIUM CHLORIDE 12 MCG: 9 INJECTION, SOLUTION INTRAVENOUS at 11:38

## 2024-02-06 RX ADMIN — Medication 10 MG: at 13:03

## 2024-02-06 RX ADMIN — DEXAMETHASONE SODIUM PHOSPHATE 10 MG: 10 INJECTION, SOLUTION INTRAMUSCULAR; INTRAVENOUS at 11:08

## 2024-02-06 RX ADMIN — ONDANSETRON 4 MG: 2 INJECTION INTRAMUSCULAR; INTRAVENOUS at 15:09

## 2024-02-06 RX ADMIN — ACETAMINOPHEN 650 MG: 325 TABLET, FILM COATED ORAL at 18:18

## 2024-02-06 RX ADMIN — PANTOPRAZOLE SODIUM 20 MG: 20 TABLET, DELAYED RELEASE ORAL at 18:18

## 2024-02-06 RX ADMIN — MIDAZOLAM 2 MG: 1 INJECTION INTRAMUSCULAR; INTRAVENOUS at 11:06

## 2024-02-06 RX ADMIN — NORTRIPTYLINE HYDROCHLORIDE 10 MG: 10 CAPSULE ORAL at 22:27

## 2024-02-06 RX ADMIN — ROCURONIUM BROMIDE 20 MG: 10 INJECTION, SOLUTION INTRAVENOUS at 13:06

## 2024-02-06 RX ADMIN — CEFAZOLIN 2000 MG: 1 INJECTION, POWDER, FOR SOLUTION INTRAMUSCULAR; INTRAVENOUS at 15:16

## 2024-02-06 RX ADMIN — PROPOFOL 300 MG: 10 INJECTION, EMULSION INTRAVENOUS at 11:08

## 2024-02-06 RX ADMIN — PROPOFOL 150 MCG/KG/MIN: 10 INJECTION, EMULSION INTRAVENOUS at 11:18

## 2024-02-06 NOTE — ANESTHESIA POSTPROCEDURE EVALUATION
Post-Op Assessment Note    CV Status:  Stable    Pain management: adequate       Mental Status:  Alert and awake   Hydration Status:  Euvolemic   PONV Controlled:  Controlled   Airway Patency:  Patent     Post Op Vitals Reviewed: Yes    No anethesia notable event occurred.    Staff: MELITA               /56 (02/06/24 1545)    Temp 97.7 °F (36.5 °C) (02/06/24 1545)    Pulse 68 (02/06/24 1545)   Resp 20 (02/06/24 1545)    SpO2 94 % (02/06/24 1545)

## 2024-02-06 NOTE — INTERVAL H&P NOTE
H&P reviewed. After examining the patient I find no changes in the patients condition since the H&P had been written.    Vitals:    02/06/24 0732   BP: (!) 171/83   Pulse: 63   Resp: 18   Temp: 98.1 °F (36.7 °C)   SpO2: 94%

## 2024-02-06 NOTE — OP NOTE
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 suture: short superior long lateral Tissue Breast, Right TISSUE EXAM Almita Molina,  2024  2:37 PM    3 : right axilllary, sentinel node #1 Tissue Lymph Node, [place] TISSUE EXAM Almita Molina,  2024  2:37 PM        Estimated Blood Loss:   Minimal    Drains:  Closed/Suction Drain Inferior;Left Breast 15 Fr. (Active)   Number of days:        Closed/Suction Drain Inferior;Right;Medial Breast 15 Fr. (Active)   Number of days: 0       [REMOVED] Urethral Catheter Latex 16 Fr. 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  1 right axillary sentinel lymph node identified using the neoprobe.  No blue dye noted within the axilla.  No palpable lymphadenopathy within the axilla    Complications:   None    Procedure and  Technique:  The patient is brought to the operating.  A timeout was held the patient identified and procedure verified.  Appropriate antibiotic prophylaxis and DVT prophylaxis was used.  Preoperatively axillary sentinel lymph node biopsy laterality was confirmed with the patient.  The patient also presented to nuclear medicine where the radionucleotide was injected.  General anesthesia was administered.  The area of bilateral breast was prepped and draped in usual sterile fashion using chlorhexidine solution.  The left breast was first addressed.  An elliptical incision was marked out in the left breast and situated the nipple areola complex was included and skin was preserved as much as possible.  Local anesthesia using 1% lidocaine with epinephrine was infiltrated along this line.  An incision was made along the superior portion of the ellipse using a 15 blade scalpel.  Dissection was carried down through subcutaneous tissue.  The superior flap was created by  the breast tissue from the subcutaneous tissue using electrocautery.  Dissection was carried superiorly until the clavicle was encountered.  A 15 blade scalpel was then used to make the inferior incision.  Electrocautery was used to dissect through subcutaneous tissue.  The plane between the underlying breast tissue and the subcutaneous tissue was developed using electrocautery.  This was completed until the inframammary fold was completely released.  Dissection was then carried laterally to the latissimus muscle.  Dissection was carried medially to the lateral border of the sternum.  The fascia was then elevated off of the pectoralis major muscle using electrocautery.  The entire breast was removed.  The breast was marked superiorly with a short suture and laterally with a long suture.  The left breast was sent as specimen.  The area was thoroughly irrigated.  A 15 [place] Derick drain was placed within the left chest.  This was secured using a  suture of 3-0 nylon.  Subcutaneous tissue was closed using 3-0 Vicryl.  Skin was closed using 4-0 Vicryl in the subcuticular layer.  A sterile drape was then used to cover the left breast surgical site.  Attention was then turned towards the right breast.An elliptical incision was marked out in the right breast and situated the nipple areola complex was included and skin was preserved as much as possible.  Local anesthesia using 1% lidocaine with epinephrine was infiltrated along this line.  An incision was made along the superior portion of the ellipse using a 15 blade scalpel.  Dissection was carried down through subcutaneous tissue.  The superior flap was created by  the breast tissue from the subcutaneous tissue using electrocautery.  Dissection was carried superiorly until the clavicle was encountered.  A 15 blade scalpel was then used to make the inferior incision.  Electrocautery was used to dissect through subcutaneous tissue.  The plane between the underlying breast tissue and the subcutaneous tissue was developed using electrocautery.  This was completed until the inframammary fold was completely released.  Dissection was then carried laterally to the latissimus muscle.  Dissection was carried medially to the lateral border of the sternum.  The fascia was then elevated off of the pectoralis major muscle using electrocautery.  The entire breast was removed.  The breast was marked superiorly with a short suture and laterally with a long suture.  Hemostasis was achieved.  Attention was then turned towards the right axilla.  The neoprobe was used to investigate the right axilla.  Activity was noted.  Dissection was carried out in this area.  A lymph node was identified.  The surrounding axillary fat was blue in color however, the lymph node remained normal in color.  In vivo count of this lymph node averaged 1100.  The lymph node was excised in its entirety.  Any visible lymphatics were clipped.   Once the lymph node was removed, ex vivo count averaged 1180.  The basin was then inspected.  Basin count was 8.  There were no blue lymphatics noted.  There was no palpable lymphadenopathy.  [place] lymph node biopsy was therefore complete.  The right chest was irrigated.  Hemostasis was achieved electrocautery. A 15 [place] Derick drain was placed within the right chest.  This was secured using a suture of 3-0 nylon.  Subcutaneous tissue was closed using 3-0 Vicryl.  Skin was closed using 4-0 Vicryl in the subcuticular layer.  Both skin incisions were covered with skin glue.  Drain sponges and a clean sterile dressing was placed.  The chest was wrapped using an Ace wrap.  The patient tolerated the procedure well transferred to recovery.  At the end the procedure all needle instrument and sponge counts were correct x 2.   I was present for the entire procedure. and A physician assistant was required during the procedure for retraction, tissue handling, dissection and suturing.    Patient Disposition:  PACU     [place] Node Biopsy for Breast Cancer - Right  Operation performed with curative intent. Yes   Tracer(s) used to identify sentinel nodes in the upfront surgery (non-neoadjuvant) setting (select all that apply). Dye and Radioactive tracer   Tracer(s) used to identify sentinel nodes in the neoadjuvant setting (select all that apply). N/A   All nodes (colored or non-colored) present at the end of a dye-filled lymphatic channel were removed. No no dye was noted within the right axilla   All significantly radioactive nodes were removed. Yes   All palpably suspicious nodes were removed. N/A   Biopsy-proven positive nodes marked with clips prior to chemotherapy were identified and removed. N/A            SIGNATURE: Almita Molina DO  DATE: February 6, 2024  TIME: 3:22 PM

## 2024-02-06 NOTE — DISCHARGE INSTR - AVS FIRST PAGE
Mastectomy post op instructions  WHAT YOU SHOULD KNOW:   Call Dr. Molina's office at [phone] with any questions or concerns     AFTER YOU LEAVE:   Medicines:     Pain medicine:  Take prescription strength ibuprofen and Tylenol as directed on the labels as needed for pain  Take your medicine as directed.  Call if your pain is uncontrolled       Self-care:     Activity:  Slowly start to do more each day. Return to your daily activities as directed.  You may go up steps.  Walk as much as possible.  Avoid lifting anything more than 10 lb.  Stretch your arm multiple times each day      Bathing:  You may shower and pat the incision dry.  Soap and water may hit the incision.  Make sure you keep the drain attached to a lanyard or shoe lace gently around your neck so that the drain is not hanging.  Replace the dressing around the drain site after you shower     Drain care:  Keep the drain attached to your clothing at all times so it is not hanging.  Initially, the drain may need to be emptied multiple times a day.  It is recommended you empty it every 8 hours, then every 12 hours, then daily.  Record the drainage every time the drain is emptied.  When the drainage is less than 30 mL in a 24 hour time period, please call the office to have the drain removed.  Squeeze the drain when replacing the cap.  The drain should stay squeezed when you leave go.  This means the drain is functioning properly.  Keep the area where the tube enters year skin covered at all times    Contact your  surgeon if:   You have a fever.     You have discharge or pain in the area where the drain was inserted.    You have nausea or are vomiting.    Blood soaks through your bandage.    Your skin is itchy, swollen, or has a rash.    You have questions or concerns about your condition or care.  Seek care immediately or call 911 if:     You have pain in your chest or armpit that does not go away even after you take pain medicines.    There is blood,  pus, or a foul-smelling odor coming from your incision.     Any part of your arm is numb, tingly, cool, blue, or pale.    Your arm or leg feels warm, tender, and painful. It may look swollen and red.    You suddenly feel lightheaded and short of breath.    You have chest pain when you take a deep breath or cough.    You cough up blood.  © 2014 [employer]. Information is for End User's use only and may not be sold, redistributed or otherwise used for commercial purposes. All illustrations and images included in CareNotes® are the copyrighted property of [employer]. or [employer].  The above information is an  only. It is not intended as medical advice for individual conditions or treatments. Talk to your doctor, nurse or pharmacist before following any medical regimen to see if it is safe and effective for you.

## 2024-02-06 NOTE — PLAN OF CARE
Problem: PAIN - ADULT  Goal: Verbalizes/displays adequate comfort level or baseline comfort level  Description: Interventions:  - Encourage patient to monitor pain and request assistance  - Assess pain using appropriate pain scale  - Administer analgesics based on type and severity of pain and evaluate response  - Implement non-pharmacological measures as appropriate and evaluate response  - Consider cultural and social influences on pain and pain management  - Notify physician/advanced practitioner if interventions unsuccessful or patient reports new pain  Outcome: Progressing     Problem: INFECTION - ADULT  Goal: Absence or prevention of progression during hospitalization  Description: INTERVENTIONS:  - Assess and monitor for signs and symptoms of infection  - Monitor lab/diagnostic results  - Monitor all insertion sites, i.e. indwelling lines, tubes, and drains  - Monitor endotracheal if appropriate and nasal secretions for changes in amount and color  - [place] appropriate cooling/warming therapies per order  - Administer medications as ordered  - Instruct and encourage patient and family to use good hand hygiene technique  - Identify and instruct in appropriate isolation precautions for identified infection/condition  Outcome: Progressing  Goal: Absence of fever/infection during neutropenic period  Description: INTERVENTIONS:  - Monitor WBC    Outcome: Progressing

## 2024-02-07 ENCOUNTER — ANCILLARY PROCEDURE (INPATIENT)
Dept: LAB | Facility: HOSPITAL | Age: 59
End: 2024-02-07

## 2024-02-07 VITALS
DIASTOLIC BLOOD PRESSURE: 65 MMHG | OXYGEN SATURATION: 93 % | BODY MASS INDEX: 36.1 KG/M2 | HEART RATE: 66 BPM | SYSTOLIC BLOOD PRESSURE: 132 MMHG | HEIGHT: 67 IN | WEIGHT: 230 LBS | RESPIRATION RATE: 16 BRPM | TEMPERATURE: 98.1 F

## 2024-02-07 LAB
ANION GAP SERPL CALCULATED.3IONS-SCNC: 6 MMOL/L
BASOPHILS # BLD AUTO: 0.03 THOUSANDS/ÂΜL (ref 0–0.1)
BASOPHILS NFR BLD AUTO: 0 % (ref 0–1)
BUN SERPL-MCNC: 12 MG/DL (ref 5–25)
CALCIUM SERPL-MCNC: 8.7 MG/DL (ref 8.4–10.2)
CHLORIDE SERPL-SCNC: 102 MMOL/L (ref 96–108)
CO2 SERPL-SCNC: 27 MMOL/L (ref 21–32)
CREAT SERPL-MCNC: 0.76 MG/DL (ref 0.6–1.3)
EOSINOPHIL # BLD AUTO: 0 THOUSAND/ÂΜL (ref 0–0.61)
EOSINOPHIL NFR BLD AUTO: 0 % (ref 0–6)
ERYTHROCYTE [DISTWIDTH] IN BLOOD BY AUTOMATED COUNT: 13.3 % (ref 11.6–15.1)
GFR SERPL CREATININE-BSD FRML MDRD: 86 ML/MIN/1.73SQ M
GLUCOSE SERPL-MCNC: 130 MG/DL (ref 65–140)
HCT VFR BLD AUTO: 35.9 % (ref 34.8–46.1)
HGB BLD-MCNC: 11.5 G/DL (ref 11.5–15.4)
IMM GRANULOCYTES # BLD AUTO: 0.08 THOUSAND/UL (ref 0–0.2)
IMM GRANULOCYTES NFR BLD AUTO: 1 % (ref 0–2)
LYMPHOCYTES # BLD AUTO: 1.7 THOUSANDS/ÂΜL (ref 0.6–4.47)
LYMPHOCYTES NFR BLD AUTO: 11 % (ref 14–44)
MCH RBC QN AUTO: 26.4 PG (ref 26.8–34.3)
MCHC RBC AUTO-ENTMCNC: 32 G/DL (ref 31.4–37.4)
MCV RBC AUTO: 83 FL (ref 82–98)
MONOCYTES # BLD AUTO: 0.89 THOUSAND/ÂΜL (ref 0.17–1.22)
MONOCYTES NFR BLD AUTO: 6 % (ref 4–12)
NEUTROPHILS # BLD AUTO: 12.77 THOUSANDS/ÂΜL (ref 1.85–7.62)
NEUTS SEG NFR BLD AUTO: 82 % (ref 43–75)
NRBC BLD AUTO-RTO: 0 /100 WBCS
PLATELET # BLD AUTO: 197 THOUSANDS/UL (ref 149–390)
PMV BLD AUTO: 11.1 FL (ref 8.9–12.7)
POTASSIUM SERPL-SCNC: 3.8 MMOL/L (ref 3.5–5.3)
RBC # BLD AUTO: 4.35 MILLION/UL (ref 3.81–5.12)
SODIUM SERPL-SCNC: 135 MMOL/L (ref 135–147)
WBC # BLD AUTO: 15.47 THOUSAND/UL (ref 4.31–10.16)

## 2024-02-07 PROCEDURE — 80048 BASIC METABOLIC PNL TOTAL CA: CPT | Performed by: STUDENT IN AN ORGANIZED HEALTH CARE EDUCATION/TRAINING PROGRAM

## 2024-02-07 PROCEDURE — 85025 COMPLETE CBC W/AUTO DIFF WBC: CPT | Performed by: STUDENT IN AN ORGANIZED HEALTH CARE EDUCATION/TRAINING PROGRAM

## 2024-02-07 RX ORDER — IBUPROFEN 600 MG/1
600 TABLET ORAL EVERY 6 HOURS PRN
Qty: 30 TABLET | Refills: 0 | Status: SHIPPED | OUTPATIENT
Start: 2024-02-07

## 2024-02-07 RX ORDER — OXYCODONE HYDROCHLORIDE 5 MG/1
5 TABLET ORAL EVERY 6 HOURS PRN
Qty: 6 TABLET | Refills: 0 | Status: SHIPPED | OUTPATIENT
Start: 2024-02-07

## 2024-02-07 RX ORDER — ACETAMINOPHEN 500 MG
1000 TABLET ORAL EVERY 8 HOURS
Qty: 60 TABLET | Refills: 0 | Status: SHIPPED | OUTPATIENT
Start: 2024-02-07 | End: 2024-02-17

## 2024-02-07 RX ADMIN — ACETAMINOPHEN 650 MG: 325 TABLET, FILM COATED ORAL at 06:22

## 2024-02-07 RX ADMIN — LISINOPRIL 40 MG: 20 TABLET ORAL at 08:20

## 2024-02-07 RX ADMIN — PANTOPRAZOLE SODIUM 20 MG: 20 TABLET, DELAYED RELEASE ORAL at 06:22

## 2024-02-07 RX ADMIN — OXYCODONE HYDROCHLORIDE 5 MG: 5 TABLET ORAL at 08:20

## 2024-02-07 RX ADMIN — BUPROPION HYDROCHLORIDE 450 MG: 150 TABLET, EXTENDED RELEASE ORAL at 08:20

## 2024-02-07 RX ADMIN — NICOTINE 1 PATCH: 21 PATCH, EXTENDED RELEASE TRANSDERMAL at 08:21

## 2024-02-07 RX ADMIN — ACETAMINOPHEN 650 MG: 325 TABLET, FILM COATED ORAL at 12:03

## 2024-02-07 RX ADMIN — ACETAMINOPHEN 650 MG: 325 TABLET, FILM COATED ORAL at 00:08

## 2024-02-07 NOTE — UTILIZATION REVIEW
"Initial Clinical Review    Elective Inpatient surgical procedure    Age/Sex: 58 y.o. female    Surgery Date:  2/6/2024   Procedure: Bilateral - BREAST MASTECTOMY  /  Right - AXILLARY SENTINEL NODE BIOPSY (NUC MED INJECTION AT 0730)  Anesthesia: General   Operative Findings: Bilateral mastectomy completed without difficulty.  1 right axillary sentinel lymph node identified using the neoprobe.  No blue dye noted within the axilla.  No palpable lymphadenopathy within the axilla     Date: 2/7/24  POD#1 s/p s/p b/l mastectomy with Right SLN bx   Progress Note: feels well, having some pain but well controlled with prn pain regimen. Tolerating diet without n/v. Urinating at baseline.  L LAUREEN: 95 cc s/s; R LAUREEN: 90 cc s/s  Plan: cont ivf; pain / nausea control (see below); monitor labs; maintain LAUREEN drains      Admission Orders: Date/Time/Statement:   Admission Orders (From admission, onward)       Ordered        02/06/24 1051  Inpatient Admission  Once                          Orders Placed This Encounter   Procedures    Inpatient Admission     Standing Status:   Standing     Number of Occurrences:   1     Order Specific Question:   Level of Care     Answer:   Med Surg [16]     Order Specific Question:   Estimated length of stay     Answer:   Inpatient Only Surgery     Vital Signs: /65 (BP Location: Left arm)   Pulse 66   Temp 98.1 °F (36.7 °C) (Temporal)   Resp 16   Ht 5' 7\" (1.702 m)   Wt 104 kg (230 lb)   SpO2 93%   BMI 36.02 kg/m²     Pertinent Labs/Diagnostic Test Results:   Pathology Department Faxitron Imaging Study   Final Result by SYSTEMGENERATED, DOCUMENTATION (02/07 0844)           Results from last 7 days   Lab Units 02/07/24  0631   WBC Thousand/uL 15.47*   HEMOGLOBIN g/dL 11.5   HEMATOCRIT % 35.9   PLATELETS Thousands/uL 197   NEUTROS ABS Thousands/µL 12.77*        Results from last 7 days   Lab Units 02/07/24  0631   SODIUM mmol/L 135   POTASSIUM mmol/L 3.8   CHLORIDE mmol/L 102   CO2 mmol/L 27 "   ANION GAP mmol/L 6   BUN mg/dL 12   CREATININE mg/dL 0.76   EGFR ml/min/1.73sq m 86   CALCIUM mg/dL 8.7        Results from last 7 days   Lab Units 02/07/24  0631   GLUCOSE RANDOM mg/dL 130       Diet: Surgical soft diet  Mobility: up as tolerated  DVT Prophylaxis: SCDs    Medications/Pain Control:   Scheduled Medications:  acetaminophen, 650 mg, Oral, Q6H MARIA ALEJANDRA  buPROPion, 450 mg, Oral, Daily  lisinopril, 40 mg, Oral, Daily  montelukast, 10 mg, Oral, HS  nicotine, 1 patch, Transdermal, Daily  nortriptyline, 10 mg, Oral, HS  pantoprazole, 20 mg, Oral, BID AC  prazosin, 2 mg, Oral, HS  QUEtiapine, 200 mg, Oral, HS  traZODone, 300 mg, Oral, HS      Continuous IV Infusions:  lactated ringers, 75 mL/hr, Intravenous, Continuous      PRN Meds:  albuterol, 2.5 mg, Nebulization, Q6H PRN  HYDROmorphone, 0.5 mg, Intravenous, Q3H PRN  ibuprofen, 600 mg, Oral, Q6H PRN  (2/6 rec'd x1)   ondansetron, 4 mg, Intravenous, Q6H PRN  (2/6 rec'd x1)   oxyCODONE, 5 mg, Oral, Q6H PRN  (2/6 rec'd x1)  (2/7 rec'd x1 so far today)        Network Utilization Review Department  ATTENTION: Please call with any questions or concerns to [phone] and carefully listen to the prompts so that you are directed to the right person. All voicemails are confidential.   For Discharge needs, contact Care Management DC Support Team at [phone] opt. 2  Send all requests for admission clinical reviews, approved or denied determinations and any other requests to dedicated fax number below belonging to the campus where the patient is receiving treatment. List of dedicated fax numbers for the Facilities:  FACILITY NAME UR FAX NUMBER   ADMISSION DENIALS (Administrative/Medical Necessity) [phone]   DISCHARGE SUPPORT TEAM (NETWORK) [phone]   PARENT CHILD HEALTH (Maternity/NICU/Pediatrics) [phone]   [facility] [phone]   [facility] [phone]   [facility]  [phone]   [facility] [phone]   [facility] [phone]   [facility] [phone]   [facility] [phone]   [facility] [phone]   [facility] [phone]   [facility] [phone]   [facility] [phone]   [facility], [place]) [phone]

## 2024-02-07 NOTE — NURSING NOTE
Pt dc'd surgical dressing intact, pt instructed she may remove per surgery and may shower. Pt given drain sponges for tyra drain.  Daughter at bedside.

## 2024-02-07 NOTE — NURSING NOTE
AVS reviewed with patient's daughter virtually. Voices understanding of medications, follow-ups, and reasons to seek medical assistance. Bedside RN notified of additional questions unable to be answered by virtual RN.

## 2024-02-07 NOTE — PROGRESS NOTES
"Progress Note - General Surgery   Tammie Avalos 58 y.o. female MRN: [id number]  Unit/Bed#: -01 Encounter: [id number]    Assessment:  57 yo F POD#1 s/p b/l mastectomy with Right SLN bx  AVSS    L LAUREEN: 95 cc s/s  R LAUREEN: 90 cc s/s    Plan:  - diet as tolerated  - prn pain, antiemetic regimen  - stable for d/c later this afternoon. Daughter able to pick her up after 12  - f/u in office in 2 weeks  - discussed d/c inst and f/u information at bedside. All questions answered. Discussed return precautions  - daughter to manage LAUREEN drains at home, nurses to do bedside teaching prior to d/c    Subjective/Objective     Subjective: feels well, having some pain but well controlled with prn pain regimen. Tolerating diet without n/v. Urinating at baseline. Does have some b/l hand swelling, denies numbness, tingling    Objective:     Blood pressure 132/65, pulse 66, temperature 98.1 °F (36.7 °C), resp. rate 16, height 5' 7\" (1.702 m), weight 104 kg (230 lb), SpO2 93%.,Body mass index is 36.02 kg/m².      Intake/Output Summary (Last 24 hours) at 2/7/2024 0747  Last data filed at 2/7/2024 0626  Gross per 24 hour   Intake 2700 ml   Output 285 ml   Net 2415 ml       Invasive Devices       Peripheral Intravenous Line  Duration             Peripheral IV 02/06/24 Right;Ventral (anterior) Wrist 1 day    Peripheral IV 02/06/24 Left Hand <1 day              Drain  Duration             Closed/Suction Drain Inferior;Left Breast 15 Fr. -- days    Closed/Suction Drain Inferior;Right;Medial Breast 15 Fr. <1 day                    Physical Exam  Vitals and nursing note reviewed.   Constitutional:       General: She is not in acute distress.     Appearance: Normal appearance. She is normal weight. She is not toxic-appearing or diaphoretic.   HENT:      Head: Normocephalic and atraumatic.   Cardiovascular:      Rate and Rhythm: Normal rate.   Pulmonary:      Effort: Pulmonary effort is normal. No respiratory distress.   Skin:     General: Skin " is warm.      Capillary Refill: Capillary refill takes less than 2 seconds.      Comments: Bl breast incisions CDI. No evidence of hematoma. LAUREEN drains b/l s/s. Appropriately tender to palpation   Neurological:      General: No focal deficit present.      Mental Status: She is alert and oriented to person, place, and time.   Psychiatric:         Mood and Affect: Mood normal.         Behavior: Behavior normal.        B/l hand edema R>L, minimal    Scheduled Meds:  Current Facility-Administered Medications   Medication Dose Route Frequency Provider Last Rate    acetaminophen  650 mg Oral Q6H MARIA ALEJANDRA Almita Molina, DO      albuterol  2.5 mg Nebulization Q6H PRN Almita Molina, DO      buPROPion  450 mg Oral Daily Almita Molina, DO      HYDROmorphone  0.5 mg Intravenous Q3H PRN Almita Molina, DO      ibuprofen  600 mg Oral Q6H PRN Almita Molina, DO      lactated ringers  75 mL/hr Intravenous Continuous Elina Ambrose CRNA      lisinopril  40 mg Oral Daily Almita Molina, DO      montelukast  10 mg Oral HS Almita Molina, DO      nicotine  1 patch Transdermal Daily Almita Molina, DO      nortriptyline  10 mg Oral HS Almita Molina, DO      ondansetron  4 mg Intravenous Q6H PRN Almita Molina, DO      oxyCODONE  5 mg Oral Q6H PRN Almita Molina, DO      pantoprazole  20 mg Oral BID AC Almita Molina, DO      prazosin  2 mg Oral HS Almita Molina, DO      QUEtiapine  200 mg Oral HS Almita Molina, DO      traZODone  300 mg Oral HS Almita Molina, DO       Continuous Infusions:lactated ringers, 75 mL/hr      PRN Meds:.  albuterol    HYDROmorphone    ibuprofen    ondansetron    oxyCODONE      Lab, Imaging and other studies:I have personally reviewed pertinent lab results.  , CBC:   Lab Results   Component Value Date    WBC 15.47 (H) 02/07/2024    HGB 11.5 02/07/2024    HCT  "35.9 02/07/2024    MCV 83 02/07/2024     02/07/2024    RBC 4.35 02/07/2024    MCH 26.4 (L) 02/07/2024    MCHC 32.0 02/07/2024    RDW 13.3 02/07/2024    MPV 11.1 02/07/2024    NRBC 0 02/07/2024   , CMP:   Lab Results   Component Value Date    SODIUM 135 02/07/2024    K 3.8 02/07/2024     02/07/2024    CO2 27 02/07/2024    BUN 12 02/07/2024    CREATININE 0.76 02/07/2024    CALCIUM 8.7 02/07/2024    EGFR 86 02/07/2024   , Coagulation: No results found for: \"PT\", \"INR\", \"APTT\", Urinalysis: No results found for: \"COLORU\", \"CLARITYU\", \"SPECGRAV\", \"PHUR\", \"LEUKOCYTESUR\", \"NITRITE\", \"PROTEINUA\", \"GLUCOSEU\", \"KETONESU\", \"BILIRUBINUR\", \"BLOODU\"  VTE Mechanical Prophylaxis: sequential compression device      Adina Bowman PA-C  2/7/2024 7:47 AM    "

## 2024-02-07 NOTE — PLAN OF CARE
Problem: PAIN - ADULT  Goal: Verbalizes/displays adequate comfort level or baseline comfort level  Description: Interventions:  - Encourage patient to monitor pain and request assistance  - Assess pain using appropriate pain scale  - Administer analgesics based on type and severity of pain and evaluate response  - Implement non-pharmacological measures as appropriate and evaluate response  - Consider cultural and social influences on pain and pain management  - Notify physician/advanced practitioner if interventions unsuccessful or patient reports new pain  Outcome: Progressing     Problem: INFECTION - ADULT  Goal: Absence or prevention of progression during hospitalization  Description: INTERVENTIONS:  - Assess and monitor for signs and symptoms of infection  - Monitor lab/diagnostic results  - Monitor all insertion sites, i.e. indwelling lines, tubes, and drains  - Monitor endotracheal if appropriate and nasal secretions for changes in amount and color  - [place] appropriate cooling/warming therapies per order  - Administer medications as ordered  - Instruct and encourage patient and family to use good hand hygiene technique  - Identify and instruct in appropriate isolation precautions for identified infection/condition  Outcome: Progressing  Goal: Absence of fever/infection during neutropenic period  Description: INTERVENTIONS:  - Monitor WBC    Outcome: Progressing     Problem: SAFETY ADULT  Goal: Patient will remain free of falls  Description: INTERVENTIONS:  - Educate patient/family on patient safety including physical limitations  - Instruct patient to call for assistance with activity   - Consult OT/PT to assist with strengthening/mobility   - Keep Call bell within reach  - Keep bed low and locked with side rails adjusted as appropriate  - Keep care items and personal belongings within reach  - Initiate and maintain comfort rounds  - Make Fall Risk Sign visible to staff  - Apply yellow socks and bracelet  for high fall risk patients  - Consider moving patient to room near nurses station  Outcome: Progressing  Goal: Maintain or return to baseline ADL function  Description: INTERVENTIONS:  -  Assess patient's ability to carry out ADLs; assess patient's baseline for ADL function and identify physical deficits which impact ability to perform ADLs (bathing, care of mouth/teeth, toileting, grooming, dressing, etc.)  - Assess/evaluate cause of self-care deficits   - Assess range of motion  - Assess patient's mobility; develop plan if impaired  - Assess patient's need for assistive devices and provide as appropriate  - Encourage maximum independence but intervene and supervise when necessary  - Involve family in performance of ADLs  - Assess for home care needs following discharge   - Consider OT consult to assist with ADL evaluation and planning for discharge  - Provide patient education as appropriate  Outcome: Progressing  Goal: Maintains/Returns to pre admission functional level  Description: INTERVENTIONS:  - Perform AM-PAC 6 Click Basic Mobility/ Daily Activity assessment daily.  - Set and communicate daily mobility goal to care team and patient/family/caregiver.   - Collaborate with rehabilitation services on mobility goals if consulted  - Out of bed for toileting  - Record patient progress and toleration of activity level   Outcome: Progressing     Problem: DISCHARGE PLANNING  Goal: Discharge to home or other facility with appropriate resources  Description: INTERVENTIONS:  - Identify barriers to discharge w/patient and caregiver  - Arrange for needed discharge resources and transportation as appropriate  - Identify discharge learning needs (meds, wound care, etc.)  - Arrange for interpretive services to assist at discharge as needed  - Refer to Case Management Department for coordinating discharge planning if the patient needs post-hospital services based on physician/advanced practitioner order or complex needs  related to functional status, cognitive ability, or social support system  Outcome: Progressing     Problem: Knowledge Deficit  Goal: Patient/family/caregiver demonstrates understanding of disease process, treatment plan, medications, and discharge instructions  Description: Complete learning assessment and assess knowledge base.  Interventions:  - Provide teaching at level of understanding  - Provide teaching via preferred learning methods  Outcome: Progressing

## 2024-02-07 NOTE — PLAN OF CARE
Problem: PAIN - ADULT  Goal: Verbalizes/displays adequate comfort level or baseline comfort level  Description: Interventions:  - Encourage patient to monitor pain and request assistance  - Assess pain using appropriate pain scale  - Administer analgesics based on type and severity of pain and evaluate response  - Implement non-pharmacological measures as appropriate and evaluate response  - Consider cultural and social influences on pain and pain management  - Notify physician/advanced practitioner if interventions unsuccessful or patient reports new pain  Outcome: Progressing     Problem: INFECTION - ADULT  Goal: Absence or prevention of progression during hospitalization  Description: INTERVENTIONS:  - Assess and monitor for signs and symptoms of infection  - Monitor lab/diagnostic results  - Monitor all insertion sites, i.e. indwelling lines, tubes, and drains  - Monitor endotracheal if appropriate and nasal secretions for changes in amount and color  - [place] appropriate cooling/warming therapies per order  - Administer medications as ordered  - Instruct and encourage patient and family to use good hand hygiene technique  - Identify and instruct in appropriate isolation precautions for identified infection/condition  Outcome: Progressing  Goal: Absence of fever/infection during neutropenic period  Description: INTERVENTIONS:  - Monitor WBC    Outcome: Progressing     Problem: SAFETY ADULT  Goal: Patient will remain free of falls  Description: INTERVENTIONS:  - Educate patient/family on patient safety including physical limitations  - Instruct patient to call for assistance with activity   - Consult OT/PT to assist with strengthening/mobility   - Keep Call bell within reach  - Keep bed low and locked with side rails adjusted as appropriate  - Keep care items and personal belongings within reach  - Initiate and maintain comfort rounds  - Make Fall Risk Sign visible to staff  - Offer Toileting every 2 Hours,  in advance of need  - Initiate/Maintain bedalarm  - Obtain necessary fall risk management equipment: walker  - Apply yellow socks and bracelet for high fall risk patients  - Consider moving patient to room near nurses station  Outcome: Progressing  Goal: Maintain or return to baseline ADL function  Description: INTERVENTIONS:  -  Assess patient's ability to carry out ADLs; assess patient's baseline for ADL function and identify physical deficits which impact ability to perform ADLs (bathing, care of mouth/teeth, toileting, grooming, dressing, etc.)  - Assess/evaluate cause of self-care deficits   - Assess range of motion  - Assess patient's mobility; develop plan if impaired  - Assess patient's need for assistive devices and provide as appropriate  - Encourage maximum independence but intervene and supervise when necessary  - Involve family in performance of ADLs  - Assess for home care needs following discharge   - Consider OT consult to assist with ADL evaluation and planning for discharge  - Provide patient education as appropriate  Outcome: Progressing  Goal: Maintains/Returns to pre admission functional level  Description: INTERVENTIONS:  - Perform AM-PAC 6 Click Basic Mobility/ Daily Activity assessment daily.  - Set and communicate daily mobility goal to care team and patient/family/caregiver.   - Collaborate with rehabilitation services on mobility goals if consulted  - Perform Range of Motion 3 times a day.  - Reposition patient every 2 hours.  - Dangle patient 3 times a day  - Stand patient 3 times a day  - Ambulate patient 3 times a day  - Out of bed to chair 3 times a day   - Out of bed for meals 3 times a day  - Out of bed for toileting  - Record patient progress and toleration of activity level   Outcome: Progressing     Problem: DISCHARGE PLANNING  Goal: Discharge to home or other facility with appropriate resources  Description: INTERVENTIONS:  - Identify barriers to discharge w/patient and  caregiver  - Arrange for needed discharge resources and transportation as appropriate  - Identify discharge learning needs (meds, wound care, etc.)  - Arrange for interpretive services to assist at discharge as needed  - Refer to Case Management Department for coordinating discharge planning if the patient needs post-hospital services based on physician/advanced practitioner order or complex needs related to functional status, cognitive ability, or social support system  Outcome: Progressing

## 2024-02-07 NOTE — UTILIZATION REVIEW
NOTIFICATION OF INPATIENT ADMISSION   AUTHORIZATION REQUEST   SERVICING FACILITY:   [place] [address]  Tax ID: [id number]  NPI: [id number] ATTENDING PROVIDER:  Attending Name and NPI#: Almita Molina Do [[id number]]  Address: [address] [place] [address]  Phone: [phone]   ADMISSION INFORMATION:  Place of Service: Inpatient [facility]  Place of Service Code: 21  Inpatient Admission Date/Time: 2/6/24 10:51 AM  Discharge Date/Time: No discharge date for patient encounter.  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  Contact for approvals/pending authorizations, clinical reviews, and discharge.     PHYSICIAN ADVISORY SERVICES:  Medical Necessity Denial & Bwfq-ji-Pvec Review  Phone: [phone]  Fax: [phone]  Email: [email or web address]     DISCHARGE SUPPORT TEAM:  For Patients Discharge Needs & Updates  Phone: [phone] opt. 2 Fax: [phone]  Email: [email or web address]

## 2024-02-08 NOTE — UTILIZATION REVIEW
NOTIFICATION OF ADMISSION DISCHARGE   This is a Notification of Discharge from [facility]. Please be advised that this patient has been discharge from our facility. Below you will find the admission and discharge date and time including the patient’s disposition.   UTILIZATION REVIEW CONTACT:  Shanel Hall  Utilization   Network Utilization Review Department  Phone: [phone] x carefully listen to the prompts. All voicemails are confidential.  Email: [email or web address]     ADMISSION INFORMATION  PRESENTATION DATE: 2/6/2024 10:50 AM  OBERVATION ADMISSION DATE:   INPATIENT ADMISSION DATE: 2/6/24 10:51 AM   DISCHARGE DATE: 2/7/2024  2:47 PM   DISPOSITION:Home/Self Care    Network Utilization Review Department  ATTENTION: Please call with any questions or concerns to [phone] and carefully listen to the prompts so that you are directed to the right person. All voicemails are confidential.   For Discharge needs, contact Care Management DC Support Team at [phone] opt. 2  Send all requests for admission clinical reviews, approved or denied determinations and any other requests to dedicated fax number below belonging to the campus where the patient is receiving treatment. List of dedicated fax numbers for the Facilities:  FACILITY NAME UR FAX NUMBER   ADMISSION DENIALS (Administrative/Medical Necessity) [phone]   DISCHARGE SUPPORT TEAM ([facility]) [phone]   PARENT CHILD HEALTH (Maternity/NICU/Pediatrics) [phone]   [facility] [phone]   [facility] [phone]   [facility] [phone]   [facility] [phone]   [facility] [phone]   [facility] [phone]   [facility] [phone]   [facility]  [phone]   [facility] [phone]   [facility] [phone]   [facility] [phone]   [facility] [phone]

## 2024-02-14 PROCEDURE — 88307 TISSUE EXAM BY PATHOLOGIST: CPT | Performed by: PATHOLOGY

## 2024-02-14 PROCEDURE — 88341 IMHCHEM/IMCYTCHM EA ADD ANTB: CPT | Performed by: PATHOLOGY

## 2024-02-14 PROCEDURE — 88342 IMHCHEM/IMCYTCHM 1ST ANTB: CPT | Performed by: PATHOLOGY

## 2024-03-06 ENCOUNTER — LAB REQUISITION (OUTPATIENT)
Dept: LAB | Facility: HOSPITAL | Age: 59
End: 2024-03-06

## 2024-03-06 DIAGNOSIS — N63.0 UNSPECIFIED LUMP IN UNSPECIFIED BREAST: ICD-10-CM

## 2024-03-06 DIAGNOSIS — Z15.01 GENETIC SUSCEPTIBILITY TO MALIGNANT NEOPLASM OF BREAST: ICD-10-CM

## 2024-03-06 DIAGNOSIS — C50.911 MALIGNANT NEOPLASM OF UNSPECIFIED SITE OF RIGHT FEMALE BREAST (HCC): ICD-10-CM

## 2024-03-06 LAB — SCAN RESULT: NORMAL

## 2024-03-24 ENCOUNTER — APPOINTMENT (EMERGENCY)
Dept: CT IMAGING | Facility: HOSPITAL | Age: 59
DRG: 392 | End: 2024-03-24
Payer: COMMERCIAL

## 2024-03-24 ENCOUNTER — HOSPITAL ENCOUNTER (INPATIENT)
Facility: HOSPITAL | Age: 59
LOS: 3 days | Discharge: HOME | DRG: 392 | End: 2024-03-27
Attending: STUDENT IN AN ORGANIZED HEALTH CARE EDUCATION/TRAINING PROGRAM | Admitting: FAMILY MEDICINE
Payer: COMMERCIAL

## 2024-03-24 DIAGNOSIS — E11.9 TYPE 2 DIABETES MELLITUS WITHOUT COMPLICATION, WITHOUT LONG-TERM CURRENT USE OF INSULIN (HCC): ICD-10-CM

## 2024-03-24 DIAGNOSIS — R50.9 FEVER: Primary | ICD-10-CM

## 2024-03-24 DIAGNOSIS — K57.92 DIVERTICULITIS: ICD-10-CM

## 2024-03-24 PROBLEM — E87.8 ELECTROLYTE ABNORMALITY: Status: ACTIVE | Noted: 2024-03-24

## 2024-03-24 LAB
ALBUMIN SERPL BCP-MCNC: 4.1 G/DL (ref 3.5–5)
ALP SERPL-CCNC: 94 U/L (ref 34–104)
ALT SERPL W P-5'-P-CCNC: 54 U/L (ref 7–52)
ANION GAP SERPL CALCULATED.3IONS-SCNC: 9 MMOL/L (ref 4–13)
ANISOCYTOSIS BLD QL SMEAR: PRESENT
AST SERPL W P-5'-P-CCNC: 30 U/L (ref 13–39)
BACTERIA UR QL AUTO: NORMAL /HPF
BASOPHILS # BLD MANUAL: 0 THOUSAND/UL (ref 0–0.1)
BASOPHILS NFR MAR MANUAL: 0 % (ref 0–1)
BILIRUB SERPL-MCNC: 0.6 MG/DL (ref 0.2–1)
BILIRUB UR QL STRIP: NEGATIVE
BUN SERPL-MCNC: 13 MG/DL (ref 5–25)
CALCIUM SERPL-MCNC: 9.2 MG/DL (ref 8.4–10.2)
CHLORIDE SERPL-SCNC: 95 MMOL/L (ref 96–108)
CLARITY UR: CLEAR
CO2 SERPL-SCNC: 29 MMOL/L (ref 21–32)
COLOR UR: YELLOW
CREAT SERPL-MCNC: 0.86 MG/DL (ref 0.6–1.3)
EOSINOPHIL # BLD MANUAL: 0.03 THOUSAND/UL (ref 0–0.4)
EOSINOPHIL NFR BLD MANUAL: 1 % (ref 0–6)
ERYTHROCYTE [DISTWIDTH] IN BLOOD BY AUTOMATED COUNT: 12.7 % (ref 11.6–15.1)
FLUAV RNA RESP QL NAA+PROBE: NEGATIVE
FLUBV RNA RESP QL NAA+PROBE: NEGATIVE
GFR SERPL CREATININE-BSD FRML MDRD: 74 ML/MIN/1.73SQ M
GLUCOSE SERPL-MCNC: 135 MG/DL (ref 65–140)
GLUCOSE SERPL-MCNC: 178 MG/DL (ref 65–140)
GLUCOSE UR STRIP-MCNC: NEGATIVE MG/DL
HCT VFR BLD AUTO: 40.4 % (ref 34.8–46.1)
HGB BLD-MCNC: 13 G/DL (ref 11.5–15.4)
HGB UR QL STRIP.AUTO: ABNORMAL
KETONES UR STRIP-MCNC: NEGATIVE MG/DL
LACTATE SERPL-SCNC: 1.7 MMOL/L (ref 0.5–2)
LEUKOCYTE ESTERASE UR QL STRIP: NEGATIVE
LG PLATELETS BLD QL SMEAR: PRESENT
LIPASE SERPL-CCNC: 6 U/L (ref 11–82)
LYMPHOCYTES # BLD AUTO: 1.44 THOUSAND/UL (ref 0.6–4.47)
LYMPHOCYTES # BLD AUTO: 56 % (ref 14–44)
MCH RBC QN AUTO: 26.3 PG (ref 26.8–34.3)
MCHC RBC AUTO-ENTMCNC: 32.2 G/DL (ref 31.4–37.4)
MCV RBC AUTO: 82 FL (ref 82–98)
MICROCYTES BLD QL AUTO: PRESENT
MONOCYTES # BLD AUTO: 0.05 THOUSAND/UL (ref 0–1.22)
MONOCYTES NFR BLD: 2 % (ref 4–12)
NEUTROPHILS # BLD MANUAL: 1.06 THOUSAND/UL (ref 1.85–7.62)
NEUTS SEG NFR BLD AUTO: 41 % (ref 43–75)
NITRITE UR QL STRIP: NEGATIVE
NON-SQ EPI CELLS URNS QL MICRO: NORMAL /HPF
PH UR STRIP.AUTO: 6.5 [PH]
PLATELET # BLD AUTO: 177 THOUSANDS/UL (ref 149–390)
PLATELET BLD QL SMEAR: ADEQUATE
PMV BLD AUTO: 12.1 FL (ref 8.9–12.7)
POTASSIUM SERPL-SCNC: 3.3 MMOL/L (ref 3.5–5.3)
PROT SERPL-MCNC: 7.2 G/DL (ref 6.4–8.4)
PROT UR STRIP-MCNC: NEGATIVE MG/DL
RBC # BLD AUTO: 4.95 MILLION/UL (ref 3.81–5.12)
RBC #/AREA URNS AUTO: NORMAL /HPF
RBC MORPH BLD: PRESENT
RSV RNA RESP QL NAA+PROBE: NEGATIVE
SARS-COV-2 RNA RESP QL NAA+PROBE: NEGATIVE
SODIUM SERPL-SCNC: 133 MMOL/L (ref 135–147)
SP GR UR STRIP.AUTO: <=1.005 (ref 1–1.03)
UROBILINOGEN UR QL STRIP.AUTO: 0.2 E.U./DL
WBC # BLD AUTO: 2.58 THOUSAND/UL (ref 4.31–10.16)
WBC #/AREA URNS AUTO: NORMAL /HPF

## 2024-03-24 PROCEDURE — 74177 CT ABD & PELVIS W/CONTRAST: CPT

## 2024-03-24 PROCEDURE — 80053 COMPREHEN METABOLIC PANEL: CPT | Performed by: PHYSICIAN ASSISTANT

## 2024-03-24 PROCEDURE — 0241U HB NFCT DS VIR RESP RNA 4 TRGT: CPT | Performed by: PHYSICIAN ASSISTANT

## 2024-03-24 PROCEDURE — 83690 ASSAY OF LIPASE: CPT | Performed by: PHYSICIAN ASSISTANT

## 2024-03-24 PROCEDURE — 36415 COLL VENOUS BLD VENIPUNCTURE: CPT | Performed by: PHYSICIAN ASSISTANT

## 2024-03-24 PROCEDURE — 82948 REAGENT STRIP/BLOOD GLUCOSE: CPT

## 2024-03-24 PROCEDURE — 99285 EMERGENCY DEPT VISIT HI MDM: CPT | Performed by: PHYSICIAN ASSISTANT

## 2024-03-24 PROCEDURE — 99284 EMERGENCY DEPT VISIT MOD MDM: CPT

## 2024-03-24 PROCEDURE — 99223 1ST HOSP IP/OBS HIGH 75: CPT | Performed by: FAMILY MEDICINE

## 2024-03-24 PROCEDURE — 96374 THER/PROPH/DIAG INJ IV PUSH: CPT

## 2024-03-24 PROCEDURE — 85007 BL SMEAR W/DIFF WBC COUNT: CPT | Performed by: PHYSICIAN ASSISTANT

## 2024-03-24 PROCEDURE — 85027 COMPLETE CBC AUTOMATED: CPT | Performed by: PHYSICIAN ASSISTANT

## 2024-03-24 PROCEDURE — 96361 HYDRATE IV INFUSION ADD-ON: CPT

## 2024-03-24 PROCEDURE — 83605 ASSAY OF LACTIC ACID: CPT | Performed by: PHYSICIAN ASSISTANT

## 2024-03-24 PROCEDURE — 87040 BLOOD CULTURE FOR BACTERIA: CPT | Performed by: PHYSICIAN ASSISTANT

## 2024-03-24 PROCEDURE — 96375 TX/PRO/DX INJ NEW DRUG ADDON: CPT

## 2024-03-24 PROCEDURE — 81001 URINALYSIS AUTO W/SCOPE: CPT | Performed by: PHYSICIAN ASSISTANT

## 2024-03-24 RX ORDER — FLUTICASONE PROPIONATE 50 MCG
2 SPRAY, SUSPENSION (ML) NASAL
Status: DISCONTINUED | OUTPATIENT
Start: 2024-03-24 | End: 2024-03-27 | Stop reason: HOSPADM

## 2024-03-24 RX ORDER — PRAZOSIN HYDROCHLORIDE 2 MG/1
2 CAPSULE ORAL
Status: DISCONTINUED | OUTPATIENT
Start: 2024-03-24 | End: 2024-03-24

## 2024-03-24 RX ORDER — BUDESONIDE AND FORMOTEROL FUMARATE DIHYDRATE 160; 4.5 UG/1; UG/1
2 AEROSOL RESPIRATORY (INHALATION) 2 TIMES DAILY
Status: DISCONTINUED | OUTPATIENT
Start: 2024-03-24 | End: 2024-03-27 | Stop reason: HOSPADM

## 2024-03-24 RX ORDER — INSULIN LISPRO 100 [IU]/ML
1-5 INJECTION, SOLUTION INTRAVENOUS; SUBCUTANEOUS
Status: DISCONTINUED | OUTPATIENT
Start: 2024-03-25 | End: 2024-03-27 | Stop reason: HOSPADM

## 2024-03-24 RX ORDER — ENOXAPARIN SODIUM 100 MG/ML
40 INJECTION SUBCUTANEOUS DAILY
Status: DISCONTINUED | OUTPATIENT
Start: 2024-03-25 | End: 2024-03-24

## 2024-03-24 RX ORDER — BUPROPION HYDROCHLORIDE 150 MG/1
300 TABLET ORAL DAILY
Status: DISCONTINUED | OUTPATIENT
Start: 2024-03-25 | End: 2024-03-27 | Stop reason: HOSPADM

## 2024-03-24 RX ORDER — ONDANSETRON 2 MG/ML
4 INJECTION INTRAMUSCULAR; INTRAVENOUS EVERY 6 HOURS PRN
Status: DISCONTINUED | OUTPATIENT
Start: 2024-03-24 | End: 2024-03-24

## 2024-03-24 RX ORDER — PANTOPRAZOLE SODIUM 40 MG/1
40 TABLET, DELAYED RELEASE ORAL
Status: DISCONTINUED | OUTPATIENT
Start: 2024-03-25 | End: 2024-03-27 | Stop reason: HOSPADM

## 2024-03-24 RX ORDER — ONDANSETRON 2 MG/ML
4 INJECTION INTRAMUSCULAR; INTRAVENOUS ONCE
Status: COMPLETED | OUTPATIENT
Start: 2024-03-24 | End: 2024-03-24

## 2024-03-24 RX ORDER — BUSPIRONE HYDROCHLORIDE 5 MG/1
15 TABLET ORAL 3 TIMES DAILY PRN
Status: DISCONTINUED | OUTPATIENT
Start: 2024-03-24 | End: 2024-03-27 | Stop reason: HOSPADM

## 2024-03-24 RX ORDER — ATORVASTATIN CALCIUM 40 MG/1
40 TABLET, FILM COATED ORAL DAILY
Status: DISCONTINUED | OUTPATIENT
Start: 2024-03-25 | End: 2024-03-27 | Stop reason: HOSPADM

## 2024-03-24 RX ORDER — DOCUSATE SODIUM 100 MG/1
100 CAPSULE, LIQUID FILLED ORAL 2 TIMES DAILY
Status: DISCONTINUED | OUTPATIENT
Start: 2024-03-24 | End: 2024-03-25

## 2024-03-24 RX ORDER — OXYCODONE HYDROCHLORIDE 5 MG/1
5 TABLET ORAL EVERY 6 HOURS PRN
Status: DISCONTINUED | OUTPATIENT
Start: 2024-03-24 | End: 2024-03-27 | Stop reason: HOSPADM

## 2024-03-24 RX ORDER — LISINOPRIL 20 MG/1
40 TABLET ORAL DAILY
Status: DISCONTINUED | OUTPATIENT
Start: 2024-03-25 | End: 2024-03-27 | Stop reason: HOSPADM

## 2024-03-24 RX ORDER — ALBUTEROL SULFATE 90 UG/1
2 AEROSOL, METERED RESPIRATORY (INHALATION) EVERY 6 HOURS PRN
Status: DISCONTINUED | OUTPATIENT
Start: 2024-03-24 | End: 2024-03-27 | Stop reason: HOSPADM

## 2024-03-24 RX ORDER — INSULIN LISPRO 100 [IU]/ML
1-5 INJECTION, SOLUTION INTRAVENOUS; SUBCUTANEOUS
Status: DISCONTINUED | OUTPATIENT
Start: 2024-03-24 | End: 2024-03-27 | Stop reason: HOSPADM

## 2024-03-24 RX ORDER — QUETIAPINE FUMARATE 200 MG/1
200 TABLET, FILM COATED ORAL
Status: DISCONTINUED | OUTPATIENT
Start: 2024-03-24 | End: 2024-03-27 | Stop reason: HOSPADM

## 2024-03-24 RX ORDER — POTASSIUM CHLORIDE 20 MEQ/1
40 TABLET, EXTENDED RELEASE ORAL ONCE
Status: COMPLETED | OUTPATIENT
Start: 2024-03-24 | End: 2024-03-24

## 2024-03-24 RX ORDER — ACETAMINOPHEN 325 MG/1
650 TABLET ORAL EVERY 6 HOURS PRN
Status: DISCONTINUED | OUTPATIENT
Start: 2024-03-24 | End: 2024-03-27 | Stop reason: HOSPADM

## 2024-03-24 RX ORDER — BISACODYL 10 MG
10 SUPPOSITORY, RECTAL RECTAL DAILY PRN
Status: DISCONTINUED | OUTPATIENT
Start: 2024-03-24 | End: 2024-03-27 | Stop reason: HOSPADM

## 2024-03-24 RX ORDER — PRAZOSIN HYDROCHLORIDE 2 MG/1
10 CAPSULE ORAL
Status: DISCONTINUED | OUTPATIENT
Start: 2024-03-24 | End: 2024-03-27 | Stop reason: HOSPADM

## 2024-03-24 RX ORDER — MONTELUKAST SODIUM 10 MG/1
10 TABLET ORAL
Status: DISCONTINUED | OUTPATIENT
Start: 2024-03-24 | End: 2024-03-27 | Stop reason: HOSPADM

## 2024-03-24 RX ORDER — CHLORTHALIDONE 25 MG/1
25 TABLET ORAL DAILY
Status: DISCONTINUED | OUTPATIENT
Start: 2024-03-25 | End: 2024-03-24

## 2024-03-24 RX ORDER — ESCITALOPRAM OXALATE 10 MG/1
10 TABLET ORAL DAILY
COMMUNITY

## 2024-03-24 RX ORDER — SODIUM CHLORIDE 9 MG/ML
75 INJECTION, SOLUTION INTRAVENOUS CONTINUOUS
Status: DISCONTINUED | OUTPATIENT
Start: 2024-03-24 | End: 2024-03-27

## 2024-03-24 RX ORDER — NORTRIPTYLINE HYDROCHLORIDE 10 MG/1
10 CAPSULE ORAL
Status: DISCONTINUED | OUTPATIENT
Start: 2024-03-24 | End: 2024-03-24

## 2024-03-24 RX ORDER — DIPHENHYDRAMINE HYDROCHLORIDE 50 MG/ML
25 INJECTION INTRAMUSCULAR; INTRAVENOUS EVERY 6 HOURS PRN
Status: DISCONTINUED | OUTPATIENT
Start: 2024-03-24 | End: 2024-03-27 | Stop reason: HOSPADM

## 2024-03-24 RX ORDER — POLYETHYLENE GLYCOL 3350 17 G/17G
17 POWDER, FOR SOLUTION ORAL DAILY
Status: DISCONTINUED | OUTPATIENT
Start: 2024-03-25 | End: 2024-03-25

## 2024-03-24 RX ORDER — FENTANYL CITRATE 50 UG/ML
50 INJECTION, SOLUTION INTRAMUSCULAR; INTRAVENOUS ONCE
Status: COMPLETED | OUTPATIENT
Start: 2024-03-24 | End: 2024-03-24

## 2024-03-24 RX ORDER — NORTRIPTYLINE HYDROCHLORIDE 25 MG/1
50 CAPSULE ORAL
Status: DISCONTINUED | OUTPATIENT
Start: 2024-03-24 | End: 2024-03-27 | Stop reason: HOSPADM

## 2024-03-24 RX ADMIN — PIPERACILLIN AND TAZOBACTAM 4.5 G: 36; 4.5 INJECTION, POWDER, FOR SOLUTION INTRAVENOUS at 20:37

## 2024-03-24 RX ADMIN — NORTRIPTYLINE HYDROCHLORIDE 50 MG: 25 CAPSULE ORAL at 22:55

## 2024-03-24 RX ADMIN — ONDANSETRON 4 MG: 2 INJECTION INTRAMUSCULAR; INTRAVENOUS at 17:50

## 2024-03-24 RX ADMIN — PRAZOSIN HYDROCHLORIDE 10 MG: 2 CAPSULE ORAL at 22:55

## 2024-03-24 RX ADMIN — MONTELUKAST 10 MG: 10 TABLET, FILM COATED ORAL at 22:55

## 2024-03-24 RX ADMIN — QUETIAPINE FUMARATE 200 MG: 200 TABLET ORAL at 22:55

## 2024-03-24 RX ADMIN — DOCUSATE SODIUM 100 MG: 100 CAPSULE, LIQUID FILLED ORAL at 23:08

## 2024-03-24 RX ADMIN — IOHEXOL 100 ML: 350 INJECTION, SOLUTION INTRAVENOUS at 18:58

## 2024-03-24 RX ADMIN — SODIUM CHLORIDE 125 ML/HR: 0.9 INJECTION, SOLUTION INTRAVENOUS at 23:02

## 2024-03-24 RX ADMIN — SODIUM CHLORIDE 1000 ML: 0.9 INJECTION, SOLUTION INTRAVENOUS at 17:51

## 2024-03-24 RX ADMIN — POTASSIUM CHLORIDE 40 MEQ: 1500 TABLET, EXTENDED RELEASE ORAL at 22:55

## 2024-03-24 RX ADMIN — GLYCERIN 1 SUPPOSITORY: 2 SUPPOSITORY RECTAL at 23:01

## 2024-03-24 RX ADMIN — FENTANYL CITRATE 50 MCG: 50 INJECTION INTRAMUSCULAR; INTRAVENOUS at 18:36

## 2024-03-24 NOTE — ED NOTES
Pt provided warm blanket. Resting with call bell in reach. Family at bedside.        Lucretia Sheppard RN  03/24/24 9818

## 2024-03-24 NOTE — ED PROVIDER NOTES
"History  Chief Complaint   Patient presents with    Abdominal Pain     Pt states abdominal pain, with N/V., states contipated. On chemo for breast cancer. +fever and chills     58-year-old female presents the emergency department for evaluation of lower abdominal pain nausea vomiting fevers and chills.  Patient states she feels constipated and has not had bowel movement about 1 week.  Also states she is not passing gas.  With past medical history of wrist cancer had first round of chemotherapy on Tuesday.  Patient states she was \"wiped out\" and did not eat well for several days after this round of chemo.  She reports surgical history of cholecystectomy and active and appendectomy.  She denies any dysuria hematuria urinary frequency.  Patient states she has been treating fevers with Tylenol.  Called her oncologist today who recommended she come to the emergency department for further evaluation.        Prior to Admission Medications   Prescriptions Last Dose Informant Patient Reported? Taking?   Multiple Vitamins-Minerals (MULTIVITAMIN WITH MINERALS) tablet  Self Yes No   Sig: Take 1 tablet by mouth daily   OMEPRAZOLE PO   Yes No   Sig: Take 20 mg by mouth 2 (two) times a day   QUEtiapine (SEROquel) 200 mg tablet   Yes No   Sig: Take 200 mg by mouth daily at bedtime   albuterol (2.5 mg/3 mL) 0.083 % nebulizer solution   Yes No   Sig: Take 2.5 mg by nebulization every 6 (six) hours as needed   albuterol (PROVENTIL HFA,VENTOLIN HFA) 90 mcg/act inhaler   Yes No   Sig: Inhale 2 puffs as needed   atorvastatin (LIPITOR) 40 mg tablet   Yes No   Sig: Take 40 mg by mouth daily   buPROPion (WELLBUTRIN SR) 150 mg 12 hr tablet   Yes No   Sig: Take 200 mg by mouth 2 (two) times a day   busPIRone (BUSPAR) 15 mg tablet   Yes No   Sig: Take 15 mg by mouth 3 (three) times a day as needed   cetirizine (ZyrTEC) 10 mg tablet   Yes No   Sig: Take 10 mg by mouth daily   chlorthalidone 25 mg tablet   Yes No   Sig: Take 25 mg by mouth daily "   fluticasone (FLONASE) 50 mcg/act nasal spray   Yes No   Si sprays into each nostril daily at bedtime   fluticasone-salmeterol (ADVAIR HFA) 115-21 MCG/ACT inhaler   Yes No   Sig: Inhale 2 puffs 2 (two) times a day Rinse mouth after use.   ibuprofen (MOTRIN) 600 mg tablet   No No   Sig: Take 1 tablet (600 mg total) by mouth every 6 (six) hours as needed for moderate pain   lisinopril (ZESTRIL) 20 mg tablet   Yes No   Sig: Take 40 mg by mouth daily   montelukast (SINGULAIR) 10 mg tablet   Yes No   Sig: Take 10 mg by mouth daily at bedtime   nortriptyline (PAMELOR) 10 mg capsule   Yes No   Sig: Take 10 mg by mouth daily at bedtime   ondansetron (ZOFRAN) 4 mg tablet   No No   Sig: Take 1 tablet (4 mg total) by mouth every 8 (eight) hours as needed for nausea or vomiting   oxyCODONE (ROXICODONE) 5 immediate release tablet   No No   Sig: Take 1 tablet (5 mg total) by mouth every 6 (six) hours as needed for severe pain for up to 6 doses Max Daily Amount: 20 mg   prazosin (MINIPRESS) 2 mg capsule   Yes No   Sig: Take 2 mg by mouth daily at bedtime   traZODone (DESYREL) 300 MG tablet   Yes No   Sig: Take 300 mg by mouth daily at bedtime      Facility-Administered Medications: None       Past Medical History:   Diagnosis Date    Anesthesia     Pt states she woke up during 2 surgeries    Breast cancer (HCC)     Cancer (HCC)     GERD (gastroesophageal reflux disease)     Heart murmur     Hyperlipidemia     Hypertension     Kidney stones     Subdural hematoma (HCC)     Von Willebrand disease (HCC)     Wears contact lenses        Past Surgical History:   Procedure Laterality Date    APPENDECTOMY      BREAST LUMPECTOMY Left     CARPAL TUNNEL RELEASE Bilateral     CHOLECYSTECTOMY      COLONOSCOPY      HYSTERECTOMY      KNEE SURGERY Left     LYMPH NODE BIOPSY Right 2024    Procedure: AXILLARY SENTINEL NODE BIOPSY (NUC MED INJECTION AT 0730);  Surgeon: Almita Molina DO;  Location:  MAIN OR;  Service: General     MRI BREAST BIOPSY LEFT (ALL INCLUSIVE) Left 12/13/2023    PARTIAL HYSTERECTOMY      OH MASTECTOMY SIMPLE COMPLETE Bilateral 2/6/2024    Procedure: BREAST MASTECTOMY;  Surgeon: Almita Molina DO;  Location: [facility];  Service: General    ROTATOR CUFF REPAIR Left     TONSILLECTOMY         Family History   Problem Relation Age of Onset    Cancer Mother     Allergies Father     Cancer Sister     Stroke Brother      I have reviewed and agree with the history as documented.    E-Cigarette/Vaping    E-Cigarette Use Former User      E-Cigarette/Vaping Substances    Nicotine Yes 2 cigarettes a week    THC No     CBD No     Flavoring Yes      Social History     Tobacco Use    Smoking status: Former     Current packs/day: 0.25     Average packs/day: 0.3 packs/day for 25.0 years (6.3 ttl pk-yrs)     Types: Cigarettes    Smokeless tobacco: Former    Tobacco comments:     few cigs/day   Vaping Use    Vaping status: Former    Substances: Nicotine (2 cigarettes a week), Flavoring   Substance Use Topics    Alcohol use: Not Currently    Drug use: Never       Review of Systems   Constitutional:  Positive for appetite change, chills, fatigue and fever.   HENT: Negative.     Respiratory: Negative.     Cardiovascular: Negative.    Gastrointestinal:  Positive for abdominal pain, constipation, nausea and vomiting.   Musculoskeletal: Negative.    Skin: Negative.    Neurological:  Positive for weakness.   All other systems reviewed and are negative.      Physical Exam  Physical Exam  Vitals and nursing note reviewed.   Constitutional:       General: She is not in acute distress.     Appearance: She is well-developed. She is obese. She is not ill-appearing, toxic-appearing or diaphoretic.   HENT:      Head: Normocephalic.      Mouth/Throat:      Mouth: Mucous membranes are moist.   Eyes:      Extraocular Movements: Extraocular movements intact.   Cardiovascular:      Rate and Rhythm: Normal rate and regular rhythm.   Pulmonary:       Effort: Pulmonary effort is normal.      Breath sounds: Normal breath sounds. No stridor. No wheezing, rhonchi or rales.   Chest:      Chest wall: No tenderness.   Abdominal:      General: There is distension.      Tenderness: There is abdominal tenderness in the suprapubic area.   Skin:     General: Skin is warm and dry.   Neurological:      General: No focal deficit present.      Mental Status: She is alert and oriented to person, place, and time.   Psychiatric:         Mood and Affect: Mood normal.         Vital Signs  ED Triage Vitals   Temperature Pulse Respirations Blood Pressure SpO2   03/24/24 1740 03/24/24 1740 03/24/24 1740 03/24/24 1740 03/24/24 1740   99.5 °F (37.5 °C) 86 20 147/86 95 %      Temp Source Heart Rate Source Patient Position - Orthostatic VS BP Location FiO2 (%)   03/24/24 1740 03/24/24 1800 -- 03/24/24 1800 --   Temporal Monitor  Left arm       Pain Score       03/24/24 1740       9           Vitals:    03/24/24 1845 03/24/24 1915 03/24/24 2000 03/24/24 2015   BP: 157/69 156/72 149/77 148/83   Pulse: 74 73 79 79         Visual Acuity      ED Medications  Medications   piperacillin-tazobactam (ZOSYN) 4.5 g in sodium chloride 0.9 % 100 mL IVPB (has no administration in time range)   sodium chloride 0.9 % bolus 1,000 mL (0 mL Intravenous Stopped 3/24/24 1850)   ondansetron (ZOFRAN) injection 4 mg (4 mg Intravenous Given 3/24/24 1750)   fentaNYL injection 50 mcg (50 mcg Intravenous Given 3/24/24 1836)   iohexol (OMNIPAQUE) 350 MG/ML injection (MULTI-DOSE) 100 mL (100 mL Intravenous Given 3/24/24 1858)       Diagnostic Studies  Results Reviewed       Procedure Component Value Units Date/Time    Urine Microscopic [[id number]]  (Normal) Collected: 03/24/24 1926    Lab Status: Final result Specimen: Urine, Clean Catch Updated: 03/24/24 1945     RBC, UA 0-5 /hpf      WBC, UA 0-1 /hpf      Epithelial Cells Occasional /hpf      Bacteria, UA Occasional /hpf     UA w Reflex to Microscopic w Reflex  to Culture [[id number]]  (Abnormal) Collected: 03/24/24 1926    Lab Status: Final result Specimen: Urine, Clean Catch Updated: 03/24/24 1936     Color, UA Yellow     Clarity, UA Clear     Specific Gravity, UA <=1.005     pH, UA 6.5     Leukocytes, UA Negative     Nitrite, UA Negative     Protein, UA Negative mg/dl      Glucose, UA Negative mg/dl      Ketones, UA Negative mg/dl      Urobilinogen, UA 0.2 E.U./dl      Bilirubin, UA Negative     Occult Blood, UA Trace-lysed    Manual Differential(PHLEBS Do Not Order) [[id number]]  (Abnormal) Collected: 03/24/24 1750    Lab Status: Final result Specimen: Blood from Arm, Left Updated: 03/24/24 1842     Segmented % 41 %      Lymphocytes % 56 %      Monocytes % 2 %      Eosinophils % 1 %      Basophils % 0 %      Absolute Neutrophils 1.06 Thousand/uL      Absolute Lymphocytes 1.44 Thousand/uL      Absolute Monocytes 0.05 Thousand/uL      Absolute Eosinophils 0.03 Thousand/uL      Absolute Basophils 0.00 Thousand/uL      Total Counted --     RBC Morphology Present     Platelet Estimate Adequate     Large Platelet Present     Anisocytosis Present     Microcytes Present    FLU/RSV/COVID - if FLU/RSV clinically relevant [[id number]]  (Normal) Collected: 03/24/24 1750    Lab Status: Final result Specimen: Nares from Nose Updated: 03/24/24 1836     SARS-CoV-2 Negative     INFLUENZA A PCR Negative     INFLUENZA B PCR Negative     RSV PCR Negative    Narrative:      FOR PEDIATRIC PATIENTS - copy/paste COVID Guidelines URL to browser: [email or web address]    SARS-CoV-2 assay is a Nucleic Acid Amplification assay intended for the  qualitative detection of nucleic acid from SARS-CoV-2 in nasopharyngeal  swabs. Results are for the presumptive identification of SARS-CoV-2 RNA.    Positive results are indicative of infection with SARS-CoV-2, the virus  causing COVID-19, but do not rule out bacterial infection or co-infection  with other  viruses. Laboratories within the [place] and its  territories are required to report all positive results to the appropriate  public health authorities. Negative results do not preclude SARS-CoV-2  infection and should not be used as the sole basis for treatment or other  patient management decisions. Negative results must be combined with  clinical observations, patient history, and epidemiological information.  This test has not been FDA cleared or approved.    This test has been authorized by FDA under an Emergency Use Authorization  (EUA). This test is only authorized for the duration of time the  declaration that circumstances exist justifying the authorization of the  emergency use of an in vitro diagnostic tests for detection of SARS-CoV-2  virus and/or diagnosis of COVID-19 infection under section 564(b)(1) of  the Act, 21 U.S.C. 360bbb-3(b)(1), unless the authorization is terminated  or revoked sooner. The test has been validated but independent review by FDA  and CLIA is pending.    Test performed using [employer] GeneXpert: This RT-PCR assay targets N2,  a region unique to SARS-CoV-2. A conserved region in the E-gene was chosen  for pan-Sarbecovirus detection which includes SARS-CoV-2.    According to CMS-2020-01-R, this platform meets the definition of high-throughput technology.    Comprehensive metabolic panel [[id number]]  (Abnormal) Collected: 03/24/24 1750    Lab Status: Final result Specimen: Blood from Arm, Left Updated: 03/24/24 1816     Sodium 133 mmol/L      Potassium 3.3 mmol/L      Chloride 95 mmol/L      CO2 29 mmol/L      ANION GAP 9 mmol/L      BUN 13 mg/dL      Creatinine 0.86 mg/dL      Glucose 178 mg/dL      Calcium 9.2 mg/dL      AST 30 U/L      ALT 54 U/L      Alkaline Phosphatase 94 U/L      Total Protein 7.2 g/dL      Albumin 4.1 g/dL      Total Bilirubin 0.60 mg/dL      eGFR 74 ml/min/1.73sq m     Narrative:      National Kidney Disease Foundation guidelines for Chronic Kidney  Disease (CKD):     Stage 1 with normal or high GFR (GFR > 90 mL/min/1.73 square meters)    Stage 2 Mild CKD (GFR = 60-89 mL/min/1.73 square meters)    Stage 3A Moderate CKD (GFR = 45-59 mL/min/1.73 square meters)    Stage 3B Moderate CKD (GFR = 30-44 mL/min/1.73 square meters)    Stage 4 Severe CKD (GFR = 15-29 mL/min/1.73 square meters)    Stage 5 End Stage CKD (GFR <15 mL/min/1.73 square meters)  Note: GFR calculation is accurate only with a steady state creatinine    Lipase [[id number]]  (Abnormal) Collected: 03/24/24 1750    Lab Status: Final result Specimen: Blood from Arm, Left Updated: 03/24/24 1816     Lipase 6 u/L     Lactic acid, plasma (w/reflex if result > 2.0) [[id number]]  (Normal) Collected: 03/24/24 1750    Lab Status: Final result Specimen: Blood from Arm, Left Updated: 03/24/24 1815     LACTIC ACID 1.7 mmol/L     Narrative:      Result may be elevated if tourniquet was used during collection.    CBC and differential [[id number]]  (Abnormal) Collected: 03/24/24 1750    Lab Status: Final result Specimen: Blood from Arm, Left Updated: 03/24/24 1809     WBC 2.58 Thousand/uL      RBC 4.95 Million/uL      Hemoglobin 13.0 g/dL      Hematocrit 40.4 %      MCV 82 fL      MCH 26.3 pg      MCHC 32.2 g/dL      RDW 12.7 %      MPV 12.1 fL      Platelets 177 Thousands/uL     Blood culture #1 [[id number]] Collected: 03/24/24 1750    Lab Status: In process Specimen: Blood from Arm, Left Updated: 03/24/24 1756    Blood culture #2 [[id number]] Collected: 03/24/24 1750    Lab Status: In process Specimen: Blood from Arm, Left Updated: 03/24/24 1756                   CT abdomen pelvis with contrast   Final Result by Lasha Cortez MD (03/24 2020)      1.  Question very mild acute diverticulitis at the splenic flexure of the colon. Otherwise no acute findings in the abdomen or pelvis.   2.  Hepatomegaly and hepatic steatosis.            The study was marked in EPIC for immediate notification.      Workstation performed:  [id number]                    Procedures  Procedures         ED Course  ED Course as of 03/24/24 2033   Sun Mar 24, 2024   1801 WBC(!): 2.58   1838 Potassium(!): 3.3   1838 Sodium(!): 133   1838 LACTIC ACID: 1.7   1838 FLU/RSV/COVID - if FLU/RSV clinically relevant  negative   1920 She had improvement of symptoms with fentanyl.  Resting comfortably at this time   1939 Blood, UA(!): Trace-lysed  UA negative for UTI. Noted for trace blood   2016 TT sent to oncology Dr. Nina Brothers   2022 CT abd: 1.  Question very mild acute diverticulitis at the splenic flexure of the colon. Otherwise no acute findings in the abdomen or pelvis.  2.  Hepatomegaly and hepatic steatosis.     2027 TT sent to medicine requesting admission                                SBIRT 20yo+      Flowsheet Row Most Recent Value   Initial Alcohol Screen: US AUDIT-C     1. How often do you have a drink containing alcohol? 0 Filed at: 03/24/2024 1741   2. How many drinks containing alcohol do you have on a typical day you are drinking?  0 Filed at: 03/24/2024 1741   3b. FEMALE Any Age, or MALE 65+: How often do you have 4 or more drinks on one occassion? 0 Filed at: 03/24/2024 1741   Audit-C Score 0 Filed at: 03/24/2024 1741   CRISTINO: How many times in the past year have you...    Used an illegal drug or used a prescription medication for non-medical reasons? Never Filed at: 03/24/2024 1741                      Medical Decision Making  58-year-old female presented to the emergency department for evaluation of fever, abdominal pain constipation nausea vomiting.  Vitals and medical record reviewed.  Patient at risk for the following but not limited to small bowel obstruction, pancolitis, gastritis, diverticulitis, neutropenic fever, UTI.  Patient's abdomen was not peritoneal.  Patient's laboratory findings, borderline for neutropenic fever.  UA negative for UTI. Her CAT scan was consistent with diverticulitis and she was started on IV Zosyn.  She was  accepted to medicine team for further treatments and evaluation.    Problems Addressed:  Diverticulitis: acute illness or injury  Fever: acute illness or injury    Amount and/or Complexity of Data Reviewed  Labs: ordered. Decision-making details documented in ED Course.  Radiology: ordered.    Risk  Prescription drug management.  Decision regarding hospitalization.             Disposition  Final diagnoses:   Fever   Diverticulitis     Time reflects when diagnosis was documented in both MDM as applicable and the Disposition within this note       Time User Action Codes Description Comment    3/24/2024  8:30 PM Ioana Caceres Add [R50.9] Fever     3/24/2024  8:30 PM Ioana Caceres Add [K57.92] Diverticulitis           ED Disposition       ED Disposition   Admit    Condition   Stable    Date/Time   Sun Mar 24, 2024 2030    Comment   Case was discussed with Dr. Weinberg and the patient's admission status was agreed to be Admission Status: inpatient status to the service of Dr. Weinberg .               Follow-up Information       Follow up With Specialties Details Why Contact Info    Carmelo Cramer MD Family Medicine   [address] [place] [id number]  [phone]              Patient's Medications   Discharge Prescriptions    No medications on file       No discharge procedures on file.    PDMP Review       None            ED Provider  Electronically Signed by             Ioana Caceres PA-C  03/24/24 2033

## 2024-03-25 LAB
ALBUMIN SERPL BCP-MCNC: 3.7 G/DL (ref 3.5–5)
ALP SERPL-CCNC: 76 U/L (ref 34–104)
ALT SERPL W P-5'-P-CCNC: 43 U/L (ref 7–52)
ANION GAP SERPL CALCULATED.3IONS-SCNC: 8 MMOL/L (ref 4–13)
AST SERPL W P-5'-P-CCNC: 24 U/L (ref 13–39)
ATRIAL RATE: 71 BPM
BASOPHILS # BLD AUTO: 0.04 THOUSANDS/ÂΜL (ref 0–0.1)
BASOPHILS NFR BLD AUTO: 2 % (ref 0–1)
BILIRUB SERPL-MCNC: 0.94 MG/DL (ref 0.2–1)
BUN SERPL-MCNC: 11 MG/DL (ref 5–25)
CALCIUM SERPL-MCNC: 8.4 MG/DL (ref 8.4–10.2)
CHLORIDE SERPL-SCNC: 99 MMOL/L (ref 96–108)
CO2 SERPL-SCNC: 26 MMOL/L (ref 21–32)
CREAT SERPL-MCNC: 0.83 MG/DL (ref 0.6–1.3)
EOSINOPHIL # BLD AUTO: 0.01 THOUSAND/ÂΜL (ref 0–0.61)
EOSINOPHIL NFR BLD AUTO: 1 % (ref 0–6)
ERYTHROCYTE [DISTWIDTH] IN BLOOD BY AUTOMATED COUNT: 12.6 % (ref 11.6–15.1)
GFR SERPL CREATININE-BSD FRML MDRD: 77 ML/MIN/1.73SQ M
GLUCOSE SERPL-MCNC: 127 MG/DL (ref 65–140)
GLUCOSE SERPL-MCNC: 130 MG/DL (ref 65–140)
GLUCOSE SERPL-MCNC: 131 MG/DL (ref 65–140)
GLUCOSE SERPL-MCNC: 137 MG/DL (ref 65–140)
GLUCOSE SERPL-MCNC: 150 MG/DL (ref 65–140)
HCT VFR BLD AUTO: 35.8 % (ref 34.8–46.1)
HGB BLD-MCNC: 11.5 G/DL (ref 11.5–15.4)
IMM GRANULOCYTES # BLD AUTO: 0.01 THOUSAND/UL (ref 0–0.2)
IMM GRANULOCYTES NFR BLD AUTO: 1 % (ref 0–2)
LYMPHOCYTES # BLD AUTO: 1.18 THOUSANDS/ÂΜL (ref 0.6–4.47)
LYMPHOCYTES NFR BLD AUTO: 64 % (ref 14–44)
MCH RBC QN AUTO: 26.3 PG (ref 26.8–34.3)
MCHC RBC AUTO-ENTMCNC: 32.1 G/DL (ref 31.4–37.4)
MCV RBC AUTO: 82 FL (ref 82–98)
MONOCYTES # BLD AUTO: 0.19 THOUSAND/ÂΜL (ref 0.17–1.22)
MONOCYTES NFR BLD AUTO: 11 % (ref 4–12)
NEUTROPHILS # BLD AUTO: 0.38 THOUSANDS/ÂΜL (ref 1.85–7.62)
NEUTS SEG NFR BLD AUTO: 21 % (ref 43–75)
NRBC BLD AUTO-RTO: 0 /100 WBCS
P AXIS: 47 DEGREES
PLATELET # BLD AUTO: 157 THOUSANDS/UL (ref 149–390)
PMV BLD AUTO: 12.2 FL (ref 8.9–12.7)
POTASSIUM SERPL-SCNC: 3.5 MMOL/L (ref 3.5–5.3)
PR INTERVAL: 176 MS
PROT SERPL-MCNC: 6.4 G/DL (ref 6.4–8.4)
QRS AXIS: 8 DEGREES
QRSD INTERVAL: 98 MS
QT INTERVAL: 468 MS
QTC INTERVAL: 508 MS
RBC # BLD AUTO: 4.38 MILLION/UL (ref 3.81–5.12)
SODIUM SERPL-SCNC: 133 MMOL/L (ref 135–147)
T WAVE AXIS: 28 DEGREES
VENTRICULAR RATE: 71 BPM
WBC # BLD AUTO: 1.81 THOUSAND/UL (ref 4.31–10.16)

## 2024-03-25 PROCEDURE — 93005 ELECTROCARDIOGRAM TRACING: CPT

## 2024-03-25 PROCEDURE — 85025 COMPLETE CBC W/AUTO DIFF WBC: CPT | Performed by: FAMILY MEDICINE

## 2024-03-25 PROCEDURE — 82948 REAGENT STRIP/BLOOD GLUCOSE: CPT

## 2024-03-25 PROCEDURE — 80053 COMPREHEN METABOLIC PANEL: CPT | Performed by: FAMILY MEDICINE

## 2024-03-25 PROCEDURE — 93010 ELECTROCARDIOGRAM REPORT: CPT | Performed by: STUDENT IN AN ORGANIZED HEALTH CARE EDUCATION/TRAINING PROGRAM

## 2024-03-25 PROCEDURE — 99232 SBSQ HOSP IP/OBS MODERATE 35: CPT

## 2024-03-25 RX ORDER — CALCIUM CARBONATE 500 MG/1
500 TABLET, CHEWABLE ORAL DAILY PRN
Status: DISCONTINUED | OUTPATIENT
Start: 2024-03-25 | End: 2024-03-27 | Stop reason: HOSPADM

## 2024-03-25 RX ADMIN — QUETIAPINE FUMARATE 200 MG: 200 TABLET ORAL at 21:56

## 2024-03-25 RX ADMIN — BUSPIRONE HYDROCHLORIDE 15 MG: 5 TABLET ORAL at 20:01

## 2024-03-25 RX ADMIN — ATORVASTATIN CALCIUM 40 MG: 40 TABLET, FILM COATED ORAL at 08:20

## 2024-03-25 RX ADMIN — PANTOPRAZOLE SODIUM 40 MG: 40 TABLET, DELAYED RELEASE ORAL at 06:16

## 2024-03-25 RX ADMIN — PIPERACILLIN AND TAZOBACTAM 4.5 G: 36; 4.5 INJECTION, POWDER, FOR SOLUTION INTRAVENOUS at 16:18

## 2024-03-25 RX ADMIN — CALCIUM CARBONATE (ANTACID) CHEW TAB 500 MG 500 MG: 500 CHEW TAB at 20:01

## 2024-03-25 RX ADMIN — SODIUM CHLORIDE 125 ML/HR: 0.9 INJECTION, SOLUTION INTRAVENOUS at 13:51

## 2024-03-25 RX ADMIN — PRAZOSIN HYDROCHLORIDE 10 MG: 2 CAPSULE ORAL at 21:55

## 2024-03-25 RX ADMIN — PANTOPRAZOLE SODIUM 40 MG: 40 TABLET, DELAYED RELEASE ORAL at 17:00

## 2024-03-25 RX ADMIN — LISINOPRIL 40 MG: 20 TABLET ORAL at 08:20

## 2024-03-25 RX ADMIN — OXYCODONE HYDROCHLORIDE 5 MG: 5 TABLET ORAL at 22:11

## 2024-03-25 RX ADMIN — PIPERACILLIN AND TAZOBACTAM 4.5 G: 36; 4.5 INJECTION, POWDER, FOR SOLUTION INTRAVENOUS at 00:47

## 2024-03-25 RX ADMIN — MONTELUKAST 10 MG: 10 TABLET, FILM COATED ORAL at 21:55

## 2024-03-25 RX ADMIN — BUPROPION HYDROCHLORIDE 300 MG: 150 TABLET, EXTENDED RELEASE ORAL at 08:20

## 2024-03-25 RX ADMIN — NORTRIPTYLINE HYDROCHLORIDE 50 MG: 25 CAPSULE ORAL at 21:56

## 2024-03-25 RX ADMIN — PIPERACILLIN AND TAZOBACTAM 4.5 G: 36; 4.5 INJECTION, POWDER, FOR SOLUTION INTRAVENOUS at 08:20

## 2024-03-25 NOTE — RESPIRATORY THERAPY NOTE
RT Protocol Note  Tammie Avalos 58 y.o. female MRN: [id number]  Unit/Bed#: -01 Encounter: [id number]    Assessment    Principal Problem:    Diverticulitis  Active Problems:    Von Willebrand disease (HCC)    Mild intermittent asthma without complication    Malignant neoplasm of central portion of right breast in female, estrogen receptor positive     Type 2 diabetes mellitus without complication, without long-term current use of insulin (HCC)    Electrolyte abnormality      Home Pulmonary Medications:         Past Medical History:   Diagnosis Date    Anesthesia     Pt states she woke up during 2 surgeries    Breast cancer (HCC)     Cancer (HCC)     GERD (gastroesophageal reflux disease)     Heart murmur     Hyperlipidemia     Hypertension     Kidney stones     Subdural hematoma (HCC)     Von Willebrand disease (HCC)     Wears contact lenses      Social History     Socioeconomic History    Marital status: Single     Spouse name: None    Number of children: None    Years of education: None    Highest education level: None   Occupational History    None   Tobacco Use    Smoking status: Former     Current packs/day: 0.25     Average packs/day: 0.3 packs/day for 25.0 years (6.3 ttl pk-yrs)     Types: Cigarettes    Smokeless tobacco: Former    Tobacco comments:     few cigs/day   Vaping Use    Vaping status: Former    Substances: Nicotine (2 cigarettes a week), Flavoring   Substance and Sexual Activity    Alcohol use: Not Currently    Drug use: Never    Sexual activity: None   Other Topics Concern    None   Social History Narrative    None     Social Determinants of Health     Financial Resource Strain: Not on file   Food Insecurity: No Food Insecurity (3/8/2024)    Received from [facility]    Hunger Vital Sign     Worried About Running Out of Food in the Last Year: Never true     Ran Out of Food in the Last Year: Never true   Transportation Needs: Not on file   Physical Activity: Not on file   Stress: Not on file  "  Social Connections: Not on file   Intimate Partner Violence: Not on file   Housing Stability: Not on file       Subjective         Objective    Physical Exam:   Assessment Type: Assess only  General Appearance: Awake, Alert  Respiratory Pattern: Normal    Vitals:  Blood pressure 143/78, pulse 79, temperature (!) 97 °F (36.1 °C), resp. rate 18, height 5' 7\" (1.702 m), weight 106 kg (234 lb 9.6 oz), SpO2 95%.          Imaging and other studies: I have personally reviewed pertinent reports.            Plan    Respiratory Plan: Discontinue Protocol        Resp Comments: Pt admitted for abdominal pain, fever, assessed on RA  awake and responsive,  no respiratory interventions required, pt indicates does  use albuterol rescue inhaler as needed.   "

## 2024-03-25 NOTE — ASSESSMENT & PLAN NOTE
Follows up with hematology/oncology, Dr. Moya  Patient has high bleeding tendency, hold any kind of anticoagulation.

## 2024-03-25 NOTE — UTILIZATION REVIEW
Initial Clinical Review    Admission: Date/Time/Statement:   Admission Orders (From admission, onward)       Ordered        03/24/24 2031  INPATIENT ADMISSION  Once                          Orders Placed This Encounter   Procedures    INPATIENT ADMISSION     Standing Status:   Standing     Number of Occurrences:   1     Order Specific Question:   Level of Care     Answer:   Med Surg [16]     Order Specific Question:   Estimated length of stay     Answer:   More than 2 Midnights     Order Specific Question:   Certification     Answer:   I certify that inpatient services are medically necessary for this patient for a duration of greater than two midnights. See H&P and MD Progress Notes for additional information about the patient's course of treatment.     ED Arrival Information       Expected   -    Arrival   3/24/2024 17:32    Acuity   Urgent              Means of arrival   Wheelchair    Escorted by   Family Member    Service   Hospitalist    Admission type   Emergency              Arrival complaint   Fever/Abdominal Pain/ Body Chills             Chief Complaint   Patient presents with    Abdominal Pain     Pt states abdominal pain, with N/V., states contipated. On chemo for breast cancer. +fever and chills       Initial Presentation: 58 y.o. female to ED via WC from home   Present to ED with abdominal pain with nausea vomiting.  Also having fevers and chills.  Patient reports he recently started chemotherapy, about 1 week ago she received the first cycle of chemotherapy after that she was feeling very tired and wiped out.  Having constipation.  But later  she started having abdominal pain-comes and goes, affecting right side, lower pelvic area and left lower quadrant., nausea and vomiting fever and chills.   PMHX: breast cancer, ongoing chemotherapy; GERD; HLD; HTN;  Von Willebrand disease   Admitted to [facility] with DX: Diverticulitis   on exam: T 99.5; abdominal tenderness. There is rebound.  pain 9/10; WBC 2.58; Na  133; K 3.3; ALT 54  CT shows diverticulitis   PLAN: cont iv abx; cont ivf; monitor labs; pain / nausea control; Accuchecks with ssic; serial abdominal exams      Date: 3/25/24       Day 2  She is doing a little better today. Still with poor oral intake and abdominal pain, Had multiple bowel movements overnight since use of suppository. WBC 1.81; Na 133  Plan: cont iv abx; cont ivf; monitor labs; pain / nausea control; Accuchecks with ssic; serial abdominal exams      Day 3:  3/26/24     Has surpassed a 2nd midnight with active treatments and services.  Require additional inpatient hospital stay due to diverticulitis, tolerating diet, IV abx   Still with poor oral intake and abdominal pain (pain 0-9/10), will continue with IVF rehydration and diet as tolerated.   Plan: cont iv abx; cont ivf; rec'd Mg Sulf iv x1; rec'd KCL iv x1; monitor labs; pain / nausea control; Accuchecks with ssic; serial abdominal exams    ED Triage Vitals   Temperature Pulse Respirations Blood Pressure SpO2   03/24/24 1740 03/24/24 1740 03/24/24 1740 03/24/24 1740 03/24/24 1740   99.5 °F (37.5 °C) 86 20 147/86 95 %      Temp Source Heart Rate Source Patient Position - Orthostatic VS BP Location FiO2 (%)   03/24/24 1740 03/24/24 1800 03/25/24 1924 03/24/24 1800 --   Temporal Monitor Sitting Left arm       Pain Score       03/24/24 1740       9          Wt Readings from Last 1 Encounters:   03/26/24 106 kg (234 lb)     Additional Vital Signs:   Date/Time Temp Pulse Resp BP MAP (mmHg) SpO2 O2 Device Patient Position - Orthostatic VS   03/26/24 0739 -- -- -- -- -- 92 % None (Room air) --   03/26/24 07:12:46 97 °F (36.1 °C) Abnormal  73 14 132/61 85 93 % -- --   03/25/24 23:20:35 97.3 °F (36.3 °C) Abnormal  86 18 119/62 72 93 % None (Room air) Sitting   03/25/24 2158 -- -- -- -- -- 96 % None (Room air) --   03/25/24 21:41:26 97.7 °F (36.5 °C) 85 -- 134/64 87 92 % -- --   03/25/24 19:24:49 97.5 °F (36.4 °C) 73 18 120/67 85 94 % -- Sitting   03/25/24  1924 -- -- -- -- -- 94 % None (Room air)      Date/Time Temp Pulse Resp BP MAP (mmHg) SpO2 O2 Device   03/25/24 0823 -- -- -- -- -- 94 % None (Room air)   03/25/24 07:09:12 97.3 °F (36.3 °C) Abnormal  79 18 121/68 86 92 % --   03/24/24 23:03:44 97 °F (36.1 °C) Abnormal  79 -- 143/78 100 95 % --   03/24/24 2255 -- -- -- 140/79 -- -- --   03/24/24 2200 -- 77 -- -- -- 92 % --   03/24/24 2130 -- -- -- -- -- 96 % None (Room air)   03/24/24 21:14:08 97.3 °F (36.3 °C) Abnormal  81 -- 140/79 99 92 % --   03/24/24 2109 97.3 °F (36.3 °C) Abnormal  80 18 140/79 -- -- --   03/24/24 2015 -- 79 18 148/83 110 93 % --   03/24/24 2000 -- 79 16 149/77 107 95 % --   03/24/24 1915 -- 73 16 156/72 104 95 % --   03/24/24 1845 -- 74 18 157/69 103 96 % --   03/24/24 1830 -- 72 20 141/71 99 97 % None (Room air)   03/24/24 1815 -- 74 18 144/69 99 97 % --   03/24/24 1800 -- 78 20 147/74 103 96 % None (Room air)   03/24/24 1740 99.5 °F (37.5 °C) 86 20 147/86 -- 95 % None (Room air)       EKG: Normal sinus rhythm, prolonged QT, QTc 573         Pertinent Labs/Diagnostic Test Results:   CT abdomen pelvis with contrast   Final Result by Lasha Cortez MD (03/24 2020)      1.  Question very mild acute diverticulitis at the splenic flexure of the colon. Otherwise no acute findings in the abdomen or pelvis.   2.  Hepatomegaly and hepatic steatosis.            The study was marked in EPIC for immediate notification.      Workstation performed: [id number]           Results from last 7 days   Lab Units 03/24/24  1750   SARS-COV-2  Negative     Results from last 7 days   Lab Units 03/26/24  0534 03/25/24  0615 03/24/24  1750   WBC Thousand/uL 2.83* 1.81* 2.58*   HEMOGLOBIN g/dL 10.5* 11.5 13.0   HEMATOCRIT % 33.2* 35.8 40.4   PLATELETS Thousands/uL 173 157 177   NEUTROS ABS Thousands/µL  --  0.38*  --         Results from last 7 days   Lab Units 03/26/24  0534 03/25/24  0615 03/24/24  1750   SODIUM mmol/L 136 133* 133*   POTASSIUM mmol/L 3.2* 3.5 3.3*    CHLORIDE mmol/L 103 99 95*   CO2 mmol/L 26 26 29   ANION GAP mmol/L 7 8 9   BUN mg/dL 10 11 13   CREATININE mg/dL 0.83 0.83 0.86   EGFR ml/min/1.73sq m 77 77 74   CALCIUM mg/dL 8.6 8.4 9.2   MAGNESIUM mg/dL 1.7*  --   --      Results from last 7 days   Lab Units 03/26/24  0534 03/25/24  0615 03/24/24  1750   AST U/L 17 24 30   ALT U/L 32 43 54*   ALK PHOS U/L 63 76 94   TOTAL PROTEIN g/dL 5.8* 6.4 7.2   ALBUMIN g/dL 3.4* 3.7 4.1   TOTAL BILIRUBIN mg/dL 0.35 0.94 0.60     Results from last 7 days   Lab Units 03/26/24  1107 03/26/24  0713 03/25/24  2139 03/25/24  1615 03/25/24  1055 03/25/24  0707 03/24/24  2258   POC GLUCOSE mg/dl 121 132 127 137 130 131 135     Results from last 7 days   Lab Units 03/26/24  0534 03/25/24  0615 03/24/24  1750   GLUCOSE RANDOM mg/dL 132 150* 178*        Results from last 7 days   Lab Units 03/24/24  1750   LACTIC ACID mmol/L 1.7        Results from last 7 days   Lab Units 03/24/24  1750   LIPASE u/L 6*        Results from last 7 days   Lab Units 03/24/24  1926   CLARITY UA  Clear   COLOR UA  Yellow   SPEC GRAV UA  <=1.005   PH UA  6.5   GLUCOSE UA mg/dl Negative   KETONES UA mg/dl Negative   BLOOD UA  Trace-lysed*   PROTEIN UA mg/dl Negative   NITRITE UA  Negative   BILIRUBIN UA  Negative   UROBILINOGEN UA E.U./dl 0.2   LEUKOCYTES UA  Negative   WBC UA /hpf 0-1   RBC UA /hpf 0-5   BACTERIA UA /hpf Occasional   EPITHELIAL CELLS WET PREP /hpf Occasional     Results from last 7 days   Lab Units 03/24/24  1750   INFLUENZA A PCR  Negative   INFLUENZA B PCR  Negative   RSV PCR  Negative        Results from last 7 days   Lab Units 03/24/24  1750   BLOOD CULTURE  No Growth at 24 hrs.  No Growth at 24 hrs.        ED Treatment:   Medication Administration from 03/24/2024 1730 to 03/24/2024 2109 03/24/2024 1751 EDT sodium chloride 0.9 % bolus 1,000 mL 1,000 mL Intravenous New Bag     03/24/2024 1750 EDT ondansetron (ZOFRAN) injection 4 mg 4 mg Intravenous Given     03/24/2024 1836 EDT  fentaNYL injection 50 mcg 50 mcg Intravenous Given     03/24/2024 1858 EDT iohexol (OMNIPAQUE) 350 MG/ML injection (MULTI-DOSE) 100 mL 100 mL Intravenous Given     03/24/2024 2037 EDT piperacillin-tazobactam (ZOSYN) 4.5 g in sodium chloride 0.9 % 100 mL IVPB 4.5 g Intravenous New Bag            Present on Admission:   Von Willebrand disease (HCC)   Mild intermittent asthma without complication   Diverticulitis   Type 2 diabetes mellitus without complication, without long-term current use of insulin (HCC)   Electrolyte abnormality      Admitting Diagnosis: Diverticulitis [K57.92]  Abdominal pain [R10.9]  Fever [R50.9]    Age/Sex: 58 y.o. female    Admission Orders: SCDs; I/O; Consistent Carbohydrate Diet. Blood glucose checks ACHS.      Scheduled Medications:  atorvastatin, 40 mg, Oral, Daily  budesonide-formoterol, 2 puff, Inhalation, BID  buPROPion, 300 mg, Oral, Daily  fluticasone, 2 spray, Nasal, HS  insulin lispro, 1-5 Units, Subcutaneous, TID AC  insulin lispro, 1-5 Units, Subcutaneous, HS  lisinopril, 40 mg, Oral, Daily  montelukast, 10 mg, Oral, HS  nortriptyline, 50 mg, Oral, HS  pantoprazole, 40 mg, Oral, BID AC  piperacillin-tazobactam, 4.5 g, Intravenous, Q8H  prazosin, 10 mg, Oral, HS  QUEtiapine, 200 mg, Oral, HS    magnesium sulfate 2 g/50 mL IVPB (premix) 2 g  Dose: 2 g  Freq: Once Route: IV  Last Dose: 2 g (03/26/24 0807)  Start: 03/26/24 0800 End: 03/26/24 1007     potassium chloride 20 mEq IVPB (premix)  Dose: 20 mEq  Freq: Once Route: IV  Last Dose: 20 mEq (03/26/24 0926)  Start: 03/26/24 0800 End: 03/26/24 1126       Continuous IV Infusions:  sodium chloride, 125 mL/hr, Intravenous, Continuous      PRN Meds:  acetaminophen, 650 mg, Oral, Q6H PRN  albuterol, 2 puff, Inhalation, Q6H PRN  bisacodyl, 10 mg, Rectal, Daily PRN  busPIRone, 15 mg, Oral, TID PRN  diphenhydrAMINE, 25 mg, Intravenous, Q6H PRN  oxyCODONE, 5 mg, Oral, Q6H PRN  (3/25 recd x1)   phenol, 1 spray, Mouth/Throat, Q2H  PRN          Network Utilization Review Department  ATTENTION: Please call with any questions or concerns to [phone] and carefully listen to the prompts so that you are directed to the right person. All voicemails are confidential.   For Discharge needs, contact Care Management DC Support Team at [phone] opt. 2  Send all requests for admission clinical reviews, approved or denied determinations and any other requests to dedicated fax number below belonging to the campus where the patient is receiving treatment. List of dedicated fax numbers for the Facilities:  FACILITY NAME UR FAX NUMBER   ADMISSION DENIALS (Administrative/Medical Necessity) [phone]   DISCHARGE SUPPORT TEAM (NETWORK) [phone]   PARENT CHILD HEALTH (Maternity/NICU/Pediatrics) [phone]   [facility] [phone]   [facility] [phone]   [facility] [phone]   [facility] [phone]   [facility] [phone]   [facility] [phone]   [facility] [phone]   [facility] [phone]   [facility] [phone]   [facility] [phone]   [facility] [phone]   [facility] [phone]

## 2024-03-25 NOTE — ASSESSMENT & PLAN NOTE
Coming with abdominal pain, chills and fever  Recently started chemotherapy, first cycle was 1 week ago  Imaging shows diverticulitis  Continue IV hydration, IV antibiotics, serial abdominal exam  Per EKG, patient has prolonged QT, QTc is 573-avoid QT prolonging medication including Zofran.  Use Benadryl or scopolamine patch.  Will try to avoid Tigan since it is IM-unless really necessary and patient has von Willebrand disease-to avoid any kind of hematoma,

## 2024-03-25 NOTE — PROGRESS NOTES
[facility]  Progress Note  Name: Tammie Avalos I  MRN: [id number]  Unit/Bed#: -Yousif I Date of Admission: 3/24/2024   Date of Service: 3/25/2024 I Hospital Day: 1    Assessment/Plan   * Diverticulitis  Assessment & Plan  Coming with abdominal pain, chills and fever  Recently started chemotherapy, first cycle was 1 week ago  Imaging shows diverticulitis  Continue IV hydration, IV antibiotics, serial abdominal exam  Per EKG, patient has prolonged QT, QTc is 573-avoid QT prolonging medication including Zofran.  Use Benadryl or scopolamine patch.  Will try to avoid Tigan since it is IM-unless really necessary and patient has von Willebrand disease-to avoid any kind of hematoma,   Still with poor oral intake and abdominal pain, will continue with IVF rehydration and diet as tolerated.     Electrolyte abnormality  Assessment & Plan  Hyponatremia, hypokalemia  Secondary to poor oral intake with recent chemotherapy  And IV hydration, will give p.o. potassium supplement.  EKG: Normal sinus rhythm, prolonged QT, QTc 573    Type 2 diabetes mellitus without complication, without long-term current use of insulin (HCC)  Assessment & Plan  Lab Results   Component Value Date    HGBA1C 6.7 (H) 01/26/2024       Recent Labs     03/24/24  2258 03/25/24  0707 03/25/24  1055   POCGLU 135 131 130       Blood Sugar Average: Last 72 hrs:  (P) 132  Sliding scale, follow hypoglycemia precaution    Malignant neoplasm of central portion of right breast in female, estrogen receptor positive   Assessment & Plan  Follows up with Dr. Moya  Recently started chemotherapy, first cycle about a week ago  Continue monitoring, follow Limb alert-per patient and daughter on the bedside, can use left upper extremity for IV access.    Mild intermittent asthma without complication  Assessment & Plan  Not in acute exacerbation, continue respiratory protocol, home medication    Von Willebrand disease (HCC)  Assessment &  Plan  Follows up with hematology/oncology, Dr. Moya  Patient has high bleeding tendency, hold any kind of anticoagulation.         VTE Pharmacologic Prophylaxis: VTE Score: 5 High Risk (Score >/= 5) - Pharmacological DVT Prophylaxis Contraindicated. Sequential Compression Devices Ordered.    Mobility:   Basic Mobility Inpatient Raw Score: 24  JH-HLM Goal: 8: Walk 250 feet or more  JH-HLM Achieved: 7: Walk 25 feet or more  JH-HLM Goal NOT achieved. Continue with multidisciplinary rounding and encourage appropriate mobility to improve upon JH-HLM goals.    Patient Centered Rounds: I performed bedside rounds with nursing staff today.   Discussions with Specialists or Other Care Team Provider: nursing, case management    Education and Discussions with Family / Patient:  will call and update daughter.     Total Time Spent on Date of Encounter in care of patient:  mins. This time was spent on one or more of the following: performing physical exam; counseling and coordination of care; obtaining or reviewing history; documenting in the medical record; reviewing/ordering tests, medications or procedures; communicating with other healthcare professionals and discussing with patient's family/caregivers.    Current Length of Stay: 1 day(s)  Current Patient Status: Inpatient   Certification Statement: The patient will continue to require additional inpatient hospital stay due to IV abx, pending improvement of pain, diverticulitis  Discharge Plan: Anticipate discharge in 48-72 hrs to home.    Code Status: Level 1 - Full Code    Subjective:   Patient seen and examined today. She is doing a little better today. States that she is still having some abdominal pain in the lower belly. No nausea, vomiting. Had multiple bowel movements overnight since use of suppository. No fever, chills, CP, SOB. Not much of an appetite.     Objective:     Vitals:   Temp (24hrs), Av.7 °F (36.5 °C), Min:97 °F (36.1 °C), Max:99.5 °F (37.5  °C)    Temp:  [97 °F (36.1 °C)-99.5 °F (37.5 °C)] 97.3 °F (36.3 °C)  HR:  [72-86] 79  Resp:  [16-20] 18  BP: (121-157)/(68-86) 121/68  SpO2:  [92 %-97 %] 94 %  Body mass index is 36.74 kg/m².     Input and Output Summary (last 24 hours):     Intake/Output Summary (Last 24 hours) at 3/25/2024 1233  Last data filed at 3/25/2024 1220  Gross per 24 hour   Intake 600 ml   Output 75 ml   Net 525 ml       Physical Exam:   Physical Exam  Vitals reviewed.   Constitutional:       General: She is not in acute distress.     Appearance: She is obese. She is not ill-appearing or toxic-appearing.   HENT:      Head: Normocephalic and atraumatic.      Mouth/Throat:      Mouth: Mucous membranes are moist.   Cardiovascular:      Rate and Rhythm: Normal rate and regular rhythm.      Heart sounds: Murmur heard.   Pulmonary:      Effort: No respiratory distress.      Breath sounds: No stridor. No wheezing.   Abdominal:      General: Bowel sounds are normal. There is no distension.      Palpations: Abdomen is soft. There is no mass.      Tenderness: There is abdominal tenderness (lower abdominal tenderness).   Musculoskeletal:      Right lower leg: No edema.      Left lower leg: No edema.   Skin:     General: Skin is warm and dry.   Neurological:      General: No focal deficit present.      Mental Status: She is alert and oriented to person, place, and time.   Psychiatric:         Mood and Affect: Mood normal.         Behavior: Behavior normal.          Additional Data:     Labs:  Results from last 7 days   Lab Units 03/25/24  0615   WBC Thousand/uL 1.81*   HEMOGLOBIN g/dL 11.5   HEMATOCRIT % 35.8   PLATELETS Thousands/uL 157   NEUTROS PCT % 21*   LYMPHS PCT % 64*   MONOS PCT % 11   EOS PCT % 1     Results from last 7 days   Lab Units 03/25/24  0615   SODIUM mmol/L 133*   POTASSIUM mmol/L 3.5   CHLORIDE mmol/L 99   CO2 mmol/L 26   BUN mg/dL 11   CREATININE mg/dL 0.83   ANION GAP mmol/L 8   CALCIUM mg/dL 8.4   ALBUMIN g/dL 3.7   TOTAL  BILIRUBIN mg/dL 0.94   ALK PHOS U/L 76   ALT U/L 43   AST U/L 24   GLUCOSE RANDOM mg/dL 150*         Results from last 7 days   Lab Units 03/25/24  1055 03/25/24  0707 03/24/24  2258   POC GLUCOSE mg/dl 130 131 135         Results from last 7 days   Lab Units 03/24/24  1750   LACTIC ACID mmol/L 1.7       Lines/Drains:  Invasive Devices       Peripheral Intravenous Line  Duration             Peripheral IV 03/24/24 Left Antecubital <1 day                          Imaging: Reviewed radiology reports from this admission including: abdominal/pelvic CT    Recent Cultures (last 7 days):   Results from last 7 days   Lab Units 03/24/24  1750   BLOOD CULTURE  Received in Microbiology Lab. Culture in Progress.  Received in Microbiology Lab. Culture in Progress.       Last 24 Hours Medication List:   Current Facility-Administered Medications   Medication Dose Route Frequency Provider Last Rate    acetaminophen  650 mg Oral Q6H PRN Trinidad Weinberg MD      albuterol  2 puff Inhalation Q6H PRN Trinidad Weinberg MD      atorvastatin  40 mg Oral Daily Trinidad Weinberg MD      bisacodyl  10 mg Rectal Daily PRN Trinidad Weinberg MD      budesonide-formoterol  2 puff Inhalation BID Trinidad Weinberg MD      buPROPion  300 mg Oral Daily Trinidad Weinberg MD      busPIRone  15 mg Oral TID PRN Trinidad Weinberg MD      diphenhydrAMINE  25 mg Intravenous Q6H PRN Trinidad Weinberg MD      fluticasone  2 spray Nasal HS Trinidad Weinberg MD      insulin lispro  1-5 Units Subcutaneous TID AC Trinidad Weinberg MD      insulin lispro  1-5 Units Subcutaneous HS Trinidad Weinberg MD      lisinopril  40 mg Oral Daily Trinidad Weinberg MD      montelukast  10 mg Oral HS Trinidad Weinberg MD      nortriptyline  50 mg Oral HS Trinidad Weinberg MD      oxyCODONE  5 mg Oral Q6H PRN Trinidad Weinberg MD      pantoprazole  40 mg Oral BID AC Trinidad Weinberg MD      phenol  1 spray Mouth/Throat Q2H PRN Winnie Krishnan PA-C       piperacillin-tazobactam  4.5 g Intravenous Q8H Trinidad Weinberg MD 4.5 g (03/25/24 0820)    prazosin  10 mg Oral HS Trinidad Weinberg MD      QUEtiapine  200 mg Oral HS Trinidad Weinberg MD      sodium chloride  125 mL/hr Intravenous Continuous Trinidad Weinberg  mL/hr (03/24/24 2302)        Today, Patient Was Seen By: Winnie Krishnan PA-C    **Please Note: This note may have been constructed using a voice recognition system.**

## 2024-03-25 NOTE — ASSESSMENT & PLAN NOTE
Follows up with Dr. Moya  Recently started chemotherapy, first cycle about a week ago  Continue monitoring, follow Limb alert-per patient and daughter on the bedside, can use left upper extremity for IV access.

## 2024-03-25 NOTE — PROGRESS NOTES
"Pt reports good appetite prior to starting chemo last week. Says ever since then experiencing vomiting, irritated throat with swallowing. Reports enjoying fresh fruits and salad options. Says was dx with type 2 DM though has not received instruction for DM management. Chart review of weight hx: 2/14/23 230lb, 9/18/23 233lb, 2/6/24 230lb, 3/25/24 234lb. Wt stable.     Will order low fiber diet due to diverticulitis/tolerance. Continue CCD 2 at this time.   Pt reports not liking ensure supplements she previously tried PTA \"they have too much of a protein taste\". Will trial gelatein supplement given inadequate po intake at this time.   Recommend OP nutrition services referral for diet instruction for type 2 DM. Did discuss low fiber diet with pt for diverticulitis.   "

## 2024-03-25 NOTE — PLAN OF CARE
Problem: PAIN - ADULT  Goal: Verbalizes/displays adequate comfort level or baseline comfort level  Description: Interventions:  - Encourage patient to monitor pain and request assistance  - Assess pain using appropriate pain scale  - Administer analgesics based on type and severity of pain and evaluate response  - Implement non-pharmacological measures as appropriate and evaluate response  - Consider cultural and social influences on pain and pain management  - Notify physician/advanced practitioner if interventions unsuccessful or patient reports new pain  Outcome: Progressing     Problem: INFECTION - ADULT  Goal: Absence or prevention of progression during hospitalization  Description: INTERVENTIONS:  - Assess and monitor for signs and symptoms of infection  - Monitor lab/diagnostic results  - Monitor all insertion sites, i.e. indwelling lines, tubes, and drains  - Monitor endotracheal if appropriate and nasal secretions for changes in amount and color  - [place] appropriate cooling/warming therapies per order  - Administer medications as ordered  - Instruct and encourage patient and family to use good hand hygiene technique  - Identify and instruct in appropriate isolation precautions for identified infection/condition  Outcome: Progressing  Goal: Absence of fever/infection during neutropenic period  Description: INTERVENTIONS:  - Monitor WBC    Outcome: Progressing     Problem: SAFETY ADULT  Goal: Patient will remain free of falls  Description: INTERVENTIONS:  - Educate patient/family on patient safety including physical limitations  - Instruct patient to call for assistance with activity   - Consult OT/PT to assist with strengthening/mobility   - Keep Call bell within reach  - Keep bed low and locked with side rails adjusted as appropriate  - Keep care items and personal belongings within reach  - Initiate and maintain comfort rounds  Outcome: Progressing  Goal: Maintain or return to baseline ADL  function  Description: INTERVENTIONS:  -  Assess patient's ability to carry out ADLs; assess patient's baseline for ADL function and identify physical deficits which impact ability to perform ADLs (bathing, care of mouth/teeth, toileting, grooming, dressing, etc.)  - Assess/evaluate cause of self-care deficits   - Assess range of motion  - Assess patient's mobility; develop plan if impaired  - Assess patient's need for assistive devices and provide as appropriate  - Encourage maximum independence but intervene and supervise when necessary  - Involve family in performance of ADLs  - Assess for home care needs following discharge   - Consider OT consult to assist with ADL evaluation and planning for discharge  - Provide patient education as appropriate  Outcome: Progressing     Problem: DISCHARGE PLANNING  Goal: Discharge to home or other facility with appropriate resources  Description: INTERVENTIONS:  - Identify barriers to discharge w/patient and caregiver  - Arrange for needed discharge resources and transportation as appropriate  - Identify discharge learning needs (meds, wound care, etc.)  - Arrange for interpretive services to assist at discharge as needed  - Refer to Case Management Department for coordinating discharge planning if the patient needs post-hospital services based on physician/advanced practitioner order or complex needs related to functional status, cognitive ability, or social support system  Outcome: Progressing     Problem: Knowledge Deficit  Goal: Patient/family/caregiver demonstrates understanding of disease process, treatment plan, medications, and discharge instructions  Description: Complete learning assessment and assess knowledge base.  Interventions:  - Provide teaching at level of understanding  - Provide teaching via preferred learning methods  Outcome: Progressing     Problem: GASTROINTESTINAL - ADULT  Goal: Minimal or absence of nausea and/or vomiting  Description:  INTERVENTIONS:  - Administer IV fluids if ordered to ensure adequate hydration  - Maintain NPO status until nausea and vomiting are resolved  - Nasogastric tube if ordered  - Administer ordered antiemetic medications as needed  - Provide nonpharmacologic comfort measures as appropriate  - Advance diet as tolerated, if ordered  - Consider nutrition services referral to assist patient with adequate nutrition and appropriate food choices  Outcome: Progressing  Goal: Maintains or returns to baseline bowel function  Description: INTERVENTIONS:  - Assess bowel function  - Encourage oral fluids to ensure adequate hydration  - Administer IV fluids if ordered to ensure adequate hydration  - Administer ordered medications as needed  - Encourage mobilization and activity  - Consider nutritional services referral to assist patient with adequate nutrition and appropriate food choices  Outcome: Progressing  Goal: Maintains adequate nutritional intake  Description: INTERVENTIONS:  - Monitor percentage of each meal consumed  - Identify factors contributing to decreased intake, treat as appropriate  - Assist with meals as needed  - Monitor I&O, weight, and lab values if indicated  - Obtain nutrition services referral as needed  Outcome: Progressing  Goal: Establish and maintain optimal ostomy function  Description: INTERVENTIONS:  - Assess bowel function  - Encourage oral fluids to ensure adequate hydration  - Administer IV fluids if ordered to ensure adequate hydration   - Administer ordered medications as needed  - Encourage mobilization and activity  - Nutrition services referral to assist patient with appropriate food choices  - Assess stoma site  - Consider wound care consult   Outcome: Progressing  Goal: Oral mucous membranes remain intact  Description: INTERVENTIONS  - Assess oral mucosa and hygiene practices  - Implement preventative oral hygiene regimen  - Implement oral medicated treatments as ordered  - Initiate Nutrition  services referral as needed  Outcome: Progressing     Problem: METABOLIC, FLUID AND ELECTROLYTES - ADULT  Goal: Electrolytes maintained within normal limits  Description: INTERVENTIONS:  - Monitor labs and assess patient for signs and symptoms of electrolyte imbalances  - Administer electrolyte replacement as ordered  - Monitor response to electrolyte replacements, including repeat lab results as appropriate  - Instruct patient on fluid and nutrition as appropriate  Outcome: Progressing  Goal: Fluid balance maintained  Description: INTERVENTIONS:  - Monitor labs   - Monitor I/O and WT  - Instruct patient on fluid and nutrition as appropriate  - Assess for signs & symptoms of volume excess or deficit  Outcome: Progressing  Goal: Glucose maintained within target range  Description: INTERVENTIONS:  - Monitor Blood Glucose as ordered  - Assess for signs and symptoms of hyperglycemia and hypoglycemia  - Administer ordered medications to maintain glucose within target range  - Assess nutritional intake and initiate nutrition service referral as needed  Outcome: Progressing     Problem: HEMATOLOGIC - ADULT  Goal: Maintains hematologic stability  Description: INTERVENTIONS  - Assess for signs and symptoms of bleeding or hemorrhage  - Monitor labs  - Administer supportive blood products/factors as ordered and appropriate  Outcome: Progressing     Problem: MUSCULOSKELETAL - ADULT  Goal: Maintain or return mobility to safest level of function  Description: INTERVENTIONS:  - Assess patient's ability to carry out ADLs; assess patient's baseline for ADL function and identify physical deficits which impact ability to perform ADLs (bathing, care of mouth/teeth, toileting, grooming, dressing, etc.)  - Assess/evaluate cause of self-care deficits   - Assess range of motion  - Assess patient's mobility  - Assess patient's need for assistive devices and provide as appropriate  - Encourage maximum independence but intervene and supervise  when necessary  - Involve family in performance of ADLs  - Assess for home care needs following discharge   - Consider OT consult to assist with ADL evaluation and planning for discharge  - Provide patient education as appropriate  Outcome: Progressing  Goal: Maintain proper alignment of affected body part  Description: INTERVENTIONS:  - Support, maintain and protect limb and body alignment  - Provide patient/ family with appropriate education  Outcome: Progressing

## 2024-03-25 NOTE — ASSESSMENT & PLAN NOTE
Coming with abdominal pain, chills and fever  Recently started chemotherapy, first cycle was 1 week ago  Imaging shows diverticulitis  Continue IV hydration, IV antibiotics, serial abdominal exam  Per EKG, patient has prolonged QT, QTc is 573-avoid QT prolonging medication including Zofran.  Use Benadryl or scopolamine patch.  Will try to avoid Tigan since it is IM-unless really necessary and patient has von Willebrand disease-to avoid any kind of hematoma,   Still with poor oral intake and abdominal pain, will continue with IVF rehydration and diet as tolerated.

## 2024-03-25 NOTE — QUICK NOTE
Called and updated patient's daughter. Answered all question to her satisfaction, no further questions at this time.     Winnie Krishnan PA-C

## 2024-03-25 NOTE — ASSESSMENT & PLAN NOTE
Hyponatremia, hypokalemia  Secondary to poor oral intake with recent chemotherapy  And IV hydration, will give p.o. potassium supplement.  EKG: Normal sinus rhythm, prolonged QT, QTc 573

## 2024-03-25 NOTE — ASSESSMENT & PLAN NOTE
"Lab Results   Component Value Date    HGBA1C 6.7 (H) 01/26/2024       No results for input(s): \"POCGLU\" in the last 72 hours.    Blood Sugar Average: Last 72 hrs:    Sliding scale, follow hypoglycemia precaution  "

## 2024-03-25 NOTE — ASSESSMENT & PLAN NOTE
Lab Results   Component Value Date    HGBA1C 6.7 (H) 01/26/2024       Recent Labs     03/24/24  2258 03/25/24  0707 03/25/24  1055   POCGLU 135 131 130       Blood Sugar Average: Last 72 hrs:  (P) 132  Sliding scale, follow hypoglycemia precaution

## 2024-03-25 NOTE — PLAN OF CARE
Problem: PAIN - ADULT  Goal: Verbalizes/displays adequate comfort level or baseline comfort level  Description: Interventions:  - Encourage patient to monitor pain and request assistance  - Assess pain using appropriate pain scale  - Administer analgesics based on type and severity of pain and evaluate response  - Implement non-pharmacological measures as appropriate and evaluate response  - Consider cultural and social influences on pain and pain management  - Notify physician/advanced practitioner if interventions unsuccessful or patient reports new pain  Outcome: Progressing     Problem: INFECTION - ADULT  Goal: Absence or prevention of progression during hospitalization  Description: INTERVENTIONS:  - Assess and monitor for signs and symptoms of infection  - Monitor lab/diagnostic results  - Monitor all insertion sites, i.e. indwelling lines, tubes, and drains  - Monitor endotracheal if appropriate and nasal secretions for changes in amount and color  - [place] appropriate cooling/warming therapies per order  - Administer medications as ordered  - Instruct and encourage patient and family to use good hand hygiene technique  - Identify and instruct in appropriate isolation precautions for identified infection/condition  Outcome: Progressing  Goal: Absence of fever/infection during neutropenic period  Description: INTERVENTIONS:  - Monitor WBC    Outcome: Progressing     Problem: SAFETY ADULT  Goal: Patient will remain free of falls  Description: INTERVENTIONS:  - Educate patient/family on patient safety including physical limitations  - Instruct patient to call for assistance with activity   - Consult OT/PT to assist with strengthening/mobility   - Keep Call bell within reach  - Keep bed low and locked with side rails adjusted as appropriate  - Keep care items and personal belongings within reach  - Initiate and maintain comfort rounds  - Make Fall Risk Sign visible to staff  - Offer Toileting every 2 Hours,  in advance of need  - Initiate/Maintain alarm  - Obtain necessary fall risk management equipment  - Apply yellow socks and bracelet for high fall risk patients  - Consider moving patient to room near nurses station  Outcome: Progressing  Goal: Maintain or return to baseline ADL function  Description: INTERVENTIONS:  -  Assess patient's ability to carry out ADLs; assess patient's baseline for ADL function and identify physical deficits which impact ability to perform ADLs (bathing, care of mouth/teeth, toileting, grooming, dressing, etc.)  - Assess/evaluate cause of self-care deficits   - Assess range of motion  - Assess patient's mobility; develop plan if impaired  - Assess patient's need for assistive devices and provide as appropriate  - Encourage maximum independence but intervene and supervise when necessary  - Involve family in performance of ADLs  - Assess for home care needs following discharge   - Consider OT consult to assist with ADL evaluation and planning for discharge  - Provide patient education as appropriate  Outcome: Progressing  Goal: Maintains/Returns to pre admission functional level  Description: INTERVENTIONS:  - Perform AM-PAC 6 Click Basic Mobility/ Daily Activity assessment daily.  - Set and communicate daily mobility goal to care team and patient/family/caregiver.   - Collaborate with rehabilitation services on mobility goals if consulted  - Perform Range of Motion 3 times a day.  - Reposition patient every 2 hours.  - Dangle patient 3 times a day  - Stand patient 3 times a day  - Ambulate patient 3 times a day  - Out of bed to chair 3 times a day   - Out of bed for meals 3 times a day  - Out of bed for toileting  - Record patient progress and toleration of activity level   Outcome: Progressing     Problem: DISCHARGE PLANNING  Goal: Discharge to home or other facility with appropriate resources  Description: INTERVENTIONS:  - Identify barriers to discharge w/patient and caregiver  - Arrange  for needed discharge resources and transportation as appropriate  - Identify discharge learning needs (meds, wound care, etc.)  - Arrange for interpretive services to assist at discharge as needed  - Refer to Case Management Department for coordinating discharge planning if the patient needs post-hospital services based on physician/advanced practitioner order or complex needs related to functional status, cognitive ability, or social support system  Outcome: Progressing     Problem: Knowledge Deficit  Goal: Patient/family/caregiver demonstrates understanding of disease process, treatment plan, medications, and discharge instructions  Description: Complete learning assessment and assess knowledge base.  Interventions:  - Provide teaching at level of understanding  - Provide teaching via preferred learning methods  Outcome: Progressing     Problem: GASTROINTESTINAL - ADULT  Goal: Minimal or absence of nausea and/or vomiting  Description: INTERVENTIONS:  - Administer IV fluids if ordered to ensure adequate hydration  - Maintain NPO status until nausea and vomiting are resolved  - Nasogastric tube if ordered  - Administer ordered antiemetic medications as needed  - Provide nonpharmacologic comfort measures as appropriate  - Advance diet as tolerated, if ordered  - Consider nutrition services referral to assist patient with adequate nutrition and appropriate food choices  Outcome: Progressing  Goal: Maintains or returns to baseline bowel function  Description: INTERVENTIONS:  - Assess bowel function  - Encourage oral fluids to ensure adequate hydration  - Administer IV fluids if ordered to ensure adequate hydration  - Administer ordered medications as needed  - Encourage mobilization and activity  - Consider nutritional services referral to assist patient with adequate nutrition and appropriate food choices  Outcome: Progressing  Goal: Maintains adequate nutritional intake  Description: INTERVENTIONS:  - Monitor  percentage of each meal consumed  - Identify factors contributing to decreased intake, treat as appropriate  - Assist with meals as needed  - Monitor I&O, weight, and lab values if indicated  - Obtain nutrition services referral as needed  Outcome: Progressing  Goal: Establish and maintain optimal ostomy function  Description: INTERVENTIONS:  - Assess bowel function  - Encourage oral fluids to ensure adequate hydration  - Administer IV fluids if ordered to ensure adequate hydration   - Administer ordered medications as needed  - Encourage mobilization and activity  - Nutrition services referral to assist patient with appropriate food choices  - Assess stoma site  - Consider wound care consult   Outcome: Progressing  Goal: Oral mucous membranes remain intact  Description: INTERVENTIONS  - Assess oral mucosa and hygiene practices  - Implement preventative oral hygiene regimen  - Implement oral medicated treatments as ordered  - Initiate Nutrition services referral as needed  Outcome: Progressing     Problem: METABOLIC, FLUID AND ELECTROLYTES - ADULT  Goal: Electrolytes maintained within normal limits  Description: INTERVENTIONS:  - Monitor labs and assess patient for signs and symptoms of electrolyte imbalances  - Administer electrolyte replacement as ordered  - Monitor response to electrolyte replacements, including repeat lab results as appropriate  - Instruct patient on fluid and nutrition as appropriate  Outcome: Progressing  Goal: Fluid balance maintained  Description: INTERVENTIONS:  - Monitor labs   - Monitor I/O and WT  - Instruct patient on fluid and nutrition as appropriate  - Assess for signs & symptoms of volume excess or deficit  Outcome: Progressing  Goal: Glucose maintained within target range  Description: INTERVENTIONS:  - Monitor Blood Glucose as ordered  - Assess for signs and symptoms of hyperglycemia and hypoglycemia  - Administer ordered medications to maintain glucose within target range  -  Assess nutritional intake and initiate nutrition service referral as needed  Outcome: Progressing     Problem: HEMATOLOGIC - ADULT  Goal: Maintains hematologic stability  Description: INTERVENTIONS  - Assess for signs and symptoms of bleeding or hemorrhage  - Monitor labs  - Administer supportive blood products/factors as ordered and appropriate  Outcome: Progressing     Problem: MUSCULOSKELETAL - ADULT  Goal: Maintain or return mobility to safest level of function  Description: INTERVENTIONS:  - Assess patient's ability to carry out ADLs; assess patient's baseline for ADL function and identify physical deficits which impact ability to perform ADLs (bathing, care of mouth/teeth, toileting, grooming, dressing, etc.)  - Assess/evaluate cause of self-care deficits   - Assess range of motion  - Assess patient's mobility  - Assess patient's need for assistive devices and provide as appropriate  - Encourage maximum independence but intervene and supervise when necessary  - Involve family in performance of ADLs  - Assess for home care needs following discharge   - Consider OT consult to assist with ADL evaluation and planning for discharge  - Provide patient education as appropriate  Outcome: Progressing  Goal: Maintain proper alignment of affected body part  Description: INTERVENTIONS:  - Support, maintain and protect limb and body alignment  - Provide patient/ family with appropriate education  Outcome: Progressing

## 2024-03-25 NOTE — H&P
"[facility]  H&P  Name: Tammie Avalos 58 y.o. female I MRN: [id number]  Unit/Bed#: -01 I Date of Admission: 3/24/2024   Date of Service: 3/24/2024 I Hospital Day: 0      Assessment/Plan   * Diverticulitis  Assessment & Plan  Coming with abdominal pain, chills and fever  Recently started chemotherapy, first cycle was 1 week ago  Imaging shows diverticulitis  Continue IV hydration, IV antibiotics, serial abdominal exam  Per EKG, patient has prolonged QT, QTc is 573-avoid QT prolonging medication including Zofran.  Use Benadryl or scopolamine patch.  Will try to avoid Tigan since it is IM-unless really necessary and patient has von Willebrand disease-to avoid any kind of hematoma,     Electrolyte abnormality  Assessment & Plan  Hyponatremia, hypokalemia  Secondary to poor oral intake with recent chemotherapy  And IV hydration, will give p.o. potassium supplement.  EKG: Normal sinus rhythm, prolonged QT, QTc 573    Type 2 diabetes mellitus without complication, without long-term current use of insulin (HCC)  Assessment & Plan  Lab Results   Component Value Date    HGBA1C 6.7 (H) 01/26/2024       No results for input(s): \"POCGLU\" in the last 72 hours.    Blood Sugar Average: Last 72 hrs:    Sliding scale, follow hypoglycemia precaution    Malignant neoplasm of central portion of right breast in female, estrogen receptor positive   Assessment & Plan  Follows up with Dr. Moya  Recently started chemotherapy, first cycle about a week ago  Continue monitoring, follow Limb alert-per patient and daughter on the bedside, can use left upper extremity for IV access.    Mild intermittent asthma without complication  Assessment & Plan  Not in acute exacerbation, continue respiratory protocol, home medication    Von Willebrand disease (HCC)  Assessment & Plan  Follows up with hematology/oncology, Dr. Moya  Patient has high bleeding tendency, hold any kind of anticoagulation.           VTE " Pharmacologic Prophylaxis: VTE Score: 5 High Risk (Score >/= 5) - Pharmacological DVT Prophylaxis Ordered: enoxaparin (Lovenox). Sequential Compression Devices Ordered.  Code Status: Level 1 - Full Code per patient  Discussion with family: Updated  (daughter) at bedside.    Anticipated Length of Stay: Patient will be admitted on an inpatient basis with an anticipated length of stay of greater than 2 midnights secondary to monitor above condition.    Chief Complaint: Abdominal pain    History of Present Illness:  Tammie Avalos is a 58 y.o. female with a PMH of breast cancer, ongoing chemotherapy who presents with abdominal pain with nausea vomiting.  Also having fevers and chills.  Patient reports he recently started chemotherapy, about 1 week ago she received the first cycle of chemotherapy after that she was feeling very tired and wiped out.  Having constipation.  But later  she started having abdominal pain-comes and goes, affecting right side, lower pelvic area and left lower quadrant., nausea and vomiting fever and chills.  Denies any increased frequency of urination, any blood in the urine..    Review of Systems:  Review of Systems   Constitutional:  Positive for activity change, appetite change, chills, fatigue and fever. Negative for unexpected weight change.   HENT:  Negative for congestion, postnasal drip, rhinorrhea, sinus pressure and sinus pain.    Respiratory:  Negative for apnea, cough, shortness of breath and stridor.    Cardiovascular:  Negative for chest pain, palpitations and leg swelling.   Gastrointestinal:  Positive for abdominal pain, constipation, nausea and vomiting. Negative for abdominal distention, diarrhea and rectal pain.   Genitourinary:  Negative for difficulty urinating, dyspareunia, dysuria, flank pain and frequency.   Musculoskeletal:  Negative for arthralgias and back pain.   Skin:  Negative for color change, pallor and wound.   Neurological:  Negative for dizziness,  facial asymmetry and numbness.   Hematological:  Negative for adenopathy.   Psychiatric/Behavioral:  Negative for agitation, behavioral problems, confusion and decreased concentration.    All other systems reviewed and are negative.      Past Medical and Surgical History:   Past Medical History:   Diagnosis Date    Anesthesia     Pt states she woke up during 2 surgeries    Breast cancer (HCC)     Cancer (HCC)     GERD (gastroesophageal reflux disease)     Heart murmur     Hyperlipidemia     Hypertension     Kidney stones     Subdural hematoma (HCC)     Von Willebrand disease (HCC)     Wears contact lenses        Past Surgical History:   Procedure Laterality Date    APPENDECTOMY      BREAST LUMPECTOMY Left     CARPAL TUNNEL RELEASE Bilateral     CHOLECYSTECTOMY      COLONOSCOPY      HYSTERECTOMY      KNEE SURGERY Left     LYMPH NODE BIOPSY Right 2/6/2024    Procedure: AXILLARY SENTINEL NODE BIOPSY (NUC MED INJECTION AT 0730);  Surgeon: Almita Molina DO;  Location: [facility];  Service: General    MRI BREAST BIOPSY LEFT (ALL INCLUSIVE) Left 12/13/2023    PARTIAL HYSTERECTOMY      FL MASTECTOMY SIMPLE COMPLETE Bilateral 2/6/2024    Procedure: BREAST MASTECTOMY;  Surgeon: Almita Molina DO;  Location: [facility];  Service: General    ROTATOR CUFF REPAIR Left     TONSILLECTOMY         Meds/Allergies:  Prior to Admission medications    Medication Sig Start Date End Date Taking? Authorizing Provider   albuterol (2.5 mg/3 mL) 0.083 % nebulizer solution Take 2.5 mg by nebulization every 6 (six) hours as needed 5/21/20   Historical Provider, MD   albuterol (PROVENTIL HFA,VENTOLIN HFA) 90 mcg/act inhaler Inhale 2 puffs as needed 9/8/17   Historical Provider, MD   atorvastatin (LIPITOR) 40 mg tablet Take 40 mg by mouth daily    Historical Provider, MD   buPROPion (WELLBUTRIN SR) 150 mg 12 hr tablet Take 200 mg by mouth 2 (two) times a day 12/5/19   Historical Provider, MD   busPIRone (BUSPAR) 15 mg tablet  Take 15 mg by mouth 3 (three) times a day as needed    Historical Provider, MD   cetirizine (ZyrTEC) 10 mg tablet Take 10 mg by mouth daily 9/8/17   Historical Provider, MD   chlorthalidone 25 mg tablet Take 25 mg by mouth daily    Historical Provider, MD   fluticasone (FLONASE) 50 mcg/act nasal spray 2 sprays into each nostril daily at bedtime 9/8/17   Historical Provider, MD   fluticasone-salmeterol (ADVAIR HFA) 115-21 MCG/ACT inhaler Inhale 2 puffs 2 (two) times a day Rinse mouth after use.    Historical Provider, MD   ibuprofen (MOTRIN) 600 mg tablet Take 1 tablet (600 mg total) by mouth every 6 (six) hours as needed for moderate pain 2/7/24   Adina Bowman PA-C   lisinopril (ZESTRIL) 20 mg tablet Take 40 mg by mouth daily    Historical Provider, MD   montelukast (SINGULAIR) 10 mg tablet Take 10 mg by mouth daily at bedtime 2/14/18   Historical Provider, MD   Multiple Vitamins-Minerals (MULTIVITAMIN WITH MINERALS) tablet Take 1 tablet by mouth daily    Historical Provider, MD   nortriptyline (PAMELOR) 10 mg capsule Take 10 mg by mouth daily at bedtime 5/3/21   Historical Provider, MD   OMEPRAZOLE PO Take 20 mg by mouth 2 (two) times a day 5/29/20   Historical Provider, MD   ondansetron (ZOFRAN) 4 mg tablet Take 1 tablet (4 mg total) by mouth every 8 (eight) hours as needed for nausea or vomiting 11/19/23   ANAY Dominguez   oxyCODONE (ROXICODONE) 5 immediate release tablet Take 1 tablet (5 mg total) by mouth every 6 (six) hours as needed for severe pain for up to 6 doses Max Daily Amount: 20 mg 2/7/24   Adina Bowman PA-C   prazosin (MINIPRESS) 2 mg capsule Take 2 mg by mouth daily at bedtime    Historical Provider, MD   QUEtiapine (SEROquel) 200 mg tablet Take 200 mg by mouth daily at bedtime    Historical Provider, MD   traZODone (DESYREL) 300 MG tablet Take 300 mg by mouth daily at bedtime    Historical Provider, MD     I have reviewed home medications with patient personally.    Allergies:   Allergies  "  Allergen Reactions    Azithromycin GI Intolerance and Hives    Erythromycin Vomiting    Morphine Palpitations       Social History:  Marital Status: Single   Occupation: Unknown  Patient Pre-hospital Living Situation: Home  Patient Pre-hospital Level of Mobility: walks  Patient Pre-hospital Diet Restrictions: Cardiac diet  Substance Use History:   Social History     Substance and Sexual Activity   Alcohol Use Not Currently     Social History     Tobacco Use   Smoking Status Former    Current packs/day: 0.25    Average packs/day: 0.3 packs/day for 25.0 years (6.3 ttl pk-yrs)    Types: Cigarettes   Smokeless Tobacco Former   Tobacco Comments    few cigs/day     Social History     Substance and Sexual Activity   Drug Use Never       Family History:  Family History   Problem Relation Age of Onset    Cancer Mother     Allergies Father     Cancer Sister     Stroke Brother        Physical Exam:     Vitals:   Blood Pressure: 140/79 (03/24/24 2114)  Pulse: 81 (03/24/24 2114)  Temperature: (!) 97.3 °F (36.3 °C) (03/24/24 2114)  Temp Source: Temporal (03/24/24 2109)  Respirations: 18 (03/24/24 2109)  Height: 5' 7\" (170.2 cm) (03/24/24 2109)  Weight - Scale: 106 kg (234 lb 3.2 oz) (03/24/24 2109)  SpO2: 92 % (03/24/24 2114)    Physical Exam  Vitals and nursing note reviewed.   Constitutional:       Appearance: Normal appearance. She is obese. She is not ill-appearing or diaphoretic.   HENT:      Nose: Nose normal. No congestion or rhinorrhea.      Mouth/Throat:      Mouth: Mucous membranes are moist.      Pharynx: Oropharynx is clear. No oropharyngeal exudate or posterior oropharyngeal erythema.   Eyes:      General: No scleral icterus.        Left eye: No discharge.      Extraocular Movements: Extraocular movements intact.      Conjunctiva/sclera: Conjunctivae normal.      Pupils: Pupils are equal, round, and reactive to light.   Cardiovascular:      Rate and Rhythm: Normal rate.      Heart sounds: No murmur heard.     No " friction rub. No gallop.   Pulmonary:      Effort: Pulmonary effort is normal. No respiratory distress.      Breath sounds: No stridor. No wheezing or rhonchi.   Abdominal:      General: Abdomen is flat.      Palpations: Abdomen is soft.      Tenderness: There is abdominal tenderness. There is rebound. There is no right CVA tenderness or left CVA tenderness.      Hernia: No hernia is present.   Skin:     General: Skin is warm.      Coloration: Skin is not jaundiced or pale.      Findings: No bruising or erythema.   Neurological:      General: No focal deficit present.      Mental Status: She is alert and oriented to person, place, and time.      Cranial Nerves: No cranial nerve deficit.      Sensory: No sensory deficit.      Motor: No weakness.      Coordination: Coordination normal.          Additional Data:     Lab Results:  Results from last 7 days   Lab Units 03/24/24  1750   WBC Thousand/uL 2.58*   HEMOGLOBIN g/dL 13.0   HEMATOCRIT % 40.4   PLATELETS Thousands/uL 177   LYMPHO PCT % 56*   MONO PCT % 2*   EOS PCT % 1     Results from last 7 days   Lab Units 03/24/24  1750   SODIUM mmol/L 133*   POTASSIUM mmol/L 3.3*   CHLORIDE mmol/L 95*   CO2 mmol/L 29   BUN mg/dL 13   CREATININE mg/dL 0.86   ANION GAP mmol/L 9   CALCIUM mg/dL 9.2   ALBUMIN g/dL 4.1   TOTAL BILIRUBIN mg/dL 0.60   ALK PHOS U/L 94   ALT U/L 54*   AST U/L 30   GLUCOSE RANDOM mg/dL 178*                 Results from last 7 days   Lab Units 03/24/24  1750   LACTIC ACID mmol/L 1.7       Lines/Drains:  Invasive Devices       Peripheral Intravenous Line  Duration             Peripheral IV 03/24/24 Left Antecubital <1 day              Drain  Duration             Closed/Suction Drain Inferior;Left Breast 15 Fr. -- days    Closed/Suction Drain Inferior;Right;Medial Breast 15 Fr. 47 days                        Imaging: Reviewed radiology reports from this admission including: abdominal/pelvic CT  CT abdomen pelvis with contrast   Final Result by Lasha  MD Dana (03/24 2020)      1.  Question very mild acute diverticulitis at the splenic flexure of the colon. Otherwise no acute findings in the abdomen or pelvis.   2.  Hepatomegaly and hepatic steatosis.            The study was marked in EPIC for immediate notification.      Workstation performed: [id number]             EKG and Other Studies Reviewed on Admission:   EKG: Normal sinus rhythm, prolonged QT, QTc 573.    ** Please Note: This note has been constructed using a voice recognition system. **

## 2024-03-26 LAB
ALBUMIN SERPL BCP-MCNC: 3.4 G/DL (ref 3.5–5)
ALP SERPL-CCNC: 63 U/L (ref 34–104)
ALT SERPL W P-5'-P-CCNC: 32 U/L (ref 7–52)
ANION GAP SERPL CALCULATED.3IONS-SCNC: 7 MMOL/L (ref 4–13)
AST SERPL W P-5'-P-CCNC: 17 U/L (ref 13–39)
BILIRUB SERPL-MCNC: 0.35 MG/DL (ref 0.2–1)
BUN SERPL-MCNC: 10 MG/DL (ref 5–25)
CALCIUM ALBUM COR SERPL-MCNC: 9.1 MG/DL (ref 8.3–10.1)
CALCIUM SERPL-MCNC: 8.6 MG/DL (ref 8.4–10.2)
CHLORIDE SERPL-SCNC: 103 MMOL/L (ref 96–108)
CO2 SERPL-SCNC: 26 MMOL/L (ref 21–32)
CREAT SERPL-MCNC: 0.83 MG/DL (ref 0.6–1.3)
ERYTHROCYTE [DISTWIDTH] IN BLOOD BY AUTOMATED COUNT: 13 % (ref 11.6–15.1)
GFR SERPL CREATININE-BSD FRML MDRD: 77 ML/MIN/1.73SQ M
GLUCOSE SERPL-MCNC: 119 MG/DL (ref 65–140)
GLUCOSE SERPL-MCNC: 121 MG/DL (ref 65–140)
GLUCOSE SERPL-MCNC: 124 MG/DL (ref 65–140)
GLUCOSE SERPL-MCNC: 132 MG/DL (ref 65–140)
GLUCOSE SERPL-MCNC: 132 MG/DL (ref 65–140)
HCT VFR BLD AUTO: 33.2 % (ref 34.8–46.1)
HGB BLD-MCNC: 10.5 G/DL (ref 11.5–15.4)
MAGNESIUM SERPL-MCNC: 1.7 MG/DL (ref 1.9–2.7)
MCH RBC QN AUTO: 26.5 PG (ref 26.8–34.3)
MCHC RBC AUTO-ENTMCNC: 31.6 G/DL (ref 31.4–37.4)
MCV RBC AUTO: 84 FL (ref 82–98)
NRBC BLD AUTO-RTO: 1 /100 WBCS
PLATELET # BLD AUTO: 173 THOUSANDS/UL (ref 149–390)
PMV BLD AUTO: 11.5 FL (ref 8.9–12.7)
POTASSIUM SERPL-SCNC: 3.2 MMOL/L (ref 3.5–5.3)
PROT SERPL-MCNC: 5.8 G/DL (ref 6.4–8.4)
RBC # BLD AUTO: 3.96 MILLION/UL (ref 3.81–5.12)
SODIUM SERPL-SCNC: 136 MMOL/L (ref 135–147)
WBC # BLD AUTO: 2.83 THOUSAND/UL (ref 4.31–10.16)

## 2024-03-26 PROCEDURE — 80053 COMPREHEN METABOLIC PANEL: CPT

## 2024-03-26 PROCEDURE — 85027 COMPLETE CBC AUTOMATED: CPT

## 2024-03-26 PROCEDURE — 83735 ASSAY OF MAGNESIUM: CPT

## 2024-03-26 PROCEDURE — 99232 SBSQ HOSP IP/OBS MODERATE 35: CPT

## 2024-03-26 PROCEDURE — 82948 REAGENT STRIP/BLOOD GLUCOSE: CPT

## 2024-03-26 RX ORDER — POTASSIUM CHLORIDE 14.9 MG/ML
20 INJECTION INTRAVENOUS ONCE
Status: COMPLETED | OUTPATIENT
Start: 2024-03-26 | End: 2024-03-26

## 2024-03-26 RX ORDER — MAGNESIUM SULFATE HEPTAHYDRATE 40 MG/ML
2 INJECTION, SOLUTION INTRAVENOUS ONCE
Status: COMPLETED | OUTPATIENT
Start: 2024-03-26 | End: 2024-03-26

## 2024-03-26 RX ORDER — DICYCLOMINE HYDROCHLORIDE 10 MG/1
10 CAPSULE ORAL
Status: DISCONTINUED | OUTPATIENT
Start: 2024-03-26 | End: 2024-03-27 | Stop reason: HOSPADM

## 2024-03-26 RX ADMIN — PANTOPRAZOLE SODIUM 40 MG: 40 TABLET, DELAYED RELEASE ORAL at 06:05

## 2024-03-26 RX ADMIN — POTASSIUM CHLORIDE 20 MEQ: 14.9 INJECTION, SOLUTION INTRAVENOUS at 09:26

## 2024-03-26 RX ADMIN — PIPERACILLIN AND TAZOBACTAM 4.5 G: 36; 4.5 INJECTION, POWDER, FOR SOLUTION INTRAVENOUS at 08:01

## 2024-03-26 RX ADMIN — SODIUM CHLORIDE 125 ML/HR: 0.9 INJECTION, SOLUTION INTRAVENOUS at 08:08

## 2024-03-26 RX ADMIN — NORTRIPTYLINE HYDROCHLORIDE 50 MG: 25 CAPSULE ORAL at 22:35

## 2024-03-26 RX ADMIN — ATORVASTATIN CALCIUM 40 MG: 40 TABLET, FILM COATED ORAL at 08:01

## 2024-03-26 RX ADMIN — DICYCLOMINE HYDROCHLORIDE 10 MG: 10 CAPSULE ORAL at 17:00

## 2024-03-26 RX ADMIN — DICYCLOMINE HYDROCHLORIDE 10 MG: 10 CAPSULE ORAL at 22:36

## 2024-03-26 RX ADMIN — OXYCODONE HYDROCHLORIDE 5 MG: 5 TABLET ORAL at 19:54

## 2024-03-26 RX ADMIN — BUPROPION HYDROCHLORIDE 300 MG: 150 TABLET, EXTENDED RELEASE ORAL at 08:01

## 2024-03-26 RX ADMIN — MONTELUKAST 10 MG: 10 TABLET, FILM COATED ORAL at 22:35

## 2024-03-26 RX ADMIN — PANTOPRAZOLE SODIUM 40 MG: 40 TABLET, DELAYED RELEASE ORAL at 17:00

## 2024-03-26 RX ADMIN — PIPERACILLIN AND TAZOBACTAM 4.5 G: 36; 4.5 INJECTION, POWDER, FOR SOLUTION INTRAVENOUS at 00:06

## 2024-03-26 RX ADMIN — DICYCLOMINE HYDROCHLORIDE 10 MG: 10 CAPSULE ORAL at 13:00

## 2024-03-26 RX ADMIN — SODIUM CHLORIDE 75 ML/HR: 0.9 INJECTION, SOLUTION INTRAVENOUS at 14:20

## 2024-03-26 RX ADMIN — QUETIAPINE FUMARATE 200 MG: 200 TABLET ORAL at 22:35

## 2024-03-26 RX ADMIN — MAGNESIUM SULFATE HEPTAHYDRATE 2 G: 40 INJECTION, SOLUTION INTRAVENOUS at 08:07

## 2024-03-26 RX ADMIN — BUSPIRONE HYDROCHLORIDE 15 MG: 5 TABLET ORAL at 19:54

## 2024-03-26 RX ADMIN — LISINOPRIL 40 MG: 20 TABLET ORAL at 08:01

## 2024-03-26 RX ADMIN — PRAZOSIN HYDROCHLORIDE 10 MG: 2 CAPSULE ORAL at 22:35

## 2024-03-26 RX ADMIN — PIPERACILLIN AND TAZOBACTAM 4.5 G: 36; 4.5 INJECTION, POWDER, FOR SOLUTION INTRAVENOUS at 17:22

## 2024-03-26 NOTE — ASSESSMENT & PLAN NOTE
Lab Results   Component Value Date    HGBA1C 6.7 (H) 01/26/2024       Recent Labs     03/25/24  1055 03/25/24  1615 03/25/24  2139 03/26/24  0713   POCGLU 130 137 127 132         Blood Sugar Average: Last 72 hrs:  (P) 132  Sliding scale, follow hypoglycemia precaution

## 2024-03-26 NOTE — PROGRESS NOTES
[facility]  Progress Note  Name: Tammie Avalos I  MRN: [id number]  Unit/Bed#: -01 I Date of Admission: 3/24/2024   Date of Service: 3/26/2024 I Hospital Day: 2    Assessment/Plan   * Diverticulitis  Assessment & Plan  Coming with abdominal pain, chills and fever  Recently started chemotherapy, first cycle was 1 week ago  Imaging shows diverticulitis  Continue IV hydration, IV antibiotics, serial abdominal exam  Per EKG, patient has prolonged QT, QTc is 573-avoid QT prolonging medication including Zofran.  Use Benadryl or scopolamine patch.  Will try to avoid Tigan since it is IM-unless really necessary and patient has von Willebrand disease-to avoid any kind of hematoma,   Still with poor oral intake and abdominal pain, will continue with IVF rehydration and diet as tolerated.     Electrolyte abnormality  Assessment & Plan  Hyponatremia, hypokalemia  Secondary to poor oral intake with recent chemotherapy  And IV hydration, will give p.o. potassium supplement.  EKG: Normal sinus rhythm, prolonged QT, QTc 573    Type 2 diabetes mellitus without complication, without long-term current use of insulin (HCC)  Assessment & Plan  Lab Results   Component Value Date    HGBA1C 6.7 (H) 01/26/2024       Recent Labs     03/25/24  1055 03/25/24  1615 03/25/24  2139 03/26/24  0713   POCGLU 130 137 127 132         Blood Sugar Average: Last 72 hrs:  (P) 132  Sliding scale, follow hypoglycemia precaution    Malignant neoplasm of central portion of right breast in female, estrogen receptor positive   Assessment & Plan  Follows up with Dr. Moya  Recently started chemotherapy, first cycle about a week ago  Continue monitoring, follow Limb alert-per patient and daughter on the bedside, can use left upper extremity for IV access.  Was to have follow up with Dr. Molina on 3/27, will ask if she is able to evaluate patient while she is admitted.     Mild intermittent asthma without  complication  Assessment & Plan  Not in acute exacerbation, continue respiratory protocol, home medication    Von Willebrand disease (HCC)  Assessment & Plan  Follows up with hematology/oncology, Dr. Moya  Patient has high bleeding tendency, hold any kind of anticoagulation.         VTE Pharmacologic Prophylaxis: VTE Score: 5 High Risk (Score >/= 5) - Pharmacological DVT Prophylaxis Contraindicated. Sequential Compression Devices Ordered.    Mobility:   Basic Mobility Inpatient Raw Score: 24  JH-HLM Goal: 8: Walk 250 feet or more  JH-HLM Achieved: 7: Walk 25 feet or more  JH-HLM Goal NOT achieved. Continue with multidisciplinary rounding and encourage appropriate mobility to improve upon JH-HLM goals.    Patient Centered Rounds: I performed bedside rounds with nursing staff today.   Discussions with Specialists or Other Care Team Provider: nursing, case management    Education and Discussions with Family / Patient: Updated  (daughter) via phone.    Total Time Spent on Date of Encounter in care of patient:  mins. This time was spent on one or more of the following: performing physical exam; counseling and coordination of care; obtaining or reviewing history; documenting in the medical record; reviewing/ordering tests, medications or procedures; communicating with other healthcare professionals and discussing with patient's family/caregivers.    Current Length of Stay: 2 day(s)  Current Patient Status: Inpatient   Certification Statement: The patient will continue to require additional inpatient hospital stay due to diverticulitis, tolerating diet, IV abx  Discharge Plan: Anticipate discharge in 24-48 hrs to home.    Code Status: Level 1 - Full Code    Subjective:   Seen and examined at bedside.  She states that she is doing a little bit better today.  Does not have as much pain as she did before in the lower belly. No nausea or vomiting, is tolerating her diet but states it doesn't have much flavor  for her. Still with crampy abdominal pain. No fever or chills. She is hopeful to going home tomorrow.     Objective:     Vitals:   Temp (24hrs), Av.4 °F (36.3 °C), Min:97 °F (36.1 °C), Max:97.7 °F (36.5 °C)    Temp:  [97 °F (36.1 °C)-97.7 °F (36.5 °C)] 97 °F (36.1 °C)  HR:  [73-86] 73  Resp:  [14-18] 14  BP: (119-134)/(61-67) 132/61  SpO2:  [92 %-96 %] 92 %  Body mass index is 36.65 kg/m².     Input and Output Summary (last 24 hours):     Intake/Output Summary (Last 24 hours) at 3/26/2024 1208  Last data filed at 3/26/2024 0739  Gross per 24 hour   Intake 960 ml   Output 750 ml   Net 210 ml       Physical Exam:   Physical Exam  Vitals reviewed.   Constitutional:       General: She is not in acute distress.     Appearance: She is obese. She is not ill-appearing or toxic-appearing.   HENT:      Head: Normocephalic and atraumatic.      Mouth/Throat:      Mouth: Mucous membranes are moist.   Cardiovascular:      Rate and Rhythm: Normal rate and regular rhythm.      Heart sounds: Murmur heard.   Pulmonary:      Effort: No respiratory distress.      Breath sounds: No stridor. No wheezing, rhonchi or rales.   Abdominal:      General: Bowel sounds are normal. There is no distension.      Palpations: Abdomen is soft. There is no mass.      Tenderness: There is abdominal tenderness (lower abdominal tenderness, slightly improved from yesterday).   Musculoskeletal:      Right lower leg: No edema.      Left lower leg: No edema.   Skin:     General: Skin is warm and dry.   Neurological:      General: No focal deficit present.      Mental Status: She is alert and oriented to person, place, and time.   Psychiatric:         Mood and Affect: Mood normal.         Behavior: Behavior normal.          Additional Data:     Labs:  Results from last 7 days   Lab Units 24  0534 24  0615   WBC Thousand/uL 2.83* 1.81*   HEMOGLOBIN g/dL 10.5* 11.5   HEMATOCRIT % 33.2* 35.8   PLATELETS Thousands/uL 173 157   NEUTROS PCT %  --   21*   LYMPHS PCT %  --  64*   MONOS PCT %  --  11   EOS PCT %  --  1     Results from last 7 days   Lab Units 03/26/24  0534   SODIUM mmol/L 136   POTASSIUM mmol/L 3.2*   CHLORIDE mmol/L 103   CO2 mmol/L 26   BUN mg/dL 10   CREATININE mg/dL 0.83   ANION GAP mmol/L 7   CALCIUM mg/dL 8.6   ALBUMIN g/dL 3.4*   TOTAL BILIRUBIN mg/dL 0.35   ALK PHOS U/L 63   ALT U/L 32   AST U/L 17   GLUCOSE RANDOM mg/dL 132         Results from last 7 days   Lab Units 03/26/24  1107 03/26/24  0713 03/25/24  2139 03/25/24  1615 03/25/24  1055 03/25/24  0707 03/24/24  2258   POC GLUCOSE mg/dl 121 132 127 137 130 131 135         Results from last 7 days   Lab Units 03/24/24  1750   LACTIC ACID mmol/L 1.7       Lines/Drains:  Invasive Devices       Peripheral Intravenous Line  Duration             Peripheral IV 03/24/24 Left Antecubital 1 day                          Imaging: Reviewed radiology reports from this admission including: abdominal/pelvic CT    Recent Cultures (last 7 days):   Results from last 7 days   Lab Units 03/24/24  1750   BLOOD CULTURE  No Growth at 24 hrs.  No Growth at 24 hrs.       Last 24 Hours Medication List:   Current Facility-Administered Medications   Medication Dose Route Frequency Provider Last Rate    acetaminophen  650 mg Oral Q6H PRN Trinidad Weinberg MD      albuterol  2 puff Inhalation Q6H PRN Trinidad Weinberg MD      atorvastatin  40 mg Oral Daily Trinidad Weinberg MD      bisacodyl  10 mg Rectal Daily PRN Trinidad Weinberg MD      budesonide-formoterol  2 puff Inhalation BID Trinidad Weinberg MD      buPROPion  300 mg Oral Daily Trinidad Weinberg MD      busPIRone  15 mg Oral TID PRN Trinidad Weinberg MD      calcium carbonate  500 mg Oral Daily PRN Jeremiah Alexander DO      dicyclomine  10 mg Oral 4x Daily (AC & HS) Winnie Krishnan PA-C      diphenhydrAMINE  25 mg Intravenous Q6H PRN Trinidad Weinberg MD      fluticasone  2 spray Nasal HS Trinidad Weinberg MD      insulin lispro  1-5 Units  Subcutaneous TID AC Trinidad Weinberg MD      insulin lispro  1-5 Units Subcutaneous HS Trinidad Weinberg MD      lisinopril  40 mg Oral Daily Trinidad Weinberg MD      montelukast  10 mg Oral HS Trinidad Weinberg MD      nortriptyline  50 mg Oral HS Trinidad Weinberg MD      oxyCODONE  5 mg Oral Q6H PRN Trinidad Weinberg MD      pantoprazole  40 mg Oral BID AC Trinidad Weinberg MD      phenol  1 spray Mouth/Throat Q2H PRN Winnie Krishnan PA-C      piperacillin-tazobactam  4.5 g Intravenous Q8H Trinidad Weinberg MD 4.5 g (03/26/24 0801)    prazosin  10 mg Oral HS Trinidad Weinberg MD      QUEtiapine  200 mg Oral HS Trinidad Weinberg MD      sodium chloride  75 mL/hr Intravenous Continuous Winnie Krishnan PA-C 75 mL/hr (03/26/24 1019)        Today, Patient Was Seen By: Winnie Krishnan PA-C    **Please Note: This note may have been constructed using a voice recognition system.**

## 2024-03-26 NOTE — ASSESSMENT & PLAN NOTE
Medication Refill Request     Name ibandronate (BONIVA) 150 MG tablet  Dose/Frequency Take 1 tablet (150 mg total) by mouth every 30 (thirty) days  Quantity 1 tablet  Verified pharmacy   [x]  Verified ordering Provider   [x]  Does patient have enough for the next 3 days?  Yes [x] No [] Not in acute exacerbation, continue respiratory protocol, home medication

## 2024-03-26 NOTE — UTILIZATION REVIEW
NOTIFICATION OF INPATIENT ADMISSION   AUTHORIZATION REQUEST   SERVICING FACILITY:   [place] [address]  Tax ID: [id number]  NPI: [id number] ATTENDING PROVIDER:  Attending Name and NPI#: Deanna Frazier Md [[id number]]  Address: [address] [place] [address]  Phone: [phone]   ADMISSION INFORMATION:  Place of Service: Inpatient [facility]  Place of Service Code: 21  Inpatient Admission Date/Time: 3/24/24  8:31 PM  Discharge Date/Time: No discharge date for patient encounter.  Admitting Diagnosis Code/Description:  Diverticulitis [K57.92]  Abdominal pain [R10.9]  Fever [R50.9]     UTILIZATION REVIEW CONTACT:  Shanel Hall Utilization   Network Utilization Review Department  Phone: [phone]  Fax [phone]  Email: [email or web address]  Contact for approvals/pending authorizations, clinical reviews, and discharge.     PHYSICIAN ADVISORY SERVICES:  Medical Necessity Denial & Qbqv-ua-Idks Review  Phone: [phone]  Fax: [phone]  Email: [email or web address]     DISCHARGE SUPPORT TEAM:  For Patients Discharge Needs & Updates  Phone: [phone] opt. 2 Fax: [phone]  Email: [email or web address]

## 2024-03-26 NOTE — PLAN OF CARE
Problem: PAIN - ADULT  Goal: Verbalizes/displays adequate comfort level or baseline comfort level  Description: Interventions:  - Encourage patient to monitor pain and request assistance  - Assess pain using appropriate pain scale  - Administer analgesics based on type and severity of pain and evaluate response  - Implement non-pharmacological measures as appropriate and evaluate response  - Consider cultural and social influences on pain and pain management  - Notify physician/advanced practitioner if interventions unsuccessful or patient reports new pain  Outcome: Progressing     Problem: INFECTION - ADULT  Goal: Absence or prevention of progression during hospitalization  Description: INTERVENTIONS:  - Assess and monitor for signs and symptoms of infection  - Monitor lab/diagnostic results  - Monitor all insertion sites, i.e. indwelling lines, tubes, and drains  - Monitor endotracheal if appropriate and nasal secretions for changes in amount and color  - [place] appropriate cooling/warming therapies per order  - Administer medications as ordered  - Instruct and encourage patient and family to use good hand hygiene technique  - Identify and instruct in appropriate isolation precautions for identified infection/condition  Outcome: Progressing  Goal: Absence of fever/infection during neutropenic period  Description: INTERVENTIONS:  - Monitor WBC    Outcome: Progressing     Problem: SAFETY ADULT  Goal: Patient will remain free of falls  Description: INTERVENTIONS:  - Educate patient/family on patient safety including physical limitations  - Instruct patient to call for assistance with activity   - Consult OT/PT to assist with strengthening/mobility   - Keep Call bell within reach  - Keep bed low and locked with side rails adjusted as appropriate  - Keep care items and personal belongings within reach  - Initiate and maintain comfort rounds  - Apply yellow socks and bracelet for high fall risk patients  - Consider  moving patient to room near nurses station  Outcome: Progressing  Goal: Maintain or return to baseline ADL function  Description: INTERVENTIONS:  -  Assess patient's ability to carry out ADLs; assess patient's baseline for ADL function and identify physical deficits which impact ability to perform ADLs (bathing, care of mouth/teeth, toileting, grooming, dressing, etc.)  - Assess/evaluate cause of self-care deficits   - Assess range of motion  - Assess patient's mobility; develop plan if impaired  - Assess patient's need for assistive devices and provide as appropriate  - Encourage maximum independence but intervene and supervise when necessary  - Involve family in performance of ADLs  - Assess for home care needs following discharge   - Consider OT consult to assist with ADL evaluation and planning for discharge  - Provide patient education as appropriate  Outcome: Progressing  Goal: Maintains/Returns to pre admission functional level  Description: INTERVENTIONS:  - Perform AM-PAC 6 Click Basic Mobility/ Daily Activity assessment daily.  - Set and communicate daily mobility goal to care team and patient/family/caregiver.   - Collaborate with rehabilitation services on mobility goals if consulted  - Record patient progress and toleration of activity level   Outcome: Progressing     Problem: DISCHARGE PLANNING  Goal: Discharge to home or other facility with appropriate resources  Description: INTERVENTIONS:  - Identify barriers to discharge w/patient and caregiver  - Arrange for needed discharge resources and transportation as appropriate  - Identify discharge learning needs (meds, wound care, etc.)  - Arrange for interpretive services to assist at discharge as needed  - Refer to Case Management Department for coordinating discharge planning if the patient needs post-hospital services based on physician/advanced practitioner order or complex needs related to functional status, cognitive ability, or social support  system  Outcome: Progressing     Problem: Knowledge Deficit  Goal: Patient/family/caregiver demonstrates understanding of disease process, treatment plan, medications, and discharge instructions  Description: Complete learning assessment and assess knowledge base.  Interventions:  - Provide teaching at level of understanding  - Provide teaching via preferred learning methods  Outcome: Progressing     Problem: GASTROINTESTINAL - ADULT  Goal: Minimal or absence of nausea and/or vomiting  Description: INTERVENTIONS:  - Administer IV fluids if ordered to ensure adequate hydration  - Maintain NPO status until nausea and vomiting are resolved  - Nasogastric tube if ordered  - Administer ordered antiemetic medications as needed  - Provide nonpharmacologic comfort measures as appropriate  - Advance diet as tolerated, if ordered  - Consider nutrition services referral to assist patient with adequate nutrition and appropriate food choices  Outcome: Progressing  Goal: Maintains or returns to baseline bowel function  Description: INTERVENTIONS:  - Assess bowel function  - Encourage oral fluids to ensure adequate hydration  - Administer IV fluids if ordered to ensure adequate hydration  - Administer ordered medications as needed  - Encourage mobilization and activity  - Consider nutritional services referral to assist patient with adequate nutrition and appropriate food choices  Outcome: Progressing  Goal: Maintains adequate nutritional intake  Description: INTERVENTIONS:  - Monitor percentage of each meal consumed  - Identify factors contributing to decreased intake, treat as appropriate  - Assist with meals as needed  - Monitor I&O, weight, and lab values if indicated  - Obtain nutrition services referral as needed  Outcome: Progressing  Goal: Establish and maintain optimal ostomy function  Description: INTERVENTIONS:  - Assess bowel function  - Encourage oral fluids to ensure adequate hydration  - Administer IV fluids if  ordered to ensure adequate hydration   - Administer ordered medications as needed  - Encourage mobilization and activity  - Nutrition services referral to assist patient with appropriate food choices  - Assess stoma site  - Consider wound care consult   Outcome: Progressing  Goal: Oral mucous membranes remain intact  Description: INTERVENTIONS  - Assess oral mucosa and hygiene practices  - Implement preventative oral hygiene regimen  - Implement oral medicated treatments as ordered  - Initiate Nutrition services referral as needed  Outcome: Progressing     Problem: METABOLIC, FLUID AND ELECTROLYTES - ADULT  Goal: Electrolytes maintained within normal limits  Description: INTERVENTIONS:  - Monitor labs and assess patient for signs and symptoms of electrolyte imbalances  - Administer electrolyte replacement as ordered  - Monitor response to electrolyte replacements, including repeat lab results as appropriate  - Instruct patient on fluid and nutrition as appropriate  Outcome: Progressing  Goal: Fluid balance maintained  Description: INTERVENTIONS:  - Monitor labs   - Monitor I/O and WT  - Instruct patient on fluid and nutrition as appropriate  - Assess for signs & symptoms of volume excess or deficit  Outcome: Progressing  Goal: Glucose maintained within target range  Description: INTERVENTIONS:  - Monitor Blood Glucose as ordered  - Assess for signs and symptoms of hyperglycemia and hypoglycemia  - Administer ordered medications to maintain glucose within target range  - Assess nutritional intake and initiate nutrition service referral as needed  Outcome: Progressing     Problem: HEMATOLOGIC - ADULT  Goal: Maintains hematologic stability  Description: INTERVENTIONS  - Assess for signs and symptoms of bleeding or hemorrhage  - Monitor labs  - Administer supportive blood products/factors as ordered and appropriate  Outcome: Progressing     Problem: MUSCULOSKELETAL - ADULT  Goal: Maintain or return mobility to safest  level of function  Description: INTERVENTIONS:  - Assess patient's ability to carry out ADLs; assess patient's baseline for ADL function and identify physical deficits which impact ability to perform ADLs (bathing, care of mouth/teeth, toileting, grooming, dressing, etc.)  - Assess/evaluate cause of self-care deficits   - Assess range of motion  - Assess patient's mobility  - Assess patient's need for assistive devices and provide as appropriate  - Encourage maximum independence but intervene and supervise when necessary  - Involve family in performance of ADLs  - Assess for home care needs following discharge   - Consider OT consult to assist with ADL evaluation and planning for discharge  - Provide patient education as appropriate  Outcome: Progressing  Goal: Maintain proper alignment of affected body part  Description: INTERVENTIONS:  - Support, maintain and protect limb and body alignment  - Provide patient/ family with appropriate education  Outcome: Progressing     Problem: Nutrition/Hydration-ADULT  Goal: Nutrient/Hydration intake appropriate for improving, restoring or maintaining nutritional needs  Description: Monitor and assess patient's nutrition/hydration status for malnutrition. Collaborate with interdisciplinary team and initiate plan and interventions as ordered.  Monitor patient's weight and dietary intake as ordered or per policy. Utilize nutrition screening tool and intervene as necessary. Determine patient's food preferences and provide high-protein, high-caloric foods as appropriate.     INTERVENTIONS:  - Monitor oral intake, urinary output, labs, and treatment plans  - Assess nutrition and hydration status and recommend course of action  - Evaluate amount of meals eaten  - Assist patient with eating if necessary   - Allow adequate time for meals  - Recommend/ encourage appropriate diets, oral nutritional supplements, and vitamin/mineral supplements  - Order, calculate, and assess calorie counts  as needed  - Recommend, monitor, and adjust tube feedings and TPN/PPN based on assessed needs  - Assess need for intravenous fluids  - Provide specific nutrition/hydration education as appropriate  - Include patient/family/caregiver in decisions related to nutrition  Outcome: Progressing

## 2024-03-26 NOTE — PLAN OF CARE
Problem: PAIN - ADULT  Goal: Verbalizes/displays adequate comfort level or baseline comfort level  Description: Interventions:  - Encourage patient to monitor pain and request assistance  - Assess pain using appropriate pain scale  - Administer analgesics based on type and severity of pain and evaluate response  - Implement non-pharmacological measures as appropriate and evaluate response  - Consider cultural and social influences on pain and pain management  - Notify physician/advanced practitioner if interventions unsuccessful or patient reports new pain  Outcome: Progressing     Problem: INFECTION - ADULT  Goal: Absence or prevention of progression during hospitalization  Description: INTERVENTIONS:  - Assess and monitor for signs and symptoms of infection  - Monitor lab/diagnostic results  - Monitor all insertion sites, i.e. indwelling lines, tubes, and drains  - Monitor endotracheal if appropriate and nasal secretions for changes in amount and color  - [place] appropriate cooling/warming therapies per order  - Administer medications as ordered  - Instruct and encourage patient and family to use good hand hygiene technique  - Identify and instruct in appropriate isolation precautions for identified infection/condition  Outcome: Progressing  Goal: Absence of fever/infection during neutropenic period  Description: INTERVENTIONS:  - Monitor WBC    Outcome: Progressing     Goal: Maintain or return to baseline ADL function  Description: INTERVENTIONS:  -  Assess patient's ability to carry out ADLs; assess patient's baseline for ADL function and identify physical deficits which impact ability to perform ADLs (bathing, care of mouth/teeth, toileting, grooming, dressing, etc.)  - Assess/evaluate cause of self-care deficits   - Assess range of motion  - Assess patient's mobility; develop plan if impaired  - Assess patient's need for assistive devices and provide as appropriate  - Encourage maximum independence but  intervene and supervise when necessary  - Involve family in performance of ADLs  - Assess for home care needs following discharge   - Consider OT consult to assist with ADL evaluation and planning for discharge  - Provide patient education as appropriate  Outcome: Progressing    Problem: DISCHARGE PLANNING  Goal: Discharge to home or other facility with appropriate resources  Description: INTERVENTIONS:  - Identify barriers to discharge w/patient and caregiver  - Arrange for needed discharge resources and transportation as appropriate  - Identify discharge learning needs (meds, wound care, etc.)  - Arrange for interpretive services to assist at discharge as needed  - Refer to Case Management Department for coordinating discharge planning if the patient needs post-hospital services based on physician/advanced practitioner order or complex needs related to functional status, cognitive ability, or social support system  Outcome: Progressing     Problem: Knowledge Deficit  Goal: Patient/family/caregiver demonstrates understanding of disease process, treatment plan, medications, and discharge instructions  Description: Complete learning assessment and assess knowledge base.  Interventions:  - Provide teaching at level of understanding  - Provide teaching via preferred learning methods  Outcome: Progressing     Problem: GASTROINTESTINAL - ADULT  Goal: Minimal or absence of nausea and/or vomiting  Description: INTERVENTIONS:  - Administer IV fluids if ordered to ensure adequate hydration  - Maintain NPO status until nausea and vomiting are resolved  - Nasogastric tube if ordered  - Administer ordered antiemetic medications as needed  - Provide nonpharmacologic comfort measures as appropriate  - Advance diet as tolerated, if ordered  - Consider nutrition services referral to assist patient with adequate nutrition and appropriate food choices  Outcome: Progressing  Goal: Maintains or returns to baseline bowel  function  Description: INTERVENTIONS:  - Assess bowel function  - Encourage oral fluids to ensure adequate hydration  - Administer IV fluids if ordered to ensure adequate hydration  - Administer ordered medications as needed  - Encourage mobilization and activity  - Consider nutritional services referral to assist patient with adequate nutrition and appropriate food choices  Outcome: Progressing  Goal: Maintains adequate nutritional intake  Description: INTERVENTIONS:  - Monitor percentage of each meal consumed  - Identify factors contributing to decreased intake, treat as appropriate  - Assist with meals as needed  - Monitor I&O, weight, and lab values if indicated  - Obtain nutrition services referral as needed  Outcome: Progressing  Goal: Establish and maintain optimal ostomy function  Description: INTERVENTIONS:  - Assess bowel function  - Encourage oral fluids to ensure adequate hydration  - Administer IV fluids if ordered to ensure adequate hydration   - Administer ordered medications as needed  - Encourage mobilization and activity  - Nutrition services referral to assist patient with appropriate food choices  - Assess stoma site  - Consider wound care consult   Outcome: Progressing  Goal: Oral mucous membranes remain intact  Description: INTERVENTIONS  - Assess oral mucosa and hygiene practices  - Implement preventative oral hygiene regimen  - Implement oral medicated treatments as ordered  - Initiate Nutrition services referral as needed  Outcome: Progressing     Problem: METABOLIC, FLUID AND ELECTROLYTES - ADULT  Goal: Electrolytes maintained within normal limits  Description: INTERVENTIONS:  - Monitor labs and assess patient for signs and symptoms of electrolyte imbalances  - Administer electrolyte replacement as ordered  - Monitor response to electrolyte replacements, including repeat lab results as appropriate  - Instruct patient on fluid and nutrition as appropriate  Outcome: Progressing  Goal: Fluid  balance maintained  Description: INTERVENTIONS:  - Monitor labs   - Monitor I/O and WT  - Instruct patient on fluid and nutrition as appropriate  - Assess for signs & symptoms of volume excess or deficit  Outcome: Progressing  Goal: Glucose maintained within target range  Description: INTERVENTIONS:  - Monitor Blood Glucose as ordered  - Assess for signs and symptoms of hyperglycemia and hypoglycemia  - Administer ordered medications to maintain glucose within target range  - Assess nutritional intake and initiate nutrition service referral as needed  Outcome: Progressing     Problem: HEMATOLOGIC - ADULT  Goal: Maintains hematologic stability  Description: INTERVENTIONS  - Assess for signs and symptoms of bleeding or hemorrhage  - Monitor labs  - Administer supportive blood products/factors as ordered and appropriate  Outcome: Progressing     Problem: MUSCULOSKELETAL - ADULT  Goal: Maintain or return mobility to safest level of function  Description: INTERVENTIONS:  - Assess patient's ability to carry out ADLs; assess patient's baseline for ADL function and identify physical deficits which impact ability to perform ADLs (bathing, care of mouth/teeth, toileting, grooming, dressing, etc.)  - Assess/evaluate cause of self-care deficits   - Assess range of motion  - Assess patient's mobility  - Assess patient's need for assistive devices and provide as appropriate  - Encourage maximum independence but intervene and supervise when necessary  - Involve family in performance of ADLs  - Assess for home care needs following discharge   - Consider OT consult to assist with ADL evaluation and planning for discharge  - Provide patient education as appropriate  Outcome: Progressing  Goal: Maintain proper alignment of affected body part  Description: INTERVENTIONS:  - Support, maintain and protect limb and body alignment  - Provide patient/ family with appropriate education  Outcome: Progressing     Problem:  Nutrition/Hydration-ADULT  Goal: Nutrient/Hydration intake appropriate for improving, restoring or maintaining nutritional needs  Description: Monitor and assess patient's nutrition/hydration status for malnutrition. Collaborate with interdisciplinary team and initiate plan and interventions as ordered.  Monitor patient's weight and dietary intake as ordered or per policy. Utilize nutrition screening tool and intervene as necessary. Determine patient's food preferences and provide high-protein, high-caloric foods as appropriate.     INTERVENTIONS:  - Monitor oral intake, urinary output, labs, and treatment plans  - Assess nutrition and hydration status and recommend course of action  - Evaluate amount of meals eaten  - Assist patient with eating if necessary   - Allow adequate time for meals  - Recommend/ encourage appropriate diets, oral nutritional supplements, and vitamin/mineral supplements  - Order, calculate, and assess calorie counts as needed  - Recommend, monitor, and adjust tube feedings and TPN/PPN based on assessed needs  - Assess need for intravenous fluids  - Provide specific nutrition/hydration education as appropriate  - Include patient/family/caregiver in decisions related to nutrition  Outcome: Progressing

## 2024-03-26 NOTE — PROGRESS NOTES
Pastoral Care Progress Note    3/26/2024  Patient: Tammie Avalos : 1965  Admission Date & Time: 3/24/2024 1736  MRN: [id number] [facility]: [id number]  CH initiated support visit to pt. Pt received CH upright in chair, no family present.  Pt shared about her cancer diagnosis and the impact it has on her life. Pt shared about her family: she feels well supported by them and has 5 grandchildren, but lives alone. Pt shared that she intends to pursue aggressive cancer treatment and is hopeful for a good outcome.  CH provided empathetic listening and support.     [id number]   Clinical Encounter Type   Visited With Patient   Routine Visit Introduction

## 2024-03-26 NOTE — ASSESSMENT & PLAN NOTE
Follows up with Dr. Moya  Recently started chemotherapy, first cycle about a week ago  Continue monitoring, follow Limb alert-per patient and daughter on the bedside, can use left upper extremity for IV access.  Was to have follow up with Dr. Molina on 3/27, will ask if she is able to evaluate patient while she is admitted.

## 2024-03-27 VITALS
HEART RATE: 70 BPM | RESPIRATION RATE: 18 BRPM | BODY MASS INDEX: 37.39 KG/M2 | WEIGHT: 238.2 LBS | TEMPERATURE: 96.8 F | DIASTOLIC BLOOD PRESSURE: 61 MMHG | HEIGHT: 67 IN | OXYGEN SATURATION: 92 % | SYSTOLIC BLOOD PRESSURE: 116 MMHG

## 2024-03-27 PROBLEM — E87.8 ELECTROLYTE ABNORMALITY: Status: RESOLVED | Noted: 2024-03-24 | Resolved: 2024-03-27

## 2024-03-27 LAB
ANION GAP SERPL CALCULATED.3IONS-SCNC: 9 MMOL/L (ref 4–13)
BASOPHILS # BLD MANUAL: 0 THOUSAND/UL (ref 0–0.1)
BASOPHILS NFR MAR MANUAL: 0 % (ref 0–1)
BUN SERPL-MCNC: 12 MG/DL (ref 5–25)
CALCIUM SERPL-MCNC: 8.8 MG/DL (ref 8.4–10.2)
CHLORIDE SERPL-SCNC: 106 MMOL/L (ref 96–108)
CO2 SERPL-SCNC: 24 MMOL/L (ref 21–32)
CREAT SERPL-MCNC: 0.83 MG/DL (ref 0.6–1.3)
EOSINOPHIL # BLD MANUAL: 0.11 THOUSAND/UL (ref 0–0.4)
EOSINOPHIL NFR BLD MANUAL: 1 % (ref 0–6)
ERYTHROCYTE [DISTWIDTH] IN BLOOD BY AUTOMATED COUNT: 13.2 % (ref 11.6–15.1)
GFR SERPL CREATININE-BSD FRML MDRD: 77 ML/MIN/1.73SQ M
GLUCOSE SERPL-MCNC: 120 MG/DL (ref 65–140)
GLUCOSE SERPL-MCNC: 129 MG/DL (ref 65–140)
GLUCOSE SERPL-MCNC: 135 MG/DL (ref 65–140)
HCT VFR BLD AUTO: 34.7 % (ref 34.8–46.1)
HGB BLD-MCNC: 11 G/DL (ref 11.5–15.4)
LG PLATELETS BLD QL SMEAR: PRESENT
LYMPHOCYTES # BLD AUTO: 3.99 THOUSAND/UL (ref 0.6–4.47)
LYMPHOCYTES # BLD AUTO: 33 % (ref 14–44)
MAGNESIUM SERPL-MCNC: 1.8 MG/DL (ref 1.9–2.7)
MCH RBC QN AUTO: 26.4 PG (ref 26.8–34.3)
MCHC RBC AUTO-ENTMCNC: 31.7 G/DL (ref 31.4–37.4)
MCV RBC AUTO: 83 FL (ref 82–98)
METAMYELOCYTES NFR BLD MANUAL: 5 % (ref 0–1)
MONOCYTES # BLD AUTO: 1.48 THOUSAND/UL (ref 0–1.22)
MONOCYTES NFR BLD: 13 % (ref 4–12)
MYELOCYTES NFR BLD MANUAL: 9 % (ref 0–1)
NEUTROPHILS # BLD MANUAL: 3.87 THOUSAND/UL (ref 1.85–7.62)
NEUTS BAND NFR BLD MANUAL: 13 % (ref 0–8)
NEUTS SEG NFR BLD AUTO: 21 % (ref 43–75)
PLATELET # BLD AUTO: 238 THOUSANDS/UL (ref 149–390)
PLATELET BLD QL SMEAR: ADEQUATE
PMV BLD AUTO: 11.4 FL (ref 8.9–12.7)
POTASSIUM SERPL-SCNC: 3.7 MMOL/L (ref 3.5–5.3)
PROCALCITONIN SERPL-MCNC: 0.12 NG/ML
PROMYELOCYTES NFR BLD MANUAL: 3 % (ref 0–0)
RBC # BLD AUTO: 4.16 MILLION/UL (ref 3.81–5.12)
RBC MORPH BLD: NORMAL
SODIUM SERPL-SCNC: 139 MMOL/L (ref 135–147)
VARIANT LYMPHS # BLD AUTO: 2 %
WBC # BLD AUTO: 11.39 THOUSAND/UL (ref 4.31–10.16)

## 2024-03-27 PROCEDURE — 85027 COMPLETE CBC AUTOMATED: CPT

## 2024-03-27 PROCEDURE — 83735 ASSAY OF MAGNESIUM: CPT

## 2024-03-27 PROCEDURE — 82948 REAGENT STRIP/BLOOD GLUCOSE: CPT

## 2024-03-27 PROCEDURE — 84145 PROCALCITONIN (PCT): CPT

## 2024-03-27 PROCEDURE — 99239 HOSP IP/OBS DSCHRG MGMT >30: CPT

## 2024-03-27 PROCEDURE — 80048 BASIC METABOLIC PNL TOTAL CA: CPT

## 2024-03-27 PROCEDURE — 85007 BL SMEAR W/DIFF WBC COUNT: CPT

## 2024-03-27 RX ORDER — MAGNESIUM SULFATE HEPTAHYDRATE 40 MG/ML
2 INJECTION, SOLUTION INTRAVENOUS ONCE
Status: COMPLETED | OUTPATIENT
Start: 2024-03-27 | End: 2024-03-27

## 2024-03-27 RX ORDER — CIPROFLOXACIN 500 MG/1
500 TABLET, FILM COATED ORAL EVERY 12 HOURS SCHEDULED
Qty: 9 TABLET | Refills: 0 | Status: SHIPPED | OUTPATIENT
Start: 2024-03-27 | End: 2024-04-01

## 2024-03-27 RX ORDER — METRONIDAZOLE 500 MG/1
500 TABLET ORAL EVERY 8 HOURS SCHEDULED
Qty: 13 TABLET | Refills: 0 | Status: SHIPPED | OUTPATIENT
Start: 2024-03-27 | End: 2024-03-31

## 2024-03-27 RX ADMIN — ATORVASTATIN CALCIUM 40 MG: 40 TABLET, FILM COATED ORAL at 08:25

## 2024-03-27 RX ADMIN — DICYCLOMINE HYDROCHLORIDE 10 MG: 10 CAPSULE ORAL at 04:51

## 2024-03-27 RX ADMIN — MAGNESIUM SULFATE HEPTAHYDRATE 2 G: 40 INJECTION, SOLUTION INTRAVENOUS at 08:25

## 2024-03-27 RX ADMIN — DICYCLOMINE HYDROCHLORIDE 10 MG: 10 CAPSULE ORAL at 11:35

## 2024-03-27 RX ADMIN — BUPROPION HYDROCHLORIDE 300 MG: 150 TABLET, EXTENDED RELEASE ORAL at 08:25

## 2024-03-27 RX ADMIN — PIPERACILLIN AND TAZOBACTAM 4.5 G: 36; 4.5 INJECTION, POWDER, FOR SOLUTION INTRAVENOUS at 00:42

## 2024-03-27 RX ADMIN — PANTOPRAZOLE SODIUM 40 MG: 40 TABLET, DELAYED RELEASE ORAL at 04:51

## 2024-03-27 RX ADMIN — PIPERACILLIN AND TAZOBACTAM 4.5 G: 36; 4.5 INJECTION, POWDER, FOR SOLUTION INTRAVENOUS at 08:45

## 2024-03-27 RX ADMIN — LISINOPRIL 40 MG: 20 TABLET ORAL at 08:25

## 2024-03-27 NOTE — DISCHARGE SUMMARY
[facility]  Discharge- Tammie Avalos 1965, 58 y.o. female MRN: [id number]  Unit/Bed#: -[facility] Encounter: [id number]  Primary Care Provider: Carmelo Cramer MD   Date and time admitted to hospital: 3/24/2024  5:36 PM    * Diverticulitis  Assessment & Plan  Coming with abdominal pain, chills and fever  Recently started chemotherapy, first cycle was 1 week ago  Imaging shows diverticulitis  Continue IV hydration, IV antibiotics, serial abdominal exam  Per EKG, patient has prolonged QT, QTc is 573-avoid QT prolonging medication including Zofran.  Use Benadryl or scopolamine patch.  Will try to avoid Tigan since it is IM-unless really necessary and patient has von Willebrand disease-to avoid any kind of hematoma,   No pain and tolerating regular diet on 3/27. Had BM this morning  Continue ciprofloxacin and flagyl for 4 more days on discharge    Type 2 diabetes mellitus without complication, without long-term current use of insulin (HCC)  Assessment & Plan  Lab Results   Component Value Date    HGBA1C 6.7 (H) 01/26/2024       Recent Labs     03/26/24  1551 03/26/24  2111 03/27/24  0714 03/27/24  1116   POCGLU 119 124 135 120         Blood Sugar Average: Last 72 hrs:  (P) 128.4467855498796539  Sliding scale, follow hypoglycemia precaution    Malignant neoplasm of central portion of right breast in female, estrogen receptor positive   Assessment & Plan  Follows up with Dr. Moya  Recently started chemotherapy, first cycle about a week ago  Continue monitoring, follow Limb alert-per patient and daughter on the bedside, can use left upper extremity for IV access.  Was to have follow up with Dr. Molina on 3/27, will ask if she is able to evaluate patient while she is admitted.     Mild intermittent asthma without complication  Assessment & Plan  Not in acute exacerbation, continue respiratory protocol, home medication    Von Willebrand disease (HCC)  Assessment & Plan  Follows up  with hematology/oncology, Dr. Moya  Patient has high bleeding tendency, hold any kind of anticoagulation.      Medical Problems       Resolved Problems  Date Reviewed: 3/27/2024            Resolved    Electrolyte abnormality 3/27/2024     Resolved by  Emperatriz Kennedy PA-C        Discharging Physician / Practitioner: Emperatriz Kennedy PA-C  PCP: Carmelo Cramer MD  Admission Date:   Admission Orders (From admission, onward)       Ordered        03/24/24 2031  INPATIENT ADMISSION  Once                          Discharge Date: 03/27/24    Consultations During Hospital Stay:  None     Procedures Performed:   None     Significant Findings / Test Results:   CT abdomen and pelvis with question of very mild acute diverticulitis at the splenic flexure of the colon. Otherwise no acute findings. Hepatomegaly and hepatic steatosis    Incidental Findings:   none     Test Results Pending at Discharge (will require follow up):   none     Outpatient Tests Requested:  none    Complications:  none    Reason for Admission: acute diverticulitis    Hospital Course:   Tammie Avalos is a 58 y.o. female patient who originally presented to the hospital on 3/24/2024 due to abdominal pain, nausea, and vomiting.  Patient was found have acute diverticulitis and was treated with clear liquid diet, IV fluids, and IV antibiotics.  Diet was slowly advanced per patient response.  At time of discharge, patient tolerating regular diet without difficulty.  Abdominal pain resolved.  Patient had normal bowel movement at time of discharge.  Patient is to continue ciprofloxacin and Flagyl on discharge for 4 more days.  Follow-up with PCP in 1 week.    Please see above list of diagnoses and related plan for additional information.     Condition at Discharge: good    Discharge Day Visit / Exam:   Subjective:  patient state that she is tolerating a diet without difficulty and no longer has any abdominal pain. States that she had a normal BM this morning.  "Denies chest pain or shortness of breath. Does not offer any complaints at this time.  Vitals: Blood Pressure: 116/61 (03/27/24 0624)  Pulse: 70 (03/27/24 0624)  Temperature: (!) 96.8 °F (36 °C) (03/27/24 0624)  Temp Source: Temporal (03/25/24 2320)  Respirations: 18 (03/26/24 2238)  Height: 5' 7\" (170.2 cm) (03/25/24 1518)  Weight - Scale: 108 kg (238 lb 3.2 oz) (03/27/24 0500)  SpO2: 92 % (03/27/24 0912)  Exam:   Physical Exam  Vitals reviewed.   Constitutional:       General: She is not in acute distress.     Appearance: Normal appearance. She is obese. She is not ill-appearing.   HENT:      Head: Normocephalic and atraumatic.      Nose: Nose normal.      Mouth/Throat:      Mouth: Mucous membranes are moist.      Pharynx: Oropharynx is clear.   Eyes:      Extraocular Movements: Extraocular movements intact.      Conjunctiva/sclera: Conjunctivae normal.   Cardiovascular:      Rate and Rhythm: Normal rate and regular rhythm.      Pulses: Normal pulses.      Heart sounds: Normal heart sounds. No murmur heard.  Pulmonary:      Effort: Pulmonary effort is normal. No respiratory distress.      Breath sounds: Normal breath sounds. No wheezing or rhonchi.   Abdominal:      General: Abdomen is flat. Bowel sounds are normal. There is no distension.      Palpations: Abdomen is soft.      Tenderness: There is no abdominal tenderness. There is no guarding.   Musculoskeletal:         General: Normal range of motion.      Cervical back: Normal range of motion.      Right lower leg: No edema.      Left lower leg: No edema.   Skin:     General: Skin is warm.   Neurological:      General: No focal deficit present.      Mental Status: She is alert and oriented to person, place, and time. Mental status is at baseline.      Motor: No weakness.   Psychiatric:         Mood and Affect: Mood normal.         Behavior: Behavior normal.         Thought Content: Thought content normal.         Judgment: Judgment normal.          Discussion " with Family: Patient declined call to .     Discharge instructions/Information to patient and family:   See after visit summary for information provided to patient and family.      Provisions for Follow-Up Care:  See after visit summary for information related to follow-up care and any pertinent home health orders.      Mobility at time of Discharge:   Basic Mobility Inpatient Raw Score: 24  JH-HLM Goal: 8: Walk 250 feet or more  JH-HLM Achieved: 7: Walk 25 feet or more  HLM Goal NOT achieved. Continue to encourage mobility in post discharge setting.     Disposition:   Home    Planned Readmission: none     Discharge Statement:  I spent 45 minutes discharging the patient. This time was spent on the day of discharge. I had direct contact with the patient on the day of discharge. Greater than 50% of the total time was spent examining patient, answering all patient questions, arranging and discussing plan of care with patient as well as directly providing post-discharge instructions.  Additional time then spent on discharge activities.    Discharge Medications:  See after visit summary for reconciled discharge medications provided to patient and/or family.      **Please Note: This note may have been constructed using a voice recognition system**

## 2024-03-27 NOTE — ASSESSMENT & PLAN NOTE
Coming with abdominal pain, chills and fever  Recently started chemotherapy, first cycle was 1 week ago  Imaging shows diverticulitis  Continue IV hydration, IV antibiotics, serial abdominal exam  Per EKG, patient has prolonged QT, QTc is 573-avoid QT prolonging medication including Zofran.  Use Benadryl or scopolamine patch.  Will try to avoid Tigan since it is IM-unless really necessary and patient has von Willebrand disease-to avoid any kind of hematoma,   No pain and tolerating regular diet on 3/27. Had BM this morning  Continue ciprofloxacin and flagyl for 4 more days on discharge

## 2024-03-27 NOTE — NURSING NOTE
AVS reviewed with patient virtually. Patient verbalized understanding of same, including medications, follow-up appointments, and reasons to seek medical assistance. All questions answered.

## 2024-03-27 NOTE — DISCHARGE INSTR - AVS FIRST PAGE
Dear Tammie Avalos,     It was our pleasure to care for you here at [facility]. For follow up as well as any medication refills, we recommend that you follow up with your primary care physician. Here are the most important instructions/ recommendations at discharge:     Notable Medication Adjustments -   Take ciprofloxacin 500mg 2 times daily for 4 more days, with one additional dose this evening  Take metronidazole 500mg every 8 hours for 4 more days, with one additional dose this evening  Testing Required after Discharge -   none  Important follow up information -   Follow up with pcp in 1 week  Other Instructions -   If symptoms worsen or new symptoms arise, come back to the office  Please review this entire after visit summary as additional general instructions including medication list, appointments, activity, diet, any pertinent wound care, and other additional recommendations from your care team that may be provided for you.      Sincerely,     Emperatriz Kennedy PA-C

## 2024-03-27 NOTE — ASSESSMENT & PLAN NOTE
Lab Results   Component Value Date    HGBA1C 6.7 (H) 01/26/2024       Recent Labs     03/26/24  1551 03/26/24  2111 03/27/24  0714 03/27/24  1116   POCGLU 119 124 135 120         Blood Sugar Average: Last 72 hrs:  (P) 128.0437578883598488  Sliding scale, follow hypoglycemia precaution

## 2024-03-28 NOTE — UTILIZATION REVIEW
NOTIFICATION OF ADMISSION DISCHARGE   This is a Notification of Discharge from [facility]. Please be advised that this patient has been discharge from our facility. Below you will find the admission and discharge date and time including the patient’s disposition.   UTILIZATION REVIEW CONTACT:  Shanel Hall  Utilization   Network Utilization Review Department  Phone: [phone] x carefully listen to the prompts. All voicemails are confidential.  Email: [email or web address]     ADMISSION INFORMATION  PRESENTATION DATE: 3/24/2024  5:36 PM  OBERVATION ADMISSION DATE:   INPATIENT ADMISSION DATE: 3/24/24  8:31 PM   DISCHARGE DATE: 3/27/2024  1:40 PM   DISPOSITION:Home/Self Care    Network Utilization Review Department  ATTENTION: Please call with any questions or concerns to [phone] and carefully listen to the prompts so that you are directed to the right person. All voicemails are confidential.   For Discharge needs, contact Care Management DC Support Team at [phone] opt. 2  Send all requests for admission clinical reviews, approved or denied determinations and any other requests to dedicated fax number below belonging to the campus where the patient is receiving treatment. List of dedicated fax numbers for the Facilities:  FACILITY NAME UR FAX NUMBER   ADMISSION DENIALS (Administrative/Medical Necessity) [phone]   DISCHARGE SUPPORT TEAM ([facility]) [phone]   PARENT CHILD HEALTH (Maternity/NICU/Pediatrics) [phone]   [facility] [phone]   [facility] [phone]   [facility] [phone]   [facility] [phone]   [facility] [phone]   [facility] [phone]   [facility] [phone]   [facility]  [phone]   [facility] [phone]   [facility] [phone]   General acute hospital [phone]   [facility] [phone]

## 2024-03-28 NOTE — ED ATTENDING ATTESTATION
3/24/2024  I, Veto Ying DO, saw and evaluated the patient. I have discussed the patient with the resident/non-physician practitioner and agree with the resident's/non-physician practitioner's findings, Plan of Care, and MDM as documented in the resident's/non-physician practitioner's note, except where noted. All available labs and Radiology studies were reviewed.  I was present for key portions of any procedure(s) performed by the resident/non-physician practitioner and I was immediately available to provide assistance.    At this point I agree with the current assessment done in the Emergency Department.  I have conducted an independent evaluation of this patient a history and physical is as follows:    58 year old F. Hx of breast CA on chemotherapy. Presents with fever, lower abdominal pain. Not neutropenic. W/u with signs of diverticulitis. The patient was administered IVF and abx. Admitted to [facility]. See Ms. Caceres's note for further details.     ED Course         Critical Care Time  Procedures

## 2024-03-29 LAB
BACTERIA BLD CULT: NORMAL
BACTERIA BLD CULT: NORMAL

## 2024-04-12 ENCOUNTER — DOCUMENTATION (OUTPATIENT)
Dept: HEMATOLOGY ONCOLOGY | Facility: CLINIC | Age: 59
End: 2024-04-12

## 2024-04-12 NOTE — PROGRESS NOTES
Received patients information from Tumor Registry report. Patient is scheduled with outside network for her care. No further action taken by Care Coordination Team.

## 2024-04-16 ENCOUNTER — APPOINTMENT (EMERGENCY)
Dept: CT IMAGING | Facility: HOSPITAL | Age: 59
End: 2024-04-16
Payer: COMMERCIAL

## 2024-04-16 ENCOUNTER — HOSPITAL ENCOUNTER (EMERGENCY)
Facility: HOSPITAL | Age: 59
Discharge: HOME | End: 2024-04-16
Attending: EMERGENCY MEDICINE
Payer: COMMERCIAL

## 2024-04-16 VITALS
HEIGHT: 67 IN | WEIGHT: 235 LBS | SYSTOLIC BLOOD PRESSURE: 142 MMHG | BODY MASS INDEX: 36.88 KG/M2 | DIASTOLIC BLOOD PRESSURE: 67 MMHG | OXYGEN SATURATION: 98 % | TEMPERATURE: 97.5 F | HEART RATE: 79 BPM | RESPIRATION RATE: 18 BRPM

## 2024-04-16 DIAGNOSIS — R39.11 URINARY HESITANCY: ICD-10-CM

## 2024-04-16 DIAGNOSIS — R10.2 SUPRAPUBIC PAIN: Primary | ICD-10-CM

## 2024-04-16 LAB
ALBUMIN SERPL BCP-MCNC: 4.5 G/DL (ref 3.5–5)
ALP SERPL-CCNC: 92 U/L (ref 34–104)
ALT SERPL W P-5'-P-CCNC: 50 U/L (ref 7–52)
ANION GAP SERPL CALCULATED.3IONS-SCNC: 10 MMOL/L (ref 4–13)
ANISOCYTOSIS BLD QL SMEAR: PRESENT
AST SERPL W P-5'-P-CCNC: 34 U/L (ref 13–39)
BACTERIA UR QL AUTO: NORMAL /HPF
BASOPHILS # BLD MANUAL: 0 THOUSAND/UL (ref 0–0.1)
BASOPHILS NFR MAR MANUAL: 0 % (ref 0–1)
BILIRUB SERPL-MCNC: 1 MG/DL (ref 0.2–1)
BILIRUB UR QL STRIP: ABNORMAL
BUN SERPL-MCNC: 15 MG/DL (ref 5–25)
CALCIUM SERPL-MCNC: 9.5 MG/DL (ref 8.4–10.2)
CHLORIDE SERPL-SCNC: 96 MMOL/L (ref 96–108)
CLARITY UR: CLEAR
CO2 SERPL-SCNC: 31 MMOL/L (ref 21–32)
COLOR UR: ABNORMAL
CREAT SERPL-MCNC: 0.78 MG/DL (ref 0.6–1.3)
EOSINOPHIL # BLD MANUAL: 0 THOUSAND/UL (ref 0–0.4)
EOSINOPHIL NFR BLD MANUAL: 0 % (ref 0–6)
ERYTHROCYTE [DISTWIDTH] IN BLOOD BY AUTOMATED COUNT: 13.7 % (ref 11.6–15.1)
GFR SERPL CREATININE-BSD FRML MDRD: 83 ML/MIN/1.73SQ M
GLUCOSE SERPL-MCNC: 128 MG/DL (ref 65–140)
GLUCOSE UR STRIP-MCNC: NEGATIVE MG/DL
HCT VFR BLD AUTO: 40.1 % (ref 34.8–46.1)
HGB BLD-MCNC: 13 G/DL (ref 11.5–15.4)
HGB UR QL STRIP.AUTO: ABNORMAL
KETONES UR STRIP-MCNC: NEGATIVE MG/DL
LEUKOCYTE ESTERASE UR QL STRIP: NEGATIVE
LYMPHOCYTES # BLD AUTO: 0.99 THOUSAND/UL (ref 0.6–4.47)
LYMPHOCYTES # BLD AUTO: 26 % (ref 14–44)
MCH RBC QN AUTO: 26.9 PG (ref 26.8–34.3)
MCHC RBC AUTO-ENTMCNC: 32.4 G/DL (ref 31.4–37.4)
MCV RBC AUTO: 83 FL (ref 82–98)
MICROCYTES BLD QL AUTO: PRESENT
MONOCYTES # BLD AUTO: 0.04 THOUSAND/UL (ref 0–1.22)
MONOCYTES NFR BLD: 1 % (ref 4–12)
NEUTROPHILS # BLD MANUAL: 2.77 THOUSAND/UL (ref 1.85–7.62)
NEUTS SEG NFR BLD AUTO: 73 % (ref 43–75)
NITRITE UR QL STRIP: NEGATIVE
NON-SQ EPI CELLS URNS QL MICRO: NORMAL /HPF
PH UR STRIP.AUTO: 6.5 [PH]
PLATELET # BLD AUTO: 212 THOUSANDS/UL (ref 149–390)
PLATELET BLD QL SMEAR: ADEQUATE
PMV BLD AUTO: 11.6 FL (ref 8.9–12.7)
POTASSIUM SERPL-SCNC: 4 MMOL/L (ref 3.5–5.3)
PROT SERPL-MCNC: 7.6 G/DL (ref 6.4–8.4)
PROT UR STRIP-MCNC: NEGATIVE MG/DL
RBC # BLD AUTO: 4.84 MILLION/UL (ref 3.81–5.12)
RBC #/AREA URNS AUTO: NORMAL /HPF
RBC MORPH BLD: PRESENT
SODIUM SERPL-SCNC: 137 MMOL/L (ref 135–147)
SP GR UR STRIP.AUTO: 1.01 (ref 1–1.03)
UROBILINOGEN UR QL STRIP.AUTO: 0.2 E.U./DL
WBC # BLD AUTO: 3.79 THOUSAND/UL (ref 4.31–10.16)
WBC #/AREA URNS AUTO: NORMAL /HPF

## 2024-04-16 PROCEDURE — 36415 COLL VENOUS BLD VENIPUNCTURE: CPT | Performed by: EMERGENCY MEDICINE

## 2024-04-16 PROCEDURE — 99284 EMERGENCY DEPT VISIT MOD MDM: CPT | Performed by: EMERGENCY MEDICINE

## 2024-04-16 PROCEDURE — 81001 URINALYSIS AUTO W/SCOPE: CPT | Performed by: EMERGENCY MEDICINE

## 2024-04-16 PROCEDURE — 96374 THER/PROPH/DIAG INJ IV PUSH: CPT

## 2024-04-16 PROCEDURE — 80053 COMPREHEN METABOLIC PANEL: CPT | Performed by: EMERGENCY MEDICINE

## 2024-04-16 PROCEDURE — 85007 BL SMEAR W/DIFF WBC COUNT: CPT | Performed by: EMERGENCY MEDICINE

## 2024-04-16 PROCEDURE — 85027 COMPLETE CBC AUTOMATED: CPT | Performed by: EMERGENCY MEDICINE

## 2024-04-16 PROCEDURE — 99284 EMERGENCY DEPT VISIT MOD MDM: CPT

## 2024-04-16 PROCEDURE — 96361 HYDRATE IV INFUSION ADD-ON: CPT

## 2024-04-16 PROCEDURE — 74176 CT ABD & PELVIS W/O CONTRAST: CPT

## 2024-04-16 RX ORDER — ONDANSETRON 2 MG/ML
4 INJECTION INTRAMUSCULAR; INTRAVENOUS ONCE
Status: COMPLETED | OUTPATIENT
Start: 2024-04-16 | End: 2024-04-16

## 2024-04-16 RX ADMIN — ONDANSETRON 4 MG: 2 INJECTION INTRAMUSCULAR; INTRAVENOUS at 14:00

## 2024-04-16 RX ADMIN — SODIUM CHLORIDE 1000 ML: 0.9 INJECTION, SOLUTION INTRAVENOUS at 14:00

## 2024-04-16 NOTE — DISCHARGE INSTRUCTIONS
Return to the ER immediately for any worsening symptoms.  Please follow-up with your oncologist and PCP for further evaluation and management.

## 2024-04-16 NOTE — ED PROVIDER NOTES
History  Chief Complaint   Patient presents with    Urinary Retention     Unable to urinate this day.     Nausea     This day      58 yr old female with complaints of difficulty urinating since last night at midnight. She denies any fever but admits to low abdominal pain and back pain. Patient states her pain is pressure like and constant.        Prior to Admission Medications   Prescriptions Last Dose Informant Patient Reported? Taking?   OMEPRAZOLE PO   Yes No   Sig: Take 20 mg by mouth 2 (two) times a day   QUEtiapine (SEROquel) 200 mg tablet   Yes No   Sig: Take 200 mg by mouth daily at bedtime   albuterol (PROVENTIL HFA,VENTOLIN HFA) 90 mcg/act inhaler   Yes No   Sig: Inhale 2 puffs as needed   atorvastatin (LIPITOR) 40 mg tablet   Yes No   Sig: Take 40 mg by mouth daily   buPROPion (WELLBUTRIN SR) 150 mg 12 hr tablet   Yes No   Sig: Take 200 mg by mouth 2 (two) times a day   busPIRone (BUSPAR) 15 mg tablet   Yes No   Sig: Take 15 mg by mouth 3 (three) times a day as needed   cetirizine (ZyrTEC) 10 mg tablet   Yes No   Sig: Take 10 mg by mouth daily   chlorthalidone 25 mg tablet   Yes No   Sig: Take 25 mg by mouth daily   escitalopram (LEXAPRO) 10 mg tablet   Yes No   Sig: Take 10 mg by mouth daily   fluticasone (FLONASE) 50 mcg/act nasal spray   Yes No   Si sprays into each nostril daily at bedtime   fluticasone-salmeterol (ADVAIR HFA) 115-21 MCG/ACT inhaler   Yes No   Sig: Inhale 2 puffs 2 (two) times a day Rinse mouth after use.   lisinopril (ZESTRIL) 20 mg tablet   Yes No   Sig: Take 40 mg by mouth daily   montelukast (SINGULAIR) 10 mg tablet   Yes No   Sig: Take 10 mg by mouth daily at bedtime   nortriptyline (PAMELOR) 10 mg capsule   Yes No   Sig: Take 10 mg by mouth daily at bedtime   ondansetron (ZOFRAN) 4 mg tablet   No No   Sig: Take 1 tablet (4 mg total) by mouth every 8 (eight) hours as needed for nausea or vomiting   oxyCODONE (ROXICODONE) 5 immediate release tablet   No No   Sig: Take 1 tablet  (5 mg total) by mouth every 6 (six) hours as needed for severe pain for up to 6 doses Max Daily Amount: 20 mg   prazosin (MINIPRESS) 2 mg capsule   Yes No   Sig: Take 2 mg by mouth daily at bedtime   traZODone (DESYREL) 300 MG tablet   Yes No   Sig: Take 300 mg by mouth daily at bedtime      Facility-Administered Medications: None       Past Medical History:   Diagnosis Date    Anesthesia     Pt states she woke up during 2 surgeries    Breast cancer (HCC)     Cancer (HCC)     GERD (gastroesophageal reflux disease)     Heart murmur     Hyperlipidemia     Hypertension     Kidney stones     Subdural hematoma (HCC)     Von Willebrand disease (HCC)     Wears contact lenses        Past Surgical History:   Procedure Laterality Date    APPENDECTOMY      BREAST LUMPECTOMY Left     CARPAL TUNNEL RELEASE Bilateral     CHOLECYSTECTOMY      COLONOSCOPY      HYSTERECTOMY      KNEE SURGERY Left     LYMPH NODE BIOPSY Right 2/6/2024    Procedure: AXILLARY SENTINEL NODE BIOPSY (NUC MED INJECTION AT 0730);  Surgeon: Almita Molina DO;  Location: [facility];  Service: General    MRI BREAST BIOPSY LEFT (ALL INCLUSIVE) Left 12/13/2023    PARTIAL HYSTERECTOMY      NJ MASTECTOMY SIMPLE COMPLETE Bilateral 2/6/2024    Procedure: BREAST MASTECTOMY;  Surgeon: Almita Molina DO;  Location: [facility];  Service: General    ROTATOR CUFF REPAIR Left     TONSILLECTOMY         Family History   Problem Relation Age of Onset    Cancer Mother     Allergies Father     Cancer Sister     Stroke Brother      I have reviewed and agree with the history as documented.    E-Cigarette/Vaping    E-Cigarette Use Former User      E-Cigarette/Vaping Substances    Nicotine Yes 2 cigarettes a week    THC No     CBD No     Flavoring Yes      Social History     Tobacco Use    Smoking status: Former     Current packs/day: 0.25     Average packs/day: 0.3 packs/day for 25.0 years (6.3 ttl pk-yrs)     Types: Cigarettes    Smokeless tobacco: Former     Tobacco comments:     few cigs/day   Vaping Use    Vaping status: Former    Substances: Nicotine (2 cigarettes a week), Flavoring   Substance Use Topics    Alcohol use: Not Currently    Drug use: Never       Review of Systems   Constitutional:  Negative for chills and fever.   HENT:  Negative for ear pain and sore throat.    Eyes:  Negative for pain and visual disturbance.   Respiratory:  Negative for cough and shortness of breath.    Cardiovascular:  Negative for chest pain and palpitations.   Gastrointestinal:  Negative for abdominal pain and vomiting.   Genitourinary:  Positive for difficulty urinating. Negative for dysuria and hematuria.   Musculoskeletal:  Negative for arthralgias and back pain.   Skin:  Negative for color change and rash.   Neurological:  Negative for seizures and syncope.   All other systems reviewed and are negative.      Physical Exam  Physical Exam  Vitals and nursing note reviewed.   Constitutional:       General: She is not in acute distress.     Appearance: She is well-developed.   HENT:      Head: Normocephalic and atraumatic.   Eyes:      Conjunctiva/sclera: Conjunctivae normal.   Cardiovascular:      Rate and Rhythm: Normal rate and regular rhythm.      Heart sounds: No murmur heard.  Pulmonary:      Effort: Pulmonary effort is normal. No respiratory distress.      Breath sounds: Normal breath sounds.   Abdominal:      Palpations: Abdomen is soft.      Tenderness: There is no abdominal tenderness.   Musculoskeletal:         General: No swelling.      Cervical back: Neck supple.   Skin:     General: Skin is warm and dry.      Capillary Refill: Capillary refill takes less than 2 seconds.   Neurological:      Mental Status: She is alert.   Psychiatric:         Mood and Affect: Mood normal.         Vital Signs  ED Triage Vitals   Temperature Pulse Respirations Blood Pressure SpO2   04/16/24 1334 04/16/24 1334 04/16/24 1334 04/16/24 1334 04/16/24 1334   97.5 °F (36.4 °C) 83 15 (!) 175/91  95 %      Temp Source Heart Rate Source Patient Position - Orthostatic VS BP Location FiO2 (%)   04/16/24 1334 04/16/24 1334 04/16/24 1529 04/16/24 1334 --   Temporal Monitor Sitting Left arm       Pain Score       04/16/24 1334       7           Vitals:    04/16/24 1334 04/16/24 1529 04/16/24 1626   BP: (!) 175/91 134/67 142/67   Pulse: 83 79 79   Patient Position - Orthostatic VS:  Sitting Sitting         Visual Acuity      ED Medications  Medications   sodium chloride 0.9 % bolus 1,000 mL (0 mL Intravenous Stopped 4/16/24 1509)   ondansetron (ZOFRAN) injection 4 mg (4 mg Intravenous Given 4/16/24 1400)       Diagnostic Studies  Results Reviewed       Procedure Component Value Units Date/Time    Urine Microscopic [[id number]]  (Normal) Collected: 04/16/24 1509    Lab Status: Final result Specimen: Urine, Clean Catch Updated: 04/16/24 1525     RBC, UA 0-1 /hpf      WBC, UA None Seen /hpf      Epithelial Cells Occasional /hpf      Bacteria, UA Occasional /hpf     UA w Reflex to Microscopic w Reflex to Culture [[id number]]  (Abnormal) Collected: 04/16/24 1509    Lab Status: Final result Specimen: Urine, Clean Catch Updated: 04/16/24 1518     Color, UA Mala     Clarity, UA Clear     Specific Gravity, UA 1.015     pH, UA 6.5     Leukocytes, UA Negative     Nitrite, UA Negative     Protein, UA Negative mg/dl      Glucose, UA Negative mg/dl      Ketones, UA Negative mg/dl      Urobilinogen, UA 0.2 E.U./dl      Bilirubin, UA Small     Occult Blood, UA Trace-Intact    Manual Differential(PHLEBS Do Not Order) [[id number]]  (Abnormal) Collected: 04/16/24 1357    Lab Status: Final result Specimen: Blood from Arm, Left Updated: 04/16/24 1445     Segmented % 73 %      Lymphocytes % 26 %      Monocytes % 1 %      Eosinophils % 0 %      Basophils % 0 %      Absolute Neutrophils 2.77 Thousand/uL      Absolute Lymphocytes 0.99 Thousand/uL      Absolute Monocytes 0.04 Thousand/uL      Absolute Eosinophils 0.00 Thousand/uL       Absolute Basophils 0.00 Thousand/uL      Total Counted --     RBC Morphology Present     Platelet Estimate Adequate     Anisocytosis Present     Microcytes Present    CMP [[id number]] Collected: 04/16/24 1357    Lab Status: Final result Specimen: Blood from Arm, Left Updated: 04/16/24 1429     Sodium 137 mmol/L      Potassium 4.0 mmol/L      Chloride 96 mmol/L      CO2 31 mmol/L      ANION GAP 10 mmol/L      BUN 15 mg/dL      Creatinine 0.78 mg/dL      Glucose 128 mg/dL      Calcium 9.5 mg/dL      AST 34 U/L      ALT 50 U/L      Alkaline Phosphatase 92 U/L      Total Protein 7.6 g/dL      Albumin 4.5 g/dL      Total Bilirubin 1.00 mg/dL      eGFR 83 ml/min/1.73sq m     Narrative:      National Kid[employer] guidelines for Chronic Kidney Disease (CKD):     Stage 1 with normal or high GFR (GFR > 90 mL/min/1.73 square meters)    Stage 2 Mild CKD (GFR = 60-89 mL/min/1.73 square meters)    Stage 3A Moderate CKD (GFR = 45-59 mL/min/1.73 square meters)    Stage 3B Moderate CKD (GFR = 30-44 mL/min/1.73 square meters)    Stage 4 Severe CKD (GFR = 15-29 mL/min/1.73 square meters)    Stage 5 End Stage CKD (GFR <15 mL/min/1.73 square meters)  Note: GFR calculation is accurate only with a steady state creatinine    CBC and differential [[id number]]  (Abnormal) Collected: 04/16/24 1357    Lab Status: Final result Specimen: Blood from Arm, Left Updated: 04/16/24 1410     WBC 3.79 Thousand/uL      RBC 4.84 Million/uL      Hemoglobin 13.0 g/dL      Hematocrit 40.1 %      MCV 83 fL      MCH 26.9 pg      MCHC 32.4 g/dL      RDW 13.7 %      MPV 11.6 fL      Platelets 212 Thousands/uL                    CT renal stone study abdomen pelvis wo contrast   Final Result by Patel Ortega MD (04/16 1427)      No acute findings.         Workstation performed: [id number]                    Procedures  Procedures         ED Course  ED Course as of 04/16/24 2242 Tue Apr 16, 2024   1607 Patient's bladder scan on arrival  is 94 ml. Her ER workup is benign.  I discussed her neutropenia with her oncologist states that since the absolute neutrophils are normal patient is stable for discharge.  She will follow-up outpatient with the oncologist.                               SBIRT 20yo+      Flowsheet Row Most Recent Value   Initial Alcohol Screen: US AUDIT-C     1. How often do you have a drink containing alcohol? 0 Filed at: 04/16/2024 1335   2. How many drinks containing alcohol do you have on a typical day you are drinking?  0 Filed at: 04/16/2024 1337   3b. FEMALE Any Age, or MALE 65+: How often do you have 4 or more drinks on one occassion? 0 Filed at: 04/16/2024 1337   Audit-C Score 0 Filed at: 04/16/2024 133   CRISTINO: How many times in the past year have you...    Used an illegal drug or used a prescription medication for non-medical reasons? Never Filed at: 04/16/2024 1332                      Medical Decision Making  Amount and/or Complexity of Data Reviewed  Labs: ordered.  Radiology: ordered.    Risk  Prescription drug management.             Disposition  Final diagnoses:   Suprapubic pain   Urinary hesitancy     Time reflects when diagnosis was documented in both MDM as applicable and the Disposition within this note       Time User Action Codes Description Comment    4/16/2024  4:11 PM Opal Prater Add [R10.2] Suprapubic pain     4/16/2024  4:12 PM Opal Prater Add [R39.11] Urinary hesitancy           ED Disposition       ED Disposition   Discharge    Condition   Stable    Date/Time   Tue Apr 16, 2024 1610    Comment   Tammie Avalos discharge to home/self care.                   Follow-up Information       Follow up With Specialties Details Why Contact Info    [facility], Hematology, Oncology Schedule an appointment as soon as possible for a visit   [facility]  [address]  [facility] [id number] [phone]      Carmelo Cramer MD Family Medicine   [facility]  [id number]  [phone]              Discharge Medication List as of 4/16/2024  4:13 PM        CONTINUE these medications which have NOT CHANGED    Details   albuterol (PROVENTIL HFA,VENTOLIN HFA) 90 mcg/act inhaler Inhale 2 puffs as needed, Starting Fri 9/8/2017, Historical Med      atorvastatin (LIPITOR) 40 mg tablet Take 40 mg by mouth daily, Historical Med      buPROPion (WELLBUTRIN SR) 150 mg 12 hr tablet Take 200 mg by mouth 2 (two) times a day, Starting Thu 12/5/2019, Historical Med      busPIRone (BUSPAR) 15 mg tablet Take 15 mg by mouth 3 (three) times a day as needed, Historical Med      cetirizine (ZyrTEC) 10 mg tablet Take 10 mg by mouth daily, Starting Fri 9/8/2017, Historical Med      chlorthalidone 25 mg tablet Take 25 mg by mouth daily, Historical Med      escitalopram (LEXAPRO) 10 mg tablet Take 10 mg by mouth daily, Historical Med      fluticasone (FLONASE) 50 mcg/act nasal spray 2 sprays into each nostril daily at bedtime, Starting Fri 9/8/2017, Historical Med      fluticasone-salmeterol (ADVAIR HFA) 115-21 MCG/ACT inhaler Inhale 2 puffs 2 (two) times a day Rinse mouth after use., Historical Med      lisinopril (ZESTRIL) 20 mg tablet Take 40 mg by mouth daily, Historical Med      montelukast (SINGULAIR) 10 mg tablet Take 10 mg by mouth daily at bedtime, Starting Wed 2/14/2018, Historical Med      nortriptyline (PAMELOR) 10 mg capsule Take 10 mg by mouth daily at bedtime, Starting Mon 5/3/2021, Historical Med      OMEPRAZOLE PO Take 20 mg by mouth 2 (two) times a day, Starting Fri 5/29/2020, Historical Med      ondansetron (ZOFRAN) 4 mg tablet Take 1 tablet (4 mg total) by mouth every 8 (eight) hours as needed for nausea or vomiting, Starting Sun 11/19/2023, Normal      oxyCODONE (ROXICODONE) 5 immediate release tablet Take 1 tablet (5 mg total) by mouth every 6 (six) hours as needed for severe pain for up to 6 doses Max Daily Amount: 20 mg, Starting Wed 2/7/2024, Normal      prazosin (MINIPRESS) 2 mg  capsule Take 2 mg by mouth daily at bedtime, Historical Med      QUEtiapine (SEROquel) 200 mg tablet Take 200 mg by mouth daily at bedtime, Historical Med      traZODone (DESYREL) 300 MG tablet Take 300 mg by mouth daily at bedtime, Historical Med             No discharge procedures on file.    PDMP Review         Value Time User    PDMP Reviewed  Yes 3/24/2024  8:47 PM Trinidad Weinberg MD            ED Provider  Electronically Signed by             Opal Prater DO  04/16/24 2139

## 2024-05-12 ENCOUNTER — HOSPITAL ENCOUNTER (INPATIENT)
Facility: HOSPITAL | Age: 59
LOS: 3 days | Discharge: HOME | DRG: 872 | End: 2024-05-15
Attending: STUDENT IN AN ORGANIZED HEALTH CARE EDUCATION/TRAINING PROGRAM | Admitting: INTERNAL MEDICINE
Payer: COMMERCIAL

## 2024-05-12 ENCOUNTER — APPOINTMENT (EMERGENCY)
Dept: CT IMAGING | Facility: HOSPITAL | Age: 59
DRG: 872 | End: 2024-05-12
Payer: COMMERCIAL

## 2024-05-12 DIAGNOSIS — N30.00 ACUTE CYSTITIS: ICD-10-CM

## 2024-05-12 DIAGNOSIS — A41.9 SEVERE SEPSIS (HCC): Primary | ICD-10-CM

## 2024-05-12 DIAGNOSIS — R65.20 SEVERE SEPSIS (HCC): Primary | ICD-10-CM

## 2024-05-12 LAB
ALBUMIN SERPL BCP-MCNC: 4.5 G/DL (ref 3.5–5)
ALP SERPL-CCNC: 103 U/L (ref 34–104)
ALT SERPL W P-5'-P-CCNC: 39 U/L (ref 7–52)
ANION GAP SERPL CALCULATED.3IONS-SCNC: 12 MMOL/L (ref 4–13)
ANISOCYTOSIS BLD QL SMEAR: PRESENT
APTT PPP: 28 SECONDS (ref 23–37)
AST SERPL W P-5'-P-CCNC: 36 U/L (ref 13–39)
B-OH-BUTYR SERPL-MCNC: <0.05 MMOL/L (ref 0.02–0.27)
BACTERIA UR QL AUTO: ABNORMAL /HPF
BASE EX.OXY STD BLDV CALC-SCNC: 52.8 % (ref 60–80)
BASE EXCESS BLDV CALC-SCNC: 0.5 MMOL/L
BASOPHILS # BLD MANUAL: 0 THOUSAND/UL (ref 0–0.1)
BASOPHILS NFR MAR MANUAL: 0 % (ref 0–1)
BILIRUB SERPL-MCNC: 0.78 MG/DL (ref 0.2–1)
BILIRUB UR QL STRIP: NEGATIVE
BUN SERPL-MCNC: 22 MG/DL (ref 5–25)
CALCIUM SERPL-MCNC: 9.1 MG/DL (ref 8.4–10.2)
CHLORIDE SERPL-SCNC: 95 MMOL/L (ref 96–108)
CLARITY UR: ABNORMAL
CO2 SERPL-SCNC: 26 MMOL/L (ref 21–32)
COARSE GRAN CASTS URNS QL MICRO: ABNORMAL /LPF
COLOR UR: YELLOW
CREAT SERPL-MCNC: 1.22 MG/DL (ref 0.6–1.3)
EOSINOPHIL # BLD MANUAL: 0 THOUSAND/UL (ref 0–0.4)
EOSINOPHIL NFR BLD MANUAL: 0 % (ref 0–6)
ERYTHROCYTE [DISTWIDTH] IN BLOOD BY AUTOMATED COUNT: 15.1 % (ref 11.6–15.1)
FLUAV RNA RESP QL NAA+PROBE: NEGATIVE
FLUBV RNA RESP QL NAA+PROBE: NEGATIVE
GFR SERPL CREATININE-BSD FRML MDRD: 48 ML/MIN/1.73SQ M
GLUCOSE SERPL-MCNC: 131 MG/DL (ref 65–140)
GLUCOSE SERPL-MCNC: 161 MG/DL (ref 65–140)
GLUCOSE SERPL-MCNC: 168 MG/DL (ref 65–140)
GLUCOSE UR STRIP-MCNC: NEGATIVE MG/DL
HCO3 BLDV-SCNC: 24.7 MMOL/L (ref 24–30)
HCT VFR BLD AUTO: 41 % (ref 34.8–46.1)
HGB BLD-MCNC: 12.7 G/DL (ref 11.5–15.4)
HGB UR QL STRIP.AUTO: ABNORMAL
INR PPP: 0.92 (ref 0.84–1.19)
KETONES UR STRIP-MCNC: NEGATIVE MG/DL
LACTATE SERPL-SCNC: 2.3 MMOL/L (ref 0.5–2)
LACTATE SERPL-SCNC: 3.3 MMOL/L (ref 0.5–2)
LEUKOCYTE ESTERASE UR QL STRIP: ABNORMAL
LIPASE SERPL-CCNC: 10 U/L (ref 11–82)
LYMPHOCYTES # BLD AUTO: 0.97 THOUSAND/UL (ref 0.6–4.47)
LYMPHOCYTES # BLD AUTO: 6 % (ref 14–44)
MACROCYTES BLD QL AUTO: PRESENT
MAGNESIUM SERPL-MCNC: 2.1 MG/DL (ref 1.9–2.7)
MCH RBC QN AUTO: 26.7 PG (ref 26.8–34.3)
MCHC RBC AUTO-ENTMCNC: 31 G/DL (ref 31.4–37.4)
MCV RBC AUTO: 86 FL (ref 82–98)
METAMYELOCYTE ABSOLUTE CT: 0.81 THOUSAND/UL (ref 0–0.1)
METAMYELOCYTES NFR BLD MANUAL: 5 % (ref 0–1)
MONOCYTES # BLD AUTO: 0.32 THOUSAND/UL (ref 0–1.22)
MONOCYTES NFR BLD: 2 % (ref 4–12)
NEUTROPHILS # BLD MANUAL: 14.01 THOUSAND/UL (ref 1.85–7.62)
NEUTS BAND NFR BLD MANUAL: 3 % (ref 0–8)
NEUTS SEG NFR BLD AUTO: 84 % (ref 43–75)
NITRITE UR QL STRIP: NEGATIVE
NON-SQ EPI CELLS URNS QL MICRO: ABNORMAL /HPF
O2 CT BLDV-SCNC: 10.6 ML/DL
PCO2 BLDV: 38.4 MM HG (ref 42–50)
PH BLDV: 7.43 [PH] (ref 7.3–7.4)
PH UR STRIP.AUTO: 5.5 [PH]
PLATELET # BLD AUTO: 285 THOUSANDS/UL (ref 149–390)
PLATELET BLD QL SMEAR: ADEQUATE
PMV BLD AUTO: 11.1 FL (ref 8.9–12.7)
PO2 BLDV: 27.9 MM HG (ref 35–45)
POTASSIUM SERPL-SCNC: 3.5 MMOL/L (ref 3.5–5.3)
PROCALCITONIN SERPL-MCNC: 0.33 NG/ML
PROT SERPL-MCNC: 7.6 G/DL (ref 6.4–8.4)
PROT UR STRIP-MCNC: NEGATIVE MG/DL
PROTHROMBIN TIME: 12.6 SECONDS (ref 11.6–14.5)
RBC # BLD AUTO: 4.75 MILLION/UL (ref 3.81–5.12)
RBC #/AREA URNS AUTO: ABNORMAL /HPF
RBC MORPH BLD: PRESENT
RSV RNA RESP QL NAA+PROBE: NEGATIVE
SARS-COV-2 RNA RESP QL NAA+PROBE: NEGATIVE
SODIUM SERPL-SCNC: 133 MMOL/L (ref 135–147)
SP GR UR STRIP.AUTO: <=1.005 (ref 1–1.03)
STOMATOCYTES BLD QL SMEAR: PRESENT
UROBILINOGEN UR QL STRIP.AUTO: 0.2 E.U./DL
WBC # BLD AUTO: 16.1 THOUSAND/UL (ref 4.31–10.16)
WBC #/AREA URNS AUTO: ABNORMAL /HPF
WBC CLUMPS # UR AUTO: PRESENT /UL

## 2024-05-12 PROCEDURE — 74177 CT ABD & PELVIS W/CONTRAST: CPT

## 2024-05-12 PROCEDURE — 85007 BL SMEAR W/DIFF WBC COUNT: CPT | Performed by: PHYSICIAN ASSISTANT

## 2024-05-12 PROCEDURE — 99223 1ST HOSP IP/OBS HIGH 75: CPT | Performed by: NURSE PRACTITIONER

## 2024-05-12 PROCEDURE — 84145 PROCALCITONIN (PCT): CPT | Performed by: PHYSICIAN ASSISTANT

## 2024-05-12 PROCEDURE — 80053 COMPREHEN METABOLIC PANEL: CPT | Performed by: PHYSICIAN ASSISTANT

## 2024-05-12 PROCEDURE — 83735 ASSAY OF MAGNESIUM: CPT | Performed by: PHYSICIAN ASSISTANT

## 2024-05-12 PROCEDURE — 82948 REAGENT STRIP/BLOOD GLUCOSE: CPT

## 2024-05-12 PROCEDURE — 83605 ASSAY OF LACTIC ACID: CPT | Performed by: PHYSICIAN ASSISTANT

## 2024-05-12 PROCEDURE — 82010 KETONE BODYS QUAN: CPT | Performed by: PHYSICIAN ASSISTANT

## 2024-05-12 PROCEDURE — 83690 ASSAY OF LIPASE: CPT | Performed by: PHYSICIAN ASSISTANT

## 2024-05-12 PROCEDURE — 87086 URINE CULTURE/COLONY COUNT: CPT | Performed by: PHYSICIAN ASSISTANT

## 2024-05-12 PROCEDURE — 85730 THROMBOPLASTIN TIME PARTIAL: CPT | Performed by: PHYSICIAN ASSISTANT

## 2024-05-12 PROCEDURE — 85610 PROTHROMBIN TIME: CPT | Performed by: PHYSICIAN ASSISTANT

## 2024-05-12 PROCEDURE — 99284 EMERGENCY DEPT VISIT MOD MDM: CPT

## 2024-05-12 PROCEDURE — 81001 URINALYSIS AUTO W/SCOPE: CPT | Performed by: PHYSICIAN ASSISTANT

## 2024-05-12 PROCEDURE — 99285 EMERGENCY DEPT VISIT HI MDM: CPT | Performed by: PHYSICIAN ASSISTANT

## 2024-05-12 PROCEDURE — 96375 TX/PRO/DX INJ NEW DRUG ADDON: CPT

## 2024-05-12 PROCEDURE — 96365 THER/PROPH/DIAG IV INF INIT: CPT

## 2024-05-12 PROCEDURE — 85027 COMPLETE CBC AUTOMATED: CPT | Performed by: PHYSICIAN ASSISTANT

## 2024-05-12 PROCEDURE — 87040 BLOOD CULTURE FOR BACTERIA: CPT | Performed by: PHYSICIAN ASSISTANT

## 2024-05-12 PROCEDURE — 83036 HEMOGLOBIN GLYCOSYLATED A1C: CPT | Performed by: NURSE PRACTITIONER

## 2024-05-12 PROCEDURE — 0241U HB NFCT DS VIR RESP RNA 4 TRGT: CPT | Performed by: PHYSICIAN ASSISTANT

## 2024-05-12 PROCEDURE — 96361 HYDRATE IV INFUSION ADD-ON: CPT

## 2024-05-12 PROCEDURE — 82805 BLOOD GASES W/O2 SATURATION: CPT | Performed by: PHYSICIAN ASSISTANT

## 2024-05-12 PROCEDURE — 36415 COLL VENOUS BLD VENIPUNCTURE: CPT | Performed by: PHYSICIAN ASSISTANT

## 2024-05-12 RX ORDER — TRAZODONE HYDROCHLORIDE 100 MG/1
300 TABLET ORAL
Status: DISCONTINUED | OUTPATIENT
Start: 2024-05-12 | End: 2024-05-15 | Stop reason: HOSPADM

## 2024-05-12 RX ORDER — NICOTINE 21 MG/24HR
1 PATCH, TRANSDERMAL 24 HOURS TRANSDERMAL DAILY
Status: DISCONTINUED | OUTPATIENT
Start: 2024-05-13 | End: 2024-05-13

## 2024-05-12 RX ORDER — ESCITALOPRAM OXALATE 10 MG/1
20 TABLET ORAL DAILY
Status: DISCONTINUED | OUTPATIENT
Start: 2024-05-13 | End: 2024-05-15 | Stop reason: HOSPADM

## 2024-05-12 RX ORDER — ESCITALOPRAM OXALATE 10 MG/1
10 TABLET ORAL DAILY
Status: DISCONTINUED | OUTPATIENT
Start: 2024-05-13 | End: 2024-05-12

## 2024-05-12 RX ORDER — LISINOPRIL 20 MG/1
40 TABLET ORAL DAILY
Status: DISCONTINUED | OUTPATIENT
Start: 2024-05-13 | End: 2024-05-12

## 2024-05-12 RX ORDER — ANASTROZOLE 1 MG/1
1 TABLET ORAL DAILY
COMMUNITY

## 2024-05-12 RX ORDER — ALBUTEROL SULFATE 90 UG/1
2 AEROSOL, METERED RESPIRATORY (INHALATION) EVERY 4 HOURS PRN
Status: DISCONTINUED | OUTPATIENT
Start: 2024-05-12 | End: 2024-05-15 | Stop reason: HOSPADM

## 2024-05-12 RX ORDER — PRAZOSIN HYDROCHLORIDE 2 MG/1
2 CAPSULE ORAL
Status: DISCONTINUED | OUTPATIENT
Start: 2024-05-12 | End: 2024-05-12

## 2024-05-12 RX ORDER — INSULIN LISPRO 100 [IU]/ML
1-6 INJECTION, SOLUTION INTRAVENOUS; SUBCUTANEOUS
Status: DISCONTINUED | OUTPATIENT
Start: 2024-05-13 | End: 2024-05-15 | Stop reason: HOSPADM

## 2024-05-12 RX ORDER — NORTRIPTYLINE HYDROCHLORIDE 10 MG/1
10 CAPSULE ORAL
Status: DISCONTINUED | OUTPATIENT
Start: 2024-05-12 | End: 2024-05-12

## 2024-05-12 RX ORDER — LISINOPRIL 20 MG/1
20 TABLET ORAL DAILY
Status: DISCONTINUED | OUTPATIENT
Start: 2024-05-13 | End: 2024-05-14

## 2024-05-12 RX ORDER — FLUTICASONE FUROATE AND VILANTEROL 100; 25 UG/1; UG/1
1 POWDER RESPIRATORY (INHALATION) DAILY
Status: DISCONTINUED | OUTPATIENT
Start: 2024-05-13 | End: 2024-05-15 | Stop reason: HOSPADM

## 2024-05-12 RX ORDER — NORTRIPTYLINE HYDROCHLORIDE 25 MG/1
50 CAPSULE ORAL
Status: DISCONTINUED | OUTPATIENT
Start: 2024-05-12 | End: 2024-05-15 | Stop reason: HOSPADM

## 2024-05-12 RX ORDER — CEFTRIAXONE 1 G/50ML
1000 INJECTION, SOLUTION INTRAVENOUS EVERY 24 HOURS
Status: DISCONTINUED | OUTPATIENT
Start: 2024-05-13 | End: 2024-05-15 | Stop reason: HOSPADM

## 2024-05-12 RX ORDER — QUETIAPINE FUMARATE 200 MG/1
200 TABLET, FILM COATED ORAL
Status: DISCONTINUED | OUTPATIENT
Start: 2024-05-12 | End: 2024-05-15 | Stop reason: HOSPADM

## 2024-05-12 RX ORDER — BUPROPION HYDROCHLORIDE 150 MG/1
450 TABLET ORAL DAILY
Status: DISCONTINUED | OUTPATIENT
Start: 2024-05-13 | End: 2024-05-15 | Stop reason: HOSPADM

## 2024-05-12 RX ORDER — HYDROMORPHONE HCL/PF 1 MG/ML
1 SYRINGE (ML) INJECTION ONCE
Status: COMPLETED | OUTPATIENT
Start: 2024-05-12 | End: 2024-05-12

## 2024-05-12 RX ORDER — MONTELUKAST SODIUM 10 MG/1
10 TABLET ORAL
Status: DISCONTINUED | OUTPATIENT
Start: 2024-05-12 | End: 2024-05-15 | Stop reason: HOSPADM

## 2024-05-12 RX ORDER — ONDANSETRON 2 MG/ML
4 INJECTION INTRAMUSCULAR; INTRAVENOUS ONCE
Status: COMPLETED | OUTPATIENT
Start: 2024-05-12 | End: 2024-05-12

## 2024-05-12 RX ORDER — PANTOPRAZOLE SODIUM 20 MG/1
20 TABLET, DELAYED RELEASE ORAL
Status: DISCONTINUED | OUTPATIENT
Start: 2024-05-13 | End: 2024-05-15 | Stop reason: HOSPADM

## 2024-05-12 RX ORDER — ATORVASTATIN CALCIUM 40 MG/1
40 TABLET, FILM COATED ORAL
Status: DISCONTINUED | OUTPATIENT
Start: 2024-05-13 | End: 2024-05-15 | Stop reason: HOSPADM

## 2024-05-12 RX ORDER — LORATADINE 10 MG/1
10 TABLET ORAL DAILY
Status: DISCONTINUED | OUTPATIENT
Start: 2024-05-13 | End: 2024-05-15 | Stop reason: HOSPADM

## 2024-05-12 RX ORDER — ANASTROZOLE 1 MG/1
1 TABLET ORAL DAILY
Status: DISCONTINUED | OUTPATIENT
Start: 2024-05-13 | End: 2024-05-15 | Stop reason: HOSPADM

## 2024-05-12 RX ADMIN — HYDROMORPHONE HYDROCHLORIDE 1 MG: 1 INJECTION, SOLUTION INTRAMUSCULAR; INTRAVENOUS; SUBCUTANEOUS at 16:21

## 2024-05-12 RX ADMIN — SODIUM CHLORIDE 1000 ML: 0.9 INJECTION, SOLUTION INTRAVENOUS at 19:55

## 2024-05-12 RX ADMIN — QUETIAPINE FUMARATE 200 MG: 200 TABLET ORAL at 21:37

## 2024-05-12 RX ADMIN — TRAZODONE HYDROCHLORIDE 300 MG: 100 TABLET ORAL at 21:37

## 2024-05-12 RX ADMIN — PRAZOSIN HYDROCHLORIDE 5 MG: 1 CAPSULE ORAL at 21:41

## 2024-05-12 RX ADMIN — SODIUM CHLORIDE 1000 ML: 0.9 INJECTION, SOLUTION INTRAVENOUS at 16:22

## 2024-05-12 RX ADMIN — SODIUM CHLORIDE 1000 ML: 0.9 INJECTION, SOLUTION INTRAVENOUS at 17:52

## 2024-05-12 RX ADMIN — IOHEXOL 100 ML: 350 INJECTION, SOLUTION INTRAVENOUS at 17:37

## 2024-05-12 RX ADMIN — NORTRIPTYLINE HYDROCHLORIDE 50 MG: 25 CAPSULE ORAL at 21:41

## 2024-05-12 RX ADMIN — MONTELUKAST 10 MG: 10 TABLET, FILM COATED ORAL at 21:37

## 2024-05-12 RX ADMIN — ONDANSETRON 4 MG: 2 INJECTION INTRAMUSCULAR; INTRAVENOUS at 17:52

## 2024-05-12 RX ADMIN — PIPERACILLIN AND TAZOBACTAM 4.5 G: 36; 4.5 INJECTION, POWDER, FOR SOLUTION INTRAVENOUS at 16:24

## 2024-05-12 NOTE — Clinical Note
Case was discussed with  and the patient's admission status was agreed to be Admission Status: inpatient status to the service of Dr. Gaytan

## 2024-05-12 NOTE — ED PROVIDER NOTES
History  Chief Complaint   Patient presents with    Abdominal Pain     Patient reports abdominal pain since yesterday. Patient last BM Thursday. Patient reports N/V.     The patient is a 58-year-old female who presents from home for the concern of abdominal pain.  The patient states that she has a current history of this cancer and chemotherapy.  Patient's last chemo treatment was 4 days ago through her chest port.  States that her last bowel movement was 5 days ago.  Began having abdominal pain yesterday that gradually worsened.  Patient has had nausea and vomiting.  She has a past medical history of diverticulitis and was admitted for this condition in the past and is concerned this may be happening again.  She denies any falls or traumas dysuria hematuria.      Abdominal Pain  Pain location:  LLQ  Pain quality: sharp and stabbing    Pain radiates to:  Does not radiate  Pain severity:  Severe  Onset quality:  Gradual  Duration:  2 days  Timing:  Constant  Progression:  Worsening  Chronicity:  New  Relieved by:  Nothing  Ineffective treatments:  OTC medications and position changes  Associated symptoms: constipation, nausea and vomiting    Nausea:     Duration:  1 day  Vomiting:     Duration:  1 day      Prior to Admission Medications   Prescriptions Last Dose Informant Patient Reported? Taking?   OMEPRAZOLE PO   Yes No   Sig: Take 20 mg by mouth 2 (two) times a day   QUEtiapine (SEROquel) 200 mg tablet   Yes No   Sig: Take 200 mg by mouth daily at bedtime   albuterol (PROVENTIL HFA,VENTOLIN HFA) 90 mcg/act inhaler   Yes No   Sig: Inhale 2 puffs as needed   atorvastatin (LIPITOR) 40 mg tablet   Yes No   Sig: Take 40 mg by mouth daily   buPROPion (WELLBUTRIN SR) 150 mg 12 hr tablet   Yes No   Sig: Take 200 mg by mouth 2 (two) times a day   busPIRone (BUSPAR) 15 mg tablet   Yes No   Sig: Take 15 mg by mouth 3 (three) times a day as needed   cetirizine (ZyrTEC) 10 mg tablet   Yes No   Sig: Take 10 mg by mouth daily    chlorthalidone 25 mg tablet   Yes No   Sig: Take 25 mg by mouth daily   escitalopram (LEXAPRO) 10 mg tablet   Yes No   Sig: Take 10 mg by mouth daily   fluticasone (FLONASE) 50 mcg/act nasal spray   Yes No   Si sprays into each nostril daily at bedtime   fluticasone-salmeterol (ADVAIR HFA) 115-21 MCG/ACT inhaler   Yes No   Sig: Inhale 2 puffs 2 (two) times a day Rinse mouth after use.   lisinopril (ZESTRIL) 20 mg tablet   Yes No   Sig: Take 40 mg by mouth daily   montelukast (SINGULAIR) 10 mg tablet   Yes No   Sig: Take 10 mg by mouth daily at bedtime   nortriptyline (PAMELOR) 10 mg capsule   Yes No   Sig: Take 10 mg by mouth daily at bedtime   ondansetron (ZOFRAN) 4 mg tablet   No No   Sig: Take 1 tablet (4 mg total) by mouth every 8 (eight) hours as needed for nausea or vomiting   oxyCODONE (ROXICODONE) 5 immediate release tablet   No No   Sig: Take 1 tablet (5 mg total) by mouth every 6 (six) hours as needed for severe pain for up to 6 doses Max Daily Amount: 20 mg   prazosin (MINIPRESS) 2 mg capsule   Yes No   Sig: Take 2 mg by mouth daily at bedtime   traZODone (DESYREL) 300 MG tablet   Yes No   Sig: Take 300 mg by mouth daily at bedtime      Facility-Administered Medications: None       Past Medical History:   Diagnosis Date    Anesthesia     Pt states she woke up during 2 surgeries    Breast cancer (HCC)     Cancer (HCC)     Diabetes mellitus (HCC)     GERD (gastroesophageal reflux disease)     Heart murmur     Hyperlipidemia     Hypertension     Kidney stones     Subdural hematoma (HCC)     Von Willebrand disease (HCC)     Wears contact lenses        Past Surgical History:   Procedure Laterality Date    APPENDECTOMY      BREAST LUMPECTOMY Left     CARPAL TUNNEL RELEASE Bilateral     CHOLECYSTECTOMY      COLONOSCOPY      HYSTERECTOMY      KNEE SURGERY Left     LYMPH NODE BIOPSY Right 2024    Procedure: AXILLARY SENTINEL NODE BIOPSY (NUC MED INJECTION AT 0730);  Surgeon: Almita Molina DO;   Location: [facility];  Service: General    MRI BREAST BIOPSY LEFT (ALL INCLUSIVE) Left 12/13/2023    PARTIAL HYSTERECTOMY      WY MASTECTOMY SIMPLE COMPLETE Bilateral 2/6/2024    Procedure: BREAST MASTECTOMY;  Surgeon: Almita Molina DO;  Location:  MAIN OR;  Service: General    ROTATOR CUFF REPAIR Left     TONSILLECTOMY         Family History   Problem Relation Age of Onset    Cancer Mother     Allergies Father     Cancer Sister     Stroke Brother      I have reviewed and agree with the history as documented.    E-Cigarette/Vaping    E-Cigarette Use Former User      E-Cigarette/Vaping Substances    Nicotine Yes 2 cigarettes a week    THC No     CBD No     Flavoring Yes      Social History     Tobacco Use    Smoking status: Every Day     Current packs/day: 0.25     Average packs/day: 0.3 packs/day for 25.0 years (6.3 ttl pk-yrs)     Types: Cigarettes    Smokeless tobacco: Former    Tobacco comments:     few cigs/day   Vaping Use    Vaping status: Former    Substances: Nicotine (2 cigarettes a week), Flavoring   Substance Use Topics    Alcohol use: Not Currently    Drug use: Never       Review of Systems   Gastrointestinal:  Positive for abdominal pain, constipation, nausea and vomiting.   All other systems reviewed and are negative.      Physical Exam  Physical Exam  Vitals and nursing note reviewed.   Constitutional:       General: She is in acute distress.      Appearance: She is well-developed.   HENT:      Head: Normocephalic and atraumatic.      Right Ear: External ear normal.      Left Ear: External ear normal.      Mouth/Throat:      Mouth: Mucous membranes are dry.   Eyes:      Extraocular Movements: Extraocular movements intact.      Pupils: Pupils are equal, round, and reactive to light.   Cardiovascular:      Rate and Rhythm: Regular rhythm. Tachycardia present.      Heart sounds: No murmur heard.  Pulmonary:      Effort: Pulmonary effort is normal. No respiratory distress.      Breath  sounds: Normal breath sounds. No wheezing.   Abdominal:      General: Bowel sounds are normal.      Palpations: Abdomen is soft. There is no mass.      Tenderness: There is abdominal tenderness in the right lower quadrant, suprapubic area and left lower quadrant. There is no rebound.      Hernia: No hernia is present.   Musculoskeletal:      Cervical back: Normal range of motion and neck supple.   Skin:     General: Skin is warm and dry.      Capillary Refill: Capillary refill takes less than 2 seconds.   Neurological:      Mental Status: She is alert and oriented to person, place, and time.      Coordination: Coordination normal.   Psychiatric:         Behavior: Behavior normal.         Vital Signs  ED Triage Vitals   Temperature Pulse Respirations Blood Pressure SpO2   05/12/24 1616 05/12/24 1607 05/12/24 1607 05/12/24 1607 05/12/24 1607   (!) 96.1 °F (35.6 °C) 93 20 119/64 96 %      Temp Source Heart Rate Source Patient Position - Orthostatic VS BP Location FiO2 (%)   05/12/24 1616 05/12/24 1607 05/12/24 1607 -- --   Temporal Monitor Sitting        Pain Score       05/12/24 1607       9           Vitals:    05/12/24 1630 05/12/24 1645 05/12/24 1745 05/12/24 1845   BP: 116/64 116/64 130/65 130/65   Pulse: 83 80 85 85   Patient Position - Orthostatic VS:             Visual Acuity      ED Medications  Medications   sodium chloride 0.9 % bolus 1,000 mL (1,000 mL Intravenous New Bag 5/12/24 1955)   sodium chloride 0.9 % bolus 1,000 mL (0 mL Intravenous Stopped 5/12/24 1745)   piperacillin-tazobactam (ZOSYN) 4.5 g in sodium chloride 0.9 % 100 mL IVPB (0 g Intravenous Stopped 5/12/24 1659)   HYDROmorphone (DILAUDID) injection 1 mg (1 mg Intravenous Given 5/12/24 1621)   sodium chloride 0.9 % bolus 1,000 mL (0 mL Intravenous Stopped 5/12/24 1856)   iohexol (OMNIPAQUE) 350 MG/ML injection (MULTI-DOSE) 100 mL (100 mL Intravenous Given 5/12/24 1737)   ondansetron (ZOFRAN) injection 4 mg (4 mg Intravenous Given 5/12/24 6711)        Diagnostic Studies  Results Reviewed       Procedure Component Value Units Date/Time    UA w Reflex to Microscopic w Reflex to Culture [[id number]]  (Abnormal) Collected: 05/12/24 1935    Lab Status: Final result Specimen: Urine, Clean Catch Updated: 05/12/24 1947     Color, UA Yellow     Clarity, UA Cloudy     Specific Gravity, UA <=1.005     pH, UA 5.5     Leukocytes, UA Moderate     Nitrite, UA Negative     Protein, UA Negative mg/dl      Glucose, UA Negative mg/dl      Ketones, UA Negative mg/dl      Urobilinogen, UA 0.2 E.U./dl      Bilirubin, UA Negative     Occult Blood, UA Trace-Intact    Urine Microscopic [[id number]] Collected: 05/12/24 1935    Lab Status: In process Specimen: Urine, Clean Catch Updated: 05/12/24 1947    Lactic acid 2 Hours [[id number]]  (Abnormal) Collected: 05/12/24 1749    Lab Status: Final result Specimen: Blood from Arm, Left Updated: 05/12/24 1815     LACTIC ACID 2.3 mmol/L     Narrative:      Result may be elevated if tourniquet was used during collection.    FLU/RSV/COVID - if FLU/RSV clinically relevant [[id number]]  (Normal) Collected: 05/12/24 1617    Lab Status: Final result Specimen: Nares from Nose Updated: 05/12/24 1704     SARS-CoV-2 Negative     INFLUENZA A PCR Negative     INFLUENZA B PCR Negative     RSV PCR Negative    Narrative:      FOR PEDIATRIC PATIENTS - copy/paste COVID Guidelines URL to browser: [email or web address]    SARS-CoV-2 assay is a Nucleic Acid Amplification assay intended for the  qualitative detection of nucleic acid from SARS-CoV-2 in nasopharyngeal  swabs. Results are for the presumptive identification of SARS-CoV-2 RNA.    Positive results are indicative of infection with SARS-CoV-2, the virus  causing COVID-19, but do not rule out bacterial infection or co-infection  with other viruses. Laboratories within the [place] and its  territories are required to report all positive results to  the appropriate  public health authorities. Negative results do not preclude SARS-CoV-2  infection and should not be used as the sole basis for treatment or other  patient management decisions. Negative results must be combined with  clinical observations, patient history, and epidemiological information.  This test has not been FDA cleared or approved.    This test has been authorized by FDA under an Emergency Use Authorization  (EUA). This test is only authorized for the duration of time the  declaration that circumstances exist justifying the authorization of the  emergency use of an in vitro diagnostic tests for detection of SARS-CoV-2  virus and/or diagnosis of COVID-19 infection under section 564(b)(1) of  the Act, 21 U.S.C. 360bbb-3(b)(1), unless the authorization is terminated  or revoked sooner. The test has been validated but independent review by FDA  and CLIA is pending.    Test performed using Cepheid GeneXpert: This RT-PCR assay targets N2,  a region unique to SARS-CoV-2. A conserved region in the E-gene was chosen  for pan-Sarbecovirus detection which includes SARS-CoV-2.    According to CMS-2020-01-R, this platform meets the definition of high-throughput technology.    Manual Differential(PHLEBS Do Not Order) [[id number]]  (Abnormal) Collected: 05/12/24 1617    Lab Status: Final result Specimen: Blood from Arm, Left Updated: 05/12/24 1703     Segmented % 84 %      Bands % 3 %      Lymphocytes % 6 %      Monocytes % 2 %      Eosinophils % 0 %      Basophils % 0 %      Metamyelocytes % 5 %      Absolute Neutrophils 14.01 Thousand/uL      Absolute Lymphocytes 0.97 Thousand/uL      Absolute Monocytes 0.32 Thousand/uL      Absolute Eosinophils 0.00 Thousand/uL      Absolute Basophils 0.00 Thousand/uL      Absolute Metamyelocytes 0.81 Thousand/uL      Total Counted --     RBC Morphology Present     Platelet Estimate Adequate     Anisocytosis Present     Macrocytes Present     Stomatocytes Present     Procalcitonin [[id number]]  (Abnormal) Collected: 05/12/24 1617    Lab Status: Final result Specimen: Blood from Arm, Left Updated: 05/12/24 1656     Procalcitonin 0.33 ng/ml     Comprehensive metabolic panel [[id number]]  (Abnormal) Collected: 05/12/24 1617    Lab Status: Final result Specimen: Blood from Arm, Left Updated: 05/12/24 1652     Sodium 133 mmol/L      Potassium 3.5 mmol/L      Chloride 95 mmol/L      CO2 26 mmol/L      ANION GAP 12 mmol/L      BUN 22 mg/dL      Creatinine 1.22 mg/dL      Glucose 168 mg/dL      Calcium 9.1 mg/dL      AST 36 U/L      ALT 39 U/L      Alkaline Phosphatase 103 U/L      Total Protein 7.6 g/dL      Albumin 4.5 g/dL      Total Bilirubin 0.78 mg/dL      eGFR 48 ml/min/1.73sq m     Narrative:      National Kidney Disease Foundation guidelines for Chronic Kidney Disease (CKD):     Stage 1 with normal or high GFR (GFR > 90 mL/min/1.73 square meters)    Stage 2 Mild CKD (GFR = 60-89 mL/min/1.73 square meters)    Stage 3A Moderate CKD (GFR = 45-59 mL/min/1.73 square meters)    Stage 3B Moderate CKD (GFR = 30-44 mL/min/1.73 square meters)    Stage 4 Severe CKD (GFR = 15-29 mL/min/1.73 square meters)    Stage 5 End Stage CKD (GFR <15 mL/min/1.73 square meters)  Note: GFR calculation is accurate only with a steady state creatinine    Magnesium [[id number]]  (Normal) Collected: 05/12/24 1617    Lab Status: Final result Specimen: Blood from Arm, Left Updated: 05/12/24 1652     Magnesium 2.1 mg/dL     Lipase [[id number]]  (Abnormal) Collected: 05/12/24 1617    Lab Status: Final result Specimen: Blood from Arm, Left Updated: 05/12/24 1652     Lipase 10 u/L     Beta Hydroxybutyrate [[id number]]  (Normal) Collected: 05/12/24 1617    Lab Status: Final result Specimen: Blood from Arm, Left Updated: 05/12/24 1652     Beta- Hydroxybutyrate <0.05 mmol/L     Lactic acid [[id number]]  (Abnormal) Collected: 05/12/24 1610    Lab Status: Final result Specimen: Blood from Arm, Left Updated: 05/12/24 7647      LACTIC ACID 3.3 mmol/L     Narrative:      Result may be elevated if tourniquet was used during collection.    Protime-INR [[id number]]  (Normal) Collected: 05/12/24 1617    Lab Status: Final result Specimen: Blood from Arm, Left Updated: 05/12/24 1641     Protime 12.6 seconds      INR 0.92    APTT [[id number]]  (Normal) Collected: 05/12/24 1617    Lab Status: Final result Specimen: Blood from Arm, Left Updated: 05/12/24 1641     PTT 28 seconds     Fingerstick Glucose (POCT) [[id number]]  (Abnormal) Collected: 05/12/24 1635    Lab Status: Final result Specimen: Blood Updated: 05/12/24 1636     POC Glucose 161 mg/dl     CBC and differential [[id number]]  (Abnormal) Collected: 05/12/24 1617    Lab Status: Final result Specimen: Blood from Arm, Left Updated: 05/12/24 1630     WBC 16.10 Thousand/uL      RBC 4.75 Million/uL      Hemoglobin 12.7 g/dL      Hematocrit 41.0 %      MCV 86 fL      MCH 26.7 pg      MCHC 31.0 g/dL      RDW 15.1 %      MPV 11.1 fL      Platelets 285 Thousands/uL     Blood gas, Venous [[id number]]  (Abnormal) Collected: 05/12/24 1617    Lab Status: Final result Specimen: Blood from Arm, Left Updated: 05/12/24 1627     pH, Iraj 7.426     pCO2, Iraj 38.4 mm Hg      pO2, Iraj 27.9 mm Hg      HCO3, Iraj 24.7 mmol/L      Base Excess, Iraj 0.5 mmol/L      O2 Content, Iraj 10.6 ml/dL      O2 HGB, VENOUS 52.8 %     Blood culture #1 [[id number]] Collected: 05/12/24 1617    Lab Status: In process Specimen: Blood from Arm, Left Updated: 05/12/24 1624    Blood culture #2 [[id number]] Collected: 05/12/24 1617    Lab Status: In process Specimen: Blood from Arm, Left Updated: 05/12/24 1624                   CT abdomen pelvis with contrast   Final Result by Cliff Oseguera MD (05/12 1835)      Bladder mucosal hyperenhancement which raises concern for cystitis. Correlation with urinalysis recommended.         Workstation performed: [id number]                    Procedures  Procedures         ED Course  ED Course as of 05/12/24  "1956   Sun May 12, 2024   1627 WBC(!): 16.10   1721 Patient's BMI > 30. For purposes of fluid resuscitation, IBW was utilized to calculate target volume to be administered.                               Initial Sepsis Screening       Row Name 05/12/24 1722                Is the patient's history suggestive of a new or worsening infection? Yes (Proceed)  -SB        Suspected source of infection acute abdominal infection  -SB        Indicate SIRS criteria Tachycardia > 90 bpm;Leukocytosis (WBC > 38474 IJL) OR Leukopenia (WBC <4000 IJL) OR Bandemia (WBC >10% bands)  -SB        Are two or more of the above signs & symptoms of infection both present and new to the patient? Yes (Proceed)  -SB        Assess for evidence of organ dysfunction: Are any of the below criteria present within 6 hours of suspected infection and SIRS criteria that are NOT considered to be chronic conditions? Lactate > 2.0  -SB        Date of presentation of severe sepsis 05/12/24  -SB        Time of presentation of severe sepsis 1723  -SB        Sepsis Note: Click \"NEXT\" below (NOT \"close\") to generate sepsis note based on above information. YES (proceed by clicking \"NEXT\")  -SB                  User Key  (r) = Recorded By, (t) = Taken By, (c) = Cosigned By      Initials Name Provider Type    SB Rajiv Nathan PA-C Physician Assistant                    SBIRT 20yo+      Flowsheet Row Most Recent Value   Initial Alcohol Screen: US AUDIT-C     1. How often do you have a drink containing alcohol? 0 Filed at: 05/12/2024 1611   2. How many drinks containing alcohol do you have on a typical day you are drinking?  0 Filed at: 05/12/2024 1611   3a. Male UNDER 65: How often do you have five or more drinks on one occasion? 0 Filed at: 05/12/2024 1611   3b. FEMALE Any Age, or MALE 65+: How often do you have 4 or more drinks on one occassion? 0 Filed at: 05/12/2024 1611   Audit-C Score 0 Filed at: 05/12/2024 1611   CRISTINO: How many times in the past year " have you...    Used an illegal drug or used a prescription medication for non-medical reasons? Never Filed at: 05/12/2024 1611                      Medical Decision Making  The patient is a 58-year-old female who presents from home for the concern of abdominal pain.  The patient states that she has a current history of this cancer and chemotherapy.  Patient's last chemo treatment was 4 days ago through her chest port.  States that her last bowel movement was 5 days ago.  Began having abdominal pain yesterday that gradually worsened.  Patient has had nausea and vomiting.  She has a past medical history of diverticulitis and was admitted for this condition in the past and is concerned this may be happening again.  She denies any falls or traumas dysuria hematuria.    She was uncomfortable appearing, tachycardic, dry membranes.  Patient is high risk for infection due to chemo use.  Patient has leukocytosis lactic acidosis.  Small urine production.  The patient meets severe sepsis criteria.  Did give 30 mg/kg based on ideal body weight.  Patient was covered broadly with Zosyn.  Was concern for diverticulitis did have some constipation.  Patient's CAT scan was actually more concerning for cystitis.  Patient's case was discussed with the hospitalist service for admission.  Patient was in agreement with treatment plan.  She did have slight hypoxia with Dilaudid use.  Had some nausea in the emergency department which was controlled with Zofran.    Admitted under [facility] service Dr. Silvestre      Differential diagnosis was included but not limited to: GERD, gastritis, PUD, esophageal spasm, pancreatitis, acute cholecystitis, acute cholangitis, biliary colic, acute cystitis, renal colic, kidney stone, MSK pain, colitis,ovarian torsion, ovarian abscess, diverticulitis, constipation, proctitis, small bowel obstruction, large bowel obstruction, mass, viral syndrome, bacteremia      Amount and/or Complexity of Data Reviewed  Labs:  ordered. Decision-making details documented in ED Course.  Radiology: ordered and independent interpretation performed. Decision-making details documented in ED Course.  Discussion of management or test interpretation with external provider(s): Angel Gonzalez  Prescription drug management.  Decision regarding hospitalization.             Disposition  Final diagnoses:   Severe sepsis (HCC)   Acute cystitis     Time reflects when diagnosis was documented in both MDM as applicable and the Disposition within this note       Time User Action Codes Description Comment    5/12/2024  5:25 PM Rajiv Nathan [A41.9,  R65.20] Severe sepsis (HCC)     5/12/2024  7:13 PM Rajiv Nathan [N30.00] Acute cystitis           ED Disposition       ED Disposition   Admit    Condition   Stable    Date/Time   Sun May 12, 2024 1915    Comment   Case was discussed with angel hill   and the patient's admission status was agreed to be Admission Status: inpatient status to the service of Dr. nj               Follow-up Information    None         Patient's Medications   Discharge Prescriptions    No medications on file       No discharge procedures on file.    PDMP Review         Value Time User    PDMP Reviewed  Yes 3/24/2024  8:47 PM Trinidad Weinberg MD            ED Provider  Electronically Signed by             Rajiv Nathan PA-C  05/12/24 1956

## 2024-05-12 NOTE — SEPSIS NOTE
"  Sepsis Note   Tammie Avalos 58 y.o. female MRN: [id number]  Unit/Bed#: TR 13B Encounter: [id number]       Initial Sepsis Screening       Row Name 05/12/24 1722                Is the patient's history suggestive of a new or worsening infection? Yes (Proceed)  -SB        Suspected source of infection acute abdominal infection  -SB        Indicate SIRS criteria Tachycardia > 90 bpm;Leukocytosis (WBC > 79677 IJL) OR Leukopenia (WBC <4000 IJL) OR Bandemia (WBC >10% bands)  -SB        Are two or more of the above signs & symptoms of infection both present and new to the patient? Yes (Proceed)  -SB        Assess for evidence of organ dysfunction: Are any of the below criteria present within 6 hours of suspected infection and SIRS criteria that are NOT considered to be chronic conditions? Lactate > 2.0  -SB        Date of presentation of severe sepsis 05/12/24  -SB        Time of presentation of severe sepsis 1723  -SB        Sepsis Note: Click \"NEXT\" below (NOT \"close\") to generate sepsis note based on above information. YES (proceed by clicking \"NEXT\")  -SB                  User Key  (r) = Recorded By, (t) = Taken By, (c) = Cosigned By      Initials Name Provider Type    SB Rajiv Nathan PA-C Physician Assistant                        Body mass index is 36.81 kg/m².  Wt Readings from Last 1 Encounters:   04/16/24 107 kg (235 lb)     IBW (Ideal Body Weight): 61.6 kg    Ideal body weight: 61.6 kg (135 lb 12.9 oz)  Adjusted ideal body weight: 79.6 kg (175 lb 7.7 oz)    "

## 2024-05-12 NOTE — ED NOTES
Rajiv PERRY made aware pt was able to provide urine sample. Per Rajiv no straight cath needed.      Mayra Rondon RN  05/12/24 1955

## 2024-05-13 PROBLEM — E87.1 HYPONATREMIA: Status: ACTIVE | Noted: 2024-05-13

## 2024-05-13 PROBLEM — N39.0 UTI (URINARY TRACT INFECTION): Status: ACTIVE | Noted: 2024-05-13

## 2024-05-13 LAB
ANION GAP SERPL CALCULATED.3IONS-SCNC: 9 MMOL/L (ref 4–13)
BUN SERPL-MCNC: 23 MG/DL (ref 5–25)
CALCIUM SERPL-MCNC: 8 MG/DL (ref 8.4–10.2)
CHLORIDE SERPL-SCNC: 101 MMOL/L (ref 96–108)
CO2 SERPL-SCNC: 25 MMOL/L (ref 21–32)
CREAT SERPL-MCNC: 0.98 MG/DL (ref 0.6–1.3)
ERYTHROCYTE [DISTWIDTH] IN BLOOD BY AUTOMATED COUNT: 15.3 % (ref 11.6–15.1)
EST. AVERAGE GLUCOSE BLD GHB EST-MCNC: 148 MG/DL
GFR SERPL CREATININE-BSD FRML MDRD: 63 ML/MIN/1.73SQ M
GLUCOSE SERPL-MCNC: 105 MG/DL (ref 65–140)
GLUCOSE SERPL-MCNC: 117 MG/DL (ref 65–140)
GLUCOSE SERPL-MCNC: 121 MG/DL (ref 65–140)
GLUCOSE SERPL-MCNC: 130 MG/DL (ref 65–140)
GLUCOSE SERPL-MCNC: 150 MG/DL (ref 65–140)
HBA1C MFR BLD: 6.8 %
HCT VFR BLD AUTO: 32.8 % (ref 34.8–46.1)
HGB BLD-MCNC: 10.1 G/DL (ref 11.5–15.4)
LACTATE SERPL-SCNC: 1.4 MMOL/L (ref 0.5–2)
MAGNESIUM SERPL-MCNC: 1.9 MG/DL (ref 1.9–2.7)
MCH RBC QN AUTO: 26.5 PG (ref 26.8–34.3)
MCHC RBC AUTO-ENTMCNC: 30.8 G/DL (ref 31.4–37.4)
MCV RBC AUTO: 86 FL (ref 82–98)
NRBC BLD AUTO-RTO: 0 /100 WBCS
PLATELET # BLD AUTO: 197 THOUSANDS/UL (ref 149–390)
PMV BLD AUTO: 10.9 FL (ref 8.9–12.7)
POTASSIUM SERPL-SCNC: 3.4 MMOL/L (ref 3.5–5.3)
PROCALCITONIN SERPL-MCNC: 1.44 NG/ML
RBC # BLD AUTO: 3.81 MILLION/UL (ref 3.81–5.12)
SODIUM SERPL-SCNC: 135 MMOL/L (ref 135–147)
WBC # BLD AUTO: 8.89 THOUSAND/UL (ref 4.31–10.16)

## 2024-05-13 PROCEDURE — 85027 COMPLETE CBC AUTOMATED: CPT | Performed by: NURSE PRACTITIONER

## 2024-05-13 PROCEDURE — 84145 PROCALCITONIN (PCT): CPT | Performed by: NURSE PRACTITIONER

## 2024-05-13 PROCEDURE — 83605 ASSAY OF LACTIC ACID: CPT | Performed by: NURSE PRACTITIONER

## 2024-05-13 PROCEDURE — 83735 ASSAY OF MAGNESIUM: CPT | Performed by: NURSE PRACTITIONER

## 2024-05-13 PROCEDURE — 99232 SBSQ HOSP IP/OBS MODERATE 35: CPT | Performed by: INTERNAL MEDICINE

## 2024-05-13 PROCEDURE — 80048 BASIC METABOLIC PNL TOTAL CA: CPT | Performed by: NURSE PRACTITIONER

## 2024-05-13 PROCEDURE — 82948 REAGENT STRIP/BLOOD GLUCOSE: CPT

## 2024-05-13 RX ORDER — ACETAMINOPHEN 325 MG/1
650 TABLET ORAL EVERY 6 HOURS PRN
Status: DISCONTINUED | OUTPATIENT
Start: 2024-05-13 | End: 2024-05-15 | Stop reason: HOSPADM

## 2024-05-13 RX ORDER — HYDROMORPHONE HCL/PF 1 MG/ML
0.5 SYRINGE (ML) INJECTION ONCE
Status: COMPLETED | OUTPATIENT
Start: 2024-05-13 | End: 2024-05-13

## 2024-05-13 RX ORDER — OXYCODONE HYDROCHLORIDE 5 MG/1
5 TABLET ORAL EVERY 6 HOURS PRN
Status: DISCONTINUED | OUTPATIENT
Start: 2024-05-13 | End: 2024-05-15 | Stop reason: HOSPADM

## 2024-05-13 RX ADMIN — BUSPIRONE HYDROCHLORIDE 15 MG: 10 TABLET ORAL at 17:22

## 2024-05-13 RX ADMIN — QUETIAPINE FUMARATE 200 MG: 200 TABLET ORAL at 21:13

## 2024-05-13 RX ADMIN — PANTOPRAZOLE SODIUM 20 MG: 20 TABLET, DELAYED RELEASE ORAL at 17:19

## 2024-05-13 RX ADMIN — ATORVASTATIN CALCIUM 40 MG: 40 TABLET, FILM COATED ORAL at 17:19

## 2024-05-13 RX ADMIN — OXYCODONE HYDROCHLORIDE 5 MG: 5 TABLET ORAL at 09:02

## 2024-05-13 RX ADMIN — FLUTICASONE FUROATE AND VILANTEROL TRIFENATATE 1 PUFF: 100; 25 POWDER RESPIRATORY (INHALATION) at 08:57

## 2024-05-13 RX ADMIN — INSULIN LISPRO 1 UNITS: 100 INJECTION, SOLUTION INTRAVENOUS; SUBCUTANEOUS at 21:23

## 2024-05-13 RX ADMIN — HYDROMORPHONE HYDROCHLORIDE 0.5 MG: 1 INJECTION, SOLUTION INTRAMUSCULAR; INTRAVENOUS; SUBCUTANEOUS at 03:29

## 2024-05-13 RX ADMIN — PRAZOSIN HYDROCHLORIDE 5 MG: 1 CAPSULE ORAL at 21:13

## 2024-05-13 RX ADMIN — OXYCODONE HYDROCHLORIDE 5 MG: 5 TABLET ORAL at 19:33

## 2024-05-13 RX ADMIN — MONTELUKAST 10 MG: 10 TABLET, FILM COATED ORAL at 21:13

## 2024-05-13 RX ADMIN — BUPROPION HYDROCHLORIDE 450 MG: 150 TABLET, EXTENDED RELEASE ORAL at 08:56

## 2024-05-13 RX ADMIN — LISINOPRIL 20 MG: 20 TABLET ORAL at 08:56

## 2024-05-13 RX ADMIN — ESCITALOPRAM OXALATE 20 MG: 10 TABLET ORAL at 08:56

## 2024-05-13 RX ADMIN — PANTOPRAZOLE SODIUM 20 MG: 20 TABLET, DELAYED RELEASE ORAL at 05:59

## 2024-05-13 RX ADMIN — NORTRIPTYLINE HYDROCHLORIDE 50 MG: 25 CAPSULE ORAL at 21:13

## 2024-05-13 RX ADMIN — ACETAMINOPHEN 325MG 650 MG: 325 TABLET ORAL at 17:22

## 2024-05-13 RX ADMIN — TRAZODONE HYDROCHLORIDE 300 MG: 100 TABLET ORAL at 21:13

## 2024-05-13 RX ADMIN — ANASTROZOLE 1 MG: 1 TABLET, COATED ORAL at 08:57

## 2024-05-13 RX ADMIN — CEFTRIAXONE 1000 MG: 1 INJECTION, SOLUTION INTRAVENOUS at 09:03

## 2024-05-13 RX ADMIN — LORATADINE 10 MG: 10 TABLET ORAL at 08:56

## 2024-05-13 NOTE — ASSESSMENT & PLAN NOTE
Lab Results   Component Value Date    HGBA1C 6.7 (H) 01/26/2024       Recent Labs     05/12/24  1635 05/12/24  2151   POCGLU 161* 131       Blood Sugar Average: Last 72 hrs:  (P) 146    Metformin on hold  Update hemoglobin A1c  SSI with Accu-Cheks  Hypoglycemia protocol

## 2024-05-13 NOTE — ASSESSMENT & PLAN NOTE
S/p mastectomy  Currently receiving chemotherapy  Continue Arimidex  No neutropenia  Outpatient follow-up

## 2024-05-13 NOTE — ED NOTES
Pt taken to floor room assignment by CHET Hardin via wheelchair.     Mayra Rondon RN  05/12/24 2010

## 2024-05-13 NOTE — PLAN OF CARE

## 2024-05-13 NOTE — ASSESSMENT & PLAN NOTE
Lab Results   Component Value Date    HGBA1C 6.8 (H) 05/12/2024       Recent Labs     05/12/24  1635 05/12/24  2151 05/13/24  0812   POCGLU 161* 131 117         Blood Sugar Average: Last 72 hrs:  (P) [id number]    Metformin on hold  A1c 6.8  Sliding-scale insulin  Monitor blood glucose

## 2024-05-13 NOTE — H&P
[facility]  H&P  Name: Tammie Avalos 58 y.o. female I MRN: [id number]  Unit/Bed#: -01 I Date of Admission: 5/12/2024   Date of Service: 5/13/2024 I Hospital Day: 1      Assessment/Plan   * Severe sepsis (HCC)  Assessment & Plan  Meeting criteria with tachycardia, leukocytosis, lactic acid elevation  Lactic acid cleared with fluid resuscitation  UA positive pyuria  Blood and urine cultures pending  Empiric ceftriaxone  Trend fever curve, leukocytosis    UTI (urinary tract infection)  Assessment & Plan  Presented with abdominal pain  Meeting severe sepsis criteria  CT abdomen pelvis unremarkable  UA with innumerable pyuria and bacteriuria  Urine culture pending  Empiric ceftriaxone    Hyponatremia  Assessment & Plan  Sodium 133  Hypovolemia hyponatremia  Trend BMP  Fluid resuscitation  Chlorthalidone on hold    Type 2 diabetes mellitus without complication, without long-term current use of insulin (HCC)  Assessment & Plan  Lab Results   Component Value Date    HGBA1C 6.7 (H) 01/26/2024       Recent Labs     05/12/24  1635 05/12/24  2151   POCGLU 161* 131       Blood Sugar Average: Last 72 hrs:  (P) 146    Metformin on hold  Update hemoglobin A1c  SSI with Accu-Cheks  Hypoglycemia protocol    Malignant neoplasm of central portion of right breast in female, estrogen receptor positive (HCC)  Assessment & Plan  S/p mastectomy  Currently receiving chemotherapy  Continue Arimidex  No neutropenia  Outpatient follow-up    Essential hypertension  Assessment & Plan  Continue lisinopril 20 mg daily, chlorthalidone is on hold  Trend blood pressure    Von Willebrand disease (HCC)  Assessment & Plan  Avoid anticoagulation  Monitor for bleeding           VTE Pharmacologic Prophylaxis:   High Risk (Score >/= 5) - Pharmacological DVT Prophylaxis Contraindicated. Sequential Compression Devices Ordered.  Code Status: Level 1 - Full Code   Discussion with family: Updated  (daughter) at  bedside.    Anticipated Length of Stay: Patient will be admitted on an inpatient basis with an anticipated length of stay of greater than 2 midnights secondary to sepsis, UTI.    Total Time Spent on Date of Encounter in care of patient: 45 mins. This time was spent on one or more of the following: performing physical exam; counseling and coordination of care; obtaining or reviewing history; documenting in the medical record; reviewing/ordering tests, medications or procedures; communicating with other healthcare professionals and discussing with patient's family/caregivers.    Chief Complaint: Abdominal pain    History of Present Illness:  Tammie Avalos is a 58 y.o. female with a PMH of breast cancer status postmastectomy currently receiving therapy, diabetes mellitus, GERD, hypertension, hyperlipidemia, von Willebrand's, SDH who presents with abdominal pain.  Meeting severe sepsis criteria, found to have UTI.  CT abdomen pelvis unremarkable.  Presented to the medical service for further evaluation and treatment.    Review of Systems:  Review of Systems   Constitutional:  Negative for chills and fever.   HENT:  Negative for ear pain and sore throat.    Eyes:  Negative for pain and visual disturbance.   Respiratory:  Negative for cough and shortness of breath.    Cardiovascular:  Negative for chest pain and palpitations.   Gastrointestinal:  Positive for abdominal pain. Negative for vomiting.   Genitourinary:  Negative for dysuria and hematuria.   Musculoskeletal:  Negative for arthralgias and back pain.   Skin:  Negative for color change and rash.   Neurological:  Negative for seizures and syncope.   All other systems reviewed and are negative.      Past Medical and Surgical History:   Past Medical History:   Diagnosis Date    Anesthesia     Pt states she woke up during 2 surgeries    Breast cancer (HCC)     Cancer (HCC)     Diabetes mellitus (HCC)     GERD (gastroesophageal reflux disease)     Heart murmur      Hyperlipidemia     Hypertension     Kidney stones     Subdural hematoma (HCC)     Von Willebrand disease (HCC)     Wears contact lenses        Past Surgical History:   Procedure Laterality Date    APPENDECTOMY      BREAST LUMPECTOMY Left     CARPAL TUNNEL RELEASE Bilateral     CHOLECYSTECTOMY      COLONOSCOPY      HYSTERECTOMY      KNEE SURGERY Left     LYMPH NODE BIOPSY Right 2/6/2024    Procedure: AXILLARY SENTINEL NODE BIOPSY (NUC MED INJECTION AT 0730);  Surgeon: Almita Molina DO;  Location: [facility];  Service: General    MRI BREAST BIOPSY LEFT (ALL INCLUSIVE) Left 12/13/2023    PARTIAL HYSTERECTOMY      NH MASTECTOMY SIMPLE COMPLETE Bilateral 2/6/2024    Procedure: BREAST MASTECTOMY;  Surgeon: Almita Molina DO;  Location: [facility];  Service: General    ROTATOR CUFF REPAIR Left     TONSILLECTOMY         Meds/Allergies:  Prior to Admission medications    Medication Sig Start Date End Date Taking? Authorizing Provider   albuterol (PROVENTIL HFA,VENTOLIN HFA) 90 mcg/act inhaler Inhale 2 puffs as needed 9/8/17  Yes Historical Provider, MD   anastrozole (ARIMIDEX) 1 mg tablet Take 1 mg by mouth daily   Yes Historical Provider, MD   atorvastatin (LIPITOR) 40 mg tablet Take 40 mg by mouth daily   Yes Historical Provider, MD   buPROPion (WELLBUTRIN SR) 200 MG 12 hr tablet Take 200 mg by mouth 2 (two) times a day 12/5/19  Yes Historical Provider, MD   busPIRone (BUSPAR) 15 mg tablet Take 15 mg by mouth 3 (three) times a day as needed   Yes Historical Provider, MD   cetirizine (ZyrTEC) 10 mg tablet Take 10 mg by mouth daily 9/8/17  Yes Historical Provider, MD   chlorthalidone 25 mg tablet Take 25 mg by mouth daily   Yes Historical Provider, MD   escitalopram (LEXAPRO) 20 mg tablet Take 20 mg by mouth daily   Yes Historical Provider, MD   fluticasone (FLONASE) 50 mcg/act nasal spray 2 sprays into each nostril daily at bedtime 9/8/17  Yes Historical Provider, MD   fluticasone-salmeterol (ADVAIR  HFA) 115-21 MCG/ACT inhaler Inhale 2 puffs 2 (two) times a day Rinse mouth after use.   Yes Historical Provider, MD   lisinopril (ZESTRIL) 20 mg tablet Take 20 mg by mouth daily   Yes Historical Provider, MD   metFORMIN (GLUCOPHAGE) 500 mg tablet Take 500 mg by mouth daily with breakfast   Yes Historical Provider, MD   montelukast (SINGULAIR) 10 mg tablet Take 10 mg by mouth daily at bedtime 2/14/18  Yes Historical Provider, MD   nortriptyline (PAMELOR) 10 mg capsule Take 50 mg by mouth daily at bedtime 5/3/21  Yes Historical Provider, MD   OMEPRAZOLE PO Take 20 mg by mouth 2 (two) times a day 5/29/20  Yes Historical Provider, MD   prazosin (MINIPRESS) 5 mg capsule Take 5 mg by mouth daily at bedtime   Yes Historical Provider, MD   QUEtiapine (SEROquel) 200 mg tablet Take 200 mg by mouth daily at bedtime   Yes Historical Provider, MD   traZODone (DESYREL) 300 MG tablet Take 300 mg by mouth daily at bedtime   Yes Historical Provider, MD   ondansetron (ZOFRAN) 4 mg tablet Take 1 tablet (4 mg total) by mouth every 8 (eight) hours as needed for nausea or vomiting  Patient not taking: Reported on 5/12/2024 11/19/23   ANAY Dominguez   oxyCODONE (ROXICODONE) 5 immediate release tablet Take 1 tablet (5 mg total) by mouth every 6 (six) hours as needed for severe pain for up to 6 doses Max Daily Amount: 20 mg  Patient not taking: Reported on 5/12/2024 2/7/24   Adina Bowman PA-C     I have reviewed home medications with patient personally.    Allergies:   Allergies   Allergen Reactions    Azithromycin GI Intolerance and Hives    Erythromycin Vomiting    Morphine Palpitations       Social History:  Marital Status: Single   Patient Pre-hospital Living Situation: Home  Patient Pre-hospital Level of Mobility: walks  Patient Pre-hospital Diet Restrictions: None  Substance Use History:   Social History     Substance and Sexual Activity   Alcohol Use Not Currently     Social History     Tobacco Use   Smoking Status Every Day     "Current packs/day: 0.25    Average packs/day: 0.3 packs/day for 25.0 years (6.3 ttl pk-yrs)    Types: Cigarettes   Smokeless Tobacco Former   Tobacco Comments    few cigs/day     Social History     Substance and Sexual Activity   Drug Use Never       Family History:  Family History   Problem Relation Age of Onset    Cancer Mother     Allergies Father     Cancer Sister     Stroke Brother        Physical Exam:     Vitals:   Blood Pressure: 148/75 (05/12/24 2136)  Pulse: 80 (05/12/24 2136)  Temperature: 97.5 °F (36.4 °C) (05/12/24 2017)  Temp Source: Temporal (05/12/24 2023)  Respirations: 20 (05/12/24 2136)  Height: 5' 7\" (170.2 cm) (05/12/24 2023)  Weight - Scale: 105 kg (230 lb 9.6 oz) (05/12/24 2012)  SpO2: 90 % (05/12/24 2136)    Physical Exam  Vitals and nursing note reviewed.   Constitutional:       General: She is not in acute distress.     Appearance: She is well-developed.   HENT:      Head: Normocephalic and atraumatic.      Mouth/Throat:      Mouth: Mucous membranes are dry.   Eyes:      Conjunctiva/sclera: Conjunctivae normal.   Cardiovascular:      Rate and Rhythm: Normal rate and regular rhythm.      Heart sounds: No murmur heard.  Pulmonary:      Effort: Pulmonary effort is normal. No respiratory distress.      Breath sounds: Normal breath sounds.   Abdominal:      Palpations: Abdomen is soft.      Tenderness: There is no abdominal tenderness.   Musculoskeletal:         General: No swelling.      Cervical back: Neck supple.   Skin:     General: Skin is warm and dry.      Capillary Refill: Capillary refill takes less than 2 seconds.   Neurological:      General: No focal deficit present.      Mental Status: She is alert and oriented to person, place, and time.   Psychiatric:         Mood and Affect: Mood normal.         Behavior: Behavior normal.         Additional Data:     Lab Results:  Results from last 7 days   Lab Units 05/12/24  1617   WBC Thousand/uL 16.10*   HEMOGLOBIN g/dL 12.7   HEMATOCRIT % " 41.0   PLATELETS Thousands/uL 285   BANDS PCT % 3   LYMPHO PCT % 6*   MONO PCT % 2*   EOS PCT % 0     Results from last 7 days   Lab Units 05/12/24  1617   SODIUM mmol/L 133*   POTASSIUM mmol/L 3.5   CHLORIDE mmol/L 95*   CO2 mmol/L 26   BUN mg/dL 22   CREATININE mg/dL 1.22   ANION GAP mmol/L 12   CALCIUM mg/dL 9.1   ALBUMIN g/dL 4.5   TOTAL BILIRUBIN mg/dL 0.78   ALK PHOS U/L 103   ALT U/L 39   AST U/L 36   GLUCOSE RANDOM mg/dL 168*     Results from last 7 days   Lab Units 05/12/24  1617   INR  0.92     Results from last 7 days   Lab Units 05/12/24  2151 05/12/24  1635   POC GLUCOSE mg/dl 131 161*         Results from last 7 days   Lab Units 05/12/24  1749 05/12/24  1617   LACTIC ACID mmol/L 2.3* 3.3*   PROCALCITONIN ng/ml  --  0.33*       Lines/Drains:  Invasive Devices       Central Venous Catheter Line  Duration             Port A Cath Left Chest -- days              Peripheral Intravenous Line  Duration             Peripheral IV 05/12/24 Left Antecubital <1 day                        Imaging: Reviewed radiology reports from this admission including: abdominal/pelvic CT  CT abdomen pelvis with contrast   Final Result by Cliff Oseguera MD (05/12 1835)      Bladder mucosal hyperenhancement which raises concern for cystitis. Correlation with urinalysis recommended.         Workstation performed: [id number]             ** Please Note: This note has been constructed using a voice recognition system. **

## 2024-05-13 NOTE — PLAN OF CARE
Problem: Potential for Falls  Goal: Patient will remain free of falls  Description: INTERVENTIONS:  - Educate patient/family on patient safety including physical limitations  - Instruct patient to call for assistance with activity   - Consult OT/PT to assist with strengthening/mobility   - Keep Call bell within reach  - Keep bed low and locked with side rails adjusted as appropriate  - Keep care items and personal belongings within reach  - Initiate and maintain comfort rounds  - Make Fall Risk Sign visible to staff  - Apply yellow socks and bracelet for high fall risk patients  - Consider moving patient to room near nurses station  Outcome: Progressing     Problem: PAIN - ADULT  Goal: Verbalizes/displays adequate comfort level or baseline comfort level  Description: Interventions:  - Encourage patient to monitor pain and request assistance  - Assess pain using appropriate pain scale  - Administer analgesics based on type and severity of pain and evaluate response  - Implement non-pharmacological measures as appropriate and evaluate response  - Consider cultural and social influences on pain and pain management  - Notify physician/advanced practitioner if interventions unsuccessful or patient reports new pain  Outcome: Progressing     Problem: INFECTION - ADULT  Goal: Absence or prevention of progression during hospitalization  Description: INTERVENTIONS:  - Assess and monitor for signs and symptoms of infection  - Monitor lab/diagnostic results  - Monitor all insertion sites, i.e. indwelling lines, tubes, and drains  - Monitor endotracheal if appropriate and nasal secretions for changes in amount and color  - [place] appropriate cooling/warming therapies per order  - Administer medications as ordered  - Instruct and encourage patient and family to use good hand hygiene technique  - Identify and instruct in appropriate isolation precautions for identified infection/condition  Outcome: Progressing  Goal: Absence  of fever/infection during neutropenic period  Description: INTERVENTIONS:  - Monitor WBC    Outcome: Progressing     Problem: SAFETY ADULT  Goal: Patient will remain free of falls  Description: INTERVENTIONS:  - Educate patient/family on patient safety including physical limitations  - Instruct patient to call for assistance with activity   - Consult OT/PT to assist with strengthening/mobility   - Keep Call bell within reach  - Keep bed low and locked with side rails adjusted as appropriate  - Keep care items and personal belongings within reach  - Initiate and maintain comfort rounds  - Make Fall Risk Sign visible to staff  - Obtain necessary fall risk management equipment  - Apply yellow socks and bracelet for high fall risk patients  - Consider moving patient to room near nurses station  Outcome: Progressing  Goal: Maintain or return to baseline ADL function  Description: INTERVENTIONS:  - Educate patient/family on patient safety including physical limitations  - Instruct patient to call for assistance with activity   - Consult OT/PT to assist with strengthening/mobility   - Keep Call bell within reach  - Keep bed low and locked with side rails adjusted as appropriate  - Keep care items and personal belongings within reach  - Initiate and maintain comfort rounds  - Make Fall Risk Sign visible to staff  - Offer Toileting every 2 Hours, in advance of need  - Initiate/Maintain bed alarm  - Obtain necessary fall risk management equipment  - Apply yellow socks and bracelet for high fall risk patients  - Consider moving patient to room near nurses station  Outcome: Progressing  Goal: Maintains/Returns to pre admission functional level  Description: INTERVENTIONS:  -  Assess patient's ability to carry out ADLs; assess patient's baseline for ADL function and identify physical deficits which impact ability to perform ADLs (bathing, care of mouth/teeth, toileting, grooming, dressing, etc.)  - Assess/evaluate cause of  self-care deficits   - Assess range of motion  - Assess patient's mobility; develop plan if impaired  - Assess patient's need for assistive devices and provide as appropriate  - Encourage maximum independence but intervene and supervise when necessary  - Involve family in performance of ADLs  - Assess for home care needs following discharge   - Consider OT consult to assist with ADL evaluation and planning for discharge  - Provide patient education as appropriate  Outcome: Progressing     Problem: DISCHARGE PLANNING  Goal: Discharge to home or other facility with appropriate resources  Description: INTERVENTIONS:  - Identify barriers to discharge w/patient and caregiver  - Arrange for needed discharge resources and transportation as appropriate  - Identify discharge learning needs (meds, wound care, etc.)  - Arrange for interpretive services to assist at discharge as needed  - Refer to Case Management Department for coordinating discharge planning if the patient needs post-hospital services based on physician/advanced practitioner order or complex needs related to functional status, cognitive ability, or social support system  Outcome: Progressing     Problem: Knowledge Deficit  Goal: Patient/family/caregiver demonstrates understanding of disease process, treatment plan, medications, and discharge instructions  Description: Complete learning assessment and assess knowledge base.  Interventions:  - Provide teaching at level of understanding  - Provide teaching via preferred learning methods  Outcome: Progressing

## 2024-05-13 NOTE — ASSESSMENT & PLAN NOTE
Meeting criteria with tachycardia, leukocytosis, lactic acid elevation  Lactic acid cleared with fluid resuscitation  UA positive pyuria  Blood and urine cultures pending  WBC count is since normalized  Continue ceftriaxone  Monitor WBC count

## 2024-05-13 NOTE — ED NOTES
Surjit text sent to nurse that pt will be up to room shortly.      aMyra Rondon, RN  05/12/24 2003

## 2024-05-13 NOTE — PLAN OF CARE
Problem: PAIN - ADULT  Goal: Verbalizes/displays adequate comfort level or baseline comfort level  Description: Interventions:  - Encourage patient to monitor pain and request assistance  - Assess pain using appropriate pain scale  - Administer analgesics based on type and severity of pain and evaluate response  - Implement non-pharmacological measures as appropriate and evaluate response  - Consider cultural and social influences on pain and pain management  - Notify physician/advanced practitioner if interventions unsuccessful or patient reports new pain  Outcome: Progressing     Problem: INFECTION - ADULT  Goal: Absence or prevention of progression during hospitalization  Description: INTERVENTIONS:  - Assess and monitor for signs and symptoms of infection  - Monitor lab/diagnostic results  - Monitor all insertion sites, i.e. indwelling lines, tubes, and drains  - Monitor endotracheal if appropriate and nasal secretions for changes in amount and color  - [place] appropriate cooling/warming therapies per order  - Administer medications as ordered  - Instruct and encourage patient and family to use good hand hygiene technique  - Identify and instruct in appropriate isolation precautions for identified infection/condition  Outcome: Progressing

## 2024-05-13 NOTE — UTILIZATION REVIEW
Initial Clinical Review    Admission: Date/Time/Statement:   Admission Orders (From admission, onward)       Ordered        05/12/24 1951  INPATIENT ADMISSION  Once                          Orders Placed This Encounter   Procedures    INPATIENT ADMISSION     Standing Status:   Standing     Number of Occurrences:   1     Order Specific Question:   Level of Care     Answer:   Med Surg [16]     Order Specific Question:   Estimated length of stay     Answer:   More than 2 Midnights     Order Specific Question:   Certification     Answer:   I certify that inpatient services are medically necessary for this patient for a duration of greater than two midnights. See H&P and MD Progress Notes for additional information about the patient's course of treatment.     ED Arrival Information       Expected   -    Arrival   5/12/2024 15:57    Acuity   Urgent              Means of arrival   Walk-In    Escorted by   Family Member    Service   Hospitalist    Admission type   Emergency              Arrival complaint   Abdominal Pain             Chief Complaint   Patient presents with    Abdominal Pain     Patient reports abdominal pain since yesterday. Patient last BM Thursday. Patient reports N/V.       Initial Presentation: 58 y.o. female to ED via walk-in from home  Present to ED with abdominal pain.  Meeting severe sepsis criteria, found to have UTI.   PMHX: breast cancer status postmastectomy currently receiving therapy, diabetes mellitus, GERD, hypertension, hyperlipidemia, von Willebrand's, SDH    Admitted to [facility] with DX: Severe sepsis   on exam: tachypnea; RA sat 89% - placed on O2 @ 2L via nc sat 96%; abdominal tenderness in the RLQ, suprapubic area and LLQ; pain 9/10; WBC 16.10; Na 133; LA 3.3 / 2.3; Procal 0.33; UA positive pyuria   PLAN: cont iv abx; pain/ nausea control (see below); Accuchecks with ssic; monitor labs; f/u blood / urine cx      Anticipated Length of Stay/Certification Statement: Patient will be admitted on  an inpatient basis with an anticipated length of stay of greater than 2 midnights secondary to sepsis, UTI.       Date: 5/13/24      Day 2  Does report some intermittent abdominal discomfort. Pain 8/10; WBC count is since normalized ; Sodium now within normal limits   Plan: cont iv abx; pain/ nausea control (see below); Accuchecks with ssic; monitor labs; f/u blood / urine cx      Date: 5/14/24      Day 3: Has surpassed a 2nd midnight with active treatments and services.  Continues with lower abd pain and nausea; CT abdomen and pelvis suspected of UTI; Some abd discomfort no bm since last week- will give dulxolax and place on colace bid; Blood pressure is soft decrease lisinopril to 10 mg daily; K 3.4; Procal 0.40  Plan: cont iv abx; pain/ nausea control (see below); Accuchecks with ssic; monitor labs; f/u blood / urine cx      ED Triage Vitals   Temperature Pulse Respirations Blood Pressure SpO2   05/12/24 1616 05/12/24 1607 05/12/24 1607 05/12/24 1607 05/12/24 1607   (!) 96.1 °F (35.6 °C) 93 20 119/64 96 %      Temp Source Heart Rate Source Patient Position - Orthostatic VS BP Location FiO2 (%)   05/12/24 1616 05/12/24 1607 05/12/24 1607 05/12/24 2000 --   Temporal Monitor Sitting Left arm       Pain Score       05/12/24 1607       9          Wt Readings from Last 1 Encounters:   05/12/24 105 kg (230 lb 9.6 oz)     Additional Vital Signs:   Date/Time Temp Pulse Resp BP MAP (mmHg) SpO2 Calculated FIO2 (%) - Nasal Cannula Nasal Cannula O2 Flow Rate (L/min) O2 Device Patient Position - Orthostatic VS   05/14/24 07:26:14 98.8 °F (37.1 °C) 79 20 111/43 Abnormal  66 90 % -- -- None (Room air) Lying   05/13/24 22:34:44 -- 83 -- 99/49 Abnormal  66 92 % -- -- -- --   05/13/24 2113 -- -- -- 115/60           Date/Time Temp Pulse Resp BP MAP (mmHg) SpO2 Calculated FIO2 (%) - Nasal Cannula Nasal Cannula O2 Flow Rate (L/min) O2 Device Patient Position - Orthostatic VS   05/13/24 15:53:53 97.9 °F (36.6 °C) 78 18 129/60 83 92 %  -- -- -- --   05/13/24 1040 -- 77 -- -- -- -- -- -- -- --   05/13/24 0902 -- -- -- -- -- 91 % -- -- None (Room air) --   05/13/24 08:14:48 97.9 °F (36.6 °C) 90 18 118/58 78 92 % -- -- -- --   05/13/24 0744 -- -- -- -- -- -- 28 2 L/min  Nasal cannula --   Nasal Cannula O2 Flow Rate (L/min): while sleeping at 05/13/24 0744   05/13/24 0256 -- -- -- -- -- 93 % 28 2 L/min None (Room air) --   05/12/24 21:36:07 -- 80 20 148/75 99 90 % -- -- None (Room air) Sitting   05/12/24 2031 -- -- -- -- -- -- -- -- None (Room air) --   05/12/24 20:17:37 97.5 °F (36.4 °C) 84 18 160/76 104 93 % -- -- -- --   05/12/24 2000 -- 83 16 136/71 -- 93 % -- -- None (Room air) --   05/12/24 1845 -- 85 16 130/65 -- 93 % -- -- None (Room air) --   05/12/24 1745 -- 85 -- 130/65 90 98 % -- -- -- --   05/12/24 1645 -- 80 -- 116/64 87 96 % -- -- -- --   05/12/24 1630 -- 83 -- 116/64 87 89 % Abnormal  28 2 L/min Nasal cannula --   05/12/24 1616 96.1 °F (35.6 °C) Abnormal  -- -- -- -- -- -- -- -- --   05/12/24 1607 -- 93 20 119/64 -- 96 % -- -- None (Room air) Sitting       EKG: None obtained      Pertinent Labs/Diagnostic Test Results:   CT abdomen pelvis with contrast   Final Result by Cliff Oseguera MD (05/12 1835)      Bladder mucosal hyperenhancement which raises concern for cystitis. Correlation with urinalysis recommended.         Workstation performed: [id number]           Results from last 7 days   Lab Units 05/12/24  1617   SARS-COV-2  Negative     Results from last 7 days   Lab Units 05/14/24  0606 05/13/24  0508 05/12/24  1617   WBC Thousand/uL 3.38* 8.89 16.10*   HEMOGLOBIN g/dL 9.8* 10.1* 12.7   HEMATOCRIT % 30.4* 32.8* 41.0   PLATELETS Thousands/uL 170 197 285   BANDS PCT %  --   --  3        Results from last 7 days   Lab Units 05/14/24  0606 05/13/24  0508 05/12/24  1617   SODIUM mmol/L 138 135 133*   POTASSIUM mmol/L 3.4* 3.4* 3.5   CHLORIDE mmol/L 102 101 95*   CO2 mmol/L 30 25 26   ANION GAP mmol/L 6 9 12   BUN mg/dL 19 23 22    CREATININE mg/dL 0.80 0.98 1.22   EGFR ml/min/1.73sq m 80 63 48   CALCIUM mg/dL 8.7 8.0* 9.1   MAGNESIUM mg/dL  --  1.9 2.1     Results from last 7 days   Lab Units 05/12/24  1617   AST U/L 36   ALT U/L 39   ALK PHOS U/L 103   TOTAL PROTEIN g/dL 7.6   ALBUMIN g/dL 4.5   TOTAL BILIRUBIN mg/dL 0.78     Results from last 7 days   Lab Units 05/14/24  1116 05/14/24  0800 05/13/24  2119 05/13/24  1625 05/13/24  1136 05/13/24  0812 05/12/24  2151 05/12/24  1635   POC GLUCOSE mg/dl 121 118 150* 130 105 117 131 161*     Results from last 7 days   Lab Units 05/14/24  0606 05/13/24  0508 05/12/24  1617   GLUCOSE RANDOM mg/dL 114 121 168*        Results from last 7 days   Lab Units 05/12/24  1617   HEMOGLOBIN A1C % 6.8*   EAG mg/dl 148     Beta- Hydroxybutyrate   Date Value Ref Range Status   05/12/2024 <0.05 0.02 - 0.27 mmol/L Final         Results from last 7 days   Lab Units 05/12/24  1617   PH MICHELLE  7.426*   PCO2 MICHELLE mm Hg 38.4*   PO2 MICHELLE mm Hg 27.9*   HCO3 MICHELLE mmol/L 24.7   BASE EXC MICHELLE mmol/L 0.5   O2 CONTENT MICHELLE ml/dL 10.6   O2 HGB, VENOUS % 52.8*        Results from last 7 days   Lab Units 05/12/24  1617   PROTIME seconds 12.6   INR  0.92   PTT seconds 28        Results from last 7 days   Lab Units 05/14/24  0606 05/13/24  0508 05/12/24  1617   PROCALCITONIN ng/ml 0.40* 1.44* 0.33*     Results from last 7 days   Lab Units 05/13/24  0508 05/12/24  1749 05/12/24  1617   LACTIC ACID mmol/L 1.4 2.3* 3.3*        Results from last 7 days   Lab Units 05/12/24  1617   LIPASE u/L 10*        Results from last 7 days   Lab Units 05/12/24  1935   CLARITY UA  Cloudy   COLOR UA  Yellow   SPEC GRAV UA  <=1.005   PH UA  5.5   GLUCOSE UA mg/dl Negative   KETONES UA mg/dl Negative   BLOOD UA  Trace-Intact*   PROTEIN UA mg/dl Negative   NITRITE UA  Negative   BILIRUBIN UA  Negative   UROBILINOGEN UA E.U./dl 0.2   LEUKOCYTES UA  Moderate*   WBC UA /hpf Innumerable*   RBC UA /hpf 0-5   BACTERIA UA /hpf Moderate*   EPITHELIAL CELLS WET PREP  /hpf Occasional     Results from last 7 days   Lab Units 05/12/24  1617   INFLUENZA A PCR  Negative   INFLUENZA B PCR  Negative   RSV PCR  Negative        Results from last 7 days   Lab Units 05/12/24  1935 05/12/24  1617   BLOOD CULTURE   --  No Growth at 24 hrs.  No Growth at 24 hrs.   URINE CULTURE  Culture too young- will reincubate  --           ED Treatment:   Medication Administration from 05/12/2024 1556 to 05/12/2024 2011         Date/Time Order Dose Route Action     05/12/2024 1622 EDT sodium chloride 0.9 % bolus 1,000 mL 1,000 mL Intravenous New Bag     05/12/2024 1624 EDT piperacillin-tazobactam (ZOSYN) 4.5 g in sodium chloride 0.9 % 100 mL IVPB 4.5 g Intravenous New Bag     05/12/2024 1621 EDT HYDROmorphone (DILAUDID) injection 1 mg 1 mg Intravenous Given     05/12/2024 1752 EDT sodium chloride 0.9 % bolus 1,000 mL 1,000 mL Intravenous New Bag     05/12/2024 1737 EDT iohexol (OMNIPAQUE) 350 MG/ML injection (MULTI-DOSE) 100 mL 100 mL Intravenous Given     05/12/2024 1752 EDT ondansetron (ZOFRAN) injection 4 mg 4 mg Intravenous Given     05/12/2024 1955 EDT sodium chloride 0.9 % bolus 1,000 mL 1,000 mL Intravenous New Bag            Present on Admission:   Essential hypertension   Von Willebrand disease (HCC)   Type 2 diabetes mellitus without complication, without long-term current use of insulin ([facility])      Admitting Diagnosis: Acute cystitis [N30.00]  Abdominal pain [R10.9]  Severe sepsis (HCC) [A41.9, R65.20]    Age/Sex: 58 y.o. female    Admission Orders: SCDs; Consistent Carbohydrate Diet. Blood glucose checks ACHS.       Scheduled Medications:  anastrozole, 1 mg, Oral, Daily  atorvastatin, 40 mg, Oral, Daily With Dinner  buPROPion, 450 mg, Oral, Daily  cefTRIAXone, 1,000 mg, Intravenous, Q24H  escitalopram, 20 mg, Oral, Daily  Fluticasone Furoate-Vilanterol, 1 puff, Inhalation, Daily  insulin lispro, 1-6 Units, Subcutaneous, TID AC  insulin lispro, 1-6 Units, Subcutaneous, HS  lisinopril, 20 mg,  Oral, Daily  loratadine, 10 mg, Oral, Daily  montelukast, 10 mg, Oral, HS  nortriptyline, 50 mg, Oral, HS  pantoprazole, 20 mg, Oral, BID AC  prazosin, 5 mg, Oral, HS  QUEtiapine, 200 mg, Oral, HS  traZODone, 300 mg, Oral, HS      Continuous IV Infusions: None       PRN Meds:  acetaminophen, 650 mg, Oral, Q6H PRN (5/13 recd x1)   albuterol, 2 puff, Inhalation, Q4H PRN  busPIRone, 15 mg, Oral, TID PRN (5/13 recd x1)   oxyCODONE, 5 mg, Oral, Q6H PRN  (5/13 recd x2)     HYDROmorphone (DILAUDID) injection 1 mg  Dose: 1 mg  Freq: Once Route: IV  Start: 05/12/24 1615 End: 05/12/24 1621     HYDROmorphone (DILAUDID) injection 0.5 mg  Dose: 0.5 mg  Freq: Once Route: IV  Start: 05/13/24 0330 End: 05/13/24 0329           Network Utilization Review Department  ATTENTION: Please call with any questions or concerns to [phone] and carefully listen to the prompts so that you are directed to the right person. All voicemails are confidential.   For Discharge needs, contact Care Management DC Support Team at [phone] opt. 2  Send all requests for admission clinical reviews, approved or denied determinations and any other requests to dedicated fax number below belonging to the campus where the patient is receiving treatment. List of dedicated fax numbers for the Facilities:  FACILITY NAME UR FAX NUMBER   ADMISSION DENIALS (Administrative/Medical Necessity) [phone]   DISCHARGE SUPPORT TEAM (NETWORK) [phone]   PARENT CHILD HEALTH (Maternity/NICU/Pediatrics) [phone]   [facility] [phone]   [facility] [phone]   [facility] [phone]   [facility] [phone]   [facility] [phone]   [facility] [phone]   [facility] [phone]   [facility] [phone]   [facility]  [facility] [phone]   [facility] [phone]   [facility] [phone]   [facility] [phone]

## 2024-05-13 NOTE — UTILIZATION REVIEW
NOTIFICATION OF INPATIENT ADMISSION   AUTHORIZATION REQUEST   SERVICING FACILITY:   [place] [address]  Tax ID: [id number]  NPI: [id number] ATTENDING PROVIDER:  Attending Name and NPI#: Sam Mcgovern Md [[id number]]  Address: [address] [place] [address]  Phone: [phone]   ADMISSION INFORMATION:  Place of Service: Inpatient [facility]  Place of Service Code: 21  Inpatient Admission Date/Time: 5/12/24  7:52 PM  Discharge Date/Time: No discharge date for patient encounter.  Admitting Diagnosis Code/Description:  Acute cystitis [N30.00]  Abdominal pain [R10.9]  Severe sepsis (HCC) [A41.9, R65.20]     UTILIZATION REVIEW CONTACT:  Shanel Hall, Utilization   Network Utilization Review Department  Phone: [phone]  Fax [phone]  Email: [email or web address]  Contact for approvals/pending authorizations, clinical reviews, and discharge.     PHYSICIAN ADVISORY SERVICES:  Medical Necessity Denial & Wyhk-mq-Pgem Review  Phone: [phone]  Fax: [phone]  Email: [email or web address]     DISCHARGE SUPPORT TEAM:  For Patients Discharge Needs & Updates  Phone: [phone] opt. 2 Fax: [phone]  Email: [email or web address]

## 2024-05-13 NOTE — PROGRESS NOTES
[facility]  Progress Note  Name: Tammie Avalos I  MRN: [id number]  Unit/Bed#: -01 I Date of Admission: 5/12/2024   Date of Service: 5/13/2024 I Hospital Day: 1    Assessment/Plan   * Severe sepsis (HCC)  Assessment & Plan  Meeting criteria with tachycardia, leukocytosis, lactic acid elevation  Lactic acid cleared with fluid resuscitation  UA positive pyuria  Blood and urine cultures pending  WBC count is since normalized  Continue ceftriaxone  Monitor WBC count    UTI (urinary tract infection)  Assessment & Plan  Presented with abdominal pain  Meeting severe sepsis criteria  CT abdomen pelvis unremarkable  UA with innumerable pyuria and bacteriuria  Continue ceftriaxone  Follow-up urine culture    Hyponatremia  Assessment & Plan  Hours sodium 133  Hypovolemia hyponatremia  Sodium now within normal limits    Type 2 diabetes mellitus without complication, without long-term current use of insulin ([facility])  Assessment & Plan  Lab Results   Component Value Date    HGBA1C 6.8 (H) 05/12/2024       Recent Labs     05/12/24  1635 05/12/24  2151 05/13/24  0812   POCGLU 161* 131 117         Blood Sugar Average: Last 72 hrs:  (P) [id number]    Metformin on hold  A1c 6.8  Sliding-scale insulin  Monitor blood glucose    Malignant neoplasm of central portion of right breast in female, estrogen receptor positive (HCC)  Assessment & Plan  S/p mastectomy  Currently receiving chemotherapy  Continue Arimidex    Essential hypertension  Assessment & Plan  Continue lisinopril 20 mg daily, chlorthalidone is on hold  Blood pressure remains controlled    Von Willebrand disease (HCC)  Assessment & Plan  Avoid anticoagulation  Monitor for bleeding         VTE Pharmacologic Prophylaxis:   Pharmacologic: Pharmacologic VTE Prophylaxis contraindicated due to von Willebrand's disease  Mechanical VTE Prophylaxis in Place: Yes    Patient Centered Rounds: I have performed bedside rounds with nursing staff  today.    Discussions with Specialists or Other Care Team Provider: cm, nursing    Education and Discussions with Family / Patient: pt, declined family update    Time Spent for Care: 30 minutes.  More than 50% of total time spent on counseling and coordination of care as described above.    Current Length of Stay: 1 day(s)    Current Patient Status: Inpatient   Certification Statement: The patient will continue to require additional inpatient hospital stay due to see below    Discharge Plan: Still requiring IV antibiotics.  Dissipate discharge tomorrow if continues to clinically improve    Code Status: Level 1 - Full Code      Subjective:   Denies chest pain.  Does report some intermittent abdominal discomfort.  Denies fevers, chills or any other complaints    Objective:     Vitals:   Temp (24hrs), Av.2 °F (36.2 °C), Min:96.1 °F (35.6 °C), Max:97.9 °F (36.6 °C)    Temp:  [96.1 °F (35.6 °C)-97.9 °F (36.6 °C)] 97.9 °F (36.6 °C)  HR:  [80-93] 90  Resp:  [16-20] 18  BP: (116-160)/(58-76) 118/58  SpO2:  [89 %-98 %] 91 %  Body mass index is 36.12 kg/m².     Input and Output Summary (last 24 hours):       Intake/Output Summary (Last 24 hours) at 2024 1007  Last data filed at 2024 0915  Gross per 24 hour   Intake 2340 ml   Output --   Net 2340 ml       Physical Exam:     Physical Exam  Constitutional:       General: She is not in acute distress.     Appearance: She is well-developed. She is not diaphoretic.   HENT:      Head: Normocephalic and atraumatic.      Nose: Nose normal.      Mouth/Throat:      Pharynx: No oropharyngeal exudate.   Eyes:      General: No scleral icterus.        Right eye: No discharge.         Left eye: No discharge.      Conjunctiva/sclera: Conjunctivae normal.   Neck:      Thyroid: No thyromegaly.      Vascular: No JVD.   Cardiovascular:      Rate and Rhythm: Normal rate and regular rhythm.      Heart sounds: Normal heart sounds. No murmur heard.     No friction rub. No gallop.    Pulmonary:      Effort: Pulmonary effort is normal. No respiratory distress.      Breath sounds: Normal breath sounds. No wheezing or rales.   Chest:      Chest wall: No tenderness.   Abdominal:      General: Bowel sounds are normal. There is no distension.      Palpations: Abdomen is soft.      Tenderness: There is no abdominal tenderness. There is no guarding or rebound.   Musculoskeletal:         General: No tenderness or deformity. Normal range of motion.      Cervical back: Normal range of motion and neck supple.   Skin:     General: Skin is warm and dry.      Findings: No erythema or rash.   Neurological:      Mental Status: She is alert. Mental status is at baseline.      Cranial Nerves: No cranial nerve deficit.      Sensory: No sensory deficit.      Motor: No abnormal muscle tone.      Coordination: Coordination normal.       )    Additional Data:     Labs:    Results from last 7 days   Lab Units 05/13/24  0508 05/12/24  1617   WBC Thousand/uL 8.89 16.10*   HEMOGLOBIN g/dL 10.1* 12.7   HEMATOCRIT % 32.8* 41.0   PLATELETS Thousands/uL 197 285   BANDS PCT %  --  3   LYMPHO PCT %  --  6*   MONO PCT %  --  2*   EOS PCT %  --  0     Results from last 7 days   Lab Units 05/13/24  0508 05/12/24  1617   SODIUM mmol/L 135 133*   POTASSIUM mmol/L 3.4* 3.5   CHLORIDE mmol/L 101 95*   CO2 mmol/L 25 26   BUN mg/dL 23 22   CREATININE mg/dL 0.98 1.22   ANION GAP mmol/L 9 12   CALCIUM mg/dL 8.0* 9.1   ALBUMIN g/dL  --  4.5   TOTAL BILIRUBIN mg/dL  --  0.78   ALK PHOS U/L  --  103   ALT U/L  --  39   AST U/L  --  36   GLUCOSE RANDOM mg/dL 121 168*     Results from last 7 days   Lab Units 05/12/24  1617   INR  0.92     Results from last 7 days   Lab Units 05/13/24  0812 05/12/24  2151 05/12/24  1635   POC GLUCOSE mg/dl 117 131 161*     Results from last 7 days   Lab Units 05/12/24  1617   HEMOGLOBIN A1C % 6.8*     Results from last 7 days   Lab Units 05/13/24  0508 05/12/24  1749 05/12/24  1617   LACTIC ACID mmol/L 1.4  2.3* 3.3*   PROCALCITONIN ng/ml 1.44*  --  0.33*           * I Have Reviewed All Lab Data Listed Above.  * Additional Pertinent Lab Tests Reviewed: All Labs Within Last 24 Hours Reviewed    Imaging:    Imaging Reports Reviewed Today Include: na  Imaging Personally Reviewed by Myself Includes:  na    Recent Cultures (last 7 days):     Results from last 7 days   Lab Units 05/12/24  1617   BLOOD CULTURE  Received in Microbiology Lab. Culture in Progress.  Received in Microbiology Lab. Culture in Progress.       Last 24 Hours Medication List:   Current Facility-Administered Medications   Medication Dose Route Frequency Provider Last Rate    acetaminophen  650 mg Oral Q6H PRN Gilda S Megan, CRNP      albuterol  2 puff Inhalation Q4H PRN Gilda S Megan, CRNP      anastrozole  1 mg Oral Daily Gilda S Megan, CRNP      atorvastatin  40 mg Oral Daily With Dinner Gilda S Megan, CRNP      buPROPion  450 mg Oral Daily Gilda S Megan, CRNP      busPIRone  15 mg Oral TID PRN Gilda S Megan, CRNP      cefTRIAXone  1,000 mg Intravenous Q24H Gilda S Megan, CRNP 1,000 mg (05/13/24 0903)    escitalopram  20 mg Oral Daily Gilda S Megan, CRNP      Fluticasone Furoate-Vilanterol  1 puff Inhalation Daily Gilda S Megan, CRNP      insulin lispro  1-6 Units Subcutaneous TID AC Gilda S Megan, CRNP      insulin lispro  1-6 Units Subcutaneous HS Gilda S Megan, CRNP      lisinopril  20 mg Oral Daily Gilda S Megan, CRNP      loratadine  10 mg Oral Daily Gilda S Megan, CRNP      montelukast  10 mg Oral HS Gilda S Megan, CRNP      nortriptyline  50 mg Oral HS Gilda S Megan, CRNP      oxyCODONE  5 mg Oral Q6H PRN Gilda S Megan, CRNP      pantoprazole  20 mg Oral BID AC Gilda S Megan, CRNP      prazosin  5 mg Oral HS Gilda S Megan, CRNP      QUEtiapine  200 mg Oral HS Gilda S Megan, CRNP      traZODone  300 mg Oral HS Gilda S Megan, CRNP       Like hearing my surroundings  Today, Patient Was Seen By: Sam Mcgovern MD    ** Please Note: Dictation voice  to text software may have been used in the creation of this document. **

## 2024-05-13 NOTE — ASSESSMENT & PLAN NOTE
Presented with abdominal pain  Meeting severe sepsis criteria  CT abdomen pelvis unremarkable  UA with innumerable pyuria and bacteriuria  Urine culture pending  Empiric ceftriaxone

## 2024-05-13 NOTE — ASSESSMENT & PLAN NOTE
Meeting criteria with tachycardia, leukocytosis, lactic acid elevation  Lactic acid cleared with fluid resuscitation  UA positive pyuria  Blood and urine cultures pending  Empiric ceftriaxone  Trend fever curve, leukocytosis

## 2024-05-13 NOTE — ASSESSMENT & PLAN NOTE
Presented with abdominal pain  Meeting severe sepsis criteria  CT abdomen pelvis unremarkable  UA with innumerable pyuria and bacteriuria  Continue ceftriaxone  Follow-up urine culture

## 2024-05-14 PROBLEM — K59.00 CONSTIPATION: Status: ACTIVE | Noted: 2024-05-14

## 2024-05-14 LAB
ANION GAP SERPL CALCULATED.3IONS-SCNC: 6 MMOL/L (ref 4–13)
BUN SERPL-MCNC: 19 MG/DL (ref 5–25)
CALCIUM SERPL-MCNC: 8.7 MG/DL (ref 8.4–10.2)
CHLORIDE SERPL-SCNC: 102 MMOL/L (ref 96–108)
CO2 SERPL-SCNC: 30 MMOL/L (ref 21–32)
CREAT SERPL-MCNC: 0.8 MG/DL (ref 0.6–1.3)
ERYTHROCYTE [DISTWIDTH] IN BLOOD BY AUTOMATED COUNT: 14.9 % (ref 11.6–15.1)
GFR SERPL CREATININE-BSD FRML MDRD: 80 ML/MIN/1.73SQ M
GLUCOSE SERPL-MCNC: 114 MG/DL (ref 65–140)
GLUCOSE SERPL-MCNC: 118 MG/DL (ref 65–140)
GLUCOSE SERPL-MCNC: 120 MG/DL (ref 65–140)
GLUCOSE SERPL-MCNC: 121 MG/DL (ref 65–140)
GLUCOSE SERPL-MCNC: 128 MG/DL (ref 65–140)
HCT VFR BLD AUTO: 30.4 % (ref 34.8–46.1)
HGB BLD-MCNC: 9.8 G/DL (ref 11.5–15.4)
MCH RBC QN AUTO: 27.1 PG (ref 26.8–34.3)
MCHC RBC AUTO-ENTMCNC: 32.2 G/DL (ref 31.4–37.4)
MCV RBC AUTO: 84 FL (ref 82–98)
PLATELET # BLD AUTO: 170 THOUSANDS/UL (ref 149–390)
PMV BLD AUTO: 11.3 FL (ref 8.9–12.7)
POTASSIUM SERPL-SCNC: 3.4 MMOL/L (ref 3.5–5.3)
PROCALCITONIN SERPL-MCNC: 0.4 NG/ML
RBC # BLD AUTO: 3.62 MILLION/UL (ref 3.81–5.12)
SODIUM SERPL-SCNC: 138 MMOL/L (ref 135–147)
WBC # BLD AUTO: 3.38 THOUSAND/UL (ref 4.31–10.16)

## 2024-05-14 PROCEDURE — 82948 REAGENT STRIP/BLOOD GLUCOSE: CPT

## 2024-05-14 PROCEDURE — 85027 COMPLETE CBC AUTOMATED: CPT | Performed by: INTERNAL MEDICINE

## 2024-05-14 PROCEDURE — 99232 SBSQ HOSP IP/OBS MODERATE 35: CPT | Performed by: FAMILY MEDICINE

## 2024-05-14 PROCEDURE — 84145 PROCALCITONIN (PCT): CPT | Performed by: FAMILY MEDICINE

## 2024-05-14 PROCEDURE — 80048 BASIC METABOLIC PNL TOTAL CA: CPT | Performed by: INTERNAL MEDICINE

## 2024-05-14 RX ORDER — BISACODYL 5 MG/1
10 TABLET, DELAYED RELEASE ORAL ONCE
Qty: 2 TABLET | Refills: 0 | Status: COMPLETED | OUTPATIENT
Start: 2024-05-14 | End: 2024-05-14

## 2024-05-14 RX ORDER — POTASSIUM CHLORIDE 20 MEQ/1
20 TABLET, EXTENDED RELEASE ORAL ONCE
Status: COMPLETED | OUTPATIENT
Start: 2024-05-14 | End: 2024-05-14

## 2024-05-14 RX ORDER — DOCUSATE SODIUM 100 MG/1
100 CAPSULE, LIQUID FILLED ORAL 2 TIMES DAILY
Status: DISCONTINUED | OUTPATIENT
Start: 2024-05-14 | End: 2024-05-15 | Stop reason: HOSPADM

## 2024-05-14 RX ORDER — LISINOPRIL 10 MG/1
10 TABLET ORAL DAILY
Status: DISCONTINUED | OUTPATIENT
Start: 2024-05-15 | End: 2024-05-15 | Stop reason: HOSPADM

## 2024-05-14 RX ADMIN — BUPROPION HYDROCHLORIDE 450 MG: 150 TABLET, EXTENDED RELEASE ORAL at 08:59

## 2024-05-14 RX ADMIN — OXYCODONE HYDROCHLORIDE 5 MG: 5 TABLET ORAL at 19:18

## 2024-05-14 RX ADMIN — PANTOPRAZOLE SODIUM 20 MG: 20 TABLET, DELAYED RELEASE ORAL at 06:09

## 2024-05-14 RX ADMIN — BISACODYL 10 MG: 5 TABLET, COATED ORAL at 11:55

## 2024-05-14 RX ADMIN — MONTELUKAST 10 MG: 10 TABLET, FILM COATED ORAL at 21:16

## 2024-05-14 RX ADMIN — FLUTICASONE FUROATE AND VILANTEROL TRIFENATATE 1 PUFF: 100; 25 POWDER RESPIRATORY (INHALATION) at 09:00

## 2024-05-14 RX ADMIN — ANASTROZOLE 1 MG: 1 TABLET, COATED ORAL at 09:00

## 2024-05-14 RX ADMIN — MAGNESIUM HYDROXIDE 30 ML: 400 SUSPENSION ORAL at 16:17

## 2024-05-14 RX ADMIN — TRAZODONE HYDROCHLORIDE 300 MG: 100 TABLET ORAL at 21:16

## 2024-05-14 RX ADMIN — PRAZOSIN HYDROCHLORIDE 5 MG: 1 CAPSULE ORAL at 21:16

## 2024-05-14 RX ADMIN — NORTRIPTYLINE HYDROCHLORIDE 50 MG: 25 CAPSULE ORAL at 21:16

## 2024-05-14 RX ADMIN — DOCUSATE SODIUM 100 MG: 100 CAPSULE, LIQUID FILLED ORAL at 12:24

## 2024-05-14 RX ADMIN — POTASSIUM CHLORIDE 20 MEQ: 1500 TABLET, EXTENDED RELEASE ORAL at 12:24

## 2024-05-14 RX ADMIN — ESCITALOPRAM OXALATE 20 MG: 10 TABLET ORAL at 08:59

## 2024-05-14 RX ADMIN — QUETIAPINE FUMARATE 200 MG: 200 TABLET ORAL at 21:16

## 2024-05-14 RX ADMIN — ATORVASTATIN CALCIUM 40 MG: 40 TABLET, FILM COATED ORAL at 16:17

## 2024-05-14 RX ADMIN — LISINOPRIL 20 MG: 20 TABLET ORAL at 08:59

## 2024-05-14 RX ADMIN — LORATADINE 10 MG: 10 TABLET ORAL at 08:59

## 2024-05-14 RX ADMIN — CEFTRIAXONE 1000 MG: 1 INJECTION, SOLUTION INTRAVENOUS at 08:59

## 2024-05-14 RX ADMIN — PANTOPRAZOLE SODIUM 20 MG: 20 TABLET, DELAYED RELEASE ORAL at 16:17

## 2024-05-14 NOTE — PLAN OF CARE

## 2024-05-14 NOTE — PROGRESS NOTES
[facility]  Progress Note  Name: Tammie Avalos I  MRN: [id number]  Unit/Bed#: -01 I Date of Admission: 5/12/2024   Date of Service: 5/14/2024 I Hospital Day: 2    Assessment/Plan   * Severe sepsis (HCC)  Assessment & Plan  Meeting criteria with tachycardia, leukocytosis, lactic acid elevation  Lactic acid cleared with fluid resuscitation  Secondary to UTI   resolved    Constipation  Assessment & Plan  Some abd discomfort no bm since last week- will give dulxolax and place on colace bid    UTI (urinary tract infection)  Assessment & Plan  Presented with abdominal pain  Meeting severe sepsis criteria  CT abdomen and pelvis suspected of UTI  UA with innumerable pyuria and bacteriuria  Continue ceftriaxone  Follow-up urine culture    Hyponatremia  Assessment & Plan  Hours sodium 133  Hypovolemia hyponatremia in setting of chlorthalidone which has been on hold  Sodium now within normal limits    Type 2 diabetes mellitus without complication, without long-term current use of insulin (formerly [facility])  Assessment & Plan  Lab Results   Component Value Date    HGBA1C 6.8 (H) 05/12/2024       Recent Labs     05/13/24  1625 05/13/24  2119 05/14/24  0800 05/14/24  1116   POCGLU 130 150* 118 121         Blood Sugar Average: Last 72 hrs:  (P) 129.125    Metformin on hold  A1c 6.8  Sliding-scale insulin  Monitor blood glucose    Malignant neoplasm of central portion of right breast in female, estrogen receptor positive (HCC)  Assessment & Plan  S/p mastectomy  Currently receiving chemotherapy  Continue Arimidex    Essential hypertension  Assessment & Plan  Blood pressure is soft decrease lisinopril to 10 mg daily, chlorthalidone is on hold      Von Willebrand disease (formerly [facility])  Assessment & Plan  Avoid anticoagulation  Monitor for bleeding                 VTE Pharmacologic Prophylaxis:   Moderate Risk (Score 3-4) - Pharmacological DVT Prophylaxis Contraindicated. Sequential Compression Devices  Ordered.    Mobility:   Basic Mobility Inpatient Raw Score: 22  JH-HLM Goal: 7: Walk 25 feet or more  JH-HLM Achieved: 7: Walk 25 feet or more  JH-HLM Goal achieved. Continue to encourage appropriate mobility.    Patient Centered Rounds: I performed bedside rounds with nursing staff today.   Discussions with Specialists or Other Care Team Provider: none    Education and Discussions with Family / Patient: pt will update daughter    Total Time Spent on Date of Encounter in care of patient: >35 mins. This time was spent on one or more of the following: performing physical exam; counseling and coordination of care; obtaining or reviewing history; documenting in the medical record; reviewing/ordering tests, medications or procedures; communicating with other healthcare professionals and discussing with patient's family/caregivers.    Current Length of Stay: 2 day(s)  Current Patient Status: Inpatient   Certification Statement: The patient will continue to require additional inpatient hospital stay due to abd pain  Discharge Plan: Anticipate discharge in 24-48 hrs to home.    Code Status: Level 1 - Full Code    Subjective:   Seen and examined lower abd pain and nausea    Objective:     Vitals:   Temp (24hrs), Av.4 °F (36.9 °C), Min:97.9 °F (36.6 °C), Max:98.8 °F (37.1 °C)    Temp:  [97.9 °F (36.6 °C)-98.8 °F (37.1 °C)] 98.8 °F (37.1 °C)  HR:  [78-83] 79  Resp:  [18-20] 20  BP: ()/(43-60) 111/43  SpO2:  [90 %-92 %] 90 %  Body mass index is 36.12 kg/m².     Input and Output Summary (last 24 hours):     Intake/Output Summary (Last 24 hours) at 2024 1223  Last data filed at 2024 0906  Gross per 24 hour   Intake 780 ml   Output --   Net 780 ml       Physical Exam:   Physical Exam  Vitals and nursing note reviewed.   Constitutional:       General: She is not in acute distress.     Appearance: She is well-developed.   HENT:      Head: Normocephalic and atraumatic.   Eyes:      Conjunctiva/sclera: Conjunctivae  normal.   Cardiovascular:      Rate and Rhythm: Normal rate and regular rhythm.      Heart sounds: No murmur heard.  Pulmonary:      Effort: Pulmonary effort is normal. No respiratory distress.      Breath sounds: Normal breath sounds. No wheezing or rales.   Abdominal:      General: Bowel sounds are normal. There is no distension.      Palpations: Abdomen is soft.      Tenderness: There is no abdominal tenderness.   Musculoskeletal:         General: No swelling.      Cervical back: Neck supple.   Skin:     General: Skin is warm and dry.      Capillary Refill: Capillary refill takes less than 2 seconds.   Neurological:      Mental Status: She is alert and oriented to person, place, and time.   Psychiatric:         Mood and Affect: Mood normal.          Additional Data:     Labs:  Results from last 7 days   Lab Units 05/14/24  0606 05/13/24  0508 05/12/24  1617   WBC Thousand/uL 3.38*   < > 16.10*   HEMOGLOBIN g/dL 9.8*   < > 12.7   HEMATOCRIT % 30.4*   < > 41.0   PLATELETS Thousands/uL 170   < > 285   BANDS PCT %  --   --  3   LYMPHO PCT %  --   --  6*   MONO PCT %  --   --  2*   EOS PCT %  --   --  0    < > = values in this interval not displayed.     Results from last 7 days   Lab Units 05/14/24  0606 05/13/24  0508 05/12/24  1617   SODIUM mmol/L 138   < > 133*   POTASSIUM mmol/L 3.4*   < > 3.5   CHLORIDE mmol/L 102   < > 95*   CO2 mmol/L 30   < > 26   BUN mg/dL 19   < > 22   CREATININE mg/dL 0.80   < > 1.22   ANION GAP mmol/L 6   < > 12   CALCIUM mg/dL 8.7   < > 9.1   ALBUMIN g/dL  --   --  4.5   TOTAL BILIRUBIN mg/dL  --   --  0.78   ALK PHOS U/L  --   --  103   ALT U/L  --   --  39   AST U/L  --   --  36   GLUCOSE RANDOM mg/dL 114   < > 168*    < > = values in this interval not displayed.     Results from last 7 days   Lab Units 05/12/24  1617   INR  0.92     Results from last 7 days   Lab Units 05/14/24  1116 05/14/24  0800 05/13/24  2119 05/13/24  1625 05/13/24  1136 05/13/24  0812 05/12/24  2155  05/12/24  1635   POC GLUCOSE mg/dl 121 118 150* 130 105 117 131 161*     Results from last 7 days   Lab Units 05/12/24  1617   HEMOGLOBIN A1C % 6.8*     Results from last 7 days   Lab Units 05/14/24  0606 05/13/24  0508 05/12/24  1749 05/12/24  1617   LACTIC ACID mmol/L  --  1.4 2.3* 3.3*   PROCALCITONIN ng/ml 0.40* 1.44*  --  0.33*       Lines/Drains:  Invasive Devices       Central Venous Catheter Line  Duration             Port A Cath Left Chest -- days              Peripheral Intravenous Line  Duration             Peripheral IV 05/12/24 Left Antecubital 1 day                    Central Line:  Goal for removal: N/A - Chronic PICC             Imaging: Reviewed radiology reports from this admission including: abdominal/pelvic CT    Recent Cultures (last 7 days):   Results from last 7 days   Lab Units 05/12/24  1935 05/12/24  1617   BLOOD CULTURE   --  No Growth at 24 hrs.  No Growth at 24 hrs.   URINE CULTURE  Culture too young- will reincubate  --        Last 24 Hours Medication List:   Current Facility-Administered Medications   Medication Dose Route Frequency Provider Last Rate    acetaminophen  650 mg Oral Q6H PRN Gilda S Megan, CRNP      albuterol  2 puff Inhalation Q4H PRN Gilda S Megan, CRNP      anastrozole  1 mg Oral Daily Gilda S Megan, CRNP      atorvastatin  40 mg Oral Daily With Dinner Gilda S Megan, CRNP      buPROPion  450 mg Oral Daily Gilda S Megan, CRNP      busPIRone  15 mg Oral TID PRN Gilda S Megan, CRNP      cefTRIAXone  1,000 mg Intravenous Q24H Gilda S Megan, CRNP 1,000 mg (05/14/24 0859)    docusate sodium  100 mg Oral BID Manda Benavidez MD      escitalopram  20 mg Oral Daily Gilda S Megan, CRNP      Fluticasone Furoate-Vilanterol  1 puff Inhalation Daily Gilda S Megan, CRNP      insulin lispro  1-6 Units Subcutaneous TID AC Gilda S Megan, CRNP      insulin lispro  1-6 Units Subcutaneous HS Gilda S Megan, CRNP      [START ON 5/15/2024] lisinopril  10 mg Oral Daily Manda Benavidez MD       loratadine  10 mg Oral Daily Gilda S Megan, CRNP      montelukast  10 mg Oral HS Gilda S Megan, CRNP      nortriptyline  50 mg Oral HS Gilda S Megan, CRNP      oxyCODONE  5 mg Oral Q6H PRN Gilda S Megan, CRNP      pantoprazole  20 mg Oral BID AC Gilda S Megan, CRNP      potassium chloride  20 mEq Oral Once Manda Benavidez MD      prazosin  5 mg Oral HS Gilda S Megan, CRNP      QUEtiapine  200 mg Oral HS Gilda S Megan, CRNP      traZODone  300 mg Oral HS Gilda S Megan, CRNP          Today, Patient Was Seen By: Manda Benavidez MD    **Please Note: This note may have been constructed using a voice recognition system.**

## 2024-05-14 NOTE — ASSESSMENT & PLAN NOTE
Meeting criteria with tachycardia, leukocytosis, lactic acid elevation  Lactic acid cleared with fluid resuscitation  Secondary to UTI   resolved

## 2024-05-14 NOTE — PLAN OF CARE
Problem: Potential for Falls  Goal: Patient will remain free of falls  Description: INTERVENTIONS:  - Educate patient/family on patient safety including physical limitations  - Instruct patient to call for assistance with activity   - Consult OT/PT to assist with strengthening/mobility   - Keep Call bell within reach  - Keep bed low and locked with side rails adjusted as appropriate  - Keep care items and personal belongings within reach  - Initiate and maintain comfort rounds  - Make Fall Risk Sign visible to staff  - Apply yellow socks and bracelet for high fall risk patients  - Consider moving patient to room near nurses station  5/14/2024 1925 by Taylor Klein RN  Outcome: Progressing  5/14/2024 1924 by Taylor Klein RN  Outcome: Progressing     Problem: PAIN - ADULT  Goal: Verbalizes/displays adequate comfort level or baseline comfort level  Description: Interventions:  - Encourage patient to monitor pain and request assistance  - Assess pain using appropriate pain scale  - Administer analgesics based on type and severity of pain and evaluate response  - Implement non-pharmacological measures as appropriate and evaluate response  - Consider cultural and social influences on pain and pain management  - Notify physician/advanced practitioner if interventions unsuccessful or patient reports new pain  5/14/2024 1925 by Taylor Klein RN  Outcome: Progressing  5/14/2024 1924 by Taylor Klein RN  Outcome: Progressing     Problem: INFECTION - ADULT  Goal: Absence or prevention of progression during hospitalization  Description: INTERVENTIONS:  - Assess and monitor for signs and symptoms of infection  - Monitor lab/diagnostic results  - Monitor all insertion sites, i.e. indwelling lines, tubes, and drains  - Monitor endotracheal if appropriate and nasal secretions for changes in amount and color  - [place] appropriate cooling/warming therapies per order  - Administer medications as ordered  - Instruct and encourage  patient and family to use good hand hygiene technique  - Identify and instruct in appropriate isolation precautions for identified infection/condition  5/14/2024 1925 by Taylor Klein RN  Outcome: Progressing  5/14/2024 1924 by Taylor Klein RN  Outcome: Progressing  Goal: Absence of fever/infection during neutropenic period  Description: INTERVENTIONS:  - Monitor WBC    5/14/2024 1925 by Taylor Klein RN  Outcome: Progressing  5/14/2024 1924 by Taylor Klein RN  Outcome: Progressing     Problem: SAFETY ADULT  Goal: Patient will remain free of falls  Description: INTERVENTIONS:  - Educate patient/family on patient safety including physical limitations  - Instruct patient to call for assistance with activity   - Consult OT/PT to assist with strengthening/mobility   - Keep Call bell within reach  - Keep bed low and locked with side rails adjusted as appropriate  - Keep care items and personal belongings within reach  - Initiate and maintain comfort rounds  - Make Fall Risk Sign visible to staff  - Offer Toileting every 2 Hours, in advance of need  - Initiate/Maintain bed alarm  - Obtain necessary fall risk management equipment  - Apply yellow socks and bracelet for high fall risk patients  - Consider moving patient to room near nurses station  5/14/2024 1925 by Taylor Klein RN  Outcome: Progressing  5/14/2024 1924 by Taylor Klein RN  Outcome: Progressing  Goal: Maintain or return to baseline ADL function  Description: INTERVENTIONS:  -  Assess patient's ability to carry out ADLs; assess patient's baseline for ADL function and identify physical deficits which impact ability to perform ADLs (bathing, care of mouth/teeth, toileting, grooming, dressing, etc.)  - Assess/evaluate cause of self-care deficits   - Assess range of motion  - Assess patient's mobility; develop plan if impaired  - Assess patient's need for assistive devices and provide as appropriate  - Encourage maximum independence but intervene and supervise  when necessary  - Involve family in performance of ADLs  - Assess for home care needs following discharge   - Consider OT consult to assist with ADL evaluation and planning for discharge  - Provide patient education as appropriate  5/14/2024 1925 by Taylor Klein RN  Outcome: Progressing  5/14/2024 1924 by Taylor Klein RN  Outcome: Progressing  Goal: Maintains/Returns to pre admission functional level  Description: INTERVENTIONS:  - Perform AM-PAC 6 Click Basic Mobility/ Daily Activity assessment daily.  - Set and communicate daily mobility goal to care team and patient/family/caregiver.   - Collaborate with rehabilitation services on mobility goals if consulted  - Ambulate patient 3 times a day  - Out of bed to chair 3 times a day   - Out of bed for meals 3 times a day  - Out of bed for toileting  - Record patient progress and toleration of activity level   5/14/2024 1925 by Taylor Klein RN  Outcome: Progressing  5/14/2024 1924 by Taylor Klein RN  Outcome: Progressing     Problem: DISCHARGE PLANNING  Goal: Discharge to home or other facility with appropriate resources  Description: INTERVENTIONS:  - Identify barriers to discharge w/patient and caregiver  - Arrange for needed discharge resources and transportation as appropriate  - Identify discharge learning needs (meds, wound care, etc.)  - Arrange for interpretive services to assist at discharge as needed  - Refer to Case Management Department for coordinating discharge planning if the patient needs post-hospital services based on physician/advanced practitioner order or complex needs related to functional status, cognitive ability, or social support system  5/14/2024 1925 by Taylor Klein RN  Outcome: Progressing  5/14/2024 1924 by Taylor Klein RN  Outcome: Progressing     Problem: Knowledge Deficit  Goal: Patient/family/caregiver demonstrates understanding of disease process, treatment plan, medications, and discharge instructions  Description: Complete  learning assessment and assess knowledge base.  Interventions:  - Provide teaching at level of understanding  - Provide teaching via preferred learning methods  5/14/2024 1925 by Taylor Klein RN  Outcome: Progressing  5/14/2024 1924 by Taylor Klein RN  Outcome: Progressing     Problem: METABOLIC, FLUID AND ELECTROLYTES - ADULT  Goal: Electrolytes maintained within normal limits  Description: INTERVENTIONS:  - Monitor labs and assess patient for signs and symptoms of electrolyte imbalances  - Administer electrolyte replacement as ordered  - Monitor response to electrolyte replacements, including repeat lab results as appropriate  - Instruct patient on fluid and nutrition as appropriate  Outcome: Progressing  Goal: Fluid balance maintained  Description: INTERVENTIONS:  - Monitor labs   - Monitor I/O and WT  - Instruct patient on fluid and nutrition as appropriate  - Assess for signs & symptoms of volume excess or deficit  Outcome: Progressing  Goal: Glucose maintained within target range  Description: INTERVENTIONS:  - Monitor Blood Glucose as ordered  - Assess for signs and symptoms of hyperglycemia and hypoglycemia  - Administer ordered medications to maintain glucose within target range  - Assess nutritional intake and initiate nutrition service referral as needed  Outcome: Progressing

## 2024-05-14 NOTE — ASSESSMENT & PLAN NOTE
Lab Results   Component Value Date    HGBA1C 6.8 (H) 05/12/2024       Recent Labs     05/13/24  1625 05/13/24  2119 05/14/24  0800 05/14/24  1116   POCGLU 130 150* 118 121         Blood Sugar Average: Last 72 hrs:  (P) 129.125    Metformin on hold  A1c 6.8  Sliding-scale insulin  Monitor blood glucose

## 2024-05-14 NOTE — ASSESSMENT & PLAN NOTE
Hours sodium 133  Hypovolemia hyponatremia in setting of chlorthalidone which has been on hold  Sodium now within normal limits

## 2024-05-14 NOTE — ASSESSMENT & PLAN NOTE
Presented with abdominal pain  Meeting severe sepsis criteria  CT abdomen and pelvis suspected of UTI  UA with innumerable pyuria and bacteriuria  Continue ceftriaxone  Follow-up urine culture

## 2024-05-15 VITALS
HEIGHT: 67 IN | SYSTOLIC BLOOD PRESSURE: 108 MMHG | WEIGHT: 230.6 LBS | HEART RATE: 85 BPM | OXYGEN SATURATION: 92 % | RESPIRATION RATE: 18 BRPM | BODY MASS INDEX: 36.19 KG/M2 | DIASTOLIC BLOOD PRESSURE: 56 MMHG | TEMPERATURE: 97.2 F

## 2024-05-15 LAB
ANION GAP SERPL CALCULATED.3IONS-SCNC: 7 MMOL/L (ref 4–13)
BACTERIA UR CULT: NORMAL
BUN SERPL-MCNC: 13 MG/DL (ref 5–25)
CALCIUM SERPL-MCNC: 9.4 MG/DL (ref 8.4–10.2)
CHLORIDE SERPL-SCNC: 100 MMOL/L (ref 96–108)
CO2 SERPL-SCNC: 31 MMOL/L (ref 21–32)
CREAT SERPL-MCNC: 0.74 MG/DL (ref 0.6–1.3)
ERYTHROCYTE [DISTWIDTH] IN BLOOD BY AUTOMATED COUNT: 14.6 % (ref 11.6–15.1)
GFR SERPL CREATININE-BSD FRML MDRD: 88 ML/MIN/1.73SQ M
GLUCOSE SERPL-MCNC: 115 MG/DL (ref 65–140)
GLUCOSE SERPL-MCNC: 118 MG/DL (ref 65–140)
GLUCOSE SERPL-MCNC: 139 MG/DL (ref 65–140)
HCT VFR BLD AUTO: 31.4 % (ref 34.8–46.1)
HGB BLD-MCNC: 10 G/DL (ref 11.5–15.4)
MCH RBC QN AUTO: 26.6 PG (ref 26.8–34.3)
MCHC RBC AUTO-ENTMCNC: 31.8 G/DL (ref 31.4–37.4)
MCV RBC AUTO: 84 FL (ref 82–98)
PLATELET # BLD AUTO: 171 THOUSANDS/UL (ref 149–390)
PMV BLD AUTO: 11.4 FL (ref 8.9–12.7)
POTASSIUM SERPL-SCNC: 3.5 MMOL/L (ref 3.5–5.3)
RBC # BLD AUTO: 3.76 MILLION/UL (ref 3.81–5.12)
SODIUM SERPL-SCNC: 138 MMOL/L (ref 135–147)
WBC # BLD AUTO: 2.12 THOUSAND/UL (ref 4.31–10.16)

## 2024-05-15 PROCEDURE — 82948 REAGENT STRIP/BLOOD GLUCOSE: CPT

## 2024-05-15 PROCEDURE — 99239 HOSP IP/OBS DSCHRG MGMT >30: CPT | Performed by: FAMILY MEDICINE

## 2024-05-15 PROCEDURE — 80048 BASIC METABOLIC PNL TOTAL CA: CPT | Performed by: FAMILY MEDICINE

## 2024-05-15 PROCEDURE — 85027 COMPLETE CBC AUTOMATED: CPT | Performed by: FAMILY MEDICINE

## 2024-05-15 RX ORDER — CEPHALEXIN 500 MG/1
500 CAPSULE ORAL EVERY 8 HOURS SCHEDULED
Qty: 12 CAPSULE | Refills: 0 | Status: SHIPPED | OUTPATIENT
Start: 2024-05-15 | End: 2024-05-18

## 2024-05-15 RX ORDER — LISINOPRIL 20 MG/1
10 TABLET ORAL DAILY
Start: 2024-05-15

## 2024-05-15 RX ADMIN — CEFTRIAXONE 1000 MG: 1 INJECTION, SOLUTION INTRAVENOUS at 08:28

## 2024-05-15 RX ADMIN — BUPROPION HYDROCHLORIDE 450 MG: 150 TABLET, EXTENDED RELEASE ORAL at 08:27

## 2024-05-15 RX ADMIN — ESCITALOPRAM OXALATE 20 MG: 10 TABLET ORAL at 08:26

## 2024-05-15 RX ADMIN — DOCUSATE SODIUM 100 MG: 100 CAPSULE, LIQUID FILLED ORAL at 08:27

## 2024-05-15 RX ADMIN — LORATADINE 10 MG: 10 TABLET ORAL at 08:27

## 2024-05-15 RX ADMIN — ANASTROZOLE 1 MG: 1 TABLET, COATED ORAL at 09:40

## 2024-05-15 RX ADMIN — PANTOPRAZOLE SODIUM 20 MG: 20 TABLET, DELAYED RELEASE ORAL at 06:27

## 2024-05-15 NOTE — DISCHARGE SUMMARY
[facility]  Discharge- Tammie Avalos 1965, 58 y.o. female MRN: [id number]  Unit/Bed#: MS [facility] Encounter: [id number]  Primary Care Provider: Carmelo Cramer MD   Date and time admitted to hospital: 5/12/2024  4:00 PM    * Severe sepsis (HCC)  Assessment & Plan  Meeting criteria with tachycardia, leukocytosis, lactic acid elevation  Lactic acid cleared with fluid resuscitation  Secondary to UTI   resolved    Constipation  Assessment & Plan  Resolved had 3 bowel movements on exam abdomen is nontender and soft    UTI (urinary tract infection)  Assessment & Plan  Presented with abdominal pain  Meeting severe sepsis criteria  CT abdomen and pelvis suspected of UTI  UA with innumerable pyuria and bacteriuria  Despite urine culture being negative with evidence of cystitis in the bladder on the CT and positive UA we will treat total duration 7 days will discharge on Keflex 500 mg p.o. every 8 hours for 4 more days.    Hyponatremia  Assessment & Plan  Hours sodium 133  Hypovolemia hyponatremia in setting of chlorthalidone which has been on hold discontinue on discharge  Sodium now within normal limits    Type 2 diabetes mellitus without complication, without long-term current use of insulin ([facility])  Assessment & Plan  Lab Results   Component Value Date    HGBA1C 6.8 (H) 05/12/2024       Recent Labs     05/14/24  1116 05/14/24  1653 05/14/24  2052 05/15/24  0754   POCGLU 121 120 128 118         Blood Sugar Average: Last 72 hrs:  (P) 127.6544498705830732    Resume metformin as an outpatient    Malignant neoplasm of central portion of right breast in female, estrogen receptor positive (HCC)  Assessment & Plan  S/p mastectomy  Currently receiving chemotherapy  Continue Arimidex    Essential hypertension  Assessment & Plan  Low 100s will discharge on lisinopril 10 mg daily discontinue chlorthalidone as it also caused hyponatremia      Von Willebrand disease (HCC)  Assessment & Plan  Avoid  anticoagulation  Monitor for bleeding          Medical Problems       Resolved Problems  Date Reviewed: 5/15/2024   None       Discharging Physician / Practitioner: Manda Bneavidez MD  PCP: Carmelo Cramer MD  Admission Date:   Admission Orders (From admission, onward)       Ordered        05/12/24 1951  INPATIENT ADMISSION  Once                          Discharge Date: 05/15/24    Consultations During Hospital Stay:  none    Procedures Performed:   none    Significant Findings / Test Results:   CT abdomen and pelvis- Bladder mucosal hyperenhancement which raises concern for cystitis. Correlation with urinalysis recommended.     Incidental Findings:   none    Test Results Pending at Discharge (will require follow up):   none     Outpatient Tests Requested:  none    Complications:  none    Reason for Admission: Abdominal pain    Hospital Course:   Tammie Avalos is a 58 y.o. female patient who originally presented to the hospital on 5/12/2024 due to abdominal pain with a UA significant for cystitis CT abdomen revealing bladder mucosal hyperenhancement which raises concern for cystitis started on IV antibiotics.  Her severe sepsis has resolved her blood cultures remain negative.  No fever.  Initial elevation of WBC of 16,000 has resolved she also had constipation for which she had bowel movement and her abdominal pain has improved from beginning on exam abdomen was not tender.  Hyponatremia resolved chlorthalidone discontinued.  Cleared to be discharged home on Keflex for 4 more days spite urine culture being negative she does have evidence of cystitis symptoms and cystitis evidence in the UA and CT abdomen and pelvis.         Please see above list of diagnoses and related plan for additional information.     Condition at Discharge: stable    Discharge Day Visit / Exam:   Subjective:  seen and examined had 3 bm lower abdominal pain improved wants to go home hopefully today   Vitals: Blood Pressure: 108/56 (05/15/24  "0756)  Pulse: 85 (05/15/24 0756)  Temperature: (!) 97.2 °F (36.2 °C) (05/15/24 0756)  Temp Source: Temporal (05/14/24 0726)  Respirations: 18 (05/15/24 0756)  Height: 5' 7\" (170.2 cm) (05/12/24 2023)  Weight - Scale: 105 kg (230 lb 9.6 oz) (05/12/24 2012)  SpO2: 92 % (05/15/24 0756)  Exam:   Physical Exam  Vitals and nursing note reviewed.   Constitutional:       General: She is not in acute distress.     Appearance: She is well-developed.   HENT:      Head: Normocephalic and atraumatic.   Eyes:      Conjunctiva/sclera: Conjunctivae normal.   Cardiovascular:      Rate and Rhythm: Normal rate and regular rhythm.      Heart sounds: No murmur heard.  Pulmonary:      Effort: Pulmonary effort is normal. No respiratory distress.      Breath sounds: Normal breath sounds. No wheezing or rales.   Abdominal:      General: Bowel sounds are normal. There is no distension.      Palpations: Abdomen is soft.      Tenderness: There is no abdominal tenderness.   Musculoskeletal:         General: No swelling.      Cervical back: Neck supple.   Skin:     General: Skin is warm and dry.      Capillary Refill: Capillary refill takes less than 2 seconds.   Neurological:      Mental Status: She is alert and oriented to person, place, and time.   Psychiatric:         Mood and Affect: Mood normal.          Discussion with Family: pt    Discharge instructions/Information to patient and family:   See after visit summary for information provided to patient and family.      Provisions for Follow-Up Care:  See after visit summary for information related to follow-up care and any pertinent home health orders.      Mobility at time of Discharge:   Basic Mobility Inpatient Raw Score: 22  JH-HLM Goal: 7: Walk 25 feet or more  JH-HLM Achieved: 7: Walk 25 feet or more  HLM Goal achieved. Continue to encourage appropriate mobility.     Disposition:   Home    Planned Readmission: no     Discharge Statement:  I spent >35 minutes discharging the patient. " This time was spent on the day of discharge. I had direct contact with the patient on the day of discharge. Greater than 50% of the total time was spent examining patient, answering all patient questions, arranging and discussing plan of care with patient as well as directly providing post-discharge instructions.  Additional time then spent on discharge activities.    Discharge Medications:  See after visit summary for reconciled discharge medications provided to patient and/or family.      **Please Note: This note may have been constructed using a voice recognition system**

## 2024-05-15 NOTE — DISCHARGE INSTR - AVS FIRST PAGE
Chlorthalidone stopped as caused low sodium   Your blood pressure is stable in low 100's your lisinopril was reduced to 10 mg daily

## 2024-05-15 NOTE — ASSESSMENT & PLAN NOTE
Lab Results   Component Value Date    HGBA1C 6.8 (H) 05/12/2024       Recent Labs     05/14/24  1116 05/14/24  1653 05/14/24  2052 05/15/24  0754   POCGLU 121 120 128 118         Blood Sugar Average: Last 72 hrs:  (P) 127.1469771030512102    Resume metformin as an outpatient

## 2024-05-15 NOTE — CASE MANAGEMENT
Case Management Discharge Planning Note    Patient name Tammie Avalos  Location /-01 MRN [id number]  : 1965 Date 5/15/2024       Current Admission Date: 2024  Current Admission Diagnosis:Severe sepsis (HCC)   Patient Active Problem List    Diagnosis Date Noted Date Diagnosed    Constipation 2024     Hyponatremia 2024     UTI (urinary tract infection) 2024     Severe sepsis (HCC) 2024     Diverticulitis 2024     Type 2 diabetes mellitus without complication, without long-term current use of insulin (HCC) 2024     Malignant neoplasm of central portion of right breast in female, estrogen receptor positive (HCC) 2024     Acute right ankle pain 03/10/2021     Closed fracture of right ankle 03/10/2021     Chest pain 2021     Mild intermittent asthma without complication 2021     Essential hypertension 2021     Numbness and tingling in left arm 2021     History of migraine 2021     Von Willebrand disease (HCC)        LOS (days): 3  Geometric Mean LOS (GMLOS) (days): 3.6  Days to GMLOS:1     OBJECTIVE:  Risk of Unplanned Readmission Score: 26.23         Current admission status: Inpatient   Preferred Pharmacy:   [id number] [address] [address]  Phone: [phone] Fax: [phone]    Primary Care Provider: Carmelo Cramer MD    Primary Insurance: [employer]  Secondary Insurance: [place] [employer] AND [employer] [facility]    DISCHARGE DETAILS:     Patient is being dc today.    CM spoke to patient at the bedside, reviewed DC IMM with patient and informed that patient can stay an additional 4 hours for reconsidering appealing the discharge as the medicare rights were review on the day of discharge. Pt verbalized understanding and feels ready to go home and does not intend to stay 4 hours to reconsider.      Per patient her daughter will be here  after 1300.              IMM Given (Date):: 05/15/24  IMM Given to:: Family      DC plan is home.

## 2024-05-15 NOTE — PLAN OF CARE
Problem: Potential for Falls  Goal: Patient will remain free of falls  Description: INTERVENTIONS:  - Educate patient/family on patient safety including physical limitations  - Instruct patient to call for assistance with activity   - Consult OT/PT to assist with strengthening/mobility   - Keep Call bell within reach  - Keep bed low and locked with side rails adjusted as appropriate  - Keep care items and personal belongings within reach  - Initiate and maintain comfort rounds  - Make Fall Risk Sign visible to staff  - Offer Toileting every 2 Hours, in advance of need  - Initiate/Maintain bed alarm  - Obtain necessary fall risk management equipment  - Apply yellow socks and bracelet for high fall risk patients  - Consider moving patient to room near nurses station  Outcome: Progressing     Problem: PAIN - ADULT  Goal: Verbalizes/displays adequate comfort level or baseline comfort level  Description: Interventions:  - Encourage patient to monitor pain and request assistance  - Assess pain using appropriate pain scale  - Administer analgesics based on type and severity of pain and evaluate response  - Implement non-pharmacological measures as appropriate and evaluate response  - Consider cultural and social influences on pain and pain management  - Notify physician/advanced practitioner if interventions unsuccessful or patient reports new pain  Outcome: Progressing     Problem: INFECTION - ADULT  Goal: Absence or prevention of progression during hospitalization  Description: INTERVENTIONS:  - Assess and monitor for signs and symptoms of infection  - Monitor lab/diagnostic results  - Monitor all insertion sites, i.e. indwelling lines, tubes, and drains  - Monitor endotracheal if appropriate and nasal secretions for changes in amount and color  - Zahl appropriate cooling/warming therapies per order  - Administer medications as ordered  - Instruct and encourage patient and family to use good hand hygiene  technique  - Identify and instruct in appropriate isolation precautions for identified infection/condition  Outcome: Progressing  Goal: Absence of fever/infection during neutropenic period  Description: INTERVENTIONS:  - Monitor WBC    Outcome: Progressing     Problem: SAFETY ADULT  Goal: Patient will remain free of falls  Description: INTERVENTIONS:  - Educate patient/family on patient safety including physical limitations  - Instruct patient to call for assistance with activity   - Consult OT/PT to assist with strengthening/mobility   - Keep Call bell within reach  - Keep bed low and locked with side rails adjusted as appropriate  - Keep care items and personal belongings within reach  - Initiate and maintain comfort rounds  - Make Fall Risk Sign visible to staff  - Offer Toileting every 2 Hours, in advance of need  - Initiate/Maintain bed alarm  - Obtain necessary fall risk management equipment  - Apply yellow socks and bracelet for high fall risk patients  - Consider moving patient to room near nurses station  Outcome: Progressing  Goal: Maintain or return to baseline ADL function  Description: INTERVENTIONS:  -  Assess patient's ability to carry out ADLs; assess patient's baseline for ADL function and identify physical deficits which impact ability to perform ADLs (bathing, care of mouth/teeth, toileting, grooming, dressing, etc.)  - Assess/evaluate cause of self-care deficits   - Assess range of motion  - Assess patient's mobility; develop plan if impaired  - Assess patient's need for assistive devices and provide as appropriate  - Encourage maximum independence but intervene and supervise when necessary  - Involve family in performance of ADLs  - Assess for home care needs following discharge   - Consider OT consult to assist with ADL evaluation and planning for discharge  - Provide patient education as appropriate  Outcome: Progressing  Goal: Maintains/Returns to pre admission functional level  Description:  INTERVENTIONS:  - Perform AM-PAC 6 Click Basic Mobility/ Daily Activity assessment daily.  - Set and communicate daily mobility goal to care team and patient/family/caregiver.   - Collaborate with rehabilitation services on mobility goals if consulted  - Reposition patient every 2 hours.  - Stand patient 3 times a day  - Ambulate patient 3 times a day  - Out of bed to chair 3 times a day   - Out of bed for meals 3 times a day  - Out of bed for toileting  - Record patient progress and toleration of activity level   Outcome: Progressing     Problem: DISCHARGE PLANNING  Goal: Discharge to home or other facility with appropriate resources  Description: INTERVENTIONS:  - Identify barriers to discharge w/patient and caregiver  - Arrange for needed discharge resources and transportation as appropriate  - Identify discharge learning needs (meds, wound care, etc.)  - Arrange for interpretive services to assist at discharge as needed  - Refer to Case Management Department for coordinating discharge planning if the patient needs post-hospital services based on physician/advanced practitioner order or complex needs related to functional status, cognitive ability, or social support system  Outcome: Progressing     Problem: Knowledge Deficit  Goal: Patient/family/caregiver demonstrates understanding of disease process, treatment plan, medications, and discharge instructions  Description: Complete learning assessment and assess knowledge base.  Interventions:  - Provide teaching at level of understanding  - Provide teaching via preferred learning methods  Outcome: Progressing     Problem: METABOLIC, FLUID AND ELECTROLYTES - ADULT  Goal: Electrolytes maintained within normal limits  Description: INTERVENTIONS:  - Monitor labs and assess patient for signs and symptoms of electrolyte imbalances  - Administer electrolyte replacement as ordered  - Monitor response to electrolyte replacements, including repeat lab results as  appropriate  - Instruct patient on fluid and nutrition as appropriate  Outcome: Progressing  Goal: Fluid balance maintained  Description: INTERVENTIONS:  - Monitor labs   - Monitor I/O and WT  - Instruct patient on fluid and nutrition as appropriate  - Assess for signs & symptoms of volume excess or deficit  Outcome: Progressing  Goal: Glucose maintained within target range  Description: INTERVENTIONS:  - Monitor Blood Glucose as ordered  - Assess for signs and symptoms of hyperglycemia and hypoglycemia  - Administer ordered medications to maintain glucose within target range  - Assess nutritional intake and initiate nutrition service referral as needed  Outcome: Progressing

## 2024-05-15 NOTE — ASSESSMENT & PLAN NOTE
Hours sodium 133  Hypovolemia hyponatremia in setting of chlorthalidone which has been on hold discontinue on discharge  Sodium now within normal limits

## 2024-05-15 NOTE — NURSING NOTE
Patient's IV removed and catheter intact. AVS reviewed with patient and daughter including medications. Patient escorted out by PCA.

## 2024-05-15 NOTE — ASSESSMENT & PLAN NOTE
Low 100s will discharge on lisinopril 10 mg daily discontinue chlorthalidone as it also caused hyponatremia

## 2024-05-15 NOTE — ASSESSMENT & PLAN NOTE
Presented with abdominal pain  Meeting severe sepsis criteria  CT abdomen and pelvis suspected of UTI  UA with innumerable pyuria and bacteriuria  Despite urine culture being negative with evidence of cystitis in the bladder on the CT and positive UA we will treat total duration 7 days will discharge on Keflex 500 mg p.o. every 8 hours for 4 more days.

## 2024-05-17 LAB
BACTERIA BLD CULT: NORMAL
BACTERIA BLD CULT: NORMAL

## 2024-05-18 ENCOUNTER — OFFICE VISIT (OUTPATIENT)
Dept: URGENT CARE | Facility: CLINIC | Age: 59
End: 2024-05-18
Payer: COMMERCIAL

## 2024-05-18 VITALS
BODY MASS INDEX: 36.1 KG/M2 | HEIGHT: 67 IN | SYSTOLIC BLOOD PRESSURE: 140 MMHG | DIASTOLIC BLOOD PRESSURE: 62 MMHG | WEIGHT: 230 LBS | HEART RATE: 88 BPM | OXYGEN SATURATION: 95 % | RESPIRATION RATE: 18 BRPM | TEMPERATURE: 98.6 F

## 2024-05-18 DIAGNOSIS — T78.40XA ALLERGIC REACTION, INITIAL ENCOUNTER: Primary | ICD-10-CM

## 2024-05-18 DIAGNOSIS — N30.00 ACUTE CYSTITIS WITHOUT HEMATURIA: ICD-10-CM

## 2024-05-18 PROCEDURE — 99213 OFFICE O/P EST LOW 20 MIN: CPT

## 2024-05-18 PROCEDURE — 96372 THER/PROPH/DIAG INJ SC/IM: CPT

## 2024-05-18 PROCEDURE — S9088 SERVICES PROVIDED IN URGENT: HCPCS

## 2024-05-18 RX ORDER — METHYLPREDNISOLONE SODIUM SUCCINATE 125 MG/2ML
125 INJECTION, POWDER, LYOPHILIZED, FOR SOLUTION INTRAMUSCULAR; INTRAVENOUS ONCE
Status: COMPLETED | OUTPATIENT
Start: 2024-05-18 | End: 2024-05-18

## 2024-05-18 RX ORDER — PREDNISONE 20 MG/1
40 TABLET ORAL DAILY
Qty: 10 TABLET | Refills: 0 | Status: SHIPPED | OUTPATIENT
Start: 2024-05-18 | End: 2024-05-23

## 2024-05-18 RX ORDER — SULFAMETHOXAZOLE AND TRIMETHOPRIM 800; 160 MG/1; MG/1
1 TABLET ORAL EVERY 12 HOURS SCHEDULED
Qty: 4 TABLET | Refills: 0 | Status: SHIPPED | OUTPATIENT
Start: 2024-05-18 | End: 2024-05-20

## 2024-05-18 RX ORDER — DEXAMETHASONE 4 MG/1
TABLET ORAL
COMMUNITY
Start: 2024-03-15

## 2024-05-18 RX ADMIN — METHYLPREDNISOLONE SODIUM SUCCINATE 125 MG: 125 INJECTION, POWDER, LYOPHILIZED, FOR SOLUTION INTRAMUSCULAR; INTRAVENOUS at 10:35

## 2024-05-18 NOTE — PROGRESS NOTES
[facility]        NAME: Tammie Avalos is a 58 y.o. female  : 1965    MRN: [id number]  DATE: May 18, 2024  TIME: 10:20 AM    Assessment and Plan   Allergic reaction, initial encounter [T78.40XA]  1. Allergic reaction, initial encounter  methylPREDNISolone sodium succinate (Solu-MEDROL) injection 125 mg    predniSONE 20 mg tablet      2. Acute cystitis without hematuria  sulfamethoxazole-trimethoprim (BACTRIM DS) 800-160 mg per tablet        Discussed problem w/ patient and her daughter. Appears to be allergic reaction related to keflex. Will treat with solumedrol and prednisone. Discussed blood sugar raising side effects and should monitor sugars. Discussed ER precautions. No red flag allergic reaction signs. Continue benadryl as needed. Will also switch to bactrim for full course of antibiotics. Advised pcp f/u    Patient Instructions       Follow up with PCP in 3-5 days.  Proceed to  ER if symptoms worsen.    If tests are performed, our office will contact you with results only if changes need to made to the care plan discussed with you at the visit. You can review your full results on [facility].    Chief Complaint     Chief Complaint   Patient presents with   • Rash     C/o red spotted rash, pt states she was in the hosptial for a UTI with sepsis and discharged with keflex and started that medication x2 days ago, pt woke up yesterday with spots but since then it has spread to entire body. Since then she has stopped the keflex yesterday and has had x3 doses of benadryl. Pt states the benadryl has not helped and she is very itchy.          History of Present Illness       59 y/o female with recent discharge from hospital for sepsis related to UTI presents with concerns for a allergic reaction. States she developed a rash on her arms, torso, head. Tried benadryl w/o relief. Also stopped keflex due to concerns. Had IV antibiotics and was put on keflex q8hrs for 4 days for coverage.  Stopped keflex yesterday, states rash is very itchy. Discharged on Wednesday and started with rash Friday. Progressively worsening in terms of itching. Denies any muffled voice, difficulty talking, difficulty swallowing, sob.        Review of Systems   Review of Systems   Constitutional:  Negative for appetite change, chills, fatigue and fever.   Respiratory:  Negative for cough, shortness of breath, wheezing and stridor.    Cardiovascular:  Negative for chest pain and palpitations.   Gastrointestinal:  Negative for nausea.   Genitourinary:  Negative for dysuria, flank pain, frequency, pelvic pain and urgency.   Skin:  Positive for rash.         Current Medications       Current Outpatient Medications:   •  albuterol (PROVENTIL HFA,VENTOLIN HFA) 90 mcg/act inhaler, Inhale 2 puffs as needed, Disp: , Rfl:   •  anastrozole (ARIMIDEX) 1 mg tablet, Take 1 mg by mouth daily, Disp: , Rfl:   •  atorvastatin (LIPITOR) 40 mg tablet, Take 40 mg by mouth daily, Disp: , Rfl:   •  buPROPion (WELLBUTRIN SR) 200 MG 12 hr tablet, Take 200 mg by mouth 2 (two) times a day, Disp: , Rfl:   •  busPIRone (BUSPAR) 15 mg tablet, Take 15 mg by mouth 3 (three) times a day as needed, Disp: , Rfl:   •  cetirizine (ZyrTEC) 10 mg tablet, Take 10 mg by mouth daily, Disp: , Rfl:   •  dexamethasone (DECADRON) 4 mg tablet, Take 2 tablets by mouth twice daily with food the day before and after chemotherapy., Disp: , Rfl:   •  escitalopram (LEXAPRO) 20 mg tablet, Take 20 mg by mouth daily, Disp: , Rfl:   •  fluticasone (FLONASE) 50 mcg/act nasal spray, 2 sprays into each nostril daily at bedtime, Disp: , Rfl:   •  fluticasone-salmeterol (ADVAIR HFA) 115-21 MCG/ACT inhaler, Inhale 2 puffs 2 (two) times a day Rinse mouth after use., Disp: , Rfl:   •  lisinopril (ZESTRIL) 20 mg tablet, Take 0.5 tablets (10 mg total) by mouth daily, Disp: , Rfl:   •  metFORMIN (GLUCOPHAGE) 500 mg tablet, Take 500 mg by mouth daily with breakfast, Disp: , Rfl:   •   montelukast (SINGULAIR) 10 mg tablet, Take 10 mg by mouth daily at bedtime, Disp: , Rfl:   •  nortriptyline (PAMELOR) 10 mg capsule, Take 50 mg by mouth daily at bedtime, Disp: , Rfl:   •  OMEPRAZOLE PO, Take 20 mg by mouth 2 (two) times a day, Disp: , Rfl:   •  prazosin (MINIPRESS) 5 mg capsule, Take 5 mg by mouth daily at bedtime, Disp: , Rfl:   •  predniSONE 20 mg tablet, Take 2 tablets (40 mg total) by mouth daily for 5 days, Disp: 10 tablet, Rfl: 0  •  QUEtiapine (SEROquel) 200 mg tablet, Take 200 mg by mouth daily at bedtime, Disp: , Rfl:   •  sulfamethoxazole-trimethoprim (BACTRIM DS) 800-160 mg per tablet, Take 1 tablet by mouth every 12 (twelve) hours for 2 days, Disp: 4 tablet, Rfl: 0  •  traZODone (DESYREL) 300 MG tablet, Take 300 mg by mouth daily at bedtime, Disp: , Rfl:     Current Facility-Administered Medications:   •  methylPREDNISolone sodium succinate (Solu-MEDROL) injection 125 mg, 125 mg, Intramuscular, Once,     Current Allergies     Allergies as of 05/18/2024 - Reviewed 05/18/2024   Allergen Reaction Noted   • Azithromycin GI Intolerance and Hives 05/03/2022   • Erythromycin Vomiting 07/02/2020   • Keflex [cephalexin] Hives 05/18/2024   • Morphine Palpitations 07/02/2020            The following portions of the patient's history were reviewed and updated as appropriate: allergies, current medications, past family history, past medical history, past social history, past surgical history and problem list.     Past Medical History:   Diagnosis Date   • Anesthesia     Pt states she woke up during 2 surgeries   • Breast cancer (HCC)    • Cancer (HCC)    • Diabetes mellitus (HCC)    • GERD (gastroesophageal reflux disease)    • Heart murmur    • Hyperlipidemia    • Hypertension    • Kidney stones    • Subdural hematoma (HCC)    • Von Willebrand disease (HCC)    • Wears contact lenses        Past Surgical History:   Procedure Laterality Date   • APPENDECTOMY     • BREAST LUMPECTOMY Left    • CARPAL  "TUNNEL RELEASE Bilateral    • CHOLECYSTECTOMY     • COLONOSCOPY     • HYSTERECTOMY     • KNEE SURGERY Left    • LYMPH NODE BIOPSY Right 2/6/2024    Procedure: AXILLARY SENTINEL NODE BIOPSY (NUC MED INJECTION AT 0730);  Surgeon: Almita Molina DO;  Location: [facility];  Service: General   • MRI BREAST BIOPSY LEFT (ALL INCLUSIVE) Left 12/13/2023   • PARTIAL HYSTERECTOMY     • DE MASTECTOMY SIMPLE COMPLETE Bilateral 2/6/2024    Procedure: BREAST MASTECTOMY;  Surgeon: Almita Molina DO;  Location: [facility];  Service: General   • ROTATOR CUFF REPAIR Left    • TONSILLECTOMY         Family History   Problem Relation Age of Onset   • Cancer Mother    • Allergies Father    • Cancer Sister    • Stroke Brother          Medications have been verified.        Objective   /62   Pulse 88   Temp 98.6 °F (37 °C)   Resp 18   Ht 5' 7\" (1.702 m)   Wt 104 kg (230 lb)   SpO2 95%   BMI 36.02 kg/m²        Physical Exam     Physical Exam  Vitals and nursing note reviewed.   Constitutional:       General: She is not in acute distress.     Appearance: Normal appearance. She is normal weight. She is not ill-appearing, toxic-appearing or diaphoretic.   HENT:      Mouth/Throat:      Lips: Pink. No lesions.      Mouth: Mucous membranes are moist. No injury, lacerations, oral lesions or angioedema.      Pharynx: Oropharynx is clear. Uvula midline. No pharyngeal swelling, oropharyngeal exudate, posterior oropharyngeal erythema, uvula swelling or postnasal drip.   Cardiovascular:      Rate and Rhythm: Normal rate and regular rhythm.      Pulses: Normal pulses.      Heart sounds: Normal heart sounds. No murmur heard.     No friction rub. No gallop.   Pulmonary:      Effort: Pulmonary effort is normal. No respiratory distress.      Breath sounds: Normal breath sounds. No stridor. No wheezing, rhonchi or rales.   Chest:      Chest wall: No tenderness.   Skin:     Findings: Rash present. Rash is urticarial. "   Neurological:      Mental Status: She is alert.

## 2024-06-11 PROBLEM — R65.20 SEVERE SEPSIS (HCC): Status: RESOLVED | Noted: 2024-05-12 | Resolved: 2024-06-11

## 2024-06-11 PROBLEM — A41.9 SEVERE SEPSIS (HCC): Status: RESOLVED | Noted: 2024-05-12 | Resolved: 2024-06-11

## 2024-06-12 PROBLEM — N39.0 UTI (URINARY TRACT INFECTION): Status: RESOLVED | Noted: 2024-05-13 | Resolved: 2024-06-12

## 2024-06-14 ENCOUNTER — HOSPITAL ENCOUNTER (EMERGENCY)
Facility: HOSPITAL | Age: 59
Discharge: HOME | End: 2024-06-14
Attending: EMERGENCY MEDICINE
Payer: COMMERCIAL

## 2024-06-14 ENCOUNTER — APPOINTMENT (EMERGENCY)
Dept: CT IMAGING | Facility: HOSPITAL | Age: 59
End: 2024-06-14
Payer: COMMERCIAL

## 2024-06-14 VITALS
DIASTOLIC BLOOD PRESSURE: 84 MMHG | HEIGHT: 67 IN | OXYGEN SATURATION: 97 % | SYSTOLIC BLOOD PRESSURE: 127 MMHG | WEIGHT: 240.74 LBS | BODY MASS INDEX: 37.79 KG/M2 | TEMPERATURE: 98.6 F | HEART RATE: 78 BPM | RESPIRATION RATE: 16 BRPM

## 2024-06-14 DIAGNOSIS — J40 BRONCHITIS: Primary | ICD-10-CM

## 2024-06-14 DIAGNOSIS — K59.00 CONSTIPATION: ICD-10-CM

## 2024-06-14 LAB
2HR DELTA HS TROPONIN: 0 NG/L
ALBUMIN SERPL BCP-MCNC: 4 G/DL (ref 3.5–5)
ALP SERPL-CCNC: 67 U/L (ref 34–104)
ALT SERPL W P-5'-P-CCNC: 34 U/L (ref 7–52)
ANION GAP SERPL CALCULATED.3IONS-SCNC: 9 MMOL/L (ref 4–13)
ANISOCYTOSIS BLD QL SMEAR: PRESENT
APTT PPP: 34 SECONDS (ref 23–37)
AST SERPL W P-5'-P-CCNC: 33 U/L (ref 13–39)
BACTERIA UR QL AUTO: ABNORMAL /HPF
BASOPHILS # BLD MANUAL: 0.08 THOUSAND/UL (ref 0–0.1)
BASOPHILS NFR MAR MANUAL: 2 % (ref 0–1)
BILIRUB SERPL-MCNC: 0.27 MG/DL (ref 0.2–1)
BILIRUB UR QL STRIP: NEGATIVE
BUN SERPL-MCNC: 6 MG/DL (ref 5–25)
CALCIUM SERPL-MCNC: 9.1 MG/DL (ref 8.4–10.2)
CARDIAC TROPONIN I PNL SERPL HS: 5 NG/L
CARDIAC TROPONIN I PNL SERPL HS: 5 NG/L
CHLORIDE SERPL-SCNC: 102 MMOL/L (ref 96–108)
CLARITY UR: ABNORMAL
CO2 SERPL-SCNC: 24 MMOL/L (ref 21–32)
COLOR UR: YELLOW
CREAT SERPL-MCNC: 0.72 MG/DL (ref 0.6–1.3)
DACRYOCYTES BLD QL SMEAR: PRESENT
EOSINOPHIL # BLD MANUAL: 0.04 THOUSAND/UL (ref 0–0.4)
EOSINOPHIL NFR BLD MANUAL: 1 % (ref 0–6)
ERYTHROCYTE [DISTWIDTH] IN BLOOD BY AUTOMATED COUNT: 15.2 % (ref 11.6–15.1)
FLUAV RNA RESP QL NAA+PROBE: NEGATIVE
FLUBV RNA RESP QL NAA+PROBE: NEGATIVE
GFR SERPL CREATININE-BSD FRML MDRD: 91 ML/MIN/1.73SQ M
GLUCOSE SERPL-MCNC: 141 MG/DL (ref 65–140)
GLUCOSE UR STRIP-MCNC: NEGATIVE MG/DL
HCT VFR BLD AUTO: 32.8 % (ref 34.8–46.1)
HGB BLD-MCNC: 10.4 G/DL (ref 11.5–15.4)
HGB UR QL STRIP.AUTO: ABNORMAL
HYALINE CASTS #/AREA URNS LPF: ABNORMAL /LPF
HYPERCHROMIA BLD QL SMEAR: PRESENT
INR PPP: 0.87 (ref 0.84–1.19)
KETONES UR STRIP-MCNC: NEGATIVE MG/DL
LACTATE SERPL-SCNC: 1.7 MMOL/L (ref 0.5–2)
LACTATE SERPL-SCNC: 2.3 MMOL/L (ref 0.5–2)
LEUKOCYTE ESTERASE UR QL STRIP: NEGATIVE
LG PLATELETS BLD QL SMEAR: PRESENT
LIPASE SERPL-CCNC: 10 U/L (ref 11–82)
LYMPHOCYTES # BLD AUTO: 2 THOUSAND/UL (ref 0.6–4.47)
LYMPHOCYTES # BLD AUTO: 43 % (ref 14–44)
MAGNESIUM SERPL-MCNC: 1.6 MG/DL (ref 1.9–2.7)
MCH RBC QN AUTO: 27.4 PG (ref 26.8–34.3)
MCHC RBC AUTO-ENTMCNC: 31.7 G/DL (ref 31.4–37.4)
MCV RBC AUTO: 86 FL (ref 82–98)
METAMYELOCYTE ABSOLUTE CT: 0.17 THOUSAND/UL (ref 0–0.1)
METAMYELOCYTES NFR BLD MANUAL: 4 % (ref 0–1)
MONOCYTES # BLD AUTO: 1.13 THOUSAND/UL (ref 0–1.22)
MONOCYTES NFR BLD: 27 % (ref 4–12)
MYELOCYTE ABSOLUTE CT: 0.17 THOUSAND/UL (ref 0–0.1)
MYELOCYTES NFR BLD MANUAL: 4 % (ref 0–1)
NEUTROPHILS # BLD MANUAL: 0.58 THOUSAND/UL (ref 1.85–7.62)
NEUTS BAND NFR BLD MANUAL: 4 % (ref 0–8)
NEUTS SEG NFR BLD AUTO: 10 % (ref 43–75)
NITRITE UR QL STRIP: NEGATIVE
NON-SQ EPI CELLS URNS QL MICRO: ABNORMAL /HPF
NRBC BLD AUTO-RTO: 2 /100 WBC (ref 0–2)
PH UR STRIP.AUTO: 6.5 [PH]
PLATELET # BLD AUTO: 170 THOUSANDS/UL (ref 149–390)
PLATELET BLD QL SMEAR: ADEQUATE
PMV BLD AUTO: 11.8 FL (ref 8.9–12.7)
POLYCHROMASIA BLD QL SMEAR: PRESENT
POTASSIUM SERPL-SCNC: 4.1 MMOL/L (ref 3.5–5.3)
PROT SERPL-MCNC: 6.8 G/DL (ref 6.4–8.4)
PROT UR STRIP-MCNC: NEGATIVE MG/DL
PROTHROMBIN TIME: 12.1 SECONDS (ref 11.6–14.5)
RBC # BLD AUTO: 3.8 MILLION/UL (ref 3.81–5.12)
RBC #/AREA URNS AUTO: ABNORMAL /HPF
RBC MORPH BLD: NORMAL
RSV RNA RESP QL NAA+PROBE: NEGATIVE
SARS-COV-2 RNA RESP QL NAA+PROBE: NEGATIVE
SODIUM SERPL-SCNC: 135 MMOL/L (ref 135–147)
SP GR UR STRIP.AUTO: 1.01 (ref 1–1.03)
UROBILINOGEN UR QL STRIP.AUTO: 0.2 E.U./DL
VARIANT LYMPHS # BLD AUTO: 5 %
WBC # BLD AUTO: 4.17 THOUSAND/UL (ref 4.31–10.16)
WBC #/AREA URNS AUTO: ABNORMAL /HPF

## 2024-06-14 PROCEDURE — 81001 URINALYSIS AUTO W/SCOPE: CPT | Performed by: EMERGENCY MEDICINE

## 2024-06-14 PROCEDURE — 93005 ELECTROCARDIOGRAM TRACING: CPT

## 2024-06-14 PROCEDURE — 99285 EMERGENCY DEPT VISIT HI MDM: CPT | Performed by: EMERGENCY MEDICINE

## 2024-06-14 PROCEDURE — 83690 ASSAY OF LIPASE: CPT | Performed by: EMERGENCY MEDICINE

## 2024-06-14 PROCEDURE — 71275 CT ANGIOGRAPHY CHEST: CPT

## 2024-06-14 PROCEDURE — 36415 COLL VENOUS BLD VENIPUNCTURE: CPT | Performed by: EMERGENCY MEDICINE

## 2024-06-14 PROCEDURE — 85027 COMPLETE CBC AUTOMATED: CPT | Performed by: EMERGENCY MEDICINE

## 2024-06-14 PROCEDURE — 74177 CT ABD & PELVIS W/CONTRAST: CPT

## 2024-06-14 PROCEDURE — 83605 ASSAY OF LACTIC ACID: CPT | Performed by: EMERGENCY MEDICINE

## 2024-06-14 PROCEDURE — 85007 BL SMEAR W/DIFF WBC COUNT: CPT | Performed by: EMERGENCY MEDICINE

## 2024-06-14 PROCEDURE — 87040 BLOOD CULTURE FOR BACTERIA: CPT | Performed by: EMERGENCY MEDICINE

## 2024-06-14 PROCEDURE — 80053 COMPREHEN METABOLIC PANEL: CPT | Performed by: EMERGENCY MEDICINE

## 2024-06-14 PROCEDURE — 85610 PROTHROMBIN TIME: CPT | Performed by: EMERGENCY MEDICINE

## 2024-06-14 PROCEDURE — 85730 THROMBOPLASTIN TIME PARTIAL: CPT | Performed by: EMERGENCY MEDICINE

## 2024-06-14 PROCEDURE — 0241U HB NFCT DS VIR RESP RNA 4 TRGT: CPT | Performed by: EMERGENCY MEDICINE

## 2024-06-14 PROCEDURE — 84484 ASSAY OF TROPONIN QUANT: CPT | Performed by: EMERGENCY MEDICINE

## 2024-06-14 PROCEDURE — 83735 ASSAY OF MAGNESIUM: CPT | Performed by: EMERGENCY MEDICINE

## 2024-06-14 RX ORDER — BISACODYL 10 MG
10 SUPPOSITORY, RECTAL RECTAL DAILY
Qty: 12 SUPPOSITORY | Refills: 0 | Status: SHIPPED | OUTPATIENT
Start: 2024-06-14

## 2024-06-14 RX ORDER — AMOXICILLIN AND CLAVULANATE POTASSIUM 875; 125 MG/1; MG/1
1 TABLET, FILM COATED ORAL EVERY 12 HOURS
Qty: 20 TABLET | Refills: 0 | Status: SHIPPED | OUTPATIENT
Start: 2024-06-14 | End: 2024-06-24

## 2024-06-14 RX ORDER — AMOXICILLIN AND CLAVULANATE POTASSIUM 875; 125 MG/1; MG/1
1 TABLET, FILM COATED ORAL ONCE
Status: COMPLETED | OUTPATIENT
Start: 2024-06-14 | End: 2024-06-14

## 2024-06-14 RX ORDER — MAGNESIUM SULFATE 1 G/100ML
1 INJECTION INTRAVENOUS ONCE
Status: COMPLETED | OUTPATIENT
Start: 2024-06-14 | End: 2024-06-14

## 2024-06-14 RX ORDER — BISACODYL 5 MG/1
5 TABLET, DELAYED RELEASE ORAL 2 TIMES DAILY
Qty: 10 TABLET | Refills: 0 | Status: SHIPPED | OUTPATIENT
Start: 2024-06-14 | End: 2024-06-19

## 2024-06-14 RX ADMIN — IOHEXOL 100 ML: 350 INJECTION, SOLUTION INTRAVENOUS at 21:47

## 2024-06-14 RX ADMIN — AMOXICILLIN AND CLAVULANATE POTASSIUM 1 TABLET: 875; 125 TABLET, FILM COATED ORAL at 23:13

## 2024-06-14 RX ADMIN — SODIUM CHLORIDE 1000 ML: 0.9 INJECTION, SOLUTION INTRAVENOUS at 21:00

## 2024-06-14 RX ADMIN — MAGNESIUM SULFATE HEPTAHYDRATE 1 G: 1 INJECTION, SOLUTION INTRAVENOUS at 21:46

## 2024-06-14 RX ADMIN — SODIUM CHLORIDE 1000 ML: 0.9 INJECTION, SOLUTION INTRAVENOUS at 22:49

## 2024-06-15 NOTE — ED PROVIDER NOTES
History  Chief Complaint   Patient presents with    Chest Pain     Pt since this am having mid sternal chest pain that is worse with inspiration. Pt also constipated last BM unknown. Pt also complaining of right sided flank pain. Last round of chemo was done on 6/7/24     Patient complains of midsternal chest pain has been ongoing since this morning.  Worse with deep breath.  Coughing up occasional phlegm.  Does smoke.  Just had chemo last week.  Complaining of right-sided abdominal and flank pain.  No dysuria or frequency.  No rash.  No trauma.      History provided by:  Patient   used: No    Chest Pain  Pain location:  Substernal area  Pain quality: aching    Pain radiates to:  Does not radiate  Pain radiates to the back: no    Pain severity:  Mild  Onset quality:  Gradual  Duration: Since this morning.  Chronicity:  New  Context: at rest    Context: not breathing, not eating and no stress    Relieved by:  Nothing  Worsened by:  Coughing and deep breathing  Ineffective treatments:  None tried  Associated symptoms: abdominal pain and cough    Associated symptoms: no altered mental status, no claudication, no dizziness, no dysphagia, no fever, no headache, no nausea, no near-syncope, no orthopnea, no palpitations, no PND, no shortness of breath and not vomiting        Prior to Admission Medications   Prescriptions Last Dose Informant Patient Reported? Taking?   OMEPRAZOLE PO   Yes No   Sig: Take 20 mg by mouth 2 (two) times a day   QUEtiapine (SEROquel) 200 mg tablet   Yes No   Sig: Take 200 mg by mouth daily at bedtime   albuterol (PROVENTIL HFA,VENTOLIN HFA) 90 mcg/act inhaler   Yes No   Sig: Inhale 2 puffs as needed   anastrozole (ARIMIDEX) 1 mg tablet   Yes No   Sig: Take 1 mg by mouth daily   atorvastatin (LIPITOR) 40 mg tablet   Yes No   Sig: Take 40 mg by mouth daily   buPROPion (WELLBUTRIN SR) 200 MG 12 hr tablet   Yes No   Sig: Take 200 mg by mouth 2 (two) times a day   busPIRone  (BUSPAR) 15 mg tablet   Yes No   Sig: Take 15 mg by mouth 3 (three) times a day as needed   cetirizine (ZyrTEC) 10 mg tablet   Yes No   Sig: Take 10 mg by mouth daily   dexamethasone (DECADRON) 4 mg tablet   Yes No   Sig: Take 2 tablets by mouth twice daily with food the day before and after chemotherapy.   escitalopram (LEXAPRO) 20 mg tablet   Yes No   Sig: Take 20 mg by mouth daily   fluticasone (FLONASE) 50 mcg/act nasal spray   Yes No   Si sprays into each nostril daily at bedtime   fluticasone-salmeterol (ADVAIR HFA) 115-21 MCG/ACT inhaler   Yes No   Sig: Inhale 2 puffs 2 (two) times a day Rinse mouth after use.   lisinopril (ZESTRIL) 20 mg tablet   No No   Sig: Take 0.5 tablets (10 mg total) by mouth daily   metFORMIN (GLUCOPHAGE) 500 mg tablet   Yes No   Sig: Take 500 mg by mouth daily with breakfast   montelukast (SINGULAIR) 10 mg tablet   Yes No   Sig: Take 10 mg by mouth daily at bedtime   nortriptyline (PAMELOR) 10 mg capsule   Yes No   Sig: Take 50 mg by mouth daily at bedtime   prazosin (MINIPRESS) 5 mg capsule   Yes No   Sig: Take 5 mg by mouth daily at bedtime   traZODone (DESYREL) 300 MG tablet   Yes No   Sig: Take 300 mg by mouth daily at bedtime      Facility-Administered Medications: None       Past Medical History:   Diagnosis Date    Anesthesia     Pt states she woke up during 2 surgeries    Breast cancer (HCC)     Cancer (HCC)     Diabetes mellitus (HCC)     GERD (gastroesophageal reflux disease)     Heart murmur     Hyperlipidemia     Hypertension     Kidney stones     Subdural hematoma (HCC)     Von Willebrand disease (HCC)     Wears contact lenses        Past Surgical History:   Procedure Laterality Date    APPENDECTOMY      BREAST LUMPECTOMY Left     CARPAL TUNNEL RELEASE Bilateral     CHOLECYSTECTOMY      COLONOSCOPY      HYSTERECTOMY      KNEE SURGERY Left     LYMPH NODE BIOPSY Right 2024    Procedure: AXILLARY SENTINEL NODE BIOPSY (NUC MED INJECTION AT 0730);  Surgeon: Almita  Leonor Molina DO;  Location:  MAIN OR;  Service: General    MRI BREAST BIOPSY LEFT (ALL INCLUSIVE) Left 12/13/2023    PARTIAL HYSTERECTOMY      MO MASTECTOMY SIMPLE COMPLETE Bilateral 2/6/2024    Procedure: BREAST MASTECTOMY;  Surgeon: Almita Molina DO;  Location:  MAIN OR;  Service: General    ROTATOR CUFF REPAIR Left     TONSILLECTOMY         Family History   Problem Relation Age of Onset    Cancer Mother     Allergies Father     Cancer Sister     Stroke Brother      I have reviewed and agree with the history as documented.    E-Cigarette/Vaping    E-Cigarette Use Former User      E-Cigarette/Vaping Substances    Nicotine Yes 2 cigarettes a week    THC No     CBD No     Flavoring Yes      Social History     Tobacco Use    Smoking status: Every Day     Current packs/day: 0.25     Average packs/day: 0.3 packs/day for 25.0 years (6.3 ttl pk-yrs)     Types: Cigarettes    Smokeless tobacco: Former    Tobacco comments:     few cigs/day   Vaping Use    Vaping status: Former    Substances: Nicotine (2 cigarettes a week), Flavoring   Substance Use Topics    Alcohol use: Not Currently    Drug use: Never       Review of Systems   Constitutional:  Negative for chills and fever.   HENT:  Negative for ear pain, hearing loss, sore throat, trouble swallowing and voice change.    Eyes:  Negative for pain and discharge.   Respiratory:  Positive for cough. Negative for shortness of breath and wheezing.    Cardiovascular:  Positive for chest pain. Negative for palpitations, orthopnea, claudication, PND and near-syncope.   Gastrointestinal:  Positive for abdominal pain and constipation. Negative for blood in stool, diarrhea, nausea and vomiting.   Genitourinary:  Negative for dysuria, flank pain, frequency and hematuria.   Musculoskeletal:  Negative for joint swelling, neck pain and neck stiffness.   Skin:  Negative for rash and wound.   Neurological:  Negative for dizziness, seizures, syncope, facial asymmetry and  headaches.   Psychiatric/Behavioral:  Negative for hallucinations, self-injury and suicidal ideas.    All other systems reviewed and are negative.      Physical Exam  Physical Exam  Vitals and nursing note reviewed.   Constitutional:       General: She is not in acute distress.     Appearance: She is well-developed.   HENT:      Head: Normocephalic and atraumatic.      Right Ear: External ear normal.      Left Ear: External ear normal.   Eyes:      General: No scleral icterus.        Right eye: No discharge.         Left eye: No discharge.      Extraocular Movements: Extraocular movements intact.      Conjunctiva/sclera: Conjunctivae normal.   Cardiovascular:      Rate and Rhythm: Normal rate and regular rhythm.      Heart sounds: Normal heart sounds. No murmur heard.  Pulmonary:      Effort: Pulmonary effort is normal.      Breath sounds: Normal breath sounds. No wheezing or rales.   Abdominal:      General: Bowel sounds are normal. There is no distension.      Palpations: Abdomen is soft.      Tenderness: There is abdominal tenderness. There is no guarding or rebound.      Comments: Tender along the right side of the abdomen.   Musculoskeletal:         General: No deformity. Normal range of motion.      Cervical back: Normal range of motion and neck supple.   Skin:     General: Skin is warm and dry.      Findings: No rash.   Neurological:      General: No focal deficit present.      Mental Status: She is alert and oriented to person, place, and time.      Cranial Nerves: No cranial nerve deficit.   Psychiatric:         Mood and Affect: Mood normal.         Behavior: Behavior normal.         Thought Content: Thought content normal.         Judgment: Judgment normal.         Vital Signs  ED Triage Vitals [06/14/24 2042]   Temperature Pulse Respirations Blood Pressure SpO2   98.6 °F (37 °C) 85 20 139/73 95 %      Temp Source Heart Rate Source Patient Position - Orthostatic VS BP Location FiO2 (%)   Temporal Monitor  Sitting Left arm --      Pain Score       9           Vitals:    06/14/24 2042 06/14/24 2045 06/14/24 2200 06/14/24 2245   BP: 139/73 139/73 134/75 127/84   Pulse: 85 81 83 78   Patient Position - Orthostatic VS: Sitting   Sitting         Visual Acuity      ED Medications  Medications   sodium chloride 0.9 % bolus 1,000 mL (1,000 mL Intravenous New Bag 6/14/24 2249)   amoxicillin-clavulanate (AUGMENTIN) 875-125 mg per tablet 1 tablet (has no administration in time range)   sodium chloride 0.9 % bolus 1,000 mL (0 mL Intravenous Stopped 6/14/24 2249)   magnesium sulfate IVPB (premix) SOLN 1 g (0 g Intravenous Stopped 6/14/24 2242)   iohexol (OMNIPAQUE) 350 MG/ML injection (SINGLE-DOSE) 100 mL (100 mL Intravenous Given 6/14/24 2147)       Diagnostic Studies  Results Reviewed       Procedure Component Value Units Date/Time    HS Troponin I 4hr [[id number]]     Lab Status: No result Specimen: Blood     HS Troponin I 2hr [[id number]]  (Normal) Collected: 06/14/24 2224    Lab Status: Final result Specimen: Blood from Arm, Left Updated: 06/14/24 2256     hs TnI 2hr 5 ng/L      Delta 2hr hsTnI 0 ng/L     Lactic acid 2 Hours [[id number]]  (Normal) Collected: 06/14/24 2224    Lab Status: Final result Specimen: Blood from Arm, Left Updated: 06/14/24 2250     LACTIC ACID 1.7 mmol/L     Narrative:      Result may be elevated if tourniquet was used during collection.    Urine Microscopic [[id number]]  (Abnormal) Collected: 06/14/24 2132    Lab Status: Final result Specimen: Urine, Clean Catch Updated: 06/14/24 2220     RBC, UA 0-1 /hpf      WBC, UA 0-5 /hpf      Epithelial Cells Occasional /hpf      Bacteria, UA Occasional /hpf      Hyaline Casts, UA 0-1 /lpf     FLU/RSV/COVID - if FLU/RSV clinically relevant [[id number]]  (Normal) Collected: 06/14/24 2059    Lab Status: Final result Specimen: Nares from Nose Updated: 06/14/24 2201     SARS-CoV-2 Negative     INFLUENZA A PCR Negative     INFLUENZA B PCR Negative     RSV PCR Negative     Narrative:      FOR PEDIATRIC PATIENTS - copy/paste COVID Guidelines URL to browser: [email or web address]    SARS-CoV-2 assay is a Nucleic Acid Amplification assay intended for the  qualitative detection of nucleic acid from SARS-CoV-2 in nasopharyngeal  swabs. Results are for the presumptive identification of SARS-CoV-2 RNA.    Positive results are indicative of infection with SARS-CoV-2, the virus  causing COVID-19, but do not rule out bacterial infection or co-infection  with other viruses. Laboratories within the [place] and its  territories are required to report all positive results to the appropriate  public health authorities. Negative results do not preclude SARS-CoV-2  infection and should not be used as the sole basis for treatment or other  patient management decisions. Negative results must be combined with  clinical observations, patient history, and epidemiological information.  This test has not been FDA cleared or approved.    This test has been authorized by FDA under an Emergency Use Authorization  (EUA). This test is only authorized for the duration of time the  declaration that circumstances exist justifying the authorization of the  emergency use of an in vitro diagnostic tests for detection of SARS-CoV-2  virus and/or diagnosis of COVID-19 infection under section 564(b)(1) of  the Act, 21 U.S.C. 360bbb-3(b)(1), unless the authorization is terminated  or revoked sooner. The test has been validated but independent review by FDA  and CLIA is pending.    Test performed using [employer] GeneXpert: This RT-PCR assay targets N2,  a region unique to SARS-CoV-2. A conserved region in the E-gene was chosen  for pan-Sarbecovirus detection which includes SARS-CoV-2.    According to CMS-2020-01-R, this platform meets the definition of high-throughput technology.    UA w Reflex to Microscopic w Reflex to Culture [[id number]]  (Abnormal) Collected:  06/14/24 2132    Lab Status: Final result Specimen: Urine, Clean Catch Updated: 06/14/24 2141     Color, UA Yellow     Clarity, UA Slightly Cloudy     Specific Gravity, UA 1.010     pH, UA 6.5     Leukocytes, UA Negative     Nitrite, UA Negative     Protein, UA Negative mg/dl      Glucose, UA Negative mg/dl      Ketones, UA Negative mg/dl      Urobilinogen, UA 0.2 E.U./dl      Bilirubin, UA Negative     Occult Blood, UA Trace-Intact    Comprehensive metabolic panel [[id number]]  (Abnormal) Collected: 06/14/24 2059    Lab Status: Final result Specimen: Blood from Arm, Left Updated: 06/14/24 2140     Sodium 135 mmol/L      Potassium 4.1 mmol/L      Chloride 102 mmol/L      CO2 24 mmol/L      ANION GAP 9 mmol/L      BUN 6 mg/dL      Creatinine 0.72 mg/dL      Glucose 141 mg/dL      Calcium 9.1 mg/dL      AST 33 U/L      ALT 34 U/L      Alkaline Phosphatase 67 U/L      Total Protein 6.8 g/dL      Albumin 4.0 g/dL      Total Bilirubin 0.27 mg/dL      eGFR 91 ml/min/1.73sq m     Narrative:      National Kidney Disease Foundation guidelines for Chronic Kidney Disease (CKD):     Stage 1 with normal or high GFR (GFR > 90 mL/min/1.73 square meters)    Stage 2 Mild CKD (GFR = 60-89 mL/min/1.73 square meters)    Stage 3A Moderate CKD (GFR = 45-59 mL/min/1.73 square meters)    Stage 3B Moderate CKD (GFR = 30-44 mL/min/1.73 square meters)    Stage 4 Severe CKD (GFR = 15-29 mL/min/1.73 square meters)    Stage 5 End Stage CKD (GFR <15 mL/min/1.73 square meters)  Note: GFR calculation is accurate only with a steady state creatinine    Magnesium [[id number]]  (Abnormal) Collected: 06/14/24 2059    Lab Status: Final result Specimen: Blood from Arm, Left Updated: 06/14/24 2140     Magnesium 1.6 mg/dL     Lipase [[id number]]  (Abnormal) Collected: 06/14/24 2059    Lab Status: Final result Specimen: Blood from Arm, Left Updated: 06/14/24 2140     Lipase 10 u/L     Lactic acid, plasma (w/reflex if result > 2.0) [[id number]]  (Abnormal)  Collected: 06/14/24 2059    Lab Status: Final result Specimen: Blood from Arm, Left Updated: 06/14/24 2140     LACTIC ACID 2.3 mmol/L     Narrative:      Result may be elevated if tourniquet was used during collection.    HS Troponin 0hr (reflex protocol) [[id number]]  (Normal) Collected: 06/14/24 2059    Lab Status: Final result Specimen: Blood from Arm, Left Updated: 06/14/24 2136     hs TnI 0hr 5 ng/L     CBC and differential [[id number]]  (Abnormal) Collected: 06/14/24 2059    Lab Status: Final result Specimen: Blood from Arm, Left Updated: 06/14/24 2126     WBC 4.17 Thousand/uL      RBC 3.80 Million/uL      Hemoglobin 10.4 g/dL      Hematocrit 32.8 %      MCV 86 fL      MCH 27.4 pg      MCHC 31.7 g/dL      RDW 15.2 %      MPV 11.8 fL      Platelets 170 Thousands/uL     Manual Differential(PHLEBS Do Not Order) [[id number]] Collected: 06/14/24 2059    Lab Status: In process Specimen: Blood from Arm, Left Updated: 06/14/24 2125    Protime-INR [[id number]]  (Normal) Collected: 06/14/24 2059    Lab Status: Final result Specimen: Blood from Arm, Left Updated: 06/14/24 2123     Protime 12.1 seconds      INR 0.87    APTT [[id number]]  (Normal) Collected: 06/14/24 2059    Lab Status: Final result Specimen: Blood from Arm, Left Updated: 06/14/24 2123     PTT 34 seconds     Blood culture #2 [[id number]] Collected: 06/14/24 2059    Lab Status: In process Specimen: Blood from Arm, Left Updated: 06/14/24 2106    Blood culture #1 [[id number]]     Lab Status: No result Specimen: Blood                    PE Study with CT Abdomen and Pelvis with contrast   Final Result by Cliff Oseguera MD (06/14 2229)      No acute findings in the chest, abdomen or pelvis.      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 Correlate for right lower extremity claudication symptoms.                  Workstation performed: [id number]                    Procedures  ECG 12 Lead Documentation Only    Date/Time: 6/14/2024 8:59  PM    Performed by: Hiro Langford MD  Authorized by: Hiro Langford MD    ECG reviewed by me, the ED Provider: yes    Patient location:  ED  Rate:     ECG rate:  90  Rhythm:     Rhythm: sinus rhythm    Ectopy:     Ectopy: none    QRS:     QRS axis:  Normal           ED Course  ED Course as of 06/14/24 2310   Fri Jun 14, 2024   2233 CT results noted.  Patient not having any right leg pain.             HEART Risk Score      Flowsheet Row Most Recent Value   Heart Score Risk Calculator    History 0 Filed at: 06/14/2024 2137   ECG 1 Filed at: 06/14/2024 2137   Age 1 Filed at: 06/14/2024 2137   Risk Factors 2 Filed at: 06/14/2024 2137   Troponin 0 Filed at: 06/14/2024 2137   HEART Score 4 Filed at: 06/14/2024 2137                          SBIRT 20yo+      Flowsheet Row Most Recent Value   Initial Alcohol Screen: US AUDIT-C     1. How often do you have a drink containing alcohol? 0 Filed at: 06/14/2024 2042   2. How many drinks containing alcohol do you have on a typical day you are drinking?  0 Filed at: 06/14/2024 2042   3a. Male UNDER 65: How often do you have five or more drinks on one occasion? 0 Filed at: 06/14/2024 2042   3b. FEMALE Any Age, or MALE 65+: How often do you have 4 or more drinks on one occassion? 0 Filed at: 06/14/2024 2042   Audit-C Score 0 Filed at: 06/14/2024 2042   CRISTINO: How many times in the past year have you...    Used an illegal drug or used a prescription medication for non-medical reasons? Never Filed at: 06/14/2024 2042                      Medical Decision Making  Amount and/or Complexity of Data Reviewed  Labs: ordered. Decision-making details documented in ED Course.  Radiology: ordered. Decision-making details documented in ED Course.  ECG/medicine tests: ordered and independent interpretation performed. Decision-making details documented in ED Course.  Discussion of management or test interpretation with external provider(s): At risk for but not limited to STEMI, NSTEMI,  cholelithiasis, cholecystitis, peptic ulcer disease, gastritis, pancreatitis, diverticulitis, colitis, bowel obstruction, appendicitis, UTI, ureteral stone, kidney stone, inflammatory bowel disease.    Risk  OTC drugs.  Prescription drug management.             Disposition  Final diagnoses:   Bronchitis   Constipation     Time reflects when diagnosis was documented in both MDM as applicable and the Disposition within this note       Time User Action Codes Description Comment    6/14/2024 11:08 PM Hiro Langford [J40] Bronchitis     6/14/2024 11:08 PM Hiro Langford Add [K59.00] Constipation           ED Disposition       ED Disposition   Discharge    Condition   Stable    Date/Time   Fri Jun 14, 2024 4830    Comment   Tammie EDSON Avalos discharge to home/self care.                   Follow-up Information       Follow up With Specialties Details Why Contact Info    Carmelo Cramer MD Family Medicine Call in 3 days  [address] [facility] [address]  [phone]              Patient's Medications   Discharge Prescriptions    AMOXICILLIN-CLAVULANATE (AUGMENTIN) 875-125 MG PER TABLET    Take 1 tablet by mouth every 12 (twelve) hours for 10 days       Start Date: 6/14/2024 End Date: 6/24/2024       Order Dose: 1 tablet       Quantity: 20 tablet    Refills: 0    BISACODYL (DULCOLAX) 10 MG SUPPOSITORY    Insert 1 suppository (10 mg total) into the rectum daily       Start Date: 6/14/2024 End Date: --       Order Dose: 10 mg       Quantity: 12 suppository    Refills: 0    BISACODYL (DULCOLAX) 5 MG EC TABLET    Take 1 tablet (5 mg total) by mouth 2 (two) times a day for 5 days       Start Date: 6/14/2024 End Date: 6/19/2024       Order Dose: 5 mg       Quantity: 10 tablet    Refills: 0       No discharge procedures on file.    PDMP Review         Value Time User    PDMP Reviewed  Yes 3/24/2024  8:47 PM Trinidad Weinberg MD            ED Provider  Electronically Signed by             Hiro Langford,  MD  06/14/24 5780       Hiro Langford MD  06/14/24 2640

## 2024-06-16 LAB
ATRIAL RATE: 86 BPM
BACTERIA BLD CULT: NORMAL
P AXIS: 50 DEGREES
PR INTERVAL: 164 MS
QRS AXIS: 13 DEGREES
QRSD INTERVAL: 86 MS
QT INTERVAL: 418 MS
QTC INTERVAL: 500 MS
T WAVE AXIS: 32 DEGREES
VENTRICULAR RATE: 86 BPM

## 2024-06-16 PROCEDURE — 93010 ELECTROCARDIOGRAM REPORT: CPT | Performed by: INTERNAL MEDICINE

## 2024-06-17 NOTE — ASSESSMENT & PLAN NOTE
Some abd discomfort no bm since last week- will give dulxolax and place on colace bid   General Sunscreen Counseling: I recommended a broad spectrum sunscreen with a SPF of 30 or higher.  I explained that SPF 30 sunscreens block approximately 97 percent of the sun's harmful rays.  Sunscreens should be applied at least 15 minutes prior to expected sun exposure and then every 2 hours after that as long as sun exposure continues. If swimming or exercising sunscreen should be reapplied every 45 minutes to an hour after getting wet or sweating.  One ounce, or the equivalent of a shot glass full of sunscreen, is adequate to protect the skin not covered by a bathing suit. I also recommended a lip balm with a sunscreen as well. Sun protective clothing can be used in lieu of sunscreen but must be worn the entire time you are exposed to the sun's rays. Detail Level: Generalized Products Recommended: Blue Lizard\\nSun protective clothing

## 2024-06-20 LAB — BACTERIA BLD CULT: NORMAL

## 2024-07-17 ENCOUNTER — DOCTOR'S OFFICE (OUTPATIENT)
Dept: URBAN - NONMETROPOLITAN AREA CLINIC 1 | Facility: CLINIC | Age: 59
Setting detail: OPHTHALMOLOGY
End: 2024-07-17
Payer: COMMERCIAL

## 2024-07-17 VITALS — HEIGHT: 55 IN

## 2024-07-17 DIAGNOSIS — H02.88B: ICD-10-CM

## 2024-07-17 DIAGNOSIS — H02.88A: ICD-10-CM

## 2024-07-17 PROCEDURE — 99213 OFFICE O/P EST LOW 20 MIN: CPT | Performed by: OPHTHALMOLOGY

## 2024-07-17 ASSESSMENT — CONFRONTATIONAL VISUAL FIELD TEST (CVF)
OD_FINDINGS: FULL
OS_FINDINGS: FULL

## 2024-07-17 ASSESSMENT — LID EXAM ASSESSMENTS
OS_MEIBOMITIS: 2+
OD_MEIBOMITIS: 2+

## 2024-07-22 ENCOUNTER — APPOINTMENT (EMERGENCY)
Dept: RADIOLOGY | Facility: HOSPITAL | Age: 59
End: 2024-07-22
Payer: COMMERCIAL

## 2024-07-22 ENCOUNTER — APPOINTMENT (EMERGENCY)
Dept: NON INVASIVE DIAGNOSTICS | Facility: HOSPITAL | Age: 59
End: 2024-07-22
Payer: COMMERCIAL

## 2024-07-22 ENCOUNTER — HOSPITAL ENCOUNTER (EMERGENCY)
Facility: HOSPITAL | Age: 59
Discharge: HOME | End: 2024-07-22
Attending: EMERGENCY MEDICINE
Payer: COMMERCIAL

## 2024-07-22 VITALS
WEIGHT: 234.57 LBS | RESPIRATION RATE: 18 BRPM | SYSTOLIC BLOOD PRESSURE: 189 MMHG | DIASTOLIC BLOOD PRESSURE: 89 MMHG | HEART RATE: 77 BPM | OXYGEN SATURATION: 94 % | TEMPERATURE: 98.4 F | BODY MASS INDEX: 36.74 KG/M2

## 2024-07-22 DIAGNOSIS — M79.89 LEFT ARM SWELLING: ICD-10-CM

## 2024-07-22 DIAGNOSIS — M13.0 POLYARTHRITIS OF HAND: Primary | ICD-10-CM

## 2024-07-22 LAB
ALBUMIN SERPL BCG-MCNC: 4.3 G/DL (ref 3.5–5)
ALP SERPL-CCNC: 72 U/L (ref 34–104)
ALT SERPL W P-5'-P-CCNC: 36 U/L (ref 7–52)
ANION GAP SERPL CALCULATED.3IONS-SCNC: 5 MMOL/L (ref 4–13)
AST SERPL W P-5'-P-CCNC: 31 U/L (ref 13–39)
BASOPHILS # BLD AUTO: 0.09 THOUSANDS/ÂΜL (ref 0–0.1)
BASOPHILS NFR BLD AUTO: 1 % (ref 0–1)
BILIRUB SERPL-MCNC: 0.21 MG/DL (ref 0.2–1)
BUN SERPL-MCNC: 11 MG/DL (ref 5–25)
CALCIUM SERPL-MCNC: 9.9 MG/DL (ref 8.4–10.2)
CHLORIDE SERPL-SCNC: 103 MMOL/L (ref 96–108)
CO2 SERPL-SCNC: 30 MMOL/L (ref 21–32)
CREAT SERPL-MCNC: 0.78 MG/DL (ref 0.6–1.3)
EOSINOPHIL # BLD AUTO: 0.14 THOUSAND/ÂΜL (ref 0–0.61)
EOSINOPHIL NFR BLD AUTO: 2 % (ref 0–6)
ERYTHROCYTE [DISTWIDTH] IN BLOOD BY AUTOMATED COUNT: 13.6 % (ref 11.6–15.1)
GFR SERPL CREATININE-BSD FRML MDRD: 82 ML/MIN/1.73SQ M
GLUCOSE SERPL-MCNC: 122 MG/DL (ref 65–140)
HCT VFR BLD AUTO: 40.5 % (ref 34.8–46.1)
HGB BLD-MCNC: 12.8 G/DL (ref 11.5–15.4)
IMM GRANULOCYTES # BLD AUTO: 0.01 THOUSAND/UL (ref 0–0.2)
IMM GRANULOCYTES NFR BLD AUTO: 0 % (ref 0–2)
LYMPHOCYTES # BLD AUTO: 1.82 THOUSANDS/ÂΜL (ref 0.6–4.47)
LYMPHOCYTES NFR BLD AUTO: 26 % (ref 14–44)
MCH RBC QN AUTO: 26.7 PG (ref 26.8–34.3)
MCHC RBC AUTO-ENTMCNC: 31.6 G/DL (ref 31.4–37.4)
MCV RBC AUTO: 85 FL (ref 82–98)
MONOCYTES # BLD AUTO: 0.55 THOUSAND/ÂΜL (ref 0.17–1.22)
MONOCYTES NFR BLD AUTO: 8 % (ref 4–12)
NEUTROPHILS # BLD AUTO: 4.3 THOUSANDS/ÂΜL (ref 1.85–7.62)
NEUTS SEG NFR BLD AUTO: 63 % (ref 43–75)
NRBC BLD AUTO-RTO: 0 /100 WBCS
PLATELET # BLD AUTO: 232 THOUSANDS/UL (ref 149–390)
PMV BLD AUTO: 10.9 FL (ref 8.9–12.7)
POTASSIUM SERPL-SCNC: 4 MMOL/L (ref 3.5–5.3)
PROT SERPL-MCNC: 7.4 G/DL (ref 6.4–8.4)
RBC # BLD AUTO: 4.79 MILLION/UL (ref 3.81–5.12)
SODIUM SERPL-SCNC: 138 MMOL/L (ref 135–147)
WBC # BLD AUTO: 6.91 THOUSAND/UL (ref 4.31–10.16)

## 2024-07-22 PROCEDURE — 73130 X-RAY EXAM OF HAND: CPT

## 2024-07-22 PROCEDURE — 80053 COMPREHEN METABOLIC PANEL: CPT | Performed by: EMERGENCY MEDICINE

## 2024-07-22 PROCEDURE — 99284 EMERGENCY DEPT VISIT MOD MDM: CPT | Performed by: EMERGENCY MEDICINE

## 2024-07-22 PROCEDURE — 99284 EMERGENCY DEPT VISIT MOD MDM: CPT

## 2024-07-22 PROCEDURE — 85025 COMPLETE CBC W/AUTO DIFF WBC: CPT | Performed by: EMERGENCY MEDICINE

## 2024-07-22 PROCEDURE — 93971 EXTREMITY STUDY: CPT

## 2024-07-22 PROCEDURE — 36415 COLL VENOUS BLD VENIPUNCTURE: CPT | Performed by: EMERGENCY MEDICINE

## 2024-07-22 RX ORDER — ACETAMINOPHEN 325 MG/1
650 TABLET ORAL ONCE
Status: COMPLETED | OUTPATIENT
Start: 2024-07-22 | End: 2024-07-22

## 2024-07-22 RX ORDER — METHYLPREDNISOLONE 4 MG/1
TABLET ORAL
Qty: 21 TABLET | Refills: 0 | Status: SHIPPED | OUTPATIENT
Start: 2024-07-22

## 2024-07-22 RX ORDER — COLCHICINE 0.6 MG/1
0.6 TABLET ORAL ONCE
Status: COMPLETED | OUTPATIENT
Start: 2024-07-22 | End: 2024-07-22

## 2024-07-22 RX ADMIN — ACETAMINOPHEN 325MG 650 MG: 325 TABLET ORAL at 14:18

## 2024-07-22 RX ADMIN — COLCHICINE 0.6 MG: 0.6 TABLET ORAL at 14:18

## 2024-07-22 NOTE — ED PROVIDER NOTES
History  Chief Complaint   Patient presents with    Arm Swelling     Left arm swelling and redness starting 3 weeks. Pt states pcp was started on a water pill for her arm but it is not helping       History provided by:  Medical records and patient  Arm Pain  Location:  Left arm swelling and left hand pain  Severity:  Moderate  Onset quality:  Gradual  Duration:  3 weeks  Timing:  Constant  Progression:  Unchanged  Chronicity:  New  Context:  Pt with 3 weeks of LUE swelling, no injury.  PCP placed on lasix, subsequently developed some swelling and pain to left 2nd and 3rd MCP regions  Relieved by:  Nothing  Worsened by:  Nothing  Ineffective treatments:  Lasix  Associated symptoms: no abdominal pain, no chest pain, no congestion, no cough, no diarrhea, no ear pain, no fatigue, no fever, no headaches, no loss of consciousness, no myalgias, no nausea, no rash, no rhinorrhea, no shortness of breath, no sore throat, no vomiting and no wheezing    Risk factors:  Breast cancer, recent mastectomies      Prior to Admission Medications   Prescriptions Last Dose Informant Patient Reported? Taking?   OMEPRAZOLE PO   Yes No   Sig: Take 20 mg by mouth 2 (two) times a day   QUEtiapine (SEROquel) 200 mg tablet   Yes No   Sig: Take 200 mg by mouth daily at bedtime   albuterol (PROVENTIL HFA,VENTOLIN HFA) 90 mcg/act inhaler   Yes No   Sig: Inhale 2 puffs as needed   anastrozole (ARIMIDEX) 1 mg tablet   Yes No   Sig: Take 1 mg by mouth daily   atorvastatin (LIPITOR) 40 mg tablet   Yes No   Sig: Take 40 mg by mouth daily   bisacodyl (DULCOLAX) 10 mg suppository   No No   Sig: Insert 1 suppository (10 mg total) into the rectum daily   bisacodyl (DULCOLAX) 5 mg EC tablet   No No   Sig: Take 1 tablet (5 mg total) by mouth 2 (two) times a day for 5 days   buPROPion (WELLBUTRIN SR) 200 MG 12 hr tablet   Yes No   Sig: Take 200 mg by mouth 2 (two) times a day   busPIRone (BUSPAR) 15 mg tablet   Yes No   Sig: Take 15 mg by mouth 3 (three)  times a day as needed   cetirizine (ZyrTEC) 10 mg tablet   Yes No   Sig: Take 10 mg by mouth daily   dexamethasone (DECADRON) 4 mg tablet   Yes No   Sig: Take 2 tablets by mouth twice daily with food the day before and after chemotherapy.   escitalopram (LEXAPRO) 20 mg tablet   Yes No   Sig: Take 20 mg by mouth daily   fluticasone (FLONASE) 50 mcg/act nasal spray   Yes No   Si sprays into each nostril daily at bedtime   fluticasone-salmeterol (ADVAIR HFA) 115-21 MCG/ACT inhaler   Yes No   Sig: Inhale 2 puffs 2 (two) times a day Rinse mouth after use.   lisinopril (ZESTRIL) 20 mg tablet   No No   Sig: Take 0.5 tablets (10 mg total) by mouth daily   metFORMIN (GLUCOPHAGE) 500 mg tablet   Yes No   Sig: Take 500 mg by mouth daily with breakfast   montelukast (SINGULAIR) 10 mg tablet   Yes No   Sig: Take 10 mg by mouth daily at bedtime   nortriptyline (PAMELOR) 10 mg capsule   Yes No   Sig: Take 50 mg by mouth daily at bedtime   prazosin (MINIPRESS) 5 mg capsule   Yes No   Sig: Take 5 mg by mouth daily at bedtime   traZODone (DESYREL) 300 MG tablet   Yes No   Sig: Take 300 mg by mouth daily at bedtime      Facility-Administered Medications: None       Past Medical History:   Diagnosis Date    Anesthesia     Pt states she woke up during 2 surgeries    Breast cancer (HCC)     Cancer (HCC)     Diabetes mellitus (HCC)     GERD (gastroesophageal reflux disease)     Heart murmur     Hyperlipidemia     Hypertension     Kidney stones     Subdural hematoma (HCC)     Von Willebrand disease (HCC)     Wears contact lenses        Past Surgical History:   Procedure Laterality Date    APPENDECTOMY      BREAST LUMPECTOMY Left     CARPAL TUNNEL RELEASE Bilateral     CHOLECYSTECTOMY      COLONOSCOPY      HYSTERECTOMY      KNEE SURGERY Left     LYMPH NODE BIOPSY Right 2024    Procedure: AXILLARY SENTINEL NODE BIOPSY (NUC MED INJECTION AT 0730);  Surgeon: Almita Molina DO;  Location:  MAIN OR;  Service: General    MRI  BREAST BIOPSY LEFT (ALL INCLUSIVE) Left 12/13/2023    PARTIAL HYSTERECTOMY      PA MASTECTOMY SIMPLE COMPLETE Bilateral 2/6/2024    Procedure: BREAST MASTECTOMY;  Surgeon: Almita Molina DO;  Location: [facility];  Service: General    ROTATOR CUFF REPAIR Left     TONSILLECTOMY      US GUIDED BREAST BIOPSY RIGHT COMPLETE Right 10/2/2023    US GUIDED BREAST BIOPSY RIGHT COMPLETE Right 12/13/2023       Family History   Problem Relation Age of Onset    Cancer Mother     Allergies Father     Cancer Sister     Stroke Brother      I have reviewed and agree with the history as documented.    E-Cigarette/Vaping    E-Cigarette Use Former User      E-Cigarette/Vaping Substances    Nicotine Yes 2 cigarettes a week    THC No     CBD No     Flavoring Yes      Social History     Tobacco Use    Smoking status: Former     Current packs/day: 0.25     Average packs/day: 0.3 packs/day for 25.0 years (6.3 ttl pk-yrs)     Types: Cigarettes    Smokeless tobacco: Former    Tobacco comments:     few cigs/day   Vaping Use    Vaping status: Former    Substances: Nicotine (2 cigarettes a week), Flavoring   Substance Use Topics    Alcohol use: Not Currently    Drug use: Never       Review of Systems   Constitutional:  Negative for chills, fatigue and fever.   HENT:  Negative for congestion, ear discharge, ear pain, rhinorrhea and sore throat.    Eyes:  Negative for pain and visual disturbance.   Respiratory:  Negative for cough, shortness of breath and wheezing.    Cardiovascular:  Negative for chest pain and palpitations.   Gastrointestinal:  Negative for abdominal pain, diarrhea, nausea and vomiting.   Endocrine: Negative for polydipsia, polyphagia and polyuria.   Genitourinary:  Negative for difficulty urinating, dysuria, flank pain and hematuria.   Musculoskeletal:  Positive for arthralgias. Negative for back pain and myalgias.   Skin:  Negative for color change and rash.   Allergic/Immunologic: Negative for immunocompromised  state.   Neurological:  Negative for dizziness, seizures, loss of consciousness, syncope, weakness and headaches.   Psychiatric/Behavioral:  Negative for confusion and self-injury. The patient is not nervous/anxious.    All other systems reviewed and are negative.      Physical Exam  Physical Exam  Vitals and nursing note reviewed.   Constitutional:       General: She is not in acute distress.     Appearance: Normal appearance. She is not ill-appearing, toxic-appearing or diaphoretic.   HENT:      Head: Normocephalic and atraumatic.      Comments: alopecia     Nose: Nose normal. No congestion or rhinorrhea.      Mouth/Throat:      Mouth: Mucous membranes are moist.      Pharynx: Oropharynx is clear. No oropharyngeal exudate or posterior oropharyngeal erythema.   Eyes:      General:         Right eye: No discharge.         Left eye: No discharge.   Cardiovascular:      Rate and Rhythm: Normal rate and regular rhythm.      Pulses: Normal pulses.      Heart sounds: Normal heart sounds. No murmur heard.     No gallop.      Comments: +3 radial pulses bilaterally  Pulmonary:      Effort: Pulmonary effort is normal. No respiratory distress.      Breath sounds: Normal breath sounds. No stridor. No wheezing, rhonchi or rales.   Chest:      Chest wall: No tenderness.   Abdominal:      General: Bowel sounds are normal. There is no distension.      Palpations: Abdomen is soft. There is no mass.      Tenderness: There is no abdominal tenderness. There is no right CVA tenderness, left CVA tenderness, guarding or rebound.      Hernia: No hernia is present.   Musculoskeletal:         General: Swelling and tenderness present. Normal range of motion.      Cervical back: Normal range of motion and neck supple.      Comments: Subtle swelling and TTP of left 2nd and 3rd MCP regions.  Soft compartments of LUE.   Skin:     General: Skin is warm and dry.      Capillary Refill: Capillary refill takes less than 2 seconds.      Coloration:  Skin is pale.   Neurological:      General: No focal deficit present.      Mental Status: She is alert and oriented to person, place, and time.      Cranial Nerves: No cranial nerve deficit.      Sensory: No sensory deficit.      Motor: No weakness.      Coordination: Coordination normal.      Gait: Gait normal.      Deep Tendon Reflexes: Reflexes normal.   Psychiatric:         Mood and Affect: Mood normal.         Behavior: Behavior normal.         Thought Content: Thought content normal.         Judgment: Judgment normal.         Vital Signs  ED Triage Vitals   Temperature Pulse Respirations Blood Pressure SpO2   07/22/24 1410 07/22/24 1407 07/22/24 1407 07/22/24 1407 07/22/24 1407   98.4 °F (36.9 °C) 77 18 (!) 189/89 94 %      Temp Source Heart Rate Source Patient Position - Orthostatic VS BP Location FiO2 (%)   07/22/24 1407 07/22/24 1407 -- 07/22/24 1407 --   Temporal Monitor  Left arm       Pain Score       07/22/24 1418       9           Vitals:    07/22/24 1407   BP: (!) 189/89   Pulse: 77         Visual Acuity      ED Medications  Medications   colchicine (COLCRYS) tablet 0.6 mg (0.6 mg Oral Given 7/22/24 1418)   acetaminophen (TYLENOL) tablet 650 mg (650 mg Oral Given 7/22/24 1418)       Diagnostic Studies  Results Reviewed       Procedure Component Value Units Date/Time    Comprehensive metabolic panel [[id number]] Collected: 07/22/24 1422    Lab Status: Final result Specimen: Blood from Arm, Left Updated: 07/22/24 1447     Sodium 138 mmol/L      Potassium 4.0 mmol/L      Chloride 103 mmol/L      CO2 30 mmol/L      ANION GAP 5 mmol/L      BUN 11 mg/dL      Creatinine 0.78 mg/dL      Glucose 122 mg/dL      Calcium 9.9 mg/dL      AST 31 U/L      ALT 36 U/L      Alkaline Phosphatase 72 U/L      Total Protein 7.4 g/dL      Albumin 4.3 g/dL      Total Bilirubin 0.21 mg/dL      eGFR 82 ml/min/1.73sq m     Narrative:      [employer] guidelines for Chronic Kidney Disease (CKD):     Stage 1  with normal or high GFR (GFR > 90 mL/min/1.73 square meters)    Stage 2 Mild CKD (GFR = 60-89 mL/min/1.73 square meters)    Stage 3A Moderate CKD (GFR = 45-59 mL/min/1.73 square meters)    Stage 3B Moderate CKD (GFR = 30-44 mL/min/1.73 square meters)    Stage 4 Severe CKD (GFR = 15-29 mL/min/1.73 square meters)    Stage 5 End Stage CKD (GFR <15 mL/min/1.73 square meters)  Note: GFR calculation is accurate only with a steady state creatinine    CBC and differential [[id number]]  (Abnormal) Collected: 07/22/24 1422    Lab Status: Final result Specimen: Blood from Arm, Left Updated: 07/22/24 1429     WBC 6.91 Thousand/uL      RBC 4.79 Million/uL      Hemoglobin 12.8 g/dL      Hematocrit 40.5 %      MCV 85 fL      MCH 26.7 pg      MCHC 31.6 g/dL      RDW 13.6 %      MPV 10.9 fL      Platelets 232 Thousands/uL      nRBC 0 /100 WBCs      Segmented % 63 %      Immature Grans % 0 %      Lymphocytes % 26 %      Monocytes % 8 %      Eosinophils Relative 2 %      Basophils Relative 1 %      Absolute Neutrophils 4.30 Thousands/µL      Absolute Immature Grans 0.01 Thousand/uL      Absolute Lymphocytes 1.82 Thousands/µL      Absolute Monocytes 0.55 Thousand/µL      Eosinophils Absolute 0.14 Thousand/µL      Basophils Absolute 0.09 Thousands/µL                    XR hand 3+ views LEFT    (Results Pending)   VAS upper limb venous duplex scan, unilateral/limited    (Results Pending)              Procedures  Procedures         ED Course                                               Medical Decision Making  1403:  Pt appears well, VS reviewed.  Pt with atraumatic left upper ext swelling for the past 3 weeks.  No appreciable swelling of the arm on exam.  Hx of breast cancer and bilateral mastectomies 2/24.  Plan to complete venous duplex of LUE.  Soft compartment and normal NV exam of LUE.  Pt recently seen by PCP and placed on lasix for the swelling, subsequently developed polyarthritis of left 2nd and 3rd MCP regions.  No findings to  suggest septic arthritis.  Plan to complete basic labs.  I will give colchicine and tylenol for her discomfort and reeval.    1500: Xrays, US and labs reviewed.  Trial of medrol dose poonam for polyarthritis of left hand, close follow up with PCP as needed.    Amount and/or Complexity of Data Reviewed  Labs: ordered.  Radiology: ordered.     Details: Venous duplex LUE--no DVT  Left hand xrays--no fracture    Risk  OTC drugs.  Prescription drug management.                 Disposition  Final diagnoses:   Polyarthritis of hand   Left arm swelling     Time reflects when diagnosis was documented in both MDM as applicable and the Disposition within this note       Time User Action Codes Description Comment    7/22/2024  3:00 PM Lasha Hernandez Add [M13.0] Polyarthritis of hand     7/22/2024  3:00 PM Lasha Hernandez Add [M79.89] Left arm swelling           ED Disposition       ED Disposition   Discharge    Condition   Stable    Date/Time   Mon Jul 22, 2024  2:59 PM    Comment   Tammie Avalos discharge to home/self care.                   Follow-up Information       Follow up With Specialties Details Why Contact Info    Carmelo Cramer MD Family Medicine Schedule an appointment as soon as possible for a visit   [address] [place] [id number]  [phone]              Patient's Medications   Discharge Prescriptions    METHYLPREDNISOLONE 4 MG TABLET THERAPY PACK    Use as directed on package       Start Date: 7/22/2024 End Date: --       Order Dose: --       Quantity: 21 tablet    Refills: 0       No discharge procedures on file.    PDMP Review         Value Time User    PDMP Reviewed  Yes 3/24/2024  8:47 PM Trinidad Weinberg MD            ED Provider  Electronically Signed by             Lasha Hernandez MD  07/22/24 3350

## 2024-07-22 NOTE — ED NOTES
US at bedside.      Samantha Lee RN  07/22/24 4257     warm fluids, rest.   OTC sudafed for nasal congestion  Rx albuterol for deep cough, wheeze or SOB  Work excuse

## 2024-07-23 PROCEDURE — 93971 EXTREMITY STUDY: CPT | Performed by: SURGERY

## 2024-08-05 ENCOUNTER — HOSPITAL ENCOUNTER (OUTPATIENT)
Dept: RADIOLOGY | Facility: CLINIC | Age: 59
Discharge: HOME | End: 2024-08-05
Payer: COMMERCIAL

## 2024-08-05 DIAGNOSIS — M79.621 PAIN IN RIGHT UPPER ARM: ICD-10-CM

## 2024-08-05 DIAGNOSIS — C50.211 MALIGNANT NEOPLASM OF UPPER-INNER QUADRANT OF RIGHT FEMALE BREAST (HCC): ICD-10-CM

## 2024-08-05 DIAGNOSIS — Z17.0 ESTROGEN RECEPTOR POSITIVE STATUS (ER+): ICD-10-CM

## 2024-08-05 PROCEDURE — 76642 ULTRASOUND BREAST LIMITED: CPT

## 2024-09-22 ENCOUNTER — APPOINTMENT (OUTPATIENT)
Dept: RADIOLOGY | Facility: CLINIC | Age: 59
End: 2024-09-22
Payer: COMMERCIAL

## 2024-09-22 ENCOUNTER — OFFICE VISIT (OUTPATIENT)
Dept: URGENT CARE | Facility: CLINIC | Age: 59
End: 2024-09-22
Payer: COMMERCIAL

## 2024-09-22 VITALS
WEIGHT: 234 LBS | BODY MASS INDEX: 36.73 KG/M2 | OXYGEN SATURATION: 97 % | SYSTOLIC BLOOD PRESSURE: 152 MMHG | RESPIRATION RATE: 18 BRPM | HEIGHT: 67 IN | DIASTOLIC BLOOD PRESSURE: 84 MMHG | HEART RATE: 68 BPM | TEMPERATURE: 96.4 F
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
## 2024-09-22 DIAGNOSIS — M25.551 PAIN OF RIGHT HIP: ICD-10-CM

## 2024-09-22 DIAGNOSIS — M25.551 PAIN OF RIGHT HIP: Primary | ICD-10-CM

## 2024-09-22 DIAGNOSIS — M54.50 ACUTE RIGHT-SIDED LOW BACK PAIN WITHOUT SCIATICA: ICD-10-CM

## 2024-09-22 PROCEDURE — 99213 OFFICE O/P EST LOW 20 MIN: CPT

## 2024-09-22 PROCEDURE — 73503 X-RAY EXAM HIP UNI 4/> VIEWS: CPT

## 2024-09-22 PROCEDURE — S9088 SERVICES PROVIDED IN URGENT: HCPCS

## 2024-09-22 PROCEDURE — 72100 X-RAY EXAM L-S SPINE 2/3 VWS: CPT

## 2024-09-22 RX ORDER — CYCLOBENZAPRINE HCL 5 MG
5 TABLET ORAL 3 TIMES DAILY PRN
Qty: 12 TABLET | Refills: 0 | Status: SHIPPED | OUTPATIENT
Start: 2024-09-22

## 2024-09-22 NOTE — PROGRESS NOTES
[facility]        NAME: Tammie Avalos is a 59 y.o. female  : 1965    MRN: [id number]  DATE: 2024  TIME: 12:19 PM    Assessment and Plan   Pain of right hip [M25.551]  1. Pain of right hip  XR hip/pelvis 4+ vw right if performed    XR spine lumbar 2 or 3 views injury    Ambulatory Referral to Physical Therapy    Ambulatory referral to Spine & Pain Management    cyclobenzaprine (FLEXERIL) 5 mg tablet      2. Acute right-sided low back pain without sciatica  Ambulatory Referral to Physical Therapy    Ambulatory referral to Spine & Pain Management    cyclobenzaprine (FLEXERIL) 5 mg tablet      3. Fall, initial encounter          Neuro exam intact. Preliminary XR reads negative for acute osseous abnormalities, final reads pending. Likely MSK etiology and will treat with muscle relaxer, caution advised. OTC Tylenol. Heat/Ice. Topical analgesics. Referral placed to PT and Spine/Pain Management. Encouraged continued supportive measures.  Follow up with PCP in 3-5 days or proceed to emergency department for worsening symptoms.  Patient and daughter verbalized understanding of instructions given.       Patient Instructions     Patient Instructions   Preliminary XR reads negative, final reads pending  Take muscle relaxer as prescribed but do not drink alcohol, drive your vehicle, or operate heavy machinery  OTC Tylenol as needed for pain  Heat/Ice  Topical analgesics  Referral placed to PT and Spine/Pain Management   Follow up with PCP in 3-5 days.  Proceed to  ER if symptoms worsen.    If tests have been performed at [facility], our office will contact you with results if changes need to be made to the care plan discussed with you at the visit.  You can review your full results on [facility] MyChart.      Patient Education     Hip pain in adults   The Basics   Written by the doctors and editors at [facility]   What causes hip pain? -- Many different things can cause hip pain. Sometimes, pain is  "related to an injury. Other causes include:   Arthritis - This is the general term for \"inflammation or damage of the joints.\" People whose hip pain is caused by arthritis usually develop pain in the groin or thigh, slowly over time.   Bursitis - There are 3 sacs called \"bursae\" in or near the hip (figure 1). These sacs are filled with fluid. They help cushion and protect the joints. \"Bursitis\" is when 1 of these sacs gets irritated or inflamed. People whose hip pain is caused by bursitis usually feel more pain if they lie on their side, or if someone presses against the side of their hip.   Muscle or tendon strain - Three major muscle groups help move the hip. If you overuse these muscles or the tendons that attach them to your bones, it can lead to hip pain. This pain is usually worse when you move your leg in 1 particular direction.   Nerve problems - Lots of nerves pass by the hip. These nerves or nerves in the lower part of the spine can get pressed on or damaged and cause hip pain. People with pain caused by nerve problems also often feel tingling or numbness in the area. A pinched nerve in the spine could cause other problems, such as weakness in the leg.  How is hip pain treated? -- The right treatment depends on what is causing it. In general, treatments might include:   Taking medicines to reduce pain and/or inflammation   Getting a shot of a steroid medicine, which can reduce inflammation   Physical therapy or exercises recommended by the doctor  If you have severe hip arthritis, your doctor or nurse might suggest surgery to replace the hip.  What can I do on my own to feel better? -- You can:   Ask your doctor if there are stretches or exercises that can help with the cause of your pain. Before you do these exercises, warm up your muscles by walking or taking a warm shower or bath.   Consider taking non-prescription pain medicines, such as acetaminophen (sample brand name: Tylenol) or naproxen (sample " "brand name: Aleve). But if you have heart disease or other health problems, or if you are on prescription medicines, ask your doctor before you start taking any new medicines.   Use a cane, walker, shoe insert, or other device, if it helps you.   Apply a cold gel pack, bag of ice, or bag of frozen vegetables on your hip, if it helps with your pain. Do this every 1 to 2 hours, for 15 minutes each time. Put a thin towel between the ice (or other cold object) and your skin.  When should I call the doctor? -- Call for advice if:   Your pain is sudden and severe, prevents you from putting weight on that side, or is so bad that you can't rotate your leg to the side. Some people who have hip pain actually have a broken hip bone. This is especially likely after a fall or even a mild impact. Having a broken hip is serious and needs immediate medical attention.   Your hip is swollen, bruised, or bleeding.   You also have a fever of 100.4°F (38°C) or higher along with your hip pain.   You have weakness in 1 of your legs or feet.   Your toes or foot are numb or turn, blue, gray, or pale.  All topics are updated as new evidence becomes available and our peer review process is complete.  This topic retrieved from [employer] on: Apr 24, 2024.  Topic 82276 Version 15.0  Release: 32.3.2 - C32.113  © 2024 UpToDate, Inc. and/or its affiliates. All rights reserved.  figure 1: Bursae near the hip     These sacs, called \"bursae,\" are filled with fluid. They help cushion and protect the joints. \"Bursitis\" is the medical term for when 1 of these sacs gets irritated or inflamed.  Graphic 24750 Version 8.0  Consumer Information Use and Disclaimer   Disclaimer: This generalized information is a limited summary of diagnosis, treatment, and/or medication information. It is not meant to be comprehensive and should be used as a tool to help the user understand and/or assess potential diagnostic and treatment options. It does NOT include all " "information about conditions, treatments, medications, side effects, or risks that may apply to a specific patient. It is not intended to be medical advice or a substitute for the medical advice, diagnosis, or treatment of a health care provider based on the health care provider's examination and assessment of a patient's specific and unique circumstances. Patients must speak with a health care provider for complete information about their health, medical questions, and treatment options, including any risks or benefits regarding use of medications. This information does not endorse any treatments or medications as safe, effective, or approved for treating a specific patient. UpToDate, Inc. and its affiliates disclaim any warranty or liability relating to this information or the use thereof.The use of this information is governed by the Terms of Use, available at https://www.[employer].com/en/know/clinical-effectiveness-terms. 2024© UpToDate, Inc. and its affiliates and/or licensors. All rights reserved.  Copyright   © 2024 UpToDate, Inc. and/or its affiliates. All rights reserved.  Patient Education     Low back pain - Discharge instructions   The Basics   Written by the doctors and editors at [employer]   What are discharge instructions? -- Discharge instructions are information about how to take care of yourself after getting medical care for a health problem.  What is low back pain? -- Low back pain is pain or discomfort in the lower part of your back. Many people have low back pain at some point, and it most often gets better on its own. Many different things can cause low back pain. Most of the time, doctors do not know the exact cause.  Back pain can happen if you strain a muscle. This is often what has happened when a person \"throws out\" their back. This refers to pain that starts suddenly after physical activity, like lifting something heavy or bending the back.  Back pain can also happen if you have " "(figure 1):   Damaged, bulging, or torn discs   Arthritis affecting the joints of the spine   Bony growths on the vertebrae that crowd nearby nerves   A \"compression fracture\" due to osteoporosis (a condition that makes your bones weak)   A vertebra out of place   Narrowing in the spinal canal   A tumor or infection (but this is very rare)  How do I care for myself at home? -- Ask the doctor or nurse what you should do when you go home. Make sure that you understand exactly what you need to do to care for yourself. Ask questions if there is anything you do not understand.  You should also:   Use heat on your back for short periods of time, if it helps with pain. Put a heating pad (on the low setting) on your back for 20 minutes at a time a few times each day. Never go to sleep with heat on your back.   Try to stay as active as you can without causing too much pain, if your doctor or nurse said that it is OK. If your pain is severe, you might need to rest for a day or 2. But it's important to get back to walking and moving as soon as possible. Try to keep doing your normal daily activities. Get up and move around gently during the day as you are able.   Slowly start to increase your activity level as you are able to. If something causes your pain to come back or get worse, stop and go back to doing easier activities that did not hurt.   Avoid sitting or standing in 1 position for a long time. You might want to sleep with a pillow under or between your knees if this eases your pain.   Take a medicine like ibuprofen (sample brand names: Advil, Motrin) or naproxen (brand name: Aleve) for pain, if needed. These are nonsteroidal antiinflammatory drugs (\"NSAIDs\"). They might work better than acetaminophen for low back pain.   Talk to your doctor or nurse before trying any of the following. These treatments might help you feel better for a little while:   Spinal manipulation - This is when a chiropractor, physical " "therapist, or other professional moves or \"adjusts\" the joints of your back.   Acupuncture - This is when someone who knows traditional Chinese medicine inserts tiny needles through your skin to block pain signals.   Massage therapy - The massage therapist manipulates your muscles and soft tissues to decrease muscle tension and increase relaxation.  What follow-up care do I need? -- Your doctor or nurse will tell you if you need to make a follow-up appointment. If so, make sure that you know when and where to go. The doctor might suggest that you see a physical therapist to learn exercises to help with your back pain.  When should I call the doctor? -- Call for emergency help right away (in the [place] and [place], call 9-1-1) if:   You are unable to walk, or cannot control your bowels or bladder.   You develop a fever of 100.4°F (38°C) or higher, chills, or night sweats.  Call your doctor for advice if:   Your legs are numb, weak, or tingly.   Your pain is getting worse, even with medicines and rest.   You develop a new rash.  All topics are updated as new evidence becomes available and our peer review process is complete.  This topic retrieved from [employer] on: May 15, 2024.  Topic 995041 Version 1.0  Release: 32.4.3 - C32.134  © 2024 UpToDate, Inc. and/or its affiliates. All rights reserved.  figure 1: Anatomy of the back     This drawing shows the different parts of the back. Back pain can be caused by problems with the muscles, ligaments, discs, bones (vertebrae), or nerves.  Graphic 12394 Version 6.0  Consumer Information Use and Disclaimer   Disclaimer: This generalized information is a limited summary of diagnosis, treatment, and/or medication information. It is not meant to be comprehensive and should be used as a tool to help the user understand and/or assess potential diagnostic and treatment options. It does NOT include all information about conditions, treatments, medications, side effects, or risks that may " apply to a specific patient. It is not intended to be medical advice or a substitute for the medical advice, diagnosis, or treatment of a health care provider based on the health care provider's examination and assessment of a patient's specific and unique circumstances. Patients must speak with a health care provider for complete information about their health, medical questions, and treatment options, including any risks or benefits regarding use of medications. This information does not endorse any treatments or medications as safe, effective, or approved for treating a specific patient. UpToDate, Inc. and its affiliates disclaim any warranty or liability relating to this information or the use thereof.The use of this information is governed by the Terms of Use, available at https://www.[employer].com/en/know/clinical-effectiveness-terms. 2024© UpToDate, Inc. and its affiliates and/or licensors. All rights reserved.  Copyright   © 2024 UpToDate, Inc. and/or its affiliates. All rights reserved.        Chief Complaint     Chief Complaint   Patient presents with    Fall     Fell down  approx 5 stairs X 5 days ago. Can't get up from chair difficulty in mobility and pain in right  hip and radiates to right lower back. Tried tylenol w/ codiene, and TENS unit nothing helping         History of Present Illness       59-year-old female with a past medical history significant for breast cancer, type II DM, hypertension, and von Willebrand disease presents with daughter for complaints of right-sided back pain and hip pain x 5 days.  Patient reports mechanical fall on stairs approximately 5 days ago.  She did not notify family members of fall but was seen in the ED on 9/20/2024 for generalized abdominal pain as well as right-sided flank and back pain.  Workup included laboratory studies as well as CT abdomen and pelvis.  Negative findings and treated for likely constipation. She has been taking Tylenol with Codeine for  pain with little relief. No numbness, tingling, weakness, loss of bowel or bladder incontinence, or saddle anesthesia. Reports able to ambulate and bear weight but with some difficulty. No bruising or swelling. No head strike or LOC.         Review of Systems   Review of Systems   Constitutional:  Negative for chills and fever.   Respiratory:  Negative for cough and shortness of breath.    Cardiovascular:  Negative for chest pain.   Gastrointestinal:  Negative for abdominal pain, diarrhea, nausea and vomiting.   Genitourinary:  Negative for decreased urine volume and difficulty urinating.   Musculoskeletal:  Positive for arthralgias, back pain, gait problem and myalgias.   Skin:  Negative for color change and rash.   Neurological:  Negative for weakness and numbness.         Current Medications       Current Outpatient Medications:     albuterol (PROVENTIL HFA,VENTOLIN HFA) 90 mcg/act inhaler, Inhale 2 puffs as needed, Disp: , Rfl:     atorvastatin (LIPITOR) 40 mg tablet, Take 40 mg by mouth daily, Disp: , Rfl:     bisacodyl (DULCOLAX) 10 mg suppository, Insert 1 suppository (10 mg total) into the rectum daily, Disp: 12 suppository, Rfl: 0    buPROPion (WELLBUTRIN SR) 200 MG 12 hr tablet, Take 200 mg by mouth 2 (two) times a day, Disp: , Rfl:     busPIRone (BUSPAR) 15 mg tablet, Take 15 mg by mouth 3 (three) times a day as needed, Disp: , Rfl:     cetirizine (ZyrTEC) 10 mg tablet, Take 10 mg by mouth daily, Disp: , Rfl:     cyclobenzaprine (FLEXERIL) 5 mg tablet, Take 1 tablet (5 mg total) by mouth 3 (three) times a day as needed for muscle spasms, Disp: 12 tablet, Rfl: 0    escitalopram (LEXAPRO) 20 mg tablet, Take 20 mg by mouth daily, Disp: , Rfl:     fluticasone (FLONASE) 50 mcg/act nasal spray, 2 sprays into each nostril daily at bedtime, Disp: , Rfl:     fluticasone-salmeterol (ADVAIR HFA) 115-21 MCG/ACT inhaler, Inhale 2 puffs 2 (two) times a day Rinse mouth after use., Disp: , Rfl:     lisinopril (ZESTRIL)  20 mg tablet, Take 0.5 tablets (10 mg total) by mouth daily, Disp: , Rfl:     metFORMIN (GLUCOPHAGE) 500 mg tablet, Take 500 mg by mouth daily with breakfast, Disp: , Rfl:     montelukast (SINGULAIR) 10 mg tablet, Take 10 mg by mouth daily at bedtime, Disp: , Rfl:     nortriptyline (PAMELOR) 10 mg capsule, Take 50 mg by mouth daily at bedtime, Disp: , Rfl:     OMEPRAZOLE PO, Take 20 mg by mouth 2 (two) times a day, Disp: , Rfl:     prazosin (MINIPRESS) 5 mg capsule, Take 5 mg by mouth daily at bedtime, Disp: , Rfl:     QUEtiapine (SEROquel) 200 mg tablet, Take 200 mg by mouth daily at bedtime, Disp: , Rfl:     traZODone (DESYREL) 300 MG tablet, Take 300 mg by mouth daily at bedtime, Disp: , Rfl:     anastrozole (ARIMIDEX) 1 mg tablet, Take 1 mg by mouth daily (Patient not taking: Reported on 9/22/2024), Disp: , Rfl:     bisacodyl (DULCOLAX) 5 mg EC tablet, Take 1 tablet (5 mg total) by mouth 2 (two) times a day for 5 days, Disp: 10 tablet, Rfl: 0    dexamethasone (DECADRON) 4 mg tablet, Take 2 tablets by mouth twice daily with food the day before and after chemotherapy. (Patient not taking: Reported on 9/22/2024), Disp: , Rfl:     methylPREDNISolone 4 MG tablet therapy pack, Use as directed on package (Patient not taking: Reported on 9/22/2024), Disp: 21 tablet, Rfl: 0    Current Allergies     Allergies as of 09/22/2024 - Reviewed 09/22/2024   Allergen Reaction Noted    Azithromycin GI Intolerance and Hives 05/03/2022    Erythromycin Vomiting 07/02/2020    Keflex [cephalexin] Hives 05/18/2024    Morphine Palpitations 07/02/2020            The following portions of the patient's history were reviewed and updated as appropriate: allergies, current medications, past family history, past medical history, past social history, past surgical history and problem list.     Past Medical History:   Diagnosis Date    Anesthesia     Pt states she woke up during 2 surgeries    BRCA1 positive     Breast cancer (HCC)     Cancer  "(HCC)     Diabetes mellitus (HCC)     GERD (gastroesophageal reflux disease)     Heart murmur     History of chemotherapy     adjuvant chemotherapy    Hyperlipidemia     Hypertension     Kidney stones     Subdural hematoma (HCC)     Von Willebrand disease (HCC)     Wears contact lenses        Past Surgical History:   Procedure Laterality Date    APPENDECTOMY      BREAST LUMPECTOMY Left     CARPAL TUNNEL RELEASE Bilateral     CHOLECYSTECTOMY      COLONOSCOPY      HYSTERECTOMY      KNEE SURGERY Left     LYMPH NODE BIOPSY Right 02/06/2024    Procedure: AXILLARY SENTINEL NODE BIOPSY (NUC MED INJECTION AT 0730);  Surgeon: Almita Molina DO;  Location: [facility];  Service: General    MASTECTOMY Bilateral 02/2024    MRI BREAST BIOPSY LEFT (ALL INCLUSIVE) Left 12/13/2023    PARTIAL HYSTERECTOMY      CT MASTECTOMY SIMPLE COMPLETE Bilateral 02/06/2024    Procedure: BREAST MASTECTOMY;  Surgeon: Almita Molina DO;  Location: [facility];  Service: General    ROTATOR CUFF REPAIR Left     TONSILLECTOMY      US GUIDED BREAST BIOPSY RIGHT COMPLETE Right 10/02/2023    US GUIDED BREAST BIOPSY RIGHT COMPLETE Right 12/13/2023       Family History   Problem Relation Age of Onset    Cancer Mother     Allergies Father     Cancer Sister     Stroke Brother          Medications have been verified.        Objective   /84   Pulse 68   Temp (!) 96.4 °F (35.8 °C)   Resp 18   Ht 5' 7\" (1.702 m)   Wt 106 kg (234 lb)   SpO2 97%   BMI 36.65 kg/m²   No LMP recorded. Patient has had a hysterectomy.       Physical Exam     Physical Exam  Vitals and nursing note reviewed.   Constitutional:       General: She is not in acute distress.     Appearance: She is not toxic-appearing.   HENT:      Head: Normocephalic.   Eyes:      Conjunctiva/sclera: Conjunctivae normal.   Cardiovascular:      Rate and Rhythm: Normal rate and regular rhythm.      Heart sounds: Normal heart sounds.   Pulmonary:      Effort: Pulmonary effort is " normal. No respiratory distress.      Breath sounds: Normal breath sounds. No stridor. No wheezing, rhonchi or rales.   Musculoskeletal:         General: Tenderness present. No swelling.      Thoracic back: No tenderness or bony tenderness.      Lumbar back: Tenderness and bony tenderness present. No swelling or signs of trauma. Decreased range of motion.      Right hip: Tenderness and bony tenderness present. Decreased strength.      Right upper leg: Tenderness present.      Comments: TTP over lumbar spinous processes as well as right sided lumbar paraspinal muscles    Skin:     General: Skin is warm and dry.   Neurological:      Mental Status: She is alert and oriented to person, place, and time.      Sensory: Sensation is intact.      Motor: Motor function is intact.      Gait: Gait is intact.   Psychiatric:         Mood and Affect: Mood normal.         Behavior: Behavior normal.

## 2024-09-22 NOTE — PATIENT INSTRUCTIONS
"Preliminary XR reads negative, final reads pending  Take muscle relaxer as prescribed but do not drink alcohol, drive your vehicle, or operate heavy machinery  OTC Tylenol as needed for pain  Heat/Ice  Topical analgesics  Referral placed to PT and Spine/Pain Management   Follow up with PCP in 3-5 days.  Proceed to  ER if symptoms worsen.    If tests have been performed at [facility], our office will contact you with results if changes need to be made to the care plan discussed with you at the visit.  You can review your full results on [facility] MyChart.      Patient Education     Hip pain in adults   The Basics   Written by the doctors and editors at [facility]   What causes hip pain? -- Many different things can cause hip pain. Sometimes, pain is related to an injury. Other causes include:   Arthritis - This is the general term for \"inflammation or damage of the joints.\" People whose hip pain is caused by arthritis usually develop pain in the groin or thigh, slowly over time.   Bursitis - There are 3 sacs called \"bursae\" in or near the hip (figure 1). These sacs are filled with fluid. They help cushion and protect the joints. \"Bursitis\" is when 1 of these sacs gets irritated or inflamed. People whose hip pain is caused by bursitis usually feel more pain if they lie on their side, or if someone presses against the side of their hip.   Muscle or tendon strain - Three major muscle groups help move the hip. If you overuse these muscles or the tendons that attach them to your bones, it can lead to hip pain. This pain is usually worse when you move your leg in 1 particular direction.   Nerve problems - Lots of nerves pass by the hip. These nerves or nerves in the lower part of the spine can get pressed on or damaged and cause hip pain. People with pain caused by nerve problems also often feel tingling or numbness in the area. A pinched nerve in the spine could cause other problems, such as weakness in the leg.  How is hip " pain treated? -- The right treatment depends on what is causing it. In general, treatments might include:   Taking medicines to reduce pain and/or inflammation   Getting a shot of a steroid medicine, which can reduce inflammation   Physical therapy or exercises recommended by the doctor  If you have severe hip arthritis, your doctor or nurse might suggest surgery to replace the hip.  What can I do on my own to feel better? -- You can:   Ask your doctor if there are stretches or exercises that can help with the cause of your pain. Before you do these exercises, warm up your muscles by walking or taking a warm shower or bath.   Consider taking non-prescription pain medicines, such as acetaminophen (sample brand name: Tylenol) or naproxen (sample brand name: Aleve). But if you have heart disease or other health problems, or if you are on prescription medicines, ask your doctor before you start taking any new medicines.   Use a cane, walker, shoe insert, or other device, if it helps you.   Apply a cold gel pack, bag of ice, or bag of frozen vegetables on your hip, if it helps with your pain. Do this every 1 to 2 hours, for 15 minutes each time. Put a thin towel between the ice (or other cold object) and your skin.  When should I call the doctor? -- Call for advice if:   Your pain is sudden and severe, prevents you from putting weight on that side, or is so bad that you can't rotate your leg to the side. Some people who have hip pain actually have a broken hip bone. This is especially likely after a fall or even a mild impact. Having a broken hip is serious and needs immediate medical attention.   Your hip is swollen, bruised, or bleeding.   You also have a fever of 100.4°F (38°C) or higher along with your hip pain.   You have weakness in 1 of your legs or feet.   Your toes or foot are numb or turn, blue, gray, or pale.  All topics are updated as new evidence becomes available and our peer review process is  "complete.  This topic retrieved from [employer] on: Apr 24, 2024.  Topic 36118 Version 15.0  Release: 32.3.2 - C32.113  © 2024 UpToDate, Inc. and/or its affiliates. All rights reserved.  figure 1: Bursae near the hip     These sacs, called \"bursae,\" are filled with fluid. They help cushion and protect the joints. \"Bursitis\" is the medical term for when 1 of these sacs gets irritated or inflamed.  Graphic 80161 Version 8.0  Consumer Information Use and Disclaimer   Disclaimer: This generalized information is a limited summary of diagnosis, treatment, and/or medication information. It is not meant to be comprehensive and should be used as a tool to help the user understand and/or assess potential diagnostic and treatment options. It does NOT include all information about conditions, treatments, medications, side effects, or risks that may apply to a specific patient. It is not intended to be medical advice or a substitute for the medical advice, diagnosis, or treatment of a health care provider based on the health care provider's examination and assessment of a patient's specific and unique circumstances. Patients must speak with a health care provider for complete information about their health, medical questions, and treatment options, including any risks or benefits regarding use of medications. This information does not endorse any treatments or medications as safe, effective, or approved for treating a specific patient. UpToDate, Inc. and its affiliates disclaim any warranty or liability relating to this information or the use thereof.The use of this information is governed by the Terms of Use, available at https://www.wolterskluwer.com/en/know/clinical-effectiveness-terms. 2024© UpToDate, Inc. and its affiliates and/or licensors. All rights reserved.  Copyright   © 2024 UpToDate, Inc. and/or its affiliates. All rights reserved.  Patient Education     Low back pain - Discharge instructions   The Basics   Written by " "the doctors and editors at [facility]   What are discharge instructions? -- Discharge instructions are information about how to take care of yourself after getting medical care for a health problem.  What is low back pain? -- Low back pain is pain or discomfort in the lower part of your back. Many people have low back pain at some point, and it most often gets better on its own. Many different things can cause low back pain. Most of the time, doctors do not know the exact cause.  Back pain can happen if you strain a muscle. This is often what has happened when a person \"throws out\" their back. This refers to pain that starts suddenly after physical activity, like lifting something heavy or bending the back.  Back pain can also happen if you have (figure 1):   Damaged, bulging, or torn discs   Arthritis affecting the joints of the spine   Bony growths on the vertebrae that crowd nearby nerves   A \"compression fracture\" due to osteoporosis (a condition that makes your bones weak)   A vertebra out of place   Narrowing in the spinal canal   A tumor or infection (but this is very rare)  How do I care for myself at home? -- Ask the doctor or nurse what you should do when you go home. Make sure that you understand exactly what you need to do to care for yourself. Ask questions if there is anything you do not understand.  You should also:   Use heat on your back for short periods of time, if it helps with pain. Put a heating pad (on the low setting) on your back for 20 minutes at a time a few times each day. Never go to sleep with heat on your back.   Try to stay as active as you can without causing too much pain, if your doctor or nurse said that it is OK. If your pain is severe, you might need to rest for a day or 2. But it's important to get back to walking and moving as soon as possible. Try to keep doing your normal daily activities. Get up and move around gently during the day as you are able.   Slowly start to increase " "your activity level as you are able to. If something causes your pain to come back or get worse, stop and go back to doing easier activities that did not hurt.   Avoid sitting or standing in 1 position for a long time. You might want to sleep with a pillow under or between your knees if this eases your pain.   Take a medicine like ibuprofen (sample brand names: Advil, Motrin) or naproxen (brand name: Aleve) for pain, if needed. These are nonsteroidal antiinflammatory drugs (\"NSAIDs\"). They might work better than acetaminophen for low back pain.   Talk to your doctor or nurse before trying any of the following. These treatments might help you feel better for a little while:   Spinal manipulation - This is when a chiropractor, physical therapist, or other professional moves or \"adjusts\" the joints of your back.   Acupuncture - This is when someone who knows traditional Chinese medicine inserts tiny needles through your skin to block pain signals.   Massage therapy - The massage therapist manipulates your muscles and soft tissues to decrease muscle tension and increase relaxation.  What follow-up care do I need? -- Your doctor or nurse will tell you if you need to make a follow-up appointment. If so, make sure that you know when and where to go. The doctor might suggest that you see a physical therapist to learn exercises to help with your back pain.  When should I call the doctor? -- Call for emergency help right away (in the [place] and [place], call 9-1-1) if:   You are unable to walk, or cannot control your bowels or bladder.   You develop a fever of 100.4°F (38°C) or higher, chills, or night sweats.  Call your doctor for advice if:   Your legs are numb, weak, or tingly.   Your pain is getting worse, even with medicines and rest.   You develop a new rash.  All topics are updated as new evidence becomes available and our peer review process is complete.  This topic retrieved from [employer] on: May 15, 2024.  Topic 717548 " Version 1.0  Release: 32.4.3 - C32.134  © 2024 UpToDate, Inc. and/or its affiliates. All rights reserved.  figure 1: Anatomy of the back     This drawing shows the different parts of the back. Back pain can be caused by problems with the muscles, ligaments, discs, bones (vertebrae), or nerves.  Graphic 15470 Version 6.0  Consumer Information Use and Disclaimer   Disclaimer: This generalized information is a limited summary of diagnosis, treatment, and/or medication information. It is not meant to be comprehensive and should be used as a tool to help the user understand and/or assess potential diagnostic and treatment options. It does NOT include all information about conditions, treatments, medications, side effects, or risks that may apply to a specific patient. It is not intended to be medical advice or a substitute for the medical advice, diagnosis, or treatment of a health care provider based on the health care provider's examination and assessment of a patient's specific and unique circumstances. Patients must speak with a health care provider for complete information about their health, medical questions, and treatment options, including any risks or benefits regarding use of medications. This information does not endorse any treatments or medications as safe, effective, or approved for treating a specific patient. Up[employer]Date, Inc. and its affiliates disclaim any warranty or liability relating to this information or the use thereof.The use of this information is governed by the Terms of Use, available at https://www.woltersOakmonkeyuwer.com/en/know/clinical-effectiveness-terms. 2024© UpToDate, Inc. and its affiliates and/or licensors. All rights reserved.  Copyright   © 2024 Up[employer]Date, Inc. and/or its affiliates. All rights reserved.

## 2024-09-30 ENCOUNTER — OFFICE VISIT (OUTPATIENT)
Dept: URGENT CARE | Facility: CLINIC | Age: 59
End: 2024-09-30
Payer: COMMERCIAL

## 2024-09-30 VITALS
TEMPERATURE: 97 F | HEART RATE: 85 BPM | WEIGHT: 234 LBS | RESPIRATION RATE: 18 BRPM | SYSTOLIC BLOOD PRESSURE: 132 MMHG | OXYGEN SATURATION: 98 % | BODY MASS INDEX: 36.73 KG/M2 | HEIGHT: 67 IN | DIASTOLIC BLOOD PRESSURE: 78 MMHG

## 2024-09-30 DIAGNOSIS — B34.9 VIRAL SYNDROME: Primary | ICD-10-CM

## 2024-09-30 LAB
SARS-COV-2 AG UPPER RESP QL IA: NEGATIVE
VALID CONTROL: NORMAL

## 2024-09-30 PROCEDURE — S9088 SERVICES PROVIDED IN URGENT: HCPCS

## 2024-09-30 PROCEDURE — 99213 OFFICE O/P EST LOW 20 MIN: CPT

## 2024-09-30 PROCEDURE — 87811 SARS-COV-2 COVID19 W/OPTIC: CPT

## 2024-09-30 NOTE — PROGRESS NOTES
"  [facility]        NAME: Tammie Avalos is a 59 y.o. female  : 1965    MRN: [id number]  DATE: 2024  TIME: 1:43 PM    Assessment and Plan   Viral syndrome [B34.9]  1. Viral syndrome  Poct Covid 19 Rapid Antigen Test        Rapid POC COVID testing negative. Given symptom duration x1 day, likely viral etiology. Encouraged continued supportive measures.  Follow up with PCP in 3-5 days or proceed to emergency department for worsening symptoms.  Patient and daughter verbalized understanding of instructions given.       Patient Instructions     Patient Instructions   Rapid POC COVID testing negative  Continue with supportive measures, OTC Tylenol, nasal decongestants, and cough suppressants   Cool mist humidifiers, throat lozenges, salt gargles, honey, increased fluid intake and rest   Follow up with PCP in 3-5 days  Present to ER if symptoms worsen       If tests have been performed at [facility], our office will contact you with results if changes need to be made to the care plan discussed with you at the visit.  You can review your full results on [facility] MyChart.      Patient Education     Cough, runny nose, and the common cold   The Basics   Written by the doctors and editors at [facility]   What causes cough, runny nose, and other symptoms of the common cold? -- These symptoms are usually caused by a virus. Doctors also use the term \"viral upper respiratory infection\" or \"viral URI.\" Lots of different viruses can get into your nose, mouth, throat, or airways and cause cold symptoms.  Most people get better from a cold without any lasting problems. Even so, having a cold can be uncomfortable.  What are the symptoms of the common cold? -- Symptoms can include:   Sneezing   Coughing   Sniffling and runny nose   Sore throat   Chest congestion  In children, the common cold can also cause a fever. But adults do not usually get a fever when they have a cold.  Colds usually last about 3 to 7 " days in adults and 10 days in children. But some people have symptoms for up to 2 weeks.  How can I tell if I have a cold or something else? -- Sometimes, it can be hard to tell if you have a cold or something else. Some cold symptoms can also be caused by other illnesses, such as COVID-19, the flu, or strep throat.  There are sometimes clues that can help you tell the difference:   COVID-19 often starts out very similar to a cold, although it can also cause a fever. If you have cold symptoms and have been around someone with COVID-19, you should get a test to find out if you have it, too.   The flu is more likely to cause fever, body aches, and extreme tiredness than a cold.   Strep throat usually causes severe throat pain. It can also cause a fever and swollen glands in the neck. People with strep throat usually do not have other cold symptoms like a stuffy nose or cough.  If you think that you might have an illness other than the common cold, call your doctor or nurse. They can tell you what to do.  Can medicine help with a cold? -- Usually, a cold gets better on its own and does not need treatment. Because colds are usually caused by viruses, antibiotics will not help.  If you are a teen or an adult, you can try cough and cold medicines that you can get without a prescription. These medicines might help with your symptoms. But they can't cure your cold, or help you get well faster.  If you decide to try non-prescription cold medicines:   Read the directions on the label, and follow them carefully.   Do not combine 2 or more medicines that have acetaminophen in them. If you take too much acetaminophen, it can damage your liver.   If you have a heart condition, have high blood pressure, or take any prescription medicines, talk to your doctor or pharmacist before taking cold medicine. They can tell you which medicines are safe.  Some medicines are not safe for children:   If your child is younger than 6, do not  give them any cold medicines. These medicines are not safe for young children. Even if your child is older than 6, cough and cold medicines are unlikely to help.   Never give aspirin to any child younger than 18 years old. In children, aspirin can cause a life-threatening condition called Reye syndrome.   When giving your child acetaminophen or other non-prescription medicines, never give more than the recommended dose.  Is there anything I can do on my own to feel better? -- Yes. You can:   Get plenty of rest.   Drink lots of fluids (water, juice, or broth) to stay hydrated. This will help replace any fluids lost if you have a runny nose or sweating from a fever. Warm tea or soup can help soothe a sore throat.   If the air in your home feels dry, use a cool-mist humidifier. This can help a stuffy nose and make it easier to breathe.   Use saline nose drops or spray to relieve stuffiness.   Avoid smoking, and stay away from places where people are smoking.  Can the common cold lead to more serious problems? -- In some cases, yes. In some people, having a cold can lead to:   Ear infections   Worse asthma symptoms   Sinus infections   Pneumonia or bronchitis (infections of the lungs)  Can colds be prevented? -- There are some things you can do to keep germs from spreading:   Wash your hands with soap and water often (figure 1) - This can also help prevent the spread of other illnesses like the flu and COVID-19.   Cover your cough - Cough into your elbow instead of your hands. Teach children to do this, too. Throw away used tissues right away.   Clean surfaces - The germs that cause the common cold can live on tables, door handles, and other surfaces for at least 2 hours.   Stay home if you are sick - When you do need to be around other people, consider wearing a face mask until you are feeling better.  When should I call the doctor? -- Contact your doctor or nurse if you:   Lose your sense of taste or smell   Have a  fever of more than 100.4°F (38°C) that comes with shaking chills, loss of appetite, or trouble breathing   Have a very bad sore throat   Have a fever and also have lung disease, such as emphysema or asthma   Have a cough that lasts longer than 10 days or starts getting worse   Have chest pain when you cough or breathe deeply, have trouble breathing, or cough up blood  If you are older than 65, or if you have any chronic medical conditions such as diabetes, contact your doctor or nurse any time you get a long-lasting cough.  Take your child to the emergency department if they:   Become confused or stop responding to you   Have trouble breathing or have to work hard to breathe  Contact your child's doctor or nurse if the child:   Loses their sense of taste or smell or won't eat foods that they ate before   Has a very bad sore throat   Refuses to drink anything for a long time   Is younger than 4 months   Has a fever and is not acting like themselves   Has a cough that lasts for more than 2 weeks and is not getting any better or is getting worse   Has a stuffed or runny nose that gets worse or does not get any better after 10 days   Has red eyes or yellow goop coming out of their eyes   Has ear pain, pulls at their ears, or shows other signs of having an ear infection  All topics are updated as new evidence becomes available and our peer review process is complete.  This topic retrieved from "[employer]" on: Feb 26, 2024.  Topic 35352 Version 30.0  Release: 32.2.4 - C32.56  © 2024 [employer] and/or its affiliates. All rights reserved.  figure 1: How to wash your hands     Wet your hands with clean water, and apply a small amount of soap. Lather and rub hands together for at least 20 seconds. Clean your wrists, palms, backs of your hands, between your fingers, tips of your fingers, thumbs, and under and around your nails. Rinse well, and dry your hands using a clean towel.  Graphic 682896 Version 7.0  Consumer  Information Use and Disclaimer   Disclaimer: This generalized information is a limited summary of diagnosis, treatment, and/or medication information. It is not meant to be comprehensive and should be used as a tool to help the user understand and/or assess potential diagnostic and treatment options. It does NOT include all information about conditions, treatments, medications, side effects, or risks that may apply to a specific patient. It is not intended to be medical advice or a substitute for the medical advice, diagnosis, or treatment of a health care provider based on the health care provider's examination and assessment of a patient's specific and unique circumstances. Patients must speak with a health care provider for complete information about their health, medical questions, and treatment options, including any risks or benefits regarding use of medications. This information does not endorse any treatments or medications as safe, effective, or approved for treating a specific patient. Up[employer] Inc[employer] and its affiliates disclaim any warranty or liability relating to this information or the use thereof.The use of this information is governed by the Terms of Use, available at https://www.[employer].com/en/know/clinical-effectiveness-terms. 2024© [employer] Inc[employer] and its affiliates and/or licensors. All rights reserved.  Copyright   © 2024 [employer] and/or its affiliates. All rights reserved.        Chief Complaint     Chief Complaint   Patient presents with    Cold Like Symptoms     C/o SOB, fevers (highest 101), productive cough, fatigue, vomiting (x3 today). Onset yesterday.           History of Present Illness       59-year-old female with a past medical history significant for breast cancer not on active chemotherapy or radiation and hypertension as well as von Willebrand disease presents with daughter for complaints of fever, chills, nasal congestion, cough, and vomiting x 2 days.  Tmax 101.  Some  shortness of breath but no wheezing.  She has been using her albuterol inhaler and nebulizer x 1.  No diarrhea.  States positive sick contact/exposure as grandchild currently ill with similar symptoms.        Review of Systems   Review of Systems   Constitutional:  Positive for chills and fever.   HENT:  Positive for congestion, rhinorrhea and sore throat. Negative for ear discharge, ear pain, trouble swallowing and voice change.    Eyes:  Negative for discharge.   Respiratory:  Positive for cough and shortness of breath. Negative for wheezing.    Cardiovascular:  Negative for chest pain.   Gastrointestinal:  Positive for nausea and vomiting. Negative for abdominal pain and diarrhea.   Musculoskeletal:  Positive for myalgias.   Skin:  Negative for rash.         Current Medications       Current Outpatient Medications:     albuterol (PROVENTIL HFA,VENTOLIN HFA) 90 mcg/act inhaler, Inhale 2 puffs as needed, Disp: , Rfl:     atorvastatin (LIPITOR) 40 mg tablet, Take 40 mg by mouth daily, Disp: , Rfl:     bisacodyl (DULCOLAX) 10 mg suppository, Insert 1 suppository (10 mg total) into the rectum daily, Disp: 12 suppository, Rfl: 0    buPROPion (WELLBUTRIN SR) 200 MG 12 hr tablet, Take 200 mg by mouth 2 (two) times a day, Disp: , Rfl:     busPIRone (BUSPAR) 15 mg tablet, Take 15 mg by mouth 3 (three) times a day as needed, Disp: , Rfl:     cetirizine (ZyrTEC) 10 mg tablet, Take 10 mg by mouth daily, Disp: , Rfl:     cyclobenzaprine (FLEXERIL) 5 mg tablet, Take 1 tablet (5 mg total) by mouth 3 (three) times a day as needed for muscle spasms, Disp: 12 tablet, Rfl: 0    escitalopram (LEXAPRO) 20 mg tablet, Take 20 mg by mouth daily, Disp: , Rfl:     fluticasone (FLONASE) 50 mcg/act nasal spray, 2 sprays into each nostril daily at bedtime, Disp: , Rfl:     fluticasone-salmeterol (ADVAIR HFA) 115-21 MCG/ACT inhaler, Inhale 2 puffs 2 (two) times a day Rinse mouth after use., Disp: , Rfl:     lisinopril (ZESTRIL) 20 mg tablet,  Take 0.5 tablets (10 mg total) by mouth daily, Disp: , Rfl:     metFORMIN (GLUCOPHAGE) 500 mg tablet, Take 500 mg by mouth daily with breakfast, Disp: , Rfl:     montelukast (SINGULAIR) 10 mg tablet, Take 10 mg by mouth daily at bedtime, Disp: , Rfl:     nortriptyline (PAMELOR) 10 mg capsule, Take 50 mg by mouth daily at bedtime, Disp: , Rfl:     OMEPRAZOLE PO, Take 20 mg by mouth 2 (two) times a day, Disp: , Rfl:     prazosin (MINIPRESS) 5 mg capsule, Take 5 mg by mouth daily at bedtime, Disp: , Rfl:     QUEtiapine (SEROquel) 200 mg tablet, Take 200 mg by mouth daily at bedtime, Disp: , Rfl:     traZODone (DESYREL) 300 MG tablet, Take 300 mg by mouth daily at bedtime, Disp: , Rfl:     anastrozole (ARIMIDEX) 1 mg tablet, Take 1 mg by mouth daily (Patient not taking: Reported on 9/22/2024), Disp: , Rfl:     bisacodyl (DULCOLAX) 5 mg EC tablet, Take 1 tablet (5 mg total) by mouth 2 (two) times a day for 5 days, Disp: 10 tablet, Rfl: 0    dexamethasone (DECADRON) 4 mg tablet, Take 2 tablets by mouth twice daily with food the day before and after chemotherapy. (Patient not taking: Reported on 9/22/2024), Disp: , Rfl:     methylPREDNISolone 4 MG tablet therapy pack, Use as directed on package (Patient not taking: Reported on 9/22/2024), Disp: 21 tablet, Rfl: 0    Current Allergies     Allergies as of 09/30/2024 - Reviewed 09/30/2024   Allergen Reaction Noted    Azithromycin GI Intolerance and Hives 05/03/2022    Erythromycin Vomiting 07/02/2020    Keflex [cephalexin] Hives 05/18/2024    Morphine Palpitations 07/02/2020            The following portions of the patient's history were reviewed and updated as appropriate: allergies, current medications, past family history, past medical history, past social history, past surgical history and problem list.     Past Medical History:   Diagnosis Date    Anesthesia     Pt states she woke up during 2 surgeries    BRCA1 positive     Breast cancer (HCC)     Cancer (HCC)      "Diabetes mellitus (HCC)     GERD (gastroesophageal reflux disease)     Heart murmur     History of chemotherapy     adjuvant chemotherapy    Hyperlipidemia     Hypertension     Kidney stones     Subdural hematoma (HCC)     Von Willebrand disease (HCC)     Wears contact lenses        Past Surgical History:   Procedure Laterality Date    APPENDECTOMY      BREAST LUMPECTOMY Left     CARPAL TUNNEL RELEASE Bilateral     CHOLECYSTECTOMY      COLONOSCOPY      HYSTERECTOMY      KNEE SURGERY Left     LYMPH NODE BIOPSY Right 02/06/2024    Procedure: AXILLARY SENTINEL NODE BIOPSY (NUC MED INJECTION AT 0730);  Surgeon: Almita Molina DO;  Location: [facility];  Service: General    MASTECTOMY Bilateral 02/2024    MRI BREAST BIOPSY LEFT (ALL INCLUSIVE) Left 12/13/2023    PARTIAL HYSTERECTOMY      CO MASTECTOMY SIMPLE COMPLETE Bilateral 02/06/2024    Procedure: BREAST MASTECTOMY;  Surgeon: Almita Molina DO;  Location: [facility];  Service: General    ROTATOR CUFF REPAIR Left     TONSILLECTOMY      US GUIDED BREAST BIOPSY RIGHT COMPLETE Right 10/02/2023    US GUIDED BREAST BIOPSY RIGHT COMPLETE Right 12/13/2023       Family History   Problem Relation Age of Onset    Cancer Mother     Allergies Father     Cancer Sister     Stroke Brother          Medications have been verified.        Objective   /78   Pulse 85   Temp (!) 97 °F (36.1 °C)   Resp 18   Ht 5' 7\" (1.702 m)   Wt 106 kg (234 lb)   SpO2 98%   BMI 36.65 kg/m²   No LMP recorded. Patient has had a hysterectomy.       Physical Exam     Physical Exam  Vitals and nursing note reviewed.   Constitutional:       General: She is not in acute distress.     Appearance: She is not toxic-appearing.   HENT:      Head: Normocephalic.      Right Ear: Tympanic membrane, ear canal and external ear normal.      Left Ear: Tympanic membrane, ear canal and external ear normal.      Nose: Congestion present.      Mouth/Throat:      Mouth: Mucous membranes are " moist.      Pharynx: Posterior oropharyngeal erythema present.   Eyes:      Conjunctiva/sclera: Conjunctivae normal.   Cardiovascular:      Rate and Rhythm: Normal rate and regular rhythm.      Heart sounds: Normal heart sounds.   Pulmonary:      Effort: Pulmonary effort is normal. No respiratory distress.      Breath sounds: Normal breath sounds. No stridor. No wheezing, rhonchi or rales.   Lymphadenopathy:      Cervical: No cervical adenopathy.   Skin:     General: Skin is warm and dry.   Neurological:      Mental Status: She is alert and oriented to person, place, and time.      Gait: Gait is intact.   Psychiatric:         Mood and Affect: Mood normal.         Behavior: Behavior normal.

## 2024-09-30 NOTE — PATIENT INSTRUCTIONS
"Rapid POC COVID testing negative  Continue with supportive measures, OTC Tylenol, nasal decongestants, and cough suppressants   Cool mist humidifiers, throat lozenges, salt gargles, honey, increased fluid intake and rest   Follow up with PCP in 3-5 days  Present to ER if symptoms worsen       If tests have been performed at [facility], our office will contact you with results if changes need to be made to the care plan discussed with you at the visit.  You can review your full results on [facility] MyChart.      Patient Education     Cough, runny nose, and the common cold   The Basics   Written by the doctors and editors at [facility]   What causes cough, runny nose, and other symptoms of the common cold? -- These symptoms are usually caused by a virus. Doctors also use the term \"viral upper respiratory infection\" or \"viral URI.\" Lots of different viruses can get into your nose, mouth, throat, or airways and cause cold symptoms.  Most people get better from a cold without any lasting problems. Even so, having a cold can be uncomfortable.  What are the symptoms of the common cold? -- Symptoms can include:   Sneezing   Coughing   Sniffling and runny nose   Sore throat   Chest congestion  In children, the common cold can also cause a fever. But adults do not usually get a fever when they have a cold.  Colds usually last about 3 to 7 days in adults and 10 days in children. But some people have symptoms for up to 2 weeks.  How can I tell if I have a cold or something else? -- Sometimes, it can be hard to tell if you have a cold or something else. Some cold symptoms can also be caused by other illnesses, such as COVID-19, the flu, or strep throat.  There are sometimes clues that can help you tell the difference:   COVID-19 often starts out very similar to a cold, although it can also cause a fever. If you have cold symptoms and have been around someone with COVID-19, you should get a test to find out if you have it, too.   " The flu is more likely to cause fever, body aches, and extreme tiredness than a cold.   Strep throat usually causes severe throat pain. It can also cause a fever and swollen glands in the neck. People with strep throat usually do not have other cold symptoms like a stuffy nose or cough.  If you think that you might have an illness other than the common cold, call your doctor or nurse. They can tell you what to do.  Can medicine help with a cold? -- Usually, a cold gets better on its own and does not need treatment. Because colds are usually caused by viruses, antibiotics will not help.  If you are a teen or an adult, you can try cough and cold medicines that you can get without a prescription. These medicines might help with your symptoms. But they can't cure your cold, or help you get well faster.  If you decide to try non-prescription cold medicines:   Read the directions on the label, and follow them carefully.   Do not combine 2 or more medicines that have acetaminophen in them. If you take too much acetaminophen, it can damage your liver.   If you have a heart condition, have high blood pressure, or take any prescription medicines, talk to your doctor or pharmacist before taking cold medicine. They can tell you which medicines are safe.  Some medicines are not safe for children:   If your child is younger than 6, do not give them any cold medicines. These medicines are not safe for young children. Even if your child is older than 6, cough and cold medicines are unlikely to help.   Never give aspirin to any child younger than 18 years old. In children, aspirin can cause a life-threatening condition called Reye syndrome.   When giving your child acetaminophen or other non-prescription medicines, never give more than the recommended dose.  Is there anything I can do on my own to feel better? -- Yes. You can:   Get plenty of rest.   Drink lots of fluids (water, juice, or broth) to stay hydrated. This will help  replace any fluids lost if you have a runny nose or sweating from a fever. Warm tea or soup can help soothe a sore throat.   If the air in your home feels dry, use a cool-mist humidifier. This can help a stuffy nose and make it easier to breathe.   Use saline nose drops or spray to relieve stuffiness.   Avoid smoking, and stay away from places where people are smoking.  Can the common cold lead to more serious problems? -- In some cases, yes. In some people, having a cold can lead to:   Ear infections   Worse asthma symptoms   Sinus infections   Pneumonia or bronchitis (infections of the lungs)  Can colds be prevented? -- There are some things you can do to keep germs from spreading:   Wash your hands with soap and water often (figure 1) - This can also help prevent the spread of other illnesses like the flu and COVID-19.   Cover your cough - Cough into your elbow instead of your hands. Teach children to do this, too. Throw away used tissues right away.   Clean surfaces - The germs that cause the common cold can live on tables, door handles, and other surfaces for at least 2 hours.   Stay home if you are sick - When you do need to be around other people, consider wearing a face mask until you are feeling better.  When should I call the doctor? -- Contact your doctor or nurse if you:   Lose your sense of taste or smell   Have a fever of more than 100.4°F (38°C) that comes with shaking chills, loss of appetite, or trouble breathing   Have a very bad sore throat   Have a fever and also have lung disease, such as emphysema or asthma   Have a cough that lasts longer than 10 days or starts getting worse   Have chest pain when you cough or breathe deeply, have trouble breathing, or cough up blood  If you are older than 65, or if you have any chronic medical conditions such as diabetes, contact your doctor or nurse any time you get a long-lasting cough.  Take your child to the emergency department if they:   Become confused  or stop responding to you   Have trouble breathing or have to work hard to breathe  Contact your child's doctor or nurse if the child:   Loses their sense of taste or smell or won't eat foods that they ate before   Has a very bad sore throat   Refuses to drink anything for a long time   Is younger than 4 months   Has a fever and is not acting like themselves   Has a cough that lasts for more than 2 weeks and is not getting any better or is getting worse   Has a stuffed or runny nose that gets worse or does not get any better after 10 days   Has red eyes or yellow goop coming out of their eyes   Has ear pain, pulls at their ears, or shows other signs of having an ear infection  All topics are updated as new evidence becomes available and our peer review process is complete.  This topic retrieved from [facility] on: Feb 26, 2024.  Topic 39898 Version 30.0  Release: 32.2.4 - C32.56  © 2024 [employer] and/or its affiliates. All rights reserved.  figure 1: How to wash your hands     Wet your hands with clean water, and apply a small amount of soap. Lather and rub hands together for at least 20 seconds. Clean your wrists, palms, backs of your hands, between your fingers, tips of your fingers, thumbs, and under and around your nails. Rinse well, and dry your hands using a clean towel.  Graphic 981740 Version 7.0  Consumer Information Use and Disclaimer   Disclaimer: This generalized information is a limited summary of diagnosis, treatment, and/or medication information. It is not meant to be comprehensive and should be used as a tool to help the user understand and/or assess potential diagnostic and treatment options. It does NOT include all information about conditions, treatments, medications, side effects, or risks that may apply to a specific patient. It is not intended to be medical advice or a substitute for the medical advice, diagnosis, or treatment of a health care provider based on the health care provider's  examination and assessment of a patient's specific and unique circumstances. Patients must speak with a health care provider for complete information about their health, medical questions, and treatment options, including any risks or benefits regarding use of medications. This information does not endorse any treatments or medications as safe, effective, or approved for treating a specific patient. [employer] and its affiliates disclaim any warranty or liability relating to this information or the use thereof.The use of this information is governed by the Terms of Use, available at https://www.woltersTraveeuwer.com/en/know/clinical-effectiveness-terms. 2024© [employer], [employer] and its affiliates and/or licensors. All rights reserved.  Copyright   © 2024 [employer] and/or its affiliates. All rights reserved.

## 2024-10-09 ENCOUNTER — APPOINTMENT (EMERGENCY)
Dept: RADIOLOGY | Facility: HOSPITAL | Age: 59
End: 2024-10-09
Payer: COMMERCIAL

## 2024-10-09 ENCOUNTER — APPOINTMENT (EMERGENCY)
Dept: CT IMAGING | Facility: HOSPITAL | Age: 59
End: 2024-10-09
Payer: COMMERCIAL

## 2024-10-09 ENCOUNTER — HOSPITAL ENCOUNTER (EMERGENCY)
Facility: HOSPITAL | Age: 59
Discharge: HOME | End: 2024-10-10
Attending: EMERGENCY MEDICINE
Payer: COMMERCIAL

## 2024-10-09 DIAGNOSIS — R51.9 HEADACHE: Primary | ICD-10-CM

## 2024-10-09 DIAGNOSIS — I10 HTN (HYPERTENSION): ICD-10-CM

## 2024-10-09 DIAGNOSIS — E87.6 HYPOKALEMIA: ICD-10-CM

## 2024-10-09 LAB
ALBUMIN SERPL BCG-MCNC: 4.2 G/DL (ref 3.5–5)
ALP SERPL-CCNC: 89 U/L (ref 34–104)
ALT SERPL W P-5'-P-CCNC: 35 U/L (ref 7–52)
ANION GAP SERPL CALCULATED.3IONS-SCNC: 7 MMOL/L (ref 4–13)
APTT PPP: 35 SECONDS (ref 23–34)
AST SERPL W P-5'-P-CCNC: 27 U/L (ref 13–39)
BASOPHILS # BLD AUTO: 0.04 THOUSANDS/ΜL (ref 0–0.1)
BASOPHILS NFR BLD AUTO: 1 % (ref 0–1)
BILIRUB SERPL-MCNC: 0.23 MG/DL (ref 0.2–1)
BNP SERPL-MCNC: 33 PG/ML (ref 0–100)
BUN SERPL-MCNC: 8 MG/DL (ref 5–25)
CALCIUM SERPL-MCNC: 9.3 MG/DL (ref 8.4–10.2)
CARDIAC TROPONIN I PNL SERPL HS: 7 NG/L
CHLORIDE SERPL-SCNC: 101 MMOL/L (ref 96–108)
CO2 SERPL-SCNC: 31 MMOL/L (ref 21–32)
CREAT SERPL-MCNC: 0.75 MG/DL (ref 0.6–1.3)
CRP SERPL QL: 7.9 MG/L
EOSINOPHIL # BLD AUTO: 0.07 THOUSAND/ΜL (ref 0–0.61)
EOSINOPHIL NFR BLD AUTO: 1 % (ref 0–6)
ERYTHROCYTE [DISTWIDTH] IN BLOOD BY AUTOMATED COUNT: 13.7 % (ref 11.6–15.1)
ERYTHROCYTE [SEDIMENTATION RATE] IN BLOOD: 37 MM/HOUR (ref 0–29)
FLUAV AG UPPER RESP QL IA.RAPID: NEGATIVE
FLUBV AG UPPER RESP QL IA.RAPID: NEGATIVE
GFR SERPL CREATININE-BSD FRML MDRD: 86 ML/MIN/1.73SQ M
GLUCOSE SERPL-MCNC: 115 MG/DL (ref 65–140)
HCT VFR BLD AUTO: 40.8 % (ref 34.8–46.1)
HGB BLD-MCNC: 12.9 G/DL (ref 11.5–15.4)
IMM GRANULOCYTES # BLD AUTO: 0.03 THOUSAND/UL (ref 0–0.2)
IMM GRANULOCYTES NFR BLD AUTO: 1 % (ref 0–2)
INR PPP: 0.91 (ref 0.85–1.19)
LACTATE SERPL-SCNC: 1 MMOL/L (ref 0.5–2)
LYMPHOCYTES # BLD AUTO: 1.67 THOUSANDS/ΜL (ref 0.6–4.47)
LYMPHOCYTES NFR BLD AUTO: 27 % (ref 14–44)
MAGNESIUM SERPL-MCNC: 2 MG/DL (ref 1.9–2.7)
MCH RBC QN AUTO: 26.1 PG (ref 26.8–34.3)
MCHC RBC AUTO-ENTMCNC: 31.6 G/DL (ref 31.4–37.4)
MCV RBC AUTO: 82 FL (ref 82–98)
MONOCYTES # BLD AUTO: 0.57 THOUSAND/ΜL (ref 0.17–1.22)
MONOCYTES NFR BLD AUTO: 9 % (ref 4–12)
NEUTROPHILS # BLD AUTO: 3.75 THOUSANDS/ΜL (ref 1.85–7.62)
NEUTS SEG NFR BLD AUTO: 61 % (ref 43–75)
NRBC BLD AUTO-RTO: 0 /100 WBCS
PLATELET # BLD AUTO: 206 THOUSANDS/UL (ref 149–390)
PMV BLD AUTO: 10.4 FL (ref 8.9–12.7)
POTASSIUM SERPL-SCNC: 3.3 MMOL/L (ref 3.5–5.3)
PROCALCITONIN SERPL-MCNC: 0.1 NG/ML
PROT SERPL-MCNC: 7 G/DL (ref 6.4–8.4)
PROTHROMBIN TIME: 12.7 SECONDS (ref 12.3–15)
RBC # BLD AUTO: 4.95 MILLION/UL (ref 3.81–5.12)
SARS-COV+SARS-COV-2 AG RESP QL IA.RAPID: NEGATIVE
SODIUM SERPL-SCNC: 139 MMOL/L (ref 135–147)
WBC # BLD AUTO: 6.13 THOUSAND/UL (ref 4.31–10.16)

## 2024-10-09 PROCEDURE — 84484 ASSAY OF TROPONIN QUANT: CPT | Performed by: EMERGENCY MEDICINE

## 2024-10-09 PROCEDURE — 83735 ASSAY OF MAGNESIUM: CPT | Performed by: EMERGENCY MEDICINE

## 2024-10-09 PROCEDURE — 85730 THROMBOPLASTIN TIME PARTIAL: CPT | Performed by: EMERGENCY MEDICINE

## 2024-10-09 PROCEDURE — 71045 X-RAY EXAM CHEST 1 VIEW: CPT

## 2024-10-09 PROCEDURE — 85025 COMPLETE CBC W/AUTO DIFF WBC: CPT | Performed by: EMERGENCY MEDICINE

## 2024-10-09 PROCEDURE — 85610 PROTHROMBIN TIME: CPT | Performed by: EMERGENCY MEDICINE

## 2024-10-09 PROCEDURE — 87804 INFLUENZA ASSAY W/OPTIC: CPT | Performed by: EMERGENCY MEDICINE

## 2024-10-09 PROCEDURE — 70450 CT HEAD/BRAIN W/O DYE: CPT

## 2024-10-09 PROCEDURE — 85652 RBC SED RATE AUTOMATED: CPT | Performed by: EMERGENCY MEDICINE

## 2024-10-09 PROCEDURE — 96375 TX/PRO/DX INJ NEW DRUG ADDON: CPT

## 2024-10-09 PROCEDURE — 93005 ELECTROCARDIOGRAM TRACING: CPT

## 2024-10-09 PROCEDURE — 99285 EMERGENCY DEPT VISIT HI MDM: CPT | Performed by: EMERGENCY MEDICINE

## 2024-10-09 PROCEDURE — 86140 C-REACTIVE PROTEIN: CPT | Performed by: EMERGENCY MEDICINE

## 2024-10-09 PROCEDURE — 99284 EMERGENCY DEPT VISIT MOD MDM: CPT

## 2024-10-09 PROCEDURE — 96374 THER/PROPH/DIAG INJ IV PUSH: CPT

## 2024-10-09 PROCEDURE — 80053 COMPREHEN METABOLIC PANEL: CPT | Performed by: EMERGENCY MEDICINE

## 2024-10-09 PROCEDURE — 83605 ASSAY OF LACTIC ACID: CPT | Performed by: EMERGENCY MEDICINE

## 2024-10-09 PROCEDURE — 36415 COLL VENOUS BLD VENIPUNCTURE: CPT | Performed by: EMERGENCY MEDICINE

## 2024-10-09 PROCEDURE — 84145 PROCALCITONIN (PCT): CPT | Performed by: EMERGENCY MEDICINE

## 2024-10-09 PROCEDURE — 96361 HYDRATE IV INFUSION ADD-ON: CPT

## 2024-10-09 PROCEDURE — 83880 ASSAY OF NATRIURETIC PEPTIDE: CPT | Performed by: EMERGENCY MEDICINE

## 2024-10-09 PROCEDURE — 87811 SARS-COV-2 COVID19 W/OPTIC: CPT | Performed by: EMERGENCY MEDICINE

## 2024-10-09 RX ORDER — KETOROLAC TROMETHAMINE 30 MG/ML
30 INJECTION, SOLUTION INTRAMUSCULAR; INTRAVENOUS ONCE
Status: COMPLETED | OUTPATIENT
Start: 2024-10-09 | End: 2024-10-09

## 2024-10-09 RX ORDER — HYDRALAZINE HYDROCHLORIDE 20 MG/ML
10 INJECTION INTRAMUSCULAR; INTRAVENOUS ONCE
Status: COMPLETED | OUTPATIENT
Start: 2024-10-09 | End: 2024-10-09

## 2024-10-09 RX ORDER — LABETALOL HYDROCHLORIDE 5 MG/ML
10 INJECTION, SOLUTION INTRAVENOUS ONCE
Status: COMPLETED | OUTPATIENT
Start: 2024-10-09 | End: 2024-10-09

## 2024-10-09 RX ORDER — HYDRALAZINE HYDROCHLORIDE 10 MG/1
10 TABLET, FILM COATED ORAL ONCE
Status: COMPLETED | OUTPATIENT
Start: 2024-10-09 | End: 2024-10-09

## 2024-10-09 RX ORDER — DIPHENHYDRAMINE HYDROCHLORIDE 50 MG/ML
25 INJECTION INTRAMUSCULAR; INTRAVENOUS ONCE
Status: COMPLETED | OUTPATIENT
Start: 2024-10-09 | End: 2024-10-09

## 2024-10-09 RX ORDER — HYDRALAZINE HYDROCHLORIDE 10 MG/1
10 TABLET, FILM COATED ORAL 3 TIMES DAILY
Qty: 20 TABLET | Refills: 0 | Status: SHIPPED | OUTPATIENT
Start: 2024-10-09

## 2024-10-09 RX ORDER — POTASSIUM CHLORIDE 1500 MG/1
20 TABLET, EXTENDED RELEASE ORAL ONCE
Status: COMPLETED | OUTPATIENT
Start: 2024-10-09 | End: 2024-10-09

## 2024-10-09 RX ADMIN — LABETALOL HYDROCHLORIDE 10 MG: 5 INJECTION, SOLUTION INTRAVENOUS at 22:45

## 2024-10-09 RX ADMIN — SODIUM CHLORIDE 1000 ML: 0.9 INJECTION, SOLUTION INTRAVENOUS at 22:21

## 2024-10-09 RX ADMIN — DIPHENHYDRAMINE HYDROCHLORIDE 25 MG: 50 INJECTION, SOLUTION INTRAMUSCULAR; INTRAVENOUS at 22:30

## 2024-10-09 RX ADMIN — KETOROLAC TROMETHAMINE 30 MG: 30 INJECTION, SOLUTION INTRAMUSCULAR at 22:30

## 2024-10-09 RX ADMIN — HYDRALAZINE HYDROCHLORIDE 10 MG: 10 TABLET ORAL at 23:48

## 2024-10-09 RX ADMIN — HYDRALAZINE HYDROCHLORIDE 10 MG: 20 INJECTION INTRAMUSCULAR; INTRAVENOUS at 23:21

## 2024-10-09 RX ADMIN — POTASSIUM CHLORIDE 20 MEQ: 1500 TABLET, EXTENDED RELEASE ORAL at 22:49

## 2024-10-10 VITALS
HEIGHT: 67 IN | TEMPERATURE: 97 F | SYSTOLIC BLOOD PRESSURE: 178 MMHG | WEIGHT: 246.69 LBS | RESPIRATION RATE: 21 BRPM | OXYGEN SATURATION: 94 % | DIASTOLIC BLOOD PRESSURE: 86 MMHG | BODY MASS INDEX: 38.72 KG/M2 | HEART RATE: 63 BPM

## 2024-10-10 LAB
ATRIAL RATE: 67 BPM
P AXIS: 64 DEGREES
PR INTERVAL: 208 MS
QRS AXIS: 29 DEGREES
QRSD INTERVAL: 96 MS
QT INTERVAL: 444 MS
QTC INTERVAL: 469 MS
T WAVE AXIS: 6 DEGREES
VENTRICULAR RATE: 67 BPM

## 2024-10-10 PROCEDURE — 93010 ELECTROCARDIOGRAM REPORT: CPT | Performed by: INTERNAL MEDICINE

## 2024-10-10 NOTE — ED PROVIDER NOTES
Final diagnoses:   Headache   HTN (hypertension)   Hypokalemia - mild     ED Disposition       ED Disposition   Discharge    Condition   Stable    Date/Time   Wed Oct 9, 2024 11:43 PM    Comment   Tammie Avalos discharge to home/self care.                   Assessment & Plan       Medical Decision Making  Differential diagnosis included but not limited to intracranial hemorrhage stroke hypertensive urgency hypertensive emergency acute coronary syndrome.  Patient remained clinically and hemodynamically neurologically stable in the emergency department after blood pressure improved symptoms entirely resolved and improved.  Patient felt well and wished to return home workup in the ED revealed no evidence of acute cardiopulmonary pathology or acute intracranial pathology suspect symptoms related to elevated blood pressure patient had very good response to hydralazine administered in the emergency department discussed starting a short course of this medication to help control blood pressure and advised prompt follow-up with primary physician for further evaluation and monitoring of blood pressure and further medical management of hypertension.  Patient understands instructions and agrees and will follow-up promptly as advised.  return precautions and anticipatory guidance discussed.      Problems Addressed:  Headache: acute illness or injury  HTN (hypertension): acute illness or injury  Hypokalemia: acute illness or injury    Amount and/or Complexity of Data Reviewed  Labs: ordered. Decision-making details documented in ED Course.  Radiology: ordered and independent interpretation performed. Decision-making details documented in ED Course.  ECG/medicine tests: ordered and independent interpretation performed. Decision-making details documented in ED Course.    Risk  Prescription drug management.        ED Course as of 10/09/24 2347   Wed Oct 09, 2024   2343 Following blood pressure medicine and control blood pressure in  the ED patient now feeling much better and wishing to return home.         Medications   hydrALAZINE (APRESOLINE) tablet 10 mg (has no administration in time range)   ketorolac (TORADOL) injection 30 mg (30 mg Intravenous Given 10/9/24 2230)   diphenhydrAMINE (BENADRYL) injection 25 mg (25 mg Intravenous Given 10/9/24 2230)   labetalol (NORMODYNE) injection 10 mg (10 mg Intravenous Given 10/9/24 2245)   potassium chloride (Klor-Con M20) CR tablet 20 mEq (20 mEq Oral Given 10/9/24 2249)   hydrALAZINE (APRESOLINE) injection 10 mg (10 mg Intravenous Given 10/9/24 2321)       ED Risk Strat Scores   HEART Risk Score      Flowsheet Row Most Recent Value   Heart Score Risk Calculator    History 0 Filed at: 10/09/2024 2346   ECG 0 Filed at: 10/09/2024 2346   Age 1 Filed at: 10/09/2024 2346   Risk Factors 1 Filed at: 10/09/2024 2346   Troponin 0 Filed at: 10/09/2024 2346   HEART Score 2 Filed at: 10/09/2024 2346                                                   History of Present Illness       Chief Complaint   Patient presents with    Headache     Pt having headache all day today, pt having high BP all day. Pt states per family that she would go unresponsive but was easily aroused all day.       Past Medical History:   Diagnosis Date    Anesthesia     Pt states she woke up during 2 surgeries    BRCA1 positive     Breast cancer (HCC)     Cancer (HCC)     Diabetes mellitus (HCC)     GERD (gastroesophageal reflux disease)     Heart murmur     History of chemotherapy     adjuvant chemotherapy    Hyperlipidemia     Hypertension     Kidney stones     Subdural hematoma (HCC)     Von Willebrand disease (HCC)     Wears contact lenses       Past Surgical History:   Procedure Laterality Date    APPENDECTOMY      BREAST LUMPECTOMY Left     CARPAL TUNNEL RELEASE Bilateral     CHOLECYSTECTOMY      COLONOSCOPY      HYSTERECTOMY      KNEE SURGERY Left     LYMPH NODE BIOPSY Right 02/06/2024    Procedure: AXILLARY SENTINEL NODE BIOPSY (NUC  MED INJECTION AT 0730);  Surgeon: Almita Molina DO;  Location: [facility];  Service: General    MASTECTOMY Bilateral 02/2024    MRI BREAST BIOPSY LEFT (ALL INCLUSIVE) Left 12/13/2023    PARTIAL HYSTERECTOMY      [place] MASTECTOMY SIMPLE COMPLETE Bilateral 02/06/2024    Procedure: BREAST MASTECTOMY;  Surgeon: Almita Molina DO;  Location: [facility];  Service: General    ROTATOR CUFF REPAIR Left     TONSILLECTOMY      US GUIDED BREAST BIOPSY RIGHT COMPLETE Right 10/02/2023    US GUIDED BREAST BIOPSY RIGHT COMPLETE Right 12/13/2023      Family History   Problem Relation Age of Onset    Cancer Mother     Allergies Father     Cancer Sister     Stroke Brother       Social History     Tobacco Use    Smoking status: Former     Current packs/day: 0.25     Average packs/day: 0.3 packs/day for 25.0 years (6.3 ttl pk-yrs)     Types: Cigarettes    Smokeless tobacco: Former    Tobacco comments:     few cigs/day   Vaping Use    Vaping status: Former    Substances: Nicotine (2 cigarettes a week), Flavoring   Substance Use Topics    Alcohol use: Not Currently    Drug use: Never      E-Cigarette/Vaping    E-Cigarette Use Former User       E-Cigarette/Vaping Substances    Nicotine Yes 2 cigarettes a week    THC No     CBD No     Flavoring Yes       I have reviewed and agree with the history as documented.     Patient is a 59-year-old female history of breast cancer diabetes hypertension hyperlipidemia subdural hematoma von Willebrand's disease presents to the emergency department due to generally not feeling well for about a week seen at urgent care and diagnosed with upper respiratory infection and virus.  Today started having intermittent headaches and shortness of breath and chest discomfort.  Denies fevers but did feel cool and clammy.        History provided by:  Patient and EMS personnel  Headache  Associated symptoms: congestion, cough, drainage and fatigue    Associated symptoms: no abdominal pain, no  diarrhea, no fever, no nausea, no numbness, no vomiting and no weakness        Review of Systems   Constitutional:  Positive for fatigue. Negative for fever.   HENT:  Positive for congestion and postnasal drip.    Respiratory:  Positive for cough, chest tightness and shortness of breath.    Cardiovascular:  Negative for chest pain.   Gastrointestinal:  Negative for abdominal pain, diarrhea, nausea and vomiting.   Neurological:  Positive for headaches. Negative for weakness and numbness.   All other systems reviewed and are negative.          Objective       ED Triage Vitals   Temperature Pulse Blood Pressure Respirations SpO2 Patient Position - Orthostatic VS   10/09/24 2151 10/09/24 2151 10/09/24 2152 10/09/24 2151 10/09/24 2151 10/09/24 2151   (!) 97 °F (36.1 °C) 66 (!) 183/96 18 94 % Lying      Temp Source Heart Rate Source BP Location FiO2 (%) Pain Score    10/09/24 2151 10/09/24 2151 10/09/24 2151 -- 10/09/24 2151    Temporal Monitor Left arm  9      Vitals      Date and Time Temp Pulse SpO2 Resp BP Pain Score FACES Pain Rating User   10/09/24 2330 -- 60 95 % 22 167/79 -- -- TF   10/09/24 2321 -- -- -- -- 212/93 -- -- TF   10/09/24 2300 -- 54 94 % 22 204/91 -- -- TF   10/09/24 2230 -- -- -- -- -- 9 -- TF   10/09/24 2200 -- 68 96 % 20  183/96 -- -- TF   10/09/24 2152 -- -- -- -- 183/96 -- -- SR   10/09/24 2151 97 °F (36.1 °C) 66 94 % 18 -- 9 -- SR            Physical Exam  Vitals and nursing note reviewed.   Constitutional:       General: She is not in acute distress.     Appearance: Normal appearance.   HENT:      Head: Normocephalic and atraumatic.      Nose: Nose normal.   Eyes:      Extraocular Movements: Extraocular movements intact.      Conjunctiva/sclera: Conjunctivae normal.      Pupils: Pupils are equal, round, and reactive to light.   Pulmonary:      Effort: Pulmonary effort is normal. No respiratory distress.   Skin:     General: Skin is dry.   Neurological:      General: No focal deficit present.       Mental Status: She is alert and oriented to person, place, and time.      Sensory: No sensory deficit.      Motor: No weakness.         Results Reviewed       Procedure Component Value Units Date/Time    Sedimentation rate, automated [[id number]]  (Abnormal) Collected: 10/09/24 2216    Lab Status: Final result Specimen: Blood from Arm, Left Updated: 10/09/24 2315     Sed Rate 37 mm/hour     Procalcitonin [[id number]]  (Normal) Collected: 10/09/24 2216    Lab Status: Final result Specimen: Blood from Arm, Left Updated: 10/09/24 2309     Procalcitonin 0.10 ng/ml     B-Type Natriuretic Peptide(BNP) [[id number]]  (Normal) Collected: 10/09/24 2216    Lab Status: Final result Specimen: Blood from Arm, Left Updated: 10/09/24 2257     BNP 33 pg/mL     Protime-INR [[id number]]  (Normal) Collected: 10/09/24 2216    Lab Status: Final result Specimen: Blood from Arm, Left Updated: 10/09/24 2254     Protime 12.7 seconds      INR 0.91    Narrative:      INR Therapeutic Range    Indication                                             INR Range      Atrial Fibrillation                                               2.0-3.0  Hypercoagulable State                                    2.0.2.3  Left Ventricular Asist Device                            2.0-3.0  Mechanical Heart Valve                                  -    Aortic(with afib, MI, embolism, HF, LA enlargement,    and/or coagulopathy)                                     2.0-3.0 (2.5-3.5)     Mitral                                                             2.5-3.5  Prosthetic/Bioprosthetic Heart Valve               2.0-3.0  Venous thromboembolism (VTE: VT, PE        2.0-3.0    APTT [[id number]]  (Abnormal) Collected: 10/09/24 2216    Lab Status: Final result Specimen: Blood from Arm, Left Updated: 10/09/24 2254     PTT 35 seconds     FLU/COVID Rapid Antigen (30 min. TAT) - Preferred screening test in ED [[id number]]  (Normal) Collected: 10/09/24 2232    Lab Status: Final result  Specimen: Nares from Nose Updated: 10/09/24 2252     SARS COV Rapid Antigen Negative     Influenza A Rapid Antigen Negative     Influenza B Rapid Antigen Negative    Narrative:      This test has been performed using the [employer] (formerly [employer]) Kierra 2 FLU+SARS Antigen test under the Emergency Use Authorization (EUA). This test has been validated by the  and verified by the performing laboratory. The Kierra uses lateral flow immunofluorescent sandwich assay to detect SARS-COV, Influenza A and Influenza B Antigen.     The [employer] Kierra 2 SARS Antigen test does not differentiate between SARS-CoV and SARS-CoV-2.     Negative results are presumptive and may be confirmed with a molecular assay, if necessary, for patient management. Negative results do not rule out SARS-CoV-2 or influenza infection and should not be used as the sole basis for treatment or patient management decisions. A negative test result may occur if the level of antigen in a sample is below the limit of detection of this test.     Positive results are indicative of the presence of viral antigens, but do not rule out bacterial infection or co-infection with other viruses.     All test results should be used as an adjunct to clinical observations and other information available to the provider.    FOR PEDIATRIC PATIENTS - copy/paste COVID Guidelines URL to browser: [email or web address]    HS Troponin 0hr (reflex protocol) [[id number]]  (Normal) Collected: 10/09/24 2216    Lab Status: Final result Specimen: Blood from Arm, Left Updated: 10/09/24 2248     hs TnI 0hr 7 ng/L     Comprehensive metabolic panel [[id number]]  (Abnormal) Collected: 10/09/24 2216    Lab Status: Final result Specimen: Blood from Arm, Left Updated: 10/09/24 2245     Sodium 139 mmol/L      Potassium 3.3 mmol/L      Chloride 101 mmol/L      CO2 31 mmol/L      ANION GAP 7 mmol/L      BUN 8 mg/dL      Creatinine 0.75 mg/dL      Glucose 115  mg/dL      Calcium 9.3 mg/dL      AST 27 U/L      ALT 35 U/L      Alkaline Phosphatase 89 U/L      Total Protein 7.0 g/dL      Albumin 4.2 g/dL      Total Bilirubin 0.23 mg/dL      eGFR 86 ml/min/1.73sq m     Narrative:      National Kid[employer] guidelines for Chronic Kidney Disease (CKD):     Stage 1 with normal or high GFR (GFR > 90 mL/min/1.73 square meters)    Stage 2 Mild CKD (GFR = 60-89 mL/min/1.73 square meters)    Stage 3A Moderate CKD (GFR = 45-59 mL/min/1.73 square meters)    Stage 3B Moderate CKD (GFR = 30-44 mL/min/1.73 square meters)    Stage 4 Severe CKD (GFR = 15-29 mL/min/1.73 square meters)    Stage 5 End Stage CKD (GFR <15 mL/min/1.73 square meters)  Note: GFR calculation is accurate only with a steady state creatinine    Magnesium [[id number]]  (Normal) Collected: 10/09/24 2216    Lab Status: Final result Specimen: Blood from Arm, Left Updated: 10/09/24 2245     Magnesium 2.0 mg/dL     C-reactive protein [[id number]]  (Abnormal) Collected: 10/09/24 2216    Lab Status: Final result Specimen: Blood from Arm, Left Updated: 10/09/24 2245     CRP 7.9 mg/L     Lactic acid, plasma (w/reflex if result > 2.0) [[id number]]  (Normal) Collected: 10/09/24 2216    Lab Status: Final result Specimen: Blood from Arm, Left Updated: 10/09/24 2244     LACTIC ACID 1.0 mmol/L     Narrative:      Result may be elevated if tourniquet was used during collection.    CBC and differential [[id number]]  (Abnormal) Collected: 10/09/24 2216    Lab Status: Final result Specimen: Blood from Arm, Left Updated: 10/09/24 2225     WBC 6.13 Thousand/uL      RBC 4.95 Million/uL      Hemoglobin 12.9 g/dL      Hematocrit 40.8 %      MCV 82 fL      MCH 26.1 pg      MCHC 31.6 g/dL      RDW 13.7 %      MPV 10.4 fL      Platelets 206 Thousands/uL      nRBC 0 /100 WBCs      Segmented % 61 %      Immature Grans % 1 %      Lymphocytes % 27 %      Monocytes % 9 %      Eosinophils Relative 1 %      Basophils Relative 1 %      Absolute  Neutrophils 3.75 Thousands/µL      Absolute Immature Grans 0.03 Thousand/uL      Absolute Lymphocytes 1.67 Thousands/µL      Absolute Monocytes 0.57 Thousand/µL      Eosinophils Absolute 0.07 Thousand/µL      Basophils Absolute 0.04 Thousands/µL     UA w Reflex to Microscopic w Reflex to Culture [[id number]]     Lab Status: No result Specimen: Urine             XR chest 1 view portable   ED Interpretation by Blane John DO (10/09 2236)   No acute cardiopulmonary disease      CT head without contrast   Final Interpretation by Dimas Quiroz MD (10/09 2230)      No acute intracranial abnormality.                  Workstation performed: [id number]             ECG 12 Lead Documentation Only    Date/Time: 10/9/2024 10:02 PM    Performed by: Blane John DO  Authorized by: Blane John DO    ECG reviewed by me, the ED Provider: yes    Patient location:  ED  Previous ECG:     Comparison to cardiac monitor: Yes    Quality:     Tracing quality:  Limited by artifact  Rate:     ECG rate:  67    ECG rate assessment: normal    Rhythm:     Rhythm: sinus rhythm    QRS:     QRS axis:  Normal    QRS intervals:  Normal  Conduction:     Conduction: normal    ST segments:     ST segments:  Normal  T waves:     T waves: normal        ED Medication and Procedure Management   Prior to Admission Medications   Prescriptions Last Dose Informant Patient Reported? Taking?   OMEPRAZOLE PO   Yes No   Sig: Take 20 mg by mouth 2 (two) times a day   QUEtiapine (SEROquel) 200 mg tablet   Yes No   Sig: Take 200 mg by mouth daily at bedtime   albuterol (PROVENTIL HFA,VENTOLIN HFA) 90 mcg/act inhaler   Yes No   Sig: Inhale 2 puffs as needed   anastrozole (ARIMIDEX) 1 mg tablet   Yes No   Sig: Take 1 mg by mouth daily   Patient not taking: Reported on 9/22/2024   atorvastatin (LIPITOR) 40 mg tablet   Yes No   Sig: Take 40 mg by mouth daily   bisacodyl (DULCOLAX) 10 mg suppository   No No   Sig: Insert 1 suppository (10 mg total) into the rectum  daily   bisacodyl (DULCOLAX) 5 mg EC tablet   No No   Sig: Take 1 tablet (5 mg total) by mouth 2 (two) times a day for 5 days   buPROPion (WELLBUTRIN SR) 200 MG 12 hr tablet   Yes No   Sig: Take 200 mg by mouth 2 (two) times a day   busPIRone (BUSPAR) 15 mg tablet   Yes No   Sig: Take 15 mg by mouth 3 (three) times a day as needed   cetirizine (ZyrTEC) 10 mg tablet   Yes No   Sig: Take 10 mg by mouth daily   cyclobenzaprine (FLEXERIL) 5 mg tablet   No No   Sig: Take 1 tablet (5 mg total) by mouth 3 (three) times a day as needed for muscle spasms   dexamethasone (DECADRON) 4 mg tablet   Yes No   Sig: Take 2 tablets by mouth twice daily with food the day before and after chemotherapy.   Patient not taking: Reported on 2024   escitalopram (LEXAPRO) 20 mg tablet   Yes No   Sig: Take 20 mg by mouth daily   fluticasone (FLONASE) 50 mcg/act nasal spray   Yes No   Si sprays into each nostril daily at bedtime   fluticasone-salmeterol (ADVAIR HFA) 115-21 MCG/ACT inhaler   Yes No   Sig: Inhale 2 puffs 2 (two) times a day Rinse mouth after use.   lisinopril (ZESTRIL) 20 mg tablet   No No   Sig: Take 0.5 tablets (10 mg total) by mouth daily   metFORMIN (GLUCOPHAGE) 500 mg tablet   Yes No   Sig: Take 500 mg by mouth daily with breakfast   methylPREDNISolone 4 MG tablet therapy pack   No No   Sig: Use as directed on package   Patient not taking: Reported on 2024   montelukast (SINGULAIR) 10 mg tablet   Yes No   Sig: Take 10 mg by mouth daily at bedtime   nortriptyline (PAMELOR) 10 mg capsule   Yes No   Sig: Take 50 mg by mouth daily at bedtime   prazosin (MINIPRESS) 5 mg capsule   Yes No   Sig: Take 5 mg by mouth daily at bedtime   traZODone (DESYREL) 300 MG tablet   Yes No   Sig: Take 300 mg by mouth daily at bedtime      Facility-Administered Medications: None     Patient's Medications   Discharge Prescriptions    HYDRALAZINE (APRESOLINE) 10 MG TABLET    Take 1 tablet (10 mg total) by mouth 3 (three) times a day        Start Date: 10/9/2024 End Date: --       Order Dose: 10 mg       Quantity: 20 tablet    Refills: 0     No discharge procedures on file.  ED SEPSIS DOCUMENTATION   Time reflects when diagnosis was documented in both MDM as applicable and the Disposition within this note       Time User Action Codes Description Comment    10/9/2024 10:47 PM Blane John Add [R51.9] Headache     10/9/2024 10:47 PM Blane John Add [I10] HTN (hypertension)     10/9/2024 10:47 PM Blane John Add [E87.6] Hypokalemia     10/9/2024 10:47 PM Blane John Modify [E87.6] Hypokalemia mild                 Blane John DO  10/09/24 0440

## 2024-11-12 ENCOUNTER — DOCTOR'S OFFICE (OUTPATIENT)
Dept: URBAN - NONMETROPOLITAN AREA CLINIC 1 | Facility: CLINIC | Age: 59
Setting detail: OPHTHALMOLOGY
End: 2024-11-12
Payer: COMMERCIAL

## 2024-11-12 ENCOUNTER — OPTICAL OFFICE (OUTPATIENT)
Dept: URBAN - NONMETROPOLITAN AREA CLINIC 4 | Facility: CLINIC | Age: 59
Setting detail: OPHTHALMOLOGY
End: 2024-11-12

## 2024-11-12 DIAGNOSIS — H52.13: ICD-10-CM

## 2024-11-12 DIAGNOSIS — H52.4: ICD-10-CM

## 2024-11-12 PROBLEM — S02.3XS: Status: ACTIVE | Noted: 2024-11-12

## 2024-11-12 PROCEDURE — V2020 VISION SVCS FRAMES PURCHASES: HCPCS | Performed by: OPTOMETRIST

## 2024-11-12 PROCEDURE — 92014 COMPRE OPH EXAM EST PT 1/>: CPT | Performed by: OPTOMETRIST

## 2024-11-12 PROCEDURE — V2103 SPHEROCYLINDR 4.00D/12-2.00D: HCPCS | Mod: LT | Performed by: OPTOMETRIST

## 2024-11-12 PROCEDURE — V2103 SPHEROCYLINDR 4.00D/12-2.00D: HCPCS | Performed by: OPTOMETRIST

## 2024-11-12 ASSESSMENT — REFRACTION_MANIFEST
OD_VA1: 20/20-2
OS_VA2: 20/25+2
OS_CYLINDER: -0.75
OS_VA1: 20/25+2
OD_SPHERE: -2.25
OS_SPHERE: -2.25
OD_VA2: 20/20-2
OS_AXIS: 120
OS_ADD: +2.50
OD_ADD: +2.50
OD_AXIS: 115
OU_VA: 20/20-2
OD_CYLINDER: -0.75

## 2024-11-12 ASSESSMENT — REFRACTION_CURRENTRX
OS_VPRISM_DIRECTION: SV
OD_OVR_VA: 20/
OD_AXIS: 118
OD_VPRISM_DIRECTION: SV
OD_CYLINDER: -0.75
OD_SPHERE: -2.25
OS_OVR_VA: 20/
OS_CYLINDER: -0.75
OS_AXIS: 080
OS_SPHERE: -2.25

## 2024-11-12 ASSESSMENT — REFRACTION_AUTOREFRACTION
OD_AXIS: 114
OD_SPHERE: -2.00
OD_CYLINDER: -0.75
OS_SPHERE: -1.75
OS_AXIS: 120
OS_CYLINDER: -0.75

## 2024-11-12 ASSESSMENT — KERATOMETRY
OD_K1POWER_DIOPTERS: 7.56
OS_K2POWER_DIOPTERS: 7.51
OS_AXISANGLE_DEGREES: 072
OS_K1POWER_DIOPTERS: 7.65
OD_AXISANGLE_DEGREES: 079
OD_K2POWER_DIOPTERS: 7.43

## 2024-11-12 ASSESSMENT — TONOMETRY
OD_IOP_MMHG: 18
OS_IOP_MMHG: 18

## 2024-11-12 ASSESSMENT — CONFRONTATIONAL VISUAL FIELD TEST (CVF)
OS_FINDINGS: FULL
OD_FINDINGS: FULL

## 2024-11-12 ASSESSMENT — VISUAL ACUITY
OD_BCVA: 20/40-2
OS_BCVA: 20/25

## 2025-01-26 ENCOUNTER — HOSPITAL ENCOUNTER (EMERGENCY)
Facility: HOSPITAL | Age: 60
Discharge: HOME | End: 2025-01-26
Attending: EMERGENCY MEDICINE
Payer: COMMERCIAL

## 2025-01-26 ENCOUNTER — APPOINTMENT (EMERGENCY)
Dept: RADIOLOGY | Facility: HOSPITAL | Age: 60
End: 2025-01-26
Payer: COMMERCIAL

## 2025-01-26 ENCOUNTER — APPOINTMENT (EMERGENCY)
Dept: CT IMAGING | Facility: HOSPITAL | Age: 60
End: 2025-01-26
Payer: COMMERCIAL

## 2025-01-26 VITALS
OXYGEN SATURATION: 92 % | RESPIRATION RATE: 18 BRPM | WEIGHT: 238.32 LBS | HEIGHT: 67 IN | SYSTOLIC BLOOD PRESSURE: 139 MMHG | TEMPERATURE: 97.3 F | DIASTOLIC BLOOD PRESSURE: 64 MMHG | HEART RATE: 72 BPM | BODY MASS INDEX: 37.4 KG/M2

## 2025-01-26 DIAGNOSIS — R55 SYNCOPE AND COLLAPSE: Primary | ICD-10-CM

## 2025-01-26 LAB
ALBUMIN SERPL BCG-MCNC: 4.3 G/DL (ref 3.5–5)
ALP SERPL-CCNC: 73 U/L (ref 34–104)
ALT SERPL W P-5'-P-CCNC: 45 U/L (ref 7–52)
ANION GAP SERPL CALCULATED.3IONS-SCNC: 9 MMOL/L (ref 4–13)
AST SERPL W P-5'-P-CCNC: 36 U/L (ref 13–39)
BACTERIA UR QL AUTO: NORMAL /HPF
BASOPHILS # BLD AUTO: 0.04 THOUSANDS/ΜL (ref 0–0.1)
BASOPHILS NFR BLD AUTO: 1 % (ref 0–1)
BILIRUB SERPL-MCNC: 0.23 MG/DL (ref 0.2–1)
BILIRUB UR QL STRIP: NEGATIVE
BUN SERPL-MCNC: 10 MG/DL (ref 5–25)
CALCIUM SERPL-MCNC: 9.3 MG/DL (ref 8.4–10.2)
CARDIAC TROPONIN I PNL SERPL HS: 4 NG/L (ref ?–50)
CHLORIDE SERPL-SCNC: 102 MMOL/L (ref 96–108)
CLARITY UR: CLEAR
CO2 SERPL-SCNC: 29 MMOL/L (ref 21–32)
COLOR UR: YELLOW
CREAT SERPL-MCNC: 0.88 MG/DL (ref 0.6–1.3)
EOSINOPHIL # BLD AUTO: 0.07 THOUSAND/ΜL (ref 0–0.61)
EOSINOPHIL NFR BLD AUTO: 1 % (ref 0–6)
ERYTHROCYTE [DISTWIDTH] IN BLOOD BY AUTOMATED COUNT: 16 % (ref 11.6–15.1)
FLUAV AG UPPER RESP QL IA.RAPID: NEGATIVE
FLUBV AG UPPER RESP QL IA.RAPID: NEGATIVE
GFR SERPL CREATININE-BSD FRML MDRD: 71 ML/MIN/1.73SQ M
GLUCOSE SERPL-MCNC: 123 MG/DL (ref 65–140)
GLUCOSE SERPL-MCNC: 128 MG/DL (ref 65–140)
GLUCOSE UR STRIP-MCNC: NEGATIVE MG/DL
HCT VFR BLD AUTO: 36.7 % (ref 34.8–46.1)
HGB BLD-MCNC: 11.8 G/DL (ref 11.5–15.4)
HGB UR QL STRIP.AUTO: ABNORMAL
IMM GRANULOCYTES # BLD AUTO: 0.02 THOUSAND/UL (ref 0–0.2)
IMM GRANULOCYTES NFR BLD AUTO: 0 % (ref 0–2)
KETONES UR STRIP-MCNC: NEGATIVE MG/DL
LEUKOCYTE ESTERASE UR QL STRIP: NEGATIVE
LYMPHOCYTES # BLD AUTO: 1.3 THOUSANDS/ΜL (ref 0.6–4.47)
LYMPHOCYTES NFR BLD AUTO: 22 % (ref 14–44)
MAGNESIUM SERPL-MCNC: 1.8 MG/DL (ref 1.9–2.7)
MCH RBC QN AUTO: 27.6 PG (ref 26.8–34.3)
MCHC RBC AUTO-ENTMCNC: 32.2 G/DL (ref 31.4–37.4)
MCV RBC AUTO: 86 FL (ref 82–98)
MONOCYTES # BLD AUTO: 0.52 THOUSAND/ΜL (ref 0.17–1.22)
MONOCYTES NFR BLD AUTO: 9 % (ref 4–12)
NEUTROPHILS # BLD AUTO: 4.07 THOUSANDS/ΜL (ref 1.85–7.62)
NEUTS SEG NFR BLD AUTO: 67 % (ref 43–75)
NITRITE UR QL STRIP: NEGATIVE
NON-SQ EPI CELLS URNS QL MICRO: NORMAL /HPF
NRBC BLD AUTO-RTO: 0 /100 WBCS
PH UR STRIP.AUTO: 6 [PH]
PLATELET # BLD AUTO: 196 THOUSANDS/UL (ref 149–390)
PMV BLD AUTO: 11.1 FL (ref 8.9–12.7)
POTASSIUM SERPL-SCNC: 3.7 MMOL/L (ref 3.5–5.3)
PROT SERPL-MCNC: 6.8 G/DL (ref 6.4–8.4)
PROT UR STRIP-MCNC: NEGATIVE MG/DL
RBC # BLD AUTO: 4.28 MILLION/UL (ref 3.81–5.12)
RBC #/AREA URNS AUTO: NORMAL /HPF
SARS-COV+SARS-COV-2 AG RESP QL IA.RAPID: NEGATIVE
SODIUM SERPL-SCNC: 140 MMOL/L (ref 135–147)
SP GR UR STRIP.AUTO: 1.02 (ref 1–1.03)
TSH SERPL DL<=0.05 MIU/L-ACNC: 0.7 UIU/ML (ref 0.45–4.5)
UROBILINOGEN UR QL STRIP.AUTO: 0.2 E.U./DL
WBC # BLD AUTO: 6.02 THOUSAND/UL (ref 4.31–10.16)
WBC #/AREA URNS AUTO: NORMAL /HPF

## 2025-01-26 PROCEDURE — 36415 COLL VENOUS BLD VENIPUNCTURE: CPT | Performed by: PHYSICIAN ASSISTANT

## 2025-01-26 PROCEDURE — 71045 X-RAY EXAM CHEST 1 VIEW: CPT

## 2025-01-26 PROCEDURE — 87804 INFLUENZA ASSAY W/OPTIC: CPT | Performed by: PHYSICIAN ASSISTANT

## 2025-01-26 PROCEDURE — 84484 ASSAY OF TROPONIN QUANT: CPT | Performed by: PHYSICIAN ASSISTANT

## 2025-01-26 PROCEDURE — 87811 SARS-COV-2 COVID19 W/OPTIC: CPT | Performed by: PHYSICIAN ASSISTANT

## 2025-01-26 PROCEDURE — 96361 HYDRATE IV INFUSION ADD-ON: CPT

## 2025-01-26 PROCEDURE — 83735 ASSAY OF MAGNESIUM: CPT | Performed by: PHYSICIAN ASSISTANT

## 2025-01-26 PROCEDURE — 99284 EMERGENCY DEPT VISIT MOD MDM: CPT | Performed by: PHYSICIAN ASSISTANT

## 2025-01-26 PROCEDURE — 99285 EMERGENCY DEPT VISIT HI MDM: CPT

## 2025-01-26 PROCEDURE — 81001 URINALYSIS AUTO W/SCOPE: CPT | Performed by: PHYSICIAN ASSISTANT

## 2025-01-26 PROCEDURE — 84443 ASSAY THYROID STIM HORMONE: CPT | Performed by: PHYSICIAN ASSISTANT

## 2025-01-26 PROCEDURE — 80053 COMPREHEN METABOLIC PANEL: CPT | Performed by: PHYSICIAN ASSISTANT

## 2025-01-26 PROCEDURE — 93005 ELECTROCARDIOGRAM TRACING: CPT

## 2025-01-26 PROCEDURE — 85025 COMPLETE CBC W/AUTO DIFF WBC: CPT | Performed by: PHYSICIAN ASSISTANT

## 2025-01-26 PROCEDURE — 96365 THER/PROPH/DIAG IV INF INIT: CPT

## 2025-01-26 PROCEDURE — 82948 REAGENT STRIP/BLOOD GLUCOSE: CPT

## 2025-01-26 PROCEDURE — 70450 CT HEAD/BRAIN W/O DYE: CPT

## 2025-01-26 PROCEDURE — 96375 TX/PRO/DX INJ NEW DRUG ADDON: CPT

## 2025-01-26 RX ORDER — DIPHENHYDRAMINE HYDROCHLORIDE 50 MG/ML
50 INJECTION INTRAMUSCULAR; INTRAVENOUS ONCE
Status: COMPLETED | OUTPATIENT
Start: 2025-01-26 | End: 2025-01-26

## 2025-01-26 RX ORDER — MAGNESIUM SULFATE HEPTAHYDRATE 40 MG/ML
2 INJECTION, SOLUTION INTRAVENOUS ONCE
Status: COMPLETED | OUTPATIENT
Start: 2025-01-26 | End: 2025-01-26

## 2025-01-26 RX ORDER — METOCLOPRAMIDE HYDROCHLORIDE 5 MG/ML
10 INJECTION INTRAMUSCULAR; INTRAVENOUS ONCE
Status: COMPLETED | OUTPATIENT
Start: 2025-01-26 | End: 2025-01-26

## 2025-01-26 RX ADMIN — DIPHENHYDRAMINE HYDROCHLORIDE 50 MG: 50 INJECTION, SOLUTION INTRAMUSCULAR; INTRAVENOUS at 16:02

## 2025-01-26 RX ADMIN — MAGNESIUM SULFATE HEPTAHYDRATE 2 G: 40 INJECTION, SOLUTION INTRAVENOUS at 17:01

## 2025-01-26 RX ADMIN — METOCLOPRAMIDE HYDROCHLORIDE 10 MG: 5 INJECTION INTRAMUSCULAR; INTRAVENOUS at 16:02

## 2025-01-26 RX ADMIN — SODIUM CHLORIDE 500 ML: 0.9 INJECTION, SOLUTION INTRAVENOUS at 15:47

## 2025-01-26 NOTE — ED NOTES
Radiology tech alerted this RN that patient was having symptoms after moving to transport to scan. Entered room and patient was holding on to right side of head. Patient reports feeling like they were going to pass out and severe right sided headache. Provider brought to bedside. New orders to be placed.      Samantha Lee RN  01/26/25 1871

## 2025-01-26 NOTE — ED NOTES
Patient requesting to walk to the restroom. Advised after recent episode patient could not get up out of bed. Patient asking if they could be wheeled to the bathroom. Explained to patient that they were not able to tolerate being repositioned while sitting in litter, they could not attempt to stand at this time. Patient refusing a bed pan and stating they will just hold it. Patient told to use call bell if they change their mind. Provider made aware.       Samantha Lee RN  01/26/25 5234

## 2025-01-26 NOTE — ED NOTES
Patient again requesting to leave, advised CT is still pending.     Samantha Lee, CHARLES  01/26/25 2502

## 2025-01-26 NOTE — ED NOTES
Patient requesting update and Eta for departure, advised staff was still waiting for CT to come back.      Samantha Lee RN  01/26/25 8765

## 2025-01-26 NOTE — ED NOTES
Patient attempted to use bed pan, unable to have BM. Patient visualized moving independently in bed and repositioning self without symptoms.      Samantha Lee RN  01/26/25 5633

## 2025-01-27 LAB
ATRIAL RATE: 62 BPM
P AXIS: 68 DEGREES
PR INTERVAL: 204 MS
QRS AXIS: 17 DEGREES
QRSD INTERVAL: 96 MS
QT INTERVAL: 470 MS
QTC INTERVAL: 477 MS
T WAVE AXIS: 38 DEGREES
VENTRICULAR RATE: 62 BPM

## 2025-01-27 PROCEDURE — 93010 ELECTROCARDIOGRAM REPORT: CPT | Performed by: INTERNAL MEDICINE

## 2025-01-27 NOTE — ED PROVIDER NOTES
"Time reflects when diagnosis was documented in both MDM as applicable and the Disposition within this note       Time User Action Codes Description Comment    1/26/2025  6:07 PM Rajiv Nathan Add [R55] Syncope and collapse           ED Disposition       ED Disposition   Discharge    Condition   Stable    Date/Time   Sun Jan 26, 2025  6:09 PM    Comment   Tammie Avalos discharge to home/self care.                   Assessment & Plan       Medical Decision Making  The patient is a 59-year-old female presents today with a chief complaint of several syncopal events prior to arrival by ambulance.  Patient states that prior to arrival she was sitting at a bar, not drinking\" states that she started to feel lightheaded and near syncopal.  Was lowered to the ground.  The patient states that she attempted to get up from laying her by standards and had several more syncopal events.  The second syncopal event she sat up and was laid back down and had a full body twitching for a few seconds.  The patient upon EMS arrival had a witnessed syncopal event.  The patient did not recall any of these events.  The last memory the patient has was not feeling well while sitting at the bar.  States that she also remembered EMS placing her on the stretcher.  Patient denies any current blurred vision, dizziness.  Does states she has a headache.  Patient did not have any vomiting, bowel or bladder incontinence, neck pain, chest pain any shortness of breath.  Patient does not have any history of seizure disorder.  Patient has never had \"shaking\" with her syncopal events\".  Family is concerned for seizures.  Patient is not confused and is at baseline upon arrival to the emergency department.  Patient does have a history of metastatic breast cancer and did receive chemo and radiation in the past.  Patient does have a mild headache upon arrival.    The patient upon evaluation had a headache, this was treated.  Patient when she initially arrived " when going to sit up for CAT scan the patient felt near syncopal.  Patient did not have any orthostatic hypotension on vital signs.  Patient did have improvement of headache.  Imaging studies unremarkable and lab work unremarkable.  Did not have any recurrent episodes    Patient's shaking episode could be secondary to tonic jerks during syncope.  Patient did not have any postictal phase.  The patient does have an upcoming appointment on February 6 with her neurologist because she does suffer from migraines.  Suspicion at this time for any seizure.  Do suspect that this was tonic jerks with syncopal event.  States that she has been having syncopal events like this for quite some time had a Zio patch in the past placed and no arrhythmias were found.    Will follow-up with neurology.  Will monitor symptoms.  Daughter and patient in agreement treatment plan.  Patient had full improvement of symptoms with IV hydration.        Amount and/or Complexity of Data Reviewed  Labs: ordered. Decision-making details documented in ED Course.  Radiology: ordered and independent interpretation performed. Decision-making details documented in ED Course.  ECG/medicine tests: ordered and independent interpretation performed. Decision-making details documented in ED Course.    Risk  Prescription drug management.        ED Course as of 01/26/25 2131   Sun Jan 26, 2025   1728 Patient walked to the bathroom without assistance        Medications   sodium chloride 0.9 % bolus 500 mL (0 mL Intravenous Stopped 1/26/25 1656)   metoclopramide (REGLAN) injection 10 mg (10 mg Intravenous Given 1/26/25 1602)   diphenhydrAMINE (BENADRYL) injection 50 mg (50 mg Intravenous Given 1/26/25 1602)   magnesium sulfate 2 g/50 mL IVPB (premix) 2 g (0 g Intravenous Stopped 1/26/25 1724)       ED Risk Strat Scores                          SBIRT 20yo+      Flowsheet Row Most Recent Value   Initial Alcohol Screen: US AUDIT-C     1. How often do you have a drink  containing alcohol? 0 Filed at: 01/26/2025 1521   2. How many drinks containing alcohol do you have on a typical day you are drinking?  0 Filed at: 01/26/2025 1521   3b. FEMALE Any Age, or MALE 65+: How often do you have 4 or more drinks on one occassion? 0 Filed at: 01/26/2025 1521   Audit-C Score 0 Filed at: 01/26/2025 1521   CRISTINO: How many times in the past year have you...    Used an illegal drug or used a prescription medication for non-medical reasons? Never Filed at: 01/26/2025 1521                            History of Present Illness       Chief Complaint   Patient presents with    Syncope     Pt became lightheaded. Pt had coworkers help lower her to the ground and passed out twice. Per coworkers they stated she had full body seizure like activity. Pt has no hx of seizures        Past Medical History:   Diagnosis Date    Anesthesia     Pt states she woke up during 2 surgeries    BRCA1 positive     Breast cancer (HCC)     Cancer (HCC)     Diabetes mellitus (HCC)     GERD (gastroesophageal reflux disease)     Heart murmur     History of chemotherapy     adjuvant chemotherapy    Hyperlipidemia     Hypertension     Kidney stones     Subdural hematoma (HCC)     Von Willebrand disease (HCC)     Wears contact lenses       Past Surgical History:   Procedure Laterality Date    APPENDECTOMY      BREAST LUMPECTOMY Left     CARPAL TUNNEL RELEASE Bilateral     CHOLECYSTECTOMY      COLONOSCOPY      HYSTERECTOMY      KNEE SURGERY Left     LYMPH NODE BIOPSY Right 02/06/2024    Procedure: AXILLARY SENTINEL NODE BIOPSY (NUC MED INJECTION AT 0730);  Surgeon: Almita Molina DO;  Location: [facility];  Service: General    MASTECTOMY Bilateral 02/2024    MRI BREAST BIOPSY LEFT (ALL INCLUSIVE) Left 12/13/2023    PARTIAL HYSTERECTOMY      MD MASTECTOMY SIMPLE COMPLETE Bilateral 02/06/2024    Procedure: BREAST MASTECTOMY;  Surgeon: Almita Molina DO;  Location: [facility];  Service: General    ROTATOR CUFF  "REPAIR Left     TONSILLECTOMY      US GUIDED BREAST BIOPSY RIGHT COMPLETE Right 10/02/2023    US GUIDED BREAST BIOPSY RIGHT COMPLETE Right 12/13/2023      Family History   Problem Relation Age of Onset    Cancer Mother     Allergies Father     Cancer Sister     Stroke Brother       Social History     Tobacco Use    Smoking status: Former     Current packs/day: 0.25     Average packs/day: 0.3 packs/day for 25.0 years (6.3 ttl pk-yrs)     Types: Cigarettes    Smokeless tobacco: Former    Tobacco comments:     few cigs/day   Vaping Use    Vaping status: Former    Substances: Nicotine (2 cigarettes a week), Flavoring   Substance Use Topics    Alcohol use: Not Currently    Drug use: Never      E-Cigarette/Vaping    E-Cigarette Use Former User       E-Cigarette/Vaping Substances    Nicotine Yes 2 cigarettes a week    THC No     CBD No     Flavoring Yes       I have reviewed and agree with the history as documented.     The patient is a 59-year-old female presents today with a chief complaint of several syncopal events prior to arrival by ambulance.  Patient states that prior to arrival she was sitting at a bar, not drinking\" states that she started to feel lightheaded and near syncopal.  Was lowered to the ground.  The patient states that she attempted to get up from laying her by standards and had several more syncopal events.  The second syncopal event she sat up and was laid back down and had a full body twitching for a few seconds.  The patient upon EMS arrival had a witnessed syncopal event.  The patient did not recall any of these events.  The last memory the patient has was not feeling well while sitting at the bar.  States that she also remembered EMS placing her on the stretcher.  Patient denies any current blurred vision, dizziness.  Does states she has a headache.  Patient did not have any vomiting, bowel or bladder incontinence, neck pain, chest pain any shortness of breath.  Patient does not have any history " "of seizure disorder.  Patient has never had \"shaking\" with her syncopal events\".  Family is concerned for seizures.  Patient is not confused and is at baseline upon arrival to the emergency department.  Patient does have a history of metastatic breast cancer and did receive chemo and radiation in the past.  Patient does have a mild headache upon arrival.          Review of Systems   All other systems reviewed and are negative.          Objective       ED Triage Vitals   Temperature Pulse Blood Pressure Respirations SpO2 Patient Position - Orthostatic VS   01/26/25 1514 01/26/25 1511 01/26/25 1511 01/26/25 1511 01/26/25 1511 01/26/25 1511   (!) 97.3 °F (36.3 °C) 70 141/65 18 92 % Sitting      Temp Source Heart Rate Source BP Location FiO2 (%) Pain Score    01/26/25 1514 01/26/25 1511 01/26/25 1511 -- 01/26/25 1511    Temporal Monitor Left arm  No Pain      Vitals      Date and Time Temp Pulse SpO2 Resp BP Pain Score FACES Pain Rating User   01/26/25 1655 -- 72 -- 18 139/64 -- -- SS   01/26/25 1653 -- 60 -- 18 136/69 -- -- SS   01/26/25 1514 97.3 °F (36.3 °C) -- -- -- -- -- -- SR   01/26/25 1511 -- 70 92 % 18 141/65 No Pain -- SR            Physical Exam  Vitals and nursing note reviewed.   Constitutional:       General: She is not in acute distress.     Appearance: She is well-developed.   HENT:      Head: Normocephalic and atraumatic.      Right Ear: External ear normal.      Left Ear: External ear normal.      Mouth/Throat:      Mouth: Mucous membranes are moist.   Eyes:      Extraocular Movements: Extraocular movements intact.      Pupils: Pupils are equal, round, and reactive to light.   Cardiovascular:      Rate and Rhythm: Normal rate and regular rhythm.   Pulmonary:      Effort: Pulmonary effort is normal. No respiratory distress.      Breath sounds: Normal breath sounds. No wheezing.   Abdominal:      General: Bowel sounds are normal.      Palpations: Abdomen is soft. There is no mass.      Tenderness: There " is no abdominal tenderness. There is no rebound.      Hernia: No hernia is present.   Musculoskeletal:      Cervical back: Normal range of motion and neck supple.      Right lower leg: No edema.      Left lower leg: No edema.   Skin:     General: Skin is warm and dry.      Capillary Refill: Capillary refill takes less than 2 seconds.   Neurological:      General: No focal deficit present.      Mental Status: She is alert and oriented to person, place, and time.      Coordination: Coordination normal.   Psychiatric:         Behavior: Behavior normal.         Results Reviewed       Procedure Component Value Units Date/Time    TSH, 3rd generation with Free T4 reflex [[id number]]  (Normal) Collected: 01/26/25 1545    Lab Status: Final result Specimen: Blood from Arm, Left Updated: 01/26/25 1650     TSH 3RD GENERATON 0.699 uIU/mL     Urine Microscopic [[id number]]  (Normal) Collected: 01/26/25 1617    Lab Status: Final result Specimen: Urine, Clean Catch Updated: 01/26/25 1631     RBC, UA 0-1 /hpf      WBC, UA 0-1 /hpf      Epithelial Cells Occasional /hpf      Bacteria, UA None Seen /hpf     UA w Reflex to Microscopic w Reflex to Culture [[id number]]  (Abnormal) Collected: 01/26/25 1617    Lab Status: Final result Specimen: Urine, Clean Catch Updated: 01/26/25 1623     Color, UA Yellow     Clarity, UA Clear     Specific Gravity, UA 1.020     pH, UA 6.0     Leukocytes, UA Negative     Nitrite, UA Negative     Protein, UA Negative mg/dl      Glucose, UA Negative mg/dl      Ketones, UA Negative mg/dl      Urobilinogen, UA 0.2 E.U./dl      Bilirubin, UA Negative     Occult Blood, UA Trace-Intact    Comprehensive metabolic panel [[id number]] Collected: 01/26/25 1545    Lab Status: Final result Specimen: Blood from Arm, Left Updated: 01/26/25 1619     Sodium 140 mmol/L      Potassium 3.7 mmol/L      Chloride 102 mmol/L      CO2 29 mmol/L      ANION GAP 9 mmol/L      BUN 10 mg/dL      Creatinine 0.88 mg/dL      Glucose 123 mg/dL       Calcium 9.3 mg/dL      AST 36 U/L      ALT 45 U/L      Alkaline Phosphatase 73 U/L      Total Protein 6.8 g/dL      Albumin 4.3 g/dL      Total Bilirubin 0.23 mg/dL      eGFR 71 ml/min/1.73sq m     Narrative:      National Kid[employer] Foundation guidelines for Chronic Kidney Disease (CKD):     Stage 1 with normal or high GFR (GFR > 90 mL/min/1.73 square meters)    Stage 2 Mild CKD (GFR = 60-89 mL/min/1.73 square meters)    Stage 3A Moderate CKD (GFR = 45-59 mL/min/1.73 square meters)    Stage 3B Moderate CKD (GFR = 30-44 mL/min/1.73 square meters)    Stage 4 Severe CKD (GFR = 15-29 mL/min/1.73 square meters)    Stage 5 End Stage CKD (GFR <15 mL/min/1.73 square meters)  Note: GFR calculation is accurate only with a steady state creatinine    Magnesium [[id number]]  (Abnormal) Collected: 01/26/25 1545    Lab Status: Final result Specimen: Blood from Arm, Left Updated: 01/26/25 1619     Magnesium 1.8 mg/dL     HS Troponin 0hr (reflex protocol) [[id number]]  (Normal) Collected: 01/26/25 1545    Lab Status: Final result Specimen: Blood from Arm, Left Updated: 01/26/25 1618     hs TnI 0hr 4 ng/L     FLU/COVID Rapid Antigen (30 min. TAT) - Preferred screening test in ED [[id number]]  (Normal) Collected: 01/26/25 1545    Lab Status: Final result Specimen: Nares from Nose Updated: 01/26/25 1608     SARS COV Rapid Antigen Negative     Influenza A Rapid Antigen Negative     Influenza B Rapid Antigen Negative    Narrative:      This test has been performed using the Quidel Kierra 2 FLU+SARS Antigen test under the Emergency Use Authorization (EUA). This test has been validated by the  and verified by the performing laboratory. The Kierra uses lateral flow immunofluorescent sandwich assay to detect SARS-COV, Influenza A and Influenza B Antigen.     The Quidel Kierra 2 SARS Antigen test does not differentiate between SARS-CoV and SARS-CoV-2.     Negative results are presumptive and may be confirmed with a molecular  assay, if necessary, for patient management. Negative results do not rule out SARS-CoV-2 or influenza infection and should not be used as the sole basis for treatment or patient management decisions. A negative test result may occur if the level of antigen in a sample is below the limit of detection of this test.     Positive results are indicative of the presence of viral antigens, but do not rule out bacterial infection or co-infection with other viruses.     All test results should be used as an adjunct to clinical observations and other information available to the provider.    FOR PEDIATRIC PATIENTS - copy/paste COVID Guidelines URL to browser: [email or web address]    CBC and differential [[id number]]  (Abnormal) Collected: 01/26/25 1545    Lab Status: Final result Specimen: Blood from Arm, Left Updated: 01/26/25 1552     WBC 6.02 Thousand/uL      RBC 4.28 Million/uL      Hemoglobin 11.8 g/dL      Hematocrit 36.7 %      MCV 86 fL      MCH 27.6 pg      MCHC 32.2 g/dL      RDW 16.0 %      MPV 11.1 fL      Platelets 196 Thousands/uL      nRBC 0 /100 WBCs      Segmented % 67 %      Immature Grans % 0 %      Lymphocytes % 22 %      Monocytes % 9 %      Eosinophils Relative 1 %      Basophils Relative 1 %      Absolute Neutrophils 4.07 Thousands/µL      Absolute Immature Grans 0.02 Thousand/uL      Absolute Lymphocytes 1.30 Thousands/µL      Absolute Monocytes 0.52 Thousand/µL      Eosinophils Absolute 0.07 Thousand/µL      Basophils Absolute 0.04 Thousands/µL     Fingerstick Glucose (POCT) [[id number]]  (Normal) Collected: 01/26/25 1549    Lab Status: Final result Specimen: Blood Updated: 01/26/25 1550     POC Glucose 128 mg/dl             CT head without contrast   Final Interpretation by Saul Ang MD (01/26 1757)      No acute intracranial abnormality.                  Resident: URSULA SANDS I, the attending radiologist, have reviewed the images and agree  with the final report above.      Workstation performed: [id number]         XR chest 1 view portable    (Results Pending)       ECG 12 Lead Documentation Only    Date/Time: 1/26/2025 9:25 PM    Performed by: Rajiv Nathan PA-C  Authorized by: Rajiv Nathan PA-C    Indications / Diagnosis:  Syncope  ECG reviewed by me, the ED Provider: yes    Patient location:  ED  Previous ECG:     Previous ECG:  Unavailable    Comparison to cardiac monitor: Yes    Interpretation:     Interpretation: normal    Rate:     ECG rate:  62    ECG rate assessment: normal    Rhythm:     Rhythm: sinus rhythm    Conduction:     Conduction: normal    ST segments:     ST segments:  Normal  T waves:     T waves: normal        ED Medication and Procedure Management   Prior to Admission Medications   Prescriptions Last Dose Informant Patient Reported? Taking?   FeroSul 325 (65 Fe) MG tablet   Yes No   Sig: take 1 tablet by mouth once daily ON MONDAY,WEDNESDAY AND FRIDAY ONLY   Multiple Vitamins-Minerals (Hair/Skin/Nails) TABS   Yes No   Sig: Take 1 tablet by mouth daily   OMEPRAZOLE PO   Yes No   Sig: Take 20 mg by mouth 2 (two) times a day   QUEtiapine (SEROquel) 200 mg tablet   Yes No   Sig: Take 200 mg by mouth daily at bedtime   SSD 1 % cream   Yes No   Sig: apply topically to affected area three times a day TO RADIATED AREA   acetaminophen (TYLENOL) 500 mg tablet   Yes No   Sig: Take 1 tablet by mouth every 6 (six) hours as needed   albuterol (PROVENTIL HFA,VENTOLIN HFA) 90 mcg/act inhaler   Yes No   Sig: Inhale 2 puffs as needed   anastrozole (ARIMIDEX) 1 mg tablet   Yes No   Sig: Take 1 mg by mouth daily   Patient not taking: Reported on 9/22/2024   atorvastatin (LIPITOR) 40 mg tablet   Yes No   Sig: Take 40 mg by mouth daily   bisacodyl (DULCOLAX) 10 mg suppository   No No   Sig: Insert 1 suppository (10 mg total) into the rectum daily   bisacodyl (DULCOLAX) 5 mg EC tablet   No No   Sig: Take 1 tablet (5 mg total) by mouth  2 (two) times a day for 5 days   buPROPion (WELLBUTRIN SR) 200 MG 12 hr tablet   Yes No   Sig: Take 200 mg by mouth 2 (two) times a day   busPIRone (BUSPAR) 15 mg tablet   Yes No   Sig: Take 15 mg by mouth 3 (three) times a day as needed   cetirizine (ZyrTEC) 10 mg tablet   Yes No   Sig: Take 10 mg by mouth daily   cyclobenzaprine (FLEXERIL) 5 mg tablet   No No   Sig: Take 1 tablet (5 mg total) by mouth 3 (three) times a day as needed for muscle spasms   dexamethasone (DECADRON) 4 mg tablet   Yes No   Sig: Take 2 tablets by mouth twice daily with food the day before and after chemotherapy.   Patient not taking: Reported on 2024   escitalopram (LEXAPRO) 20 mg tablet   Yes No   Sig: Take 20 mg by mouth daily   fluticasone (FLONASE) 50 mcg/act nasal spray   Yes No   Si sprays into each nostril daily at bedtime   fluticasone-salmeterol (ADVAIR HFA) 115-21 MCG/ACT inhaler   Yes No   Sig: Inhale 2 puffs 2 (two) times a day Rinse mouth after use.   furosemide (LASIX) 20 mg tablet   Yes No   Sig: Take 20 mg by mouth daily   hydrALAZINE (APRESOLINE) 10 mg tablet   No No   Sig: Take 1 tablet (10 mg total) by mouth 3 (three) times a day   ipratropium-albuterol (DUO-NEB) 0.5-2.5 mg/3 mL nebulizer solution   Yes No   Sig: Inhale 3 mL   lisinopril (ZESTRIL) 20 mg tablet   No No   Sig: Take 0.5 tablets (10 mg total) by mouth daily   metFORMIN (GLUCOPHAGE) 500 mg tablet   Yes No   Sig: Take 500 mg by mouth daily with breakfast   methylPREDNISolone 4 MG tablet therapy pack   No No   Sig: Use as directed on package   Patient not taking: Reported on 2024   montelukast (SINGULAIR) 10 mg tablet   Yes No   Sig: Take 10 mg by mouth daily at bedtime   nitroglycerin (NITROSTAT) 0.4 mg SL tablet   Yes No   Sig: PLACE 1 TABLET UNDER THE TONGUE EVERY 5 MINUTES AS NEEDED FOR CHEST PAIN UP TO 3 TABLETS IN 15MINUTES   nortriptyline (PAMELOR) 10 mg capsule   Yes No   Sig: Take 50 mg by mouth daily at bedtime   nortriptyline (PAMELOR)  50 mg capsule   Yes No   Sig: Take 50 mg by mouth daily at bedtime   olaparib (LYNPARZA) tablet   Yes No   Sig: Take 300 mg by mouth 2 (two) times a day   ondansetron (ZOFRAN) 8 mg tablet   Yes No   Sig: Take 8 mg by mouth daily   prazosin (MINIPRESS) 5 mg capsule   Yes No   Sig: Take 5 mg by mouth daily at bedtime   prochlorperazine (COMPAZINE) 10 mg tablet   Yes No   Sig: Take 1 tablet by mouth every 6 (six) hours as needed   rosuvastatin (CRESTOR) 40 MG tablet   Yes No   Sig: Take 40 mg by mouth every morning   traZODone (DESYREL) 100 mg tablet   Yes No   Sig: take 2 TO 3 tablets at bedtime for sleep   traZODone (DESYREL) 300 MG tablet   Yes No   Sig: Take 300 mg by mouth daily at bedtime   valACYclovir (VALTREX) 1,000 mg tablet   Yes No   Sig: take 2 tablets by mouth every morning and 2 tablets BEFORE BEDTIM...  (REFER TO PRESCRIPTION NOTES).   varenicline (CHANTIX) 1 mg tablet   Yes No   Sig: Take 1 mg by mouth 2 (two) times a day      Facility-Administered Medications: None     Discharge Medication List as of 1/26/2025  6:09 PM        CONTINUE these medications which have NOT CHANGED    Details   acetaminophen (TYLENOL) 500 mg tablet Take 1 tablet by mouth every 6 (six) hours as needed, Historical Med      albuterol (PROVENTIL HFA,VENTOLIN HFA) 90 mcg/act inhaler Inhale 2 puffs as needed, Starting Fri 9/8/2017, Historical Med      anastrozole (ARIMIDEX) 1 mg tablet Take 1 mg by mouth daily, Historical Med      atorvastatin (LIPITOR) 40 mg tablet Take 40 mg by mouth daily, Historical Med      bisacodyl (DULCOLAX) 10 mg suppository Insert 1 suppository (10 mg total) into the rectum daily, Starting Fri 6/14/2024, Normal      bisacodyl (DULCOLAX) 5 mg EC tablet Take 1 tablet (5 mg total) by mouth 2 (two) times a day for 5 days, Starting Fri 6/14/2024, Until Wed 6/19/2024, Normal      buPROPion (WELLBUTRIN SR) 200 MG 12 hr tablet Take 200 mg by mouth 2 (two) times a day, Starting Thu 12/5/2019, Historical Med       busPIRone (BUSPAR) 15 mg tablet Take 15 mg by mouth 3 (three) times a day as needed, Historical Med      cetirizine (ZyrTEC) 10 mg tablet Take 10 mg by mouth daily, Starting Fri 9/8/2017, Historical Med      cyclobenzaprine (FLEXERIL) 5 mg tablet Take 1 tablet (5 mg total) by mouth 3 (three) times a day as needed for muscle spasms, Starting Sun 9/22/2024, Normal      dexamethasone (DECADRON) 4 mg tablet Take 2 tablets by mouth twice daily with food the day before and after chemotherapy., Historical Med      escitalopram (LEXAPRO) 20 mg tablet Take 20 mg by mouth daily, Historical Med      FeroSul 325 (65 Fe) MG tablet take 1 tablet by mouth once daily ON MONDAY,WEDNESDAY AND FRIDAY ONLY, Historical Med      fluticasone (FLONASE) 50 mcg/act nasal spray 2 sprays into each nostril daily at bedtime, Starting Fri 9/8/2017, Historical Med      fluticasone-salmeterol (ADVAIR HFA) 115-21 MCG/ACT inhaler Inhale 2 puffs 2 (two) times a day Rinse mouth after use., Historical Med      furosemide (LASIX) 20 mg tablet Take 20 mg by mouth daily, Starting Tue 7/16/2024, Historical Med      hydrALAZINE (APRESOLINE) 10 mg tablet Take 1 tablet (10 mg total) by mouth 3 (three) times a day, Starting Wed 10/9/2024, Normal      ipratropium-albuterol (DUO-NEB) 0.5-2.5 mg/3 mL nebulizer solution Inhale 3 mL, Starting Wed 7/17/2024, Historical Med      lisinopril (ZESTRIL) 20 mg tablet Take 0.5 tablets (10 mg total) by mouth daily, Starting Wed 5/15/2024, No Print      metFORMIN (GLUCOPHAGE) 500 mg tablet Take 500 mg by mouth daily with breakfast, Historical Med      methylPREDNISolone 4 MG tablet therapy pack Use as directed on package, Normal      montelukast (SINGULAIR) 10 mg tablet Take 10 mg by mouth daily at bedtime, Starting Wed 2/14/2018, Historical Med      Multiple Vitamins-Minerals (Hair/Skin/Nails) TABS Take 1 tablet by mouth daily, Historical Med      nitroglycerin (NITROSTAT) 0.4 mg SL tablet PLACE 1 TABLET UNDER THE TONGUE  EVERY 5 MINUTES AS NEEDED FOR CHEST PAIN UP TO 3 TABLETS IN 15MINUTES, Historical Med      !! nortriptyline (PAMELOR) 10 mg capsule Take 50 mg by mouth daily at bedtime, Starting Mon 5/3/2021, Historical Med      !! nortriptyline (PAMELOR) 50 mg capsule Take 50 mg by mouth daily at bedtime, Starting Thu 9/19/2024, Historical Med      olaparib (LYNPARZA) tablet Take 300 mg by mouth 2 (two) times a day, Starting Wed 8/28/2024, Historical Med      OMEPRAZOLE PO Take 20 mg by mouth 2 (two) times a day, Starting Fri 5/29/2020, Historical Med      ondansetron (ZOFRAN) 8 mg tablet Take 8 mg by mouth daily, Starting Thu 10/24/2024, Historical Med      prazosin (MINIPRESS) 5 mg capsule Take 5 mg by mouth daily at bedtime, Historical Med      prochlorperazine (COMPAZINE) 10 mg tablet Take 1 tablet by mouth every 6 (six) hours as needed, Starting Thu 10/24/2024, Historical Med      QUEtiapine (SEROquel) 200 mg tablet Take 200 mg by mouth daily at bedtime, Historical Med      rosuvastatin (CRESTOR) 40 MG tablet Take 40 mg by mouth every morning, Historical Med      SSD 1 % cream apply topically to affected area three times a day TO RADIATED AREA, Historical Med      !! traZODone (DESYREL) 100 mg tablet take 2 TO 3 tablets at bedtime for sleep, Historical Med      !! traZODone (DESYREL) 300 MG tablet Take 300 mg by mouth daily at bedtime, Historical Med      valACYclovir (VALTREX) 1,000 mg tablet take 2 tablets by mouth every morning and 2 tablets BEFORE BEDTIM...  (REFER TO PRESCRIPTION NOTES)., Historical Med      varenicline (CHANTIX) 1 mg tablet Take 1 mg by mouth 2 (two) times a day, Starting Wed 5/15/2024, Historical Med       !! - Potential duplicate medications found. Please discuss with provider.        No discharge procedures on file.  ED SEPSIS DOCUMENTATION   Time reflects when diagnosis was documented in both MDM as applicable and the Disposition within this note       Time User Action Codes Description Comment     1/26/2025  6:07 PM Rajiv Nathan Add [R55] Syncope and collapse                  Rajiv Nathan PA-C  01/26/25 [id number]

## 2025-03-15 ENCOUNTER — OFFICE VISIT (OUTPATIENT)
Dept: URGENT CARE | Facility: CLINIC | Age: 60
End: 2025-03-15
Payer: COMMERCIAL

## 2025-03-15 VITALS
RESPIRATION RATE: 18 BRPM | SYSTOLIC BLOOD PRESSURE: 126 MMHG | HEART RATE: 72 BPM | HEIGHT: 67 IN | TEMPERATURE: 98.6 F | DIASTOLIC BLOOD PRESSURE: 82 MMHG | BODY MASS INDEX: 36.1 KG/M2 | WEIGHT: 230 LBS | OXYGEN SATURATION: 96 %

## 2025-03-15 DIAGNOSIS — J02.9 SORE THROAT: ICD-10-CM

## 2025-03-15 DIAGNOSIS — J06.9 VIRAL URI WITH COUGH: Primary | ICD-10-CM

## 2025-03-15 LAB — S PYO AG THROAT QL: NEGATIVE

## 2025-03-15 PROCEDURE — S9088 SERVICES PROVIDED IN URGENT: HCPCS

## 2025-03-15 PROCEDURE — 87880 STREP A ASSAY W/OPTIC: CPT

## 2025-03-15 PROCEDURE — 99213 OFFICE O/P EST LOW 20 MIN: CPT

## 2025-03-15 RX ORDER — BENZONATATE 200 MG/1
200 CAPSULE ORAL 3 TIMES DAILY PRN
Qty: 20 CAPSULE | Refills: 0 | Status: SHIPPED | OUTPATIENT
Start: 2025-03-15

## 2025-03-15 NOTE — PROGRESS NOTES
[facility]        NAME: Tammie Avalos is a 59 y.o. female  : 1965    MRN: [id number]  DATE: March 15, 2025  TIME: 2:15 PM    Assessment and Plan   Viral URI with cough [J06.9]  1. Viral URI with cough  benzonatate (TESSALON) 200 MG capsule      2. Sore throat  POCT rapid ANTIGEN strepA        Rapid strep: neg    Patient Instructions     Take Tessalon as needed for cough  Use Coridicin products for congestion   Plenty of fluids  Can use honey   Cool mist humidifier   Warm gargle with salt water for sore throat   Use Tylenol as needed for fever or pain    Follow up with PCP in 3-5 days.  Proceed to  ER if symptoms worsen.    If tests are performed, our office will contact you with results only if changes need to made to the care plan discussed with you at the visit. You can review your full results on [facility].    Chief Complaint     Chief Complaint   Patient presents with    Cough     Yesterday started with sore throat, earache, persistent cough, Chest congestion.          History of Present Illness       Cough  This is a new problem. The current episode started yesterday. The cough is Productive of sputum. Associated symptoms include ear pain and a sore throat. Pertinent negatives include no chest pain, chills, fever, postnasal drip, rhinorrhea, shortness of breath or wheezing. Her past medical history is significant for asthma.       Review of Systems   Review of Systems   Constitutional:  Negative for chills, diaphoresis, fatigue and fever.   HENT:  Positive for congestion (in chest), ear pain and sore throat. Negative for postnasal drip, rhinorrhea, sinus pressure and trouble swallowing.    Respiratory:  Positive for cough and chest tightness. Negative for shortness of breath and wheezing.    Cardiovascular:  Negative for chest pain and palpitations.   Skin:  Negative for color change.   Neurological:  Negative for dizziness and light-headedness.   Psychiatric/Behavioral:  Negative  for sleep disturbance.          Current Medications       Current Outpatient Medications:     benzonatate (TESSALON) 200 MG capsule, Take 1 capsule (200 mg total) by mouth 3 (three) times a day as needed for cough, Disp: 20 capsule, Rfl: 0    cyclobenzaprine (FLEXERIL) 5 mg tablet, Take 1 tablet (5 mg total) by mouth 3 (three) times a day as needed for muscle spasms, Disp: 12 tablet, Rfl: 0    dexamethasone (DECADRON) 4 mg tablet, , Disp: , Rfl:     escitalopram (LEXAPRO) 20 mg tablet, Take 20 mg by mouth daily, Disp: , Rfl:     FeroSul 325 (65 Fe) MG tablet, take 1 tablet by mouth once daily ON MONDAY,WEDNESDAY AND FRIDAY ONLY, Disp: , Rfl:     fluticasone (FLONASE) 50 mcg/act nasal spray, 2 sprays into each nostril daily at bedtime, Disp: , Rfl:     fluticasone-salmeterol (ADVAIR HFA) 115-21 MCG/ACT inhaler, Inhale 2 puffs 2 (two) times a day Rinse mouth after use., Disp: , Rfl:     furosemide (LASIX) 20 mg tablet, Take 20 mg by mouth daily, Disp: , Rfl:     hydrALAZINE (APRESOLINE) 10 mg tablet, Take 1 tablet (10 mg total) by mouth 3 (three) times a day, Disp: 20 tablet, Rfl: 0    ipratropium-albuterol (DUO-NEB) 0.5-2.5 mg/3 mL nebulizer solution, Inhale 3 mL, Disp: , Rfl:     lisinopril (ZESTRIL) 20 mg tablet, Take 0.5 tablets (10 mg total) by mouth daily, Disp: , Rfl:     metFORMIN (GLUCOPHAGE) 500 mg tablet, Take 500 mg by mouth daily with breakfast, Disp: , Rfl:     methylPREDNISolone 4 MG tablet therapy pack, Use as directed on package, Disp: 21 tablet, Rfl: 0    montelukast (SINGULAIR) 10 mg tablet, Take 10 mg by mouth daily at bedtime, Disp: , Rfl:     Multiple Vitamins-Minerals (Hair/Skin/Nails) TABS, Take 1 tablet by mouth daily, Disp: , Rfl:     nitroglycerin (NITROSTAT) 0.4 mg SL tablet, PLACE 1 TABLET UNDER THE TONGUE EVERY 5 MINUTES AS NEEDED FOR CHEST PAIN UP TO 3 TABLETS IN 15MINUTES, Disp: , Rfl:     nortriptyline (PAMELOR) 10 mg capsule, Take 50 mg by mouth daily at bedtime, Disp: , Rfl:      nortriptyline (PAMELOR) 50 mg capsule, Take 50 mg by mouth daily at bedtime, Disp: , Rfl:     OMEPRAZOLE PO, Take 20 mg by mouth 2 (two) times a day, Disp: , Rfl:     ondansetron (ZOFRAN) 8 mg tablet, Take 8 mg by mouth daily, Disp: , Rfl:     prazosin (MINIPRESS) 5 mg capsule, Take 5 mg by mouth daily at bedtime, Disp: , Rfl:     prochlorperazine (COMPAZINE) 10 mg tablet, Take 1 tablet by mouth every 6 (six) hours as needed, Disp: , Rfl:     QUEtiapine (SEROquel) 200 mg tablet, Take 200 mg by mouth daily at bedtime, Disp: , Rfl:     rosuvastatin (CRESTOR) 40 MG tablet, Take 40 mg by mouth every morning, Disp: , Rfl:     SSD 1 % cream, apply topically to affected area three times a day TO RADIATED AREA, Disp: , Rfl:     traZODone (DESYREL) 100 mg tablet, take 2 TO 3 tablets at bedtime for sleep, Disp: , Rfl:     traZODone (DESYREL) 300 MG tablet, Take 300 mg by mouth daily at bedtime, Disp: , Rfl:     valACYclovir (VALTREX) 1,000 mg tablet, take 2 tablets by mouth every morning and 2 tablets BEFORE BEDTIM...  (REFER TO PRESCRIPTION NOTES)., Disp: , Rfl:     varenicline (CHANTIX) 1 mg tablet, Take 1 mg by mouth 2 (two) times a day, Disp: , Rfl:     acetaminophen (TYLENOL) 500 mg tablet, Take 1 tablet by mouth every 6 (six) hours as needed, Disp: , Rfl:     albuterol (PROVENTIL HFA,VENTOLIN HFA) 90 mcg/act inhaler, Inhale 2 puffs as needed, Disp: , Rfl:     anastrozole (ARIMIDEX) 1 mg tablet, Take 1 mg by mouth daily (Patient not taking: Reported on 9/22/2024), Disp: , Rfl:     atorvastatin (LIPITOR) 40 mg tablet, Take 40 mg by mouth daily, Disp: , Rfl:     bisacodyl (DULCOLAX) 10 mg suppository, Insert 1 suppository (10 mg total) into the rectum daily, Disp: 12 suppository, Rfl: 0    bisacodyl (DULCOLAX) 5 mg EC tablet, Take 1 tablet (5 mg total) by mouth 2 (two) times a day for 5 days, Disp: 10 tablet, Rfl: 0    buPROPion (WELLBUTRIN SR) 200 MG 12 hr tablet, Take 200 mg by mouth 2 (two) times a day, Disp: , Rfl:      busPIRone (BUSPAR) 15 mg tablet, Take 15 mg by mouth 3 (three) times a day as needed, Disp: , Rfl:     cetirizine (ZyrTEC) 10 mg tablet, Take 10 mg by mouth daily, Disp: , Rfl:     olaparib (LYNPARZA) tablet, Take 300 mg by mouth 2 (two) times a day (Patient not taking: Reported on 3/15/2025), Disp: , Rfl:     Current Allergies     Allergies as of 03/15/2025 - Reviewed 03/15/2025   Allergen Reaction Noted    Morphine Palpitations and Tachycardia 09/11/2017    Erythromycin Vomiting 07/02/2020    Keflex [cephalexin] Hives 05/18/2024    Wound dressing adhesive Other (See Comments) 02/09/2024    Azithromycin GI Intolerance, Hives, and Rash 05/03/2022            The following portions of the patient's history were reviewed and updated as appropriate: allergies, current medications, past family history, past medical history, past social history, past surgical history and problem list.     Past Medical History:   Diagnosis Date    Anesthesia     Pt states she woke up during 2 surgeries    BRCA1 positive     Breast cancer (HCC)     Cancer (HCC)     Diabetes mellitus (HCC)     GERD (gastroesophageal reflux disease)     Heart murmur     History of chemotherapy     adjuvant chemotherapy    Hyperlipidemia     Hypertension     Kidney stones     Subdural hematoma (HCC)     Von Willebrand disease (HCC)     Wears contact lenses        Past Surgical History:   Procedure Laterality Date    APPENDECTOMY      BREAST LUMPECTOMY Left     CARPAL TUNNEL RELEASE Bilateral     CHOLECYSTECTOMY      COLONOSCOPY      HYSTERECTOMY      KNEE SURGERY Left     LYMPH NODE BIOPSY Right 02/06/2024    Procedure: AXILLARY SENTINEL NODE BIOPSY (NUC MED INJECTION AT 0730);  Surgeon: Almita Molina DO;  Location:  MAIN OR;  Service: General    MASTECTOMY Bilateral 02/2024    MRI BREAST BIOPSY LEFT (ALL INCLUSIVE) Left 12/13/2023    PARTIAL HYSTERECTOMY      KY MASTECTOMY SIMPLE COMPLETE Bilateral 02/06/2024    Procedure: BREAST MASTECTOMY;   "Surgeon: Almita Molina DO;  Location:  MAIN OR;  Service: General    ROTATOR CUFF REPAIR Left     TONSILLECTOMY      US GUIDED BREAST BIOPSY RIGHT COMPLETE Right 10/02/2023    US GUIDED BREAST BIOPSY RIGHT COMPLETE Right 12/13/2023       Family History   Problem Relation Age of Onset    Cancer Mother     Allergies Father     Cancer Sister     Stroke Brother          Medications have been verified.        Objective   /82   Pulse 72   Temp 98.6 °F (37 °C)   Resp 18   Ht 5' 7\" (1.702 m)   Wt 104 kg (230 lb)   SpO2 96%   BMI 36.02 kg/m²        Physical Exam     Physical Exam  Constitutional:       General: She is not in acute distress.     Appearance: Normal appearance. She is not ill-appearing.   HENT:      Head: Normocephalic.      Right Ear: Tympanic membrane and external ear normal.      Left Ear: Tympanic membrane and external ear normal.      Nose: No congestion.      Mouth/Throat:      Mouth: Mucous membranes are moist.      Pharynx: Oropharynx is clear. No posterior oropharyngeal erythema.      Comments: PND  Cardiovascular:      Rate and Rhythm: Normal rate and regular rhythm.      Pulses: Normal pulses.      Heart sounds: Normal heart sounds.   Pulmonary:      Effort: Pulmonary effort is normal. No respiratory distress.      Breath sounds: Normal breath sounds. No stridor. No wheezing, rhonchi or rales.   Lymphadenopathy:      Cervical: No cervical adenopathy.   Skin:     General: Skin is warm and dry.   Neurological:      General: No focal deficit present.      Mental Status: She is alert and oriented to person, place, and time. Mental status is at baseline.   Psychiatric:         Mood and Affect: Mood normal.         Behavior: Behavior normal.         Thought Content: Thought content normal.         Judgment: Judgment normal.                   "

## 2025-04-21 DIAGNOSIS — R55 SYNCOPE AND COLLAPSE: ICD-10-CM

## 2025-05-21 ENCOUNTER — HOSPITAL ENCOUNTER (OUTPATIENT)
Dept: MRI IMAGING | Facility: HOSPITAL | Age: 60
Discharge: HOME | End: 2025-05-21
Attending: STUDENT IN AN ORGANIZED HEALTH CARE EDUCATION/TRAINING PROGRAM
Payer: COMMERCIAL

## 2025-05-21 DIAGNOSIS — R55 SYNCOPE AND COLLAPSE: ICD-10-CM

## 2025-05-21 PROCEDURE — 70553 MRI BRAIN STEM W/O & W/DYE: CPT

## 2025-05-21 PROCEDURE — A9585 GADOBUTROL INJECTION: HCPCS | Performed by: RADIOLOGY

## 2025-05-21 RX ORDER — GADOBUTROL 604.72 MG/ML
10 INJECTION INTRAVENOUS
Status: COMPLETED | OUTPATIENT
Start: 2025-05-21 | End: 2025-05-21

## 2025-05-21 RX ADMIN — GADOBUTROL 10 ML: 604.72 INJECTION INTRAVENOUS at 09:59

## 2025-05-29 ENCOUNTER — EVALUATION (OUTPATIENT)
Dept: PHYSICAL THERAPY | Facility: CLINIC | Age: 60
End: 2025-05-29
Payer: COMMERCIAL

## 2025-05-29 DIAGNOSIS — M25.662 KNEE STIFF, LEFT: Primary | ICD-10-CM

## 2025-05-29 DIAGNOSIS — S82.142D CLOSED FRACTURE OF LEFT TIBIAL PLATEAU WITH ROUTINE HEALING, SUBSEQUENT ENCOUNTER: ICD-10-CM

## 2025-05-29 PROCEDURE — 97110 THERAPEUTIC EXERCISES: CPT | Performed by: PHYSICAL THERAPIST

## 2025-05-29 PROCEDURE — 97161 PT EVAL LOW COMPLEX 20 MIN: CPT | Performed by: PHYSICAL THERAPIST

## 2025-05-29 NOTE — PROGRESS NOTES
PT Evaluation     Today's date: 2025  Patient name: Tammie Avalos  : 1965  MRN: [id number]  Referring provider: Nash Welch MD  Dx:   Encounter Diagnosis     ICD-10-CM    1. Knee stiff, left  M25.662       2. Closed fracture of left tibial plateau with routine healing, subsequent encounter  S82.142D                      Assessment  Impairments: abnormal gait, abnormal or restricted ROM, abnormal movement, activity intolerance, impaired balance, impaired physical strength, lacks appropriate home exercise program, pain with function and weight-bearing intolerance  Symptom irritability: low    Assessment details: Patient is a  59 y.o. female who presents to PT with a chronic history of L knee pain x2 years who recently underwent arthroscopic surgery in March.  She demonstrates severely decreased AROM and strength of the knee at this time. The patient is very tender with palpation of the medial knee. The knee is very sensitive to light touch. She is expected to respond well to PT intervention at this time.   Understanding of Dx/Px/POC: excellent     Prognosis: excellent    Goals  STG 4 weeks    Patient to be independent with initial HEP for knee ROM and strengthening.  Patient to have increased AROM into flexion to  90 degrees, so that she can complete car transfers.  Patient to report decreased pain at worst with activity to  4/10 SPR      LTG 8 weeks  Patient able to ambulate overall all terrain without LOB.  Patient to ambulate stairs using a step over step gait pattern without increased pain symptoms.  Patient able to demonstrate strength at 5/5 in the   RLE for return to PLOF.  Patient to be independent with final HEP.     Plan  Patient would benefit from: PT eval and skilled physical therapy  Planned modality interventions: cryotherapy, TENS and thermotherapy: hydrocollator packs    Planned therapy interventions: manual therapy, neuromuscular re-education, therapeutic activities, therapeutic  exercise, home exercise program, gait training, functional ROM exercises, flexibility and joint mobilization    Frequency: 2x week  Duration in weeks: 8  Plan of Care beginning date: 2025  Plan of Care expiration date: 2025  Treatment plan discussed with: patient and family  Plan details: Patient's evaluation is completed. Patient was instructed with a HEP this date. Patient has scheduled further PT sessions for treatment.          Subjective Evaluation    History of Present Illness  Mechanism of injury: Patient notes that she injured her left knee when in her closet 2 years ago- she is not clear on what exactly happened. Since then she had conitnued pain symptoms and so arthroscopic surgery was completed in 2025 by Dr. Welch. She notes pain in the L knee since surgery due to locking, stiffness and swelling. She lives all on one floor - uses an elevator. She does have a 4 wheeled walker that she uses daily. Difficulty getting to the restroom in time and so does have urinary incontinence at times. She has had multiple falls in her home. She has a recent history of seizures which is being further investigated.     PMH-  COPD/ 2023 CA of Breast/ Brain injury - subdural hematoma/ brachial plexus injury    Patient Goals  Patient goals for therapy: increased motion, independence with ADLs/IADLs, decreased pain, decreased edema and increased strength    Pain  Current pain ratin  At best pain ratin  At worst pain ratin  Quality: sharp  Relieving factors: ice and heat  Aggravating factors: walking (200 ft)    Social Support  0  0  Lives with: alone    Employment status: not working    Objective     Observations   Left Knee   Positive for edema.     Palpation     Additional Palpation Details  Severe tenderness with palpation to musculature and into the medial joint line.     Neurological Testing     Sensation     Knee   Left Knee   Hypersensation: Light touch    Right Knee   Intact: light  "touch     Active Range of Motion   Left Knee   Flexion: 25 degrees   Extension: 8 degrees     Passive Range of Motion   Left Knee   Flexion: 32 degrees   Extension: 6 degrees     Patellar Static Positioning   Left Knee: WFL  Right Knee: WFL    Strength/Myotome Testing     Left Knee   Flexion: 2+  Extension: 2+  Quadriceps contraction: poor    Right Knee   Flexion: 4+  Extension: 4+  Quadriceps contraction: good    Swelling     Left Knee Girth Measurement (cm)   Joint line: 46 cm  10 cm above joint line: 54 cm  10 cm below joint line: 45 cm    General Comments:      Knee Comments  Patient is ambulating without an assistive device this date with the knee locked into extension with the hip circumduction. She demonstrates a short step. Fall risk with ambulation. She has had multiple falls at home.             Precautions: COPD/ Active Seizure Hx/ L knee scope March 2025     Daily Treatment Diary:      Initial Evaluation 5/29/25  Compliance 5/29                     Visit Number 1                    Re-Eval  IE                 MC   Foto Captured Y                           5/29                     Manual                      PF mob                      STM                      Gentle desensitization                                   Ther-Ex                      GS/QS 5\" x10                     Ankle pump x10                     Heel slide x10                     SAQ -                     LAQ x10                                                                                       Heel raises                      Nu-Step                      Neuro Re-Ed                                                                                                Ther-Act              Pt ed                      Gt train             Stair train              Modalities                      CP                                                           "

## 2025-05-29 NOTE — HOME EXERCISE EDUCATION
Program_ID:[id number]   Access Code: [email or web address]  URL: [email or web address]  Date: 05-  Prepared By: Maribel Hazel    Program Notes      Exercises      - Supine Gluteal Sets - 1 x daily - 7 x weekly - 1 sets - 10 reps - 5 hold      - Supine Quad Set - 1 x daily - 7 x weekly - 1 sets - 10 reps - 5 hold      - Seated Ankle Pumps - 1 x daily - 7 x weekly - 1 sets - 10 reps      - Seated Heel Slide - 1 x daily - 7 x weekly - 1 sets - 10 reps - 5 hold

## 2025-06-25 ENCOUNTER — OFFICE VISIT (OUTPATIENT)
Dept: URGENT CARE | Facility: CLINIC | Age: 60
End: 2025-06-25
Payer: COMMERCIAL

## 2025-06-25 VITALS
OXYGEN SATURATION: 95 % | BODY MASS INDEX: 36.1 KG/M2 | WEIGHT: 230 LBS | SYSTOLIC BLOOD PRESSURE: 142 MMHG | HEART RATE: 66 BPM | RESPIRATION RATE: 16 BRPM | HEIGHT: 67 IN | DIASTOLIC BLOOD PRESSURE: 76 MMHG | TEMPERATURE: 98.5 F

## 2025-06-25 DIAGNOSIS — K04.7 DENTAL INFECTION: Primary | ICD-10-CM

## 2025-06-25 DIAGNOSIS — J20.9 ACUTE BRONCHITIS, UNSPECIFIED ORGANISM: ICD-10-CM

## 2025-06-25 PROCEDURE — S9088 SERVICES PROVIDED IN URGENT: HCPCS | Performed by: PHYSICIAN ASSISTANT

## 2025-06-25 PROCEDURE — 99213 OFFICE O/P EST LOW 20 MIN: CPT | Performed by: PHYSICIAN ASSISTANT

## 2025-06-25 RX ORDER — PREDNISONE 50 MG/1
50 TABLET ORAL DAILY
Qty: 5 TABLET | Refills: 0 | Status: SHIPPED | OUTPATIENT
Start: 2025-06-25 | End: 2025-06-30

## 2025-06-25 RX ORDER — AMOXICILLIN 500 MG/1
500 CAPSULE ORAL EVERY 12 HOURS SCHEDULED
Qty: 20 CAPSULE | Refills: 0 | Status: SHIPPED | OUTPATIENT
Start: 2025-06-25 | End: 2025-07-05

## 2025-06-25 RX ORDER — PREDNISONE 50 MG/1
50 TABLET ORAL DAILY
Qty: 5 TABLET | Refills: 0 | Status: SHIPPED | OUTPATIENT
Start: 2025-06-25 | End: 2025-06-25

## 2025-06-25 RX ORDER — CHLORHEXIDINE GLUCONATE ORAL RINSE 1.2 MG/ML
15 SOLUTION DENTAL 2 TIMES DAILY
Qty: 120 ML | Refills: 0 | Status: SHIPPED | OUTPATIENT
Start: 2025-06-25

## 2025-06-25 NOTE — PROGRESS NOTES
[facility]        NAME: Tammie Avaols is a 60 y.o. female  : 1965    MRN: [id number]  DATE: 2025  TIME: 4:28 PM    Assessment and Plan   Dental infection [K04.7]  1. Dental infection  amoxicillin (AMOXIL) 500 mg capsule    chlorhexidine (PERIDEX) 0.12 % solution    DISCONTINUED: predniSONE 50 mg tablet      2. Acute bronchitis, unspecified organism  predniSONE 50 mg tablet            Patient Instructions     Take amoxicillin and Peridex as prescribed. Do not use Peridex for longer than 2 weeks. Use increases risk of tooth staining.   Follow up with dentist  Salt water gargles  Ice over area  Ibuprofen 600-800mg + Tylenol 1000mg every 6 hours. Do not exceed this amount.   Follow up with PCP in 3-5 days.  Proceed to  ER if symptoms worsen.    If tests have been performed at [facility], our office will contact you with results if changes need to be made to the care plan discussed with you at the visit.  [facility]    Chief Complaint     Chief Complaint   Patient presents with    Dental Pain     Broke upper right tooth and painful X 3 days         History of Present Illness       Dental Pain   This is a new problem. Episode onset: 3 days. The problem occurs constantly. The problem has been rapidly worsening. The pain is severe. Associated symptoms include thermal sensitivity. Pertinent negatives include no fever or sinus pressure. She has tried acetaminophen for the symptoms. The treatment provided mild relief.   Cough  This is a chronic problem. The current episode started more than 1 month ago. The problem has been waxing and waning. The problem occurs constantly. The cough is Non-productive. Pertinent negatives include no chills, ear pain, fever, headaches, myalgias, postnasal drip, rash, rhinorrhea, sore throat or shortness of breath. The symptoms are aggravated by lying down. Risk factors for lung disease include smoking/tobacco exposure. She  "has tried ipratropium inhaler and leukotriene antagonists for the symptoms. The treatment provided no relief. Her past medical history is significant for COPD and emphysema.       Review of Systems   Review of Systems   Constitutional:  Negative for activity change, appetite change, chills and fever.   HENT:  Positive for dental problem. Negative for congestion, ear discharge, ear pain, postnasal drip, rhinorrhea, sinus pressure, sinus pain, sneezing, sore throat and trouble swallowing.    Eyes:  Negative for pain and itching.   Respiratory:  Positive for cough. Negative for shortness of breath.    Gastrointestinal:  Negative for abdominal pain, diarrhea, nausea and vomiting.   Musculoskeletal:  Negative for myalgias.   Skin:  Negative for rash.   Neurological:  Negative for light-headedness and headaches.         Current Medications     Current Medications[1]    Current Allergies     Allergies as of 06/25/2025 - Reviewed 06/25/2025   Allergen Reaction Noted    Morphine Palpitations and Tachycardia 09/11/2017    Erythromycin Vomiting 07/02/2020    Keflex [cephalexin] Hives 05/18/2024    Wound dressing adhesive Other (See Comments) 02/09/2024    Azithromycin GI Intolerance, Hives, and Rash 05/03/2022            The following portions of the patient's history were reviewed and updated as appropriate: allergies, current medications, past family history, past medical history, past social history, past surgical history and problem list.     Past Medical History[2]    Past Surgical History[3]    Family History[4]      Medications have been verified.        Objective   /76   Pulse 66   Temp 98.5 °F (36.9 °C)   Resp 16   Ht 5' 7\" (1.702 m)   Wt 104 kg (230 lb)   SpO2 95%   BMI 36.02 kg/m²   No LMP recorded. Patient has had a hysterectomy.       Physical Exam     Physical Exam  Vitals and nursing note reviewed.   Constitutional:       Appearance: She is well-developed.   HENT:      Head: Normocephalic.      " Mouth/Throat:      Dentition: Abnormal dentition. Gingival swelling present.       Cardiovascular:      Rate and Rhythm: Normal rate and regular rhythm.      Heart sounds: Normal heart sounds. No murmur heard.     No friction rub. No gallop.   Pulmonary:      Effort: Pulmonary effort is normal. No respiratory distress.      Breath sounds: Examination of the right-upper field reveals wheezing and rhonchi. Examination of the left-upper field reveals wheezing. Wheezing and rhonchi present. No rales.   Chest:      Chest wall: No tenderness.   Lymphadenopathy:      Cervical: No cervical adenopathy.     Skin:     General: Skin is warm.     Neurological:      Mental Status: She is alert and oriented to person, place, and time.     Psychiatric:         Behavior: Behavior normal.         Thought Content: Thought content normal.         Judgment: Judgment normal.                          [1]   Current Outpatient Medications:     albuterol (PROVENTIL HFA,VENTOLIN HFA) 90 mcg/act inhaler, Inhale 2 puffs as needed, Disp: , Rfl:     amoxicillin (AMOXIL) 500 mg capsule, Take 1 capsule (500 mg total) by mouth every 12 (twelve) hours for 10 days, Disp: 20 capsule, Rfl: 0    anastrozole (ARIMIDEX) 1 mg tablet, Take 1 mg by mouth daily, Disp: , Rfl:     atorvastatin (LIPITOR) 40 mg tablet, Take 40 mg by mouth in the morning., Disp: , Rfl:     bisacodyl (DULCOLAX) 10 mg suppository, Insert 1 suppository (10 mg total) into the rectum daily, Disp: 12 suppository, Rfl: 0    buPROPion (WELLBUTRIN SR) 200 MG 12 hr tablet, Take 200 mg by mouth in the morning and 200 mg in the evening., Disp: , Rfl:     busPIRone (BUSPAR) 15 mg tablet, Take 15 mg by mouth as needed in the morning and 15 mg as needed at noon and 15 mg as needed in the evening., Disp: , Rfl:     cetirizine (ZyrTEC) 10 mg tablet, Take 10 mg by mouth in the morning., Disp: , Rfl:     chlorhexidine (PERIDEX) 0.12 % solution, Apply 15 mL to the mouth or throat 2 (two) times a day,  Disp: 120 mL, Rfl: 0    cyclobenzaprine (FLEXERIL) 5 mg tablet, Take 1 tablet (5 mg total) by mouth 3 (three) times a day as needed for muscle spasms, Disp: 12 tablet, Rfl: 0    escitalopram (LEXAPRO) 20 mg tablet, Take 20 mg by mouth in the morning., Disp: , Rfl:     FeroSul 325 (65 Fe) MG tablet, , Disp: , Rfl:     fluticasone (FLONASE) 50 mcg/act nasal spray, 2 sprays into each nostril daily at bedtime, Disp: , Rfl:     fluticasone-salmeterol (ADVAIR HFA) 115-21 MCG/ACT inhaler, Inhale 2 puffs in the morning and 2 puffs in the evening. Rinse mouth after use., Disp: , Rfl:     furosemide (LASIX) 20 mg tablet, Take 20 mg by mouth in the morning., Disp: , Rfl:     hydrALAZINE (APRESOLINE) 10 mg tablet, Take 1 tablet (10 mg total) by mouth 3 (three) times a day, Disp: 20 tablet, Rfl: 0    ipratropium-albuterol (DUO-NEB) 0.5-2.5 mg/3 mL nebulizer solution, Inhale 3 mL, Disp: , Rfl:     lisinopril (ZESTRIL) 20 mg tablet, Take 0.5 tablets (10 mg total) by mouth daily, Disp: , Rfl:     metFORMIN (GLUCOPHAGE) 500 mg tablet, Take 500 mg by mouth daily with breakfast, Disp: , Rfl:     montelukast (SINGULAIR) 10 mg tablet, Take 10 mg by mouth daily at bedtime, Disp: , Rfl:     Multiple Vitamins-Minerals (Hair/Skin/Nails) TABS, Take 1 tablet by mouth in the morning., Disp: , Rfl:     nitroglycerin (NITROSTAT) 0.4 mg SL tablet, , Disp: , Rfl:     nortriptyline (PAMELOR) 10 mg capsule, Take 50 mg by mouth daily at bedtime, Disp: , Rfl:     nortriptyline (PAMELOR) 50 mg capsule, Take 50 mg by mouth daily at bedtime, Disp: , Rfl:     OMEPRAZOLE PO, Take 20 mg by mouth in the morning and 20 mg in the evening., Disp: , Rfl:     prazosin (MINIPRESS) 5 mg capsule, Take 5 mg by mouth daily at bedtime, Disp: , Rfl:     predniSONE 50 mg tablet, Take 1 tablet (50 mg total) by mouth daily for 5 days, Disp: 5 tablet, Rfl: 0    prochlorperazine (COMPAZINE) 10 mg tablet, Take 1 tablet by mouth every 6 (six) hours as needed, Disp: , Rfl:      QUEtiapine (SEROquel) 200 mg tablet, Take 200 mg by mouth daily at bedtime, Disp: , Rfl:     rosuvastatin (CRESTOR) 40 MG tablet, Take 40 mg by mouth every morning, Disp: , Rfl:     traZODone (DESYREL) 100 mg tablet, , Disp: , Rfl:     valACYclovir (VALTREX) 1,000 mg tablet, , Disp: , Rfl:     acetaminophen (TYLENOL) 500 mg tablet, Take 1 tablet by mouth every 6 (six) hours as needed, Disp: , Rfl:     benzonatate (TESSALON) 200 MG capsule, Take 1 capsule (200 mg total) by mouth 3 (three) times a day as needed for cough, Disp: 20 capsule, Rfl: 0    dexamethasone (DECADRON) 4 mg tablet, , Disp: , Rfl:     methylPREDNISolone 4 MG tablet therapy pack, Use as directed on package (Patient not taking: Reported on 6/25/2025), Disp: 21 tablet, Rfl: 0    olaparib (LYNPARZA) tablet, Take 300 mg by mouth 2 (two) times a day (Patient not taking: Reported on 6/25/2025), Disp: , Rfl:     ondansetron (ZOFRAN) 8 mg tablet, Take 8 mg by mouth daily (Patient not taking: Reported on 6/25/2025), Disp: , Rfl:     SSD 1 % cream, apply topically to affected area three times a day TO RADIATED AREA (Patient not taking: Reported on 6/25/2025), Disp: , Rfl:     traZODone (DESYREL) 300 MG tablet, Take 300 mg by mouth daily at bedtime, Disp: , Rfl:     varenicline (CHANTIX) 1 mg tablet, Take 1 mg by mouth 2 (two) times a day (Patient not taking: Reported on 6/25/2025), Disp: , Rfl:   [2]   Past Medical History:  Diagnosis Date    Anesthesia     Pt states she woke up during 2 surgeries    BRCA1 positive     Breast cancer (HCC)     Cancer (HCC)     Diabetes mellitus (HCC)     GERD (gastroesophageal reflux disease)     Heart murmur     History of chemotherapy     adjuvant chemotherapy    Hyperlipidemia     Hypertension     Kidney stones     Subdural hematoma (HCC)     Von Willebrand disease (HCC)     Wears contact lenses    [3]   Past Surgical History:  Procedure Laterality Date    APPENDECTOMY      BREAST LUMPECTOMY Left     CARPAL TUNNEL RELEASE  Bilateral     CHOLECYSTECTOMY      COLONOSCOPY      HYSTERECTOMY      KNEE SURGERY Left     LYMPH NODE BIOPSY Right 02/06/2024    Procedure: AXILLARY SENTINEL NODE BIOPSY (NUC MED INJECTION AT 0730);  Surgeon: Almita Molina DO;  [facility];  Service: General    MASTECTOMY Bilateral 02/2024    MRI BREAST BIOPSY LEFT (ALL INCLUSIVE) Left 12/13/2023    PARTIAL HYSTERECTOMY      [place] MASTECTOMY SIMPLE COMPLETE Bilateral 02/06/2024    Procedure: BREAST MASTECTOMY;  Surgeon: Almita Molina DO;  Location: [facility];  Service: General    ROTATOR CUFF REPAIR Left     TONSILLECTOMY      US GUIDED BREAST BIOPSY RIGHT COMPLETE Right 10/02/2023    US GUIDED BREAST BIOPSY RIGHT COMPLETE Right 12/13/2023   [4]   Family History  Problem Relation Name Age of Onset    Cancer Mother      Allergies Father      Cancer Sister      Stroke Brother

## 2025-07-03 ENCOUNTER — OFFICE VISIT (OUTPATIENT)
Dept: URGENT CARE | Facility: CLINIC | Age: 60
End: 2025-07-03
Payer: COMMERCIAL

## 2025-07-03 VITALS
HEART RATE: 102 BPM | TEMPERATURE: 96.9 F | HEIGHT: 67 IN | RESPIRATION RATE: 18 BRPM | WEIGHT: 230 LBS | OXYGEN SATURATION: 94 % | BODY MASS INDEX: 36.1 KG/M2 | SYSTOLIC BLOOD PRESSURE: 160 MMHG | DIASTOLIC BLOOD PRESSURE: 90 MMHG

## 2025-07-03 DIAGNOSIS — B37.0 THRUSH: Primary | ICD-10-CM

## 2025-07-03 PROCEDURE — S9088 SERVICES PROVIDED IN URGENT: HCPCS | Performed by: PHYSICIAN ASSISTANT

## 2025-07-03 PROCEDURE — 99213 OFFICE O/P EST LOW 20 MIN: CPT | Performed by: PHYSICIAN ASSISTANT

## 2025-07-03 RX ORDER — NYSTATIN 100000 [USP'U]/ML
500000 SUSPENSION ORAL 4 TIMES DAILY
Qty: 473 ML | Refills: 0 | Status: SHIPPED | OUTPATIENT
Start: 2025-07-03

## 2025-07-03 NOTE — PROGRESS NOTES
[facility]        NAME: Tammie Avalos is a 60 y.o. female  : 1965    MRN: [id number]  DATE: July 3, 2025  TIME: 10:33 AM    Assessment and Plan   Thrush [B37.0]  1. Thrush  nystatin (MYCOSTATIN) 500,000 units/5 mL suspension        Encourage patient to take medicine as prescribed and follow-up closely with general dentist or family doctor on  if symptoms fail to improve.    Patient Instructions     Take medicine as prescribed  Follow up with PCP in 3-5 days.  Proceed to  ER if symptoms worsen.    If tests have been performed at [facility], our office will contact you with results if changes need to be made to the care plan discussed with you at the visit.  You can review your full results on [facility].    Chief Complaint     Chief Complaint   Patient presents with    Oral Pain     Pt c/o pain in mouth and tongue that began 2 days ago. Pt also c/o pain in her throat. Pt is currently taking prednisone and amoxicillin for a dental abscess.          History of Present Illness       Oral Pain   This is a new problem. Episode onset: 2 days ago. The problem occurs constantly. Pertinent negatives include no difficulty swallowing, facial pain, fever or sinus pressure. She has tried acetaminophen for the symptoms.   Patient reports pain is aching in nature and located on her tongue and in the oropharynx to a lesser extent.  Patient is presently taking prednisone for COPD flare.  She is also taking Advair twice daily.  Patient is also taking amoxicillin at present for a dental abscess.  Patient is a type II diabetic.  She denies new oral rinses.  She is not taking the Peridex previously prescribed her.  She denies new food or new intraoral contacts.  Patient was seen in ED on 630 for shortness of breath.  D-dimer faintly elevated and CTA normal.  Patient reports her shortness of breath is improving on steroids.    Review of Systems   Review of Systems   Constitutional:  Negative for fatigue and  "fever.   HENT:  Positive for sore throat. Negative for congestion, mouth sores, postnasal drip and sinus pressure.    Respiratory:  Negative for shortness of breath.          Current Medications     Current Medications[1]    Current Allergies     Allergies as of 07/03/2025 - Reviewed 07/03/2025   Allergen Reaction Noted    Morphine Palpitations and Tachycardia 09/11/2017    Erythromycin Vomiting 07/02/2020    Keflex [cephalexin] Hives 05/18/2024    Wound dressing adhesive Other (See Comments) 02/09/2024    Azithromycin GI Intolerance, Hives, and Rash 05/03/2022            The following portions of the patient's history were reviewed and updated as appropriate: allergies, current medications, past family history, past medical history, past social history, past surgical history and problem list.     Past Medical History[2]    Past Surgical History[3]    Family History[4]      Medications have been verified.        Objective   /90   Pulse 102   Temp (!) 96.9 °F (36.1 °C)   Resp 18   Ht 5' 7\" (1.702 m)   Wt 104 kg (230 lb)   SpO2 94%   BMI 36.02 kg/m²   No LMP recorded. Patient has had a hysterectomy.       Physical Exam     Physical Exam  Constitutional:       Appearance: Normal appearance.   HENT:      Mouth/Throat:      Mouth: Mucous membranes are moist. No oral lesions or angioedema.      Dentition: No gum lesions.      Tongue: No lesions.      Pharynx: Posterior oropharyngeal erythema present. No pharyngeal swelling, oropharyngeal exudate, uvula swelling or postnasal drip.      Tonsils: No tonsillar exudate.      Comments: Tongue is erythematous with white patches overlying lateral aspects of tongue    Cardiovascular:      Rate and Rhythm: Normal rate and regular rhythm.   Pulmonary:      Effort: Pulmonary effort is normal. No respiratory distress.   Lymphadenopathy:      Cervical: No cervical adenopathy.     Neurological:      Mental Status: She is alert.                        [1]   Current " Outpatient Medications:     nystatin (MYCOSTATIN) 500,000 units/5 mL suspension, Apply 5 mL (500,000 Units total) to the mouth or throat 4 (four) times a day, Disp: 473 mL, Rfl: 0    acetaminophen (TYLENOL) 500 mg tablet, Take 1 tablet by mouth every 6 (six) hours as needed, Disp: , Rfl:     albuterol (PROVENTIL HFA,VENTOLIN HFA) 90 mcg/act inhaler, Inhale 2 puffs as needed, Disp: , Rfl:     amoxicillin (AMOXIL) 500 mg capsule, Take 1 capsule (500 mg total) by mouth every 12 (twelve) hours for 10 days, Disp: 20 capsule, Rfl: 0    anastrozole (ARIMIDEX) 1 mg tablet, Take 1 mg by mouth daily, Disp: , Rfl:     atorvastatin (LIPITOR) 40 mg tablet, Take 40 mg by mouth in the morning., Disp: , Rfl:     benzonatate (TESSALON) 200 MG capsule, Take 1 capsule (200 mg total) by mouth 3 (three) times a day as needed for cough, Disp: 20 capsule, Rfl: 0    bisacodyl (DULCOLAX) 10 mg suppository, Insert 1 suppository (10 mg total) into the rectum daily, Disp: 12 suppository, Rfl: 0    buPROPion (WELLBUTRIN SR) 200 MG 12 hr tablet, Take 200 mg by mouth in the morning and 200 mg in the evening., Disp: , Rfl:     busPIRone (BUSPAR) 15 mg tablet, Take 15 mg by mouth as needed in the morning and 15 mg as needed at noon and 15 mg as needed in the evening., Disp: , Rfl:     cetirizine (ZyrTEC) 10 mg tablet, Take 10 mg by mouth in the morning., Disp: , Rfl:     chlorhexidine (PERIDEX) 0.12 % solution, Apply 15 mL to the mouth or throat 2 (two) times a day, Disp: 120 mL, Rfl: 0    cyclobenzaprine (FLEXERIL) 5 mg tablet, Take 1 tablet (5 mg total) by mouth 3 (three) times a day as needed for muscle spasms, Disp: 12 tablet, Rfl: 0    dexamethasone (DECADRON) 4 mg tablet, , Disp: , Rfl:     escitalopram (LEXAPRO) 20 mg tablet, Take 20 mg by mouth in the morning., Disp: , Rfl:     FeroSul 325 (65 Fe) MG tablet, , Disp: , Rfl:     fluticasone (FLONASE) 50 mcg/act nasal spray, 2 sprays into each nostril daily at bedtime, Disp: , Rfl:      fluticasone-salmeterol (ADVAIR HFA) 115-21 MCG/ACT inhaler, Inhale 2 puffs in the morning and 2 puffs in the evening. Rinse mouth after use., Disp: , Rfl:     furosemide (LASIX) 20 mg tablet, Take 20 mg by mouth in the morning., Disp: , Rfl:     hydrALAZINE (APRESOLINE) 10 mg tablet, Take 1 tablet (10 mg total) by mouth 3 (three) times a day, Disp: 20 tablet, Rfl: 0    ipratropium-albuterol (DUO-NEB) 0.5-2.5 mg/3 mL nebulizer solution, Inhale 3 mL, Disp: , Rfl:     lisinopril (ZESTRIL) 20 mg tablet, Take 0.5 tablets (10 mg total) by mouth daily, Disp: , Rfl:     metFORMIN (GLUCOPHAGE) 500 mg tablet, Take 500 mg by mouth daily with breakfast, Disp: , Rfl:     methylPREDNISolone 4 MG tablet therapy pack, Use as directed on package (Patient not taking: Reported on 6/25/2025), Disp: 21 tablet, Rfl: 0    montelukast (SINGULAIR) 10 mg tablet, Take 10 mg by mouth daily at bedtime, Disp: , Rfl:     Multiple Vitamins-Minerals (Hair/Skin/Nails) TABS, Take 1 tablet by mouth in the morning., Disp: , Rfl:     nitroglycerin (NITROSTAT) 0.4 mg SL tablet, , Disp: , Rfl:     nortriptyline (PAMELOR) 10 mg capsule, Take 50 mg by mouth daily at bedtime, Disp: , Rfl:     nortriptyline (PAMELOR) 50 mg capsule, Take 50 mg by mouth daily at bedtime, Disp: , Rfl:     olaparib (LYNPARZA) tablet, Take 300 mg by mouth 2 (two) times a day (Patient not taking: Reported on 6/25/2025), Disp: , Rfl:     OMEPRAZOLE PO, Take 20 mg by mouth in the morning and 20 mg in the evening., Disp: , Rfl:     ondansetron (ZOFRAN) 8 mg tablet, Take 8 mg by mouth daily (Patient not taking: Reported on 6/25/2025), Disp: , Rfl:     prazosin (MINIPRESS) 5 mg capsule, Take 5 mg by mouth daily at bedtime, Disp: , Rfl:     prochlorperazine (COMPAZINE) 10 mg tablet, Take 1 tablet by mouth every 6 (six) hours as needed, Disp: , Rfl:     QUEtiapine (SEROquel) 200 mg tablet, Take 200 mg by mouth daily at bedtime, Disp: , Rfl:     rosuvastatin (CRESTOR) 40 MG tablet, Take 40  mg by mouth every morning, Disp: , Rfl:     SSD 1 % cream, apply topically to affected area three times a day TO RADIATED AREA (Patient not taking: Reported on 6/25/2025), Disp: , Rfl:     traZODone (DESYREL) 100 mg tablet, , Disp: , Rfl:     traZODone (DESYREL) 300 MG tablet, Take 300 mg by mouth daily at bedtime, Disp: , Rfl:     valACYclovir (VALTREX) 1,000 mg tablet, , Disp: , Rfl:     varenicline (CHANTIX) 1 mg tablet, Take 1 mg by mouth 2 (two) times a day (Patient not taking: Reported on 6/25/2025), Disp: , Rfl:   [2]   Past Medical History:  Diagnosis Date    Anesthesia     Pt states she woke up during 2 surgeries    BRCA1 positive     Breast cancer (HCC)     Cancer (HCC)     Diabetes mellitus (HCC)     GERD (gastroesophageal reflux disease)     Heart murmur     History of chemotherapy     adjuvant chemotherapy    Hyperlipidemia     Hypertension     Kidney stones     Subdural hematoma (HCC)     Von Willebrand disease (HCC)     Wears contact lenses    [3]   Past Surgical History:  Procedure Laterality Date    APPENDECTOMY      BREAST LUMPECTOMY Left     CARPAL TUNNEL RELEASE Bilateral     CHOLECYSTECTOMY      COLONOSCOPY      HYSTERECTOMY      KNEE SURGERY Left     LYMPH NODE BIOPSY Right 02/06/2024    Procedure: AXILLARY SENTINEL NODE BIOPSY (NUC MED INJECTION AT 0730);  Surgeon: Almita Molina DO;  Location: [facility];  Service: General    MASTECTOMY Bilateral 02/2024    MRI BREAST BIOPSY LEFT (ALL INCLUSIVE) Left 12/13/2023    PARTIAL HYSTERECTOMY      [place] MASTECTOMY SIMPLE COMPLETE Bilateral 02/06/2024    Procedure: BREAST MASTECTOMY;  Surgeon: Almita Molina DO;  Location: [facility];  Service: General    ROTATOR CUFF REPAIR Left     TONSILLECTOMY      US GUIDED BREAST BIOPSY RIGHT COMPLETE Right 10/02/2023    US GUIDED BREAST BIOPSY RIGHT COMPLETE Right 12/13/2023   [4]   Family History  Problem Relation Name Age of Onset    Cancer Mother      Allergies Father      Cancer Sister       Stroke Brother

## 2025-07-07 ENCOUNTER — EVALUATION (OUTPATIENT)
Dept: PHYSICAL THERAPY | Facility: CLINIC | Age: 60
End: 2025-07-07
Payer: COMMERCIAL

## 2025-07-07 DIAGNOSIS — M25.662 KNEE STIFF, LEFT: Primary | ICD-10-CM

## 2025-07-07 DIAGNOSIS — S82.142D CLOSED DISPLACED BICONDYLAR FRACTURE OF LEFT TIBIA WITH ROUTINE HEALING: ICD-10-CM

## 2025-07-07 PROCEDURE — 97110 THERAPEUTIC EXERCISES: CPT | Performed by: PHYSICAL THERAPIST

## 2025-07-07 PROCEDURE — 97140 MANUAL THERAPY 1/> REGIONS: CPT | Performed by: PHYSICAL THERAPIST

## 2025-07-07 NOTE — LETTER
2025    Nash Welch MD  [address] [facility] [address]    Patient: Tammie Avalos   YOB: 1965   Date of Visit: 2025     Encounter Diagnosis     ICD-10-CM    1. Knee stiff, left  M25.662       2. Closed displaced bicondylar fracture of left tibia with routine healing  S82.142D           Dear Dr. Nash Welch MD:    Thank you for your recent referral of Tammie Avalos. Please review the attached evaluation summary from Tammie's recent visit.     Please verify that you agree with the plan of care by signing the attached order.     If you have any questions or concerns, please do not hesitate to call.     I sincerely appreciate the opportunity to share in the care of one of your patients and hope to have another opportunity to work with you in the near future.       Sincerely,    Maribel Hazel, PT      Referring Provider:      I certify that I have read the below Plan of Care and certify the need for these services furnished under this plan of treatment while under my care.                    Nash Welch MD  [address] [facility] [address]  Via Fax: [phone]          PT Evaluation     Today's date: 2025  Patient name: Tammie Avalos  : 1965  MRN: [id number]  Referring provider: Nash Welch MD  Dx:   Encounter Diagnosis     ICD-10-CM    1. Knee stiff, left  M25.662       2. Closed displaced bicondylar fracture of left tibia with routine healing  S82.142D                        Assessment  Impairments: abnormal gait, abnormal or restricted ROM, abnormal movement, activity intolerance, impaired balance, impaired physical strength, lacks appropriate home exercise program, pain with function and weight-bearing intolerance  Symptom irritability: low    Assessment details: Patient is a  59 y.o. female who presents to PT with a chronic history of L knee pain x2 years who recently underwent arthroscopic surgery in March.  She demonstrates severely  decreased AROM and strength of the knee at this time. The patient is very tender with palpation of the medial knee. The knee is very sensitive to light touch. She is expected to respond well to PT intervention at this time.     UPDATE 7/7/25  Patient returns to PT with continued symptoms of knee stiffness and limited AROM. Strength deficits persist in the LLE. Patient responded well to AROM and STM this date. Will work to progress ROM and decrease pain with PT intervention.   Understanding of Dx/Px/POC: excellent     Prognosis: excellent    Goals  STG 4 weeks    Patient to be independent with initial HEP for knee ROM and strengthening.  Patient to have increased AROM into flexion to  90 degrees, so that she can complete car transfers.  Patient to report decreased pain at worst with activity to  4/10 SPR      LTG 8 weeks  Patient able to ambulate overall all terrain without LOB.  Patient to ambulate stairs using a step over step gait pattern without increased pain symptoms.  Patient able to demonstrate strength at 5/5 in the   LLE for return to PLOF.  Patient to be independent with final HEP.     Plan  Patient would benefit from: PT eval and skilled physical therapy  Planned modality interventions: cryotherapy, TENS and thermotherapy: hydrocollator packs    Planned therapy interventions: manual therapy, neuromuscular re-education, therapeutic activities, therapeutic exercise, home exercise program, gait training, functional ROM exercises, flexibility and joint mobilization    Frequency: 2x week  Duration in weeks: 8  Plan of Care beginning date: 7/7/2025  Plan of Care expiration date: 9/1/2025  Treatment plan discussed with: patient and family  Plan details: Patient's evaluation is completed. Patient was instructed with a HEP this date. Patient has scheduled further PT sessions for treatment.          Subjective Evaluation    History of Present Illness  Mechanism of injury: Patient notes that she injured her left  knee when in her closet 2 years ago- she is not clear on what exactly happened. Since then she had conitnued pain symptoms and so arthroscopic surgery was completed in 2025 by Dr. Welch. She notes pain in the L knee since surgery due to locking, stiffness and swelling. She lives all on one floor - uses an elevator. She does have a 4 wheeled walker that she uses daily. Difficulty getting to the restroom in time and so does have urinary incontinence at times. She has had multiple falls in her home. She has a recent history of seizures which is being further investigated.     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 She notes that pain and difficulty with knee ROM and ambulation persist.     Patient Goals  Patient goals for therapy: increased motion, independence with ADLs/IADLs, decreased pain, decreased edema and increased strength    Pain  Current pain ratin  At best pain ratin  At worst pain ratin  Quality: sharp  Relieving factors: ice and heat  Aggravating factors: walking (200 ft)    Social Support  0  0  Lives with: alone    Employment status: not working      Objective     Observations   Left Knee   Positive for edema.     Palpation     Additional Palpation Details  Severe tenderness with palpation to musculature and into the medial joint line.     Neurological Testing     Sensation     Knee   Left Knee   Hypersensation: Light touch    Right Knee   Intact: light touch     Active Range of Motion   Left Knee   Flexion: 50 degrees   Extension: 0 degrees     Passive Range of Motion   Left Knee   Flexion: 70 degrees   Extension: 0 degrees     Patellar Static Positioning   Left Knee: WFL  Right Knee: WFL    Strength/Myotome Testing     Left Knee   Flexion: 3-  Extension: 3-  Quadriceps contraction: poor    Right Knee   Flexion:  "4+  Extension: 4+  Quadriceps contraction: good    Swelling     Left Knee Girth Measurement (cm)   Joint line: 48 cm  10 cm above joint line: 54 cm  10 cm below joint line: 45 cm    Right Knee Girth Measurement (cm)   Joint line: 45 cm    General Comments:      Knee Comments  Patient is ambulating without an assistive device this date with the knee locked into extension with the hip circumduction. She demonstrates a short step. Fall risk with ambulation. She has had multiple falls at home.             Precautions: COPD/ Active Seizure Hx/ L knee scope March 2025     Daily Treatment Diary:      Initial Evaluation 5/29/25  Compliance 5/29 7/7                   Visit Number 1 2                   Re-Eval  IE                 MC   Foto Captured Y                           5/29 7/7                   Manual                      PF mob   5 min                   STM   10 min while seated in flexion                   Gentle desensitization                                   Ther-Ex                      GS/QS 5\" x10  5\" x10                   Ankle pump x10  x10 in sitting                   Heel slide x10  seated with sliding board 2x8                   SAQ -  x10                    LAQ x10  2x5                                                                                     Heel raises                      Nu-Step                      Neuro Re-Ed                                                                                                Ther-Act              Pt ed                      Gt train             Stair train              Modalities                      CP  declined                                                                         "

## 2025-07-07 NOTE — PROGRESS NOTES
PT Evaluation     Today's date: 2025  Patient name: Tammie Avalos  : 1965  MRN: [id number]  Referring provider: Nash Welch MD  Dx:   Encounter Diagnosis     ICD-10-CM    1. Knee stiff, left  M25.662       2. Closed displaced bicondylar fracture of left tibia with routine healing  S82.142D                        Assessment  Impairments: abnormal gait, abnormal or restricted ROM, abnormal movement, activity intolerance, impaired balance, impaired physical strength, lacks appropriate home exercise program, pain with function and weight-bearing intolerance  Symptom irritability: low    Assessment details: Patient is a  59 y.o. female who presents to PT with a chronic history of L knee pain x2 years who recently underwent arthroscopic surgery in March.  She demonstrates severely decreased AROM and strength of the knee at this time. The patient is very tender with palpation of the medial knee. The knee is very sensitive to light touch. She is expected to respond well to PT intervention at this time.     UPDATE 25  Patient returns to PT with continued symptoms of knee stiffness and limited AROM. Strength deficits persist in the LLE. Patient responded well to AROM and STM this date. Will work to progress ROM and decrease pain with PT intervention.   Understanding of Dx/Px/POC: excellent     Prognosis: excellent    Goals  STG 4 weeks    Patient to be independent with initial HEP for knee ROM and strengthening.  Patient to have increased AROM into flexion to  90 degrees, so that she can complete car transfers.  Patient to report decreased pain at worst with activity to  4/10 SPR      LTG 8 weeks  Patient able to ambulate overall all terrain without LOB.  Patient to ambulate stairs using a step over step gait pattern without increased pain symptoms.  Patient able to demonstrate strength at 5/5 in the   LLE for return to PLOF.  Patient to be independent with final HEP.     Plan  Patient would benefit  from: PT eval and skilled physical therapy  Planned modality interventions: cryotherapy, TENS and thermotherapy: hydrocollator packs    Planned therapy interventions: manual therapy, neuromuscular re-education, therapeutic activities, therapeutic exercise, home exercise program, gait training, functional ROM exercises, flexibility and joint mobilization    Frequency: 2x week  Duration in weeks: 8  Plan of Care beginning date: 2025  Plan of Care expiration date: 2025  Treatment plan discussed with: patient and family  Plan details: Patient's evaluation is completed. Patient was instructed with a HEP this date. Patient has scheduled further PT sessions for treatment.          Subjective Evaluation    History of Present Illness  Mechanism of injury: Patient notes that she injured her left knee when in her closet 2 years ago- she is not clear on what exactly happened. Since then she had conitnued pain symptoms and so arthroscopic surgery was completed in 2025 by Dr. Welch. She notes pain in the L knee since surgery due to locking, stiffness and swelling. She lives all on one floor - uses an elevator. She does have a 4 wheeled walker that she uses daily. Difficulty getting to the restroom in time and so does have urinary incontinence at times. She has had multiple falls in her home. She has a recent history of seizures which is being further investigated.     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 She notes that pain and difficulty with knee ROM and ambulation persist.     Patient Goals  Patient goals for therapy: increased motion, independence with ADLs/IADLs, decreased pain, decreased edema and increased strength    Pain  Current pain ratin  At best pain ratin  At worst pain ratin  Quality: sharp  Relieving factors: ice and  "heat  Aggravating factors: walking (200 ft)    Social Support  0  0  Lives with: alone    Employment status: not working      Objective     Observations   Left Knee   Positive for edema.     Palpation     Additional Palpation Details  Severe tenderness with palpation to musculature and into the medial joint line.     Neurological Testing     Sensation     Knee   Left Knee   Hypersensation: Light touch    Right Knee   Intact: light touch     Active Range of Motion   Left Knee   Flexion: 50 degrees   Extension: 0 degrees     Passive Range of Motion   Left Knee   Flexion: 70 degrees   Extension: 0 degrees     Patellar Static Positioning   Left Knee: WFL  Right Knee: WFL    Strength/Myotome Testing     Left Knee   Flexion: 3-  Extension: 3-  Quadriceps contraction: poor    Right Knee   Flexion: 4+  Extension: 4+  Quadriceps contraction: good    Swelling     Left Knee Girth Measurement (cm)   Joint line: 48 cm  10 cm above joint line: 54 cm  10 cm below joint line: 45 cm    Right Knee Girth Measurement (cm)   Joint line: 45 cm    General Comments:      Knee Comments  Patient is ambulating without an assistive device this date with the knee locked into extension with the hip circumduction. She demonstrates a short step. Fall risk with ambulation. She has had multiple falls at home.             Precautions: COPD/ Active Seizure Hx/ L knee scope March 2025     Daily Treatment Diary:      Initial Evaluation 5/29/25  Compliance 5/29 7/7                   Visit Number 1 2                   Re-Eval  IE                 MC   Foto Captured Y                           5/29 7/7                   Manual                      PF mob   5 min                   STM   10 min while seated in flexion                   Gentle desensitization                                   Ther-Ex                      GS/QS 5\" x10  5\" x10                   Ankle pump x10  x10 in sitting                   Heel slide x10  seated with sliding board 2x8       "             SAQ -  x10                    LAQ x10  2x5                                                                                     Heel raises                      Nu-Step                      Neuro Re-Ed                                                                                                Ther-Act              Pt ed                      Gt train             Stair train              Modalities                      CP  declined

## 2025-07-07 NOTE — PROGRESS NOTES
"Daily Note     Today's date: 2025  Patient name: Tammie Avalos  : 1965  MRN: [id number]  Referring provider: Nash Welch MD  Dx: No diagnosis found.               Subjective: 48 cm L / R 45 cm      Objective: See treatment diary below      Assessment: Tolerated treatment {Tolerated treatment :}. Patient {assessment:}      Plan: {PLAN:}     Precautions: COPD/ Active Seizure Hx/ L knee scope 2025     Daily Treatment Diary:      Initial Evaluation 25  Compliance                    Visit Number 1 2                   Re-Eval  IE                 MC   Foto Captured Y                                                Manual                      PF mob                      STM                      Gentle desensitization                                   Ther-Ex                      GS/QS 5\" x10                     Ankle pump x10                     Heel slide x10                     SAQ -                     LAQ x10                                                                                       Heel raises                      Nu-Step                      Neuro Re-Ed                                                                                                Ther-Act              Pt ed                      Gt train             Stair train              Modalities                      CP                                                                "

## 2025-07-10 ENCOUNTER — APPOINTMENT (OUTPATIENT)
Dept: PHYSICAL THERAPY | Facility: CLINIC | Age: 60
End: 2025-07-10
Payer: COMMERCIAL

## 2025-07-15 ENCOUNTER — OFFICE VISIT (OUTPATIENT)
Dept: PHYSICAL THERAPY | Facility: CLINIC | Age: 60
End: 2025-07-15
Payer: COMMERCIAL

## 2025-07-15 ENCOUNTER — OFFICE VISIT (OUTPATIENT)
Dept: URGENT CARE | Facility: CLINIC | Age: 60
End: 2025-07-15
Payer: COMMERCIAL

## 2025-07-15 VITALS — HEART RATE: 72 BPM | SYSTOLIC BLOOD PRESSURE: 192 MMHG | DIASTOLIC BLOOD PRESSURE: 86 MMHG | OXYGEN SATURATION: 93 %

## 2025-07-15 DIAGNOSIS — R55 SYNCOPE, UNSPECIFIED SYNCOPE TYPE: Primary | ICD-10-CM

## 2025-07-15 DIAGNOSIS — S82.142D CLOSED DISPLACED BICONDYLAR FRACTURE OF LEFT TIBIA WITH ROUTINE HEALING: ICD-10-CM

## 2025-07-15 DIAGNOSIS — M25.662 KNEE STIFF, LEFT: Primary | ICD-10-CM

## 2025-07-15 PROCEDURE — 97110 THERAPEUTIC EXERCISES: CPT

## 2025-07-15 PROCEDURE — 97140 MANUAL THERAPY 1/> REGIONS: CPT

## 2025-07-15 PROCEDURE — 99214 OFFICE O/P EST MOD 30 MIN: CPT | Performed by: PHYSICIAN ASSISTANT

## 2025-07-15 PROCEDURE — S9088 SERVICES PROVIDED IN URGENT: HCPCS | Performed by: PHYSICIAN ASSISTANT

## 2025-07-15 NOTE — PROGRESS NOTES
Daily Note     Today's date: 7/15/2025  Patient name: Tammie Avalos  : 1965  MRN: [id number]  Referring provider: Nash Welch MD  Dx:   Encounter Diagnosis     ICD-10-CM    1. Knee stiff, left  M25.662       2. Closed displaced bicondylar fracture of left tibia with routine healing  S82.142D                      Subjective: Patient presents to clinic dizzy and states she feels like she is going to pass out. This was relieved after a few minutes of being seated and resting. She notes she experiences this often- especially with the heat. Most recent prior syncope was . Has talked with PCP about this. Wanted to continue with treatment today.      Objective: See treatment diary below  BP upon arrival: 167/95  SpO2: 95  BPM: 77    Post session immediately following syncope:  BP: 188/98  SpO2: 93  RR: 16  BPM: 72      Assessment: Patient arrived to session reporting she felt dizzy and like she was going to pass out. Had patient sit and rest while vitals were assessed as noted above. She reported these to be in normal ranges for her. Session continued as planned modifying activity to all seated work.    At end of session, patient was feeling good. She was ambulating to front of clinic with her daughter and myself when she lost her balance and reported return of dizziness and subsequently experienced LOC. Patient was held by myself and her daughter. I called to clinic staff for help. Soto Nielson DPT and Nedra Melgoza PTA brought chair and assisted with transfer to siting and elevating her legs. Soto Nielson began assessing vitals while I placed BP cuff. Vital readings noted above. Adriana Carlisle, , asked [facility] for assistance from advanced medical provider. Patient was unresponsive for approx 1-2 minutes while vitals remained stable. Upon waking up she was confused. She was insistent that she did not want 911 to be called and did not want to go to ER. Daughter also  "encouraged this decision. ORLANDO Gacría from [facility], took over care at this point. Patient signed AMA form as she did not agree to further evaluation.     Tolerated PT treatment well. Patient required moderate verbal cues to perform exercises with appropriate technique and intensity. She was able to achieve approx 70* flexion AROM of L knee by end of session.      Plan: Continue per plan of care.      Precautions: COPD/ Active Seizure Hx/ L knee scope March 2025/ frequent syncope     Daily Treatment Diary:      Initial Evaluation 5/29/25  Compliance 5/29  7/7 7/15                 Visit Number 1 2 3                 Re-Eval  IE                 MC   Foto Captured Y                           5/29  7/7 7/15                 Manual                      PF mob   5 min  5m                 STM   10 min while seated in flexion 10m                 Gentle desensitization                                   Ther-Ex                      GS/QS 5\" x10  5\" x10                   Ankle pump x10  x10 in sitting X10 in sitting                 Heel slide x10  seated with sliding board 2x8 seated with sliding board 2x8                 SAQ -  x10                    LAQ x10  2x5 x12                                                                                   Heel raises                      Nu-Step                      Neuro Re-Ed                                                                                                Ther-Act              Pt ed                      Gt train             Stair train              Modalities                      CP  declined def                                                             "

## 2025-07-21 ENCOUNTER — APPOINTMENT (OUTPATIENT)
Dept: PHYSICAL THERAPY | Facility: CLINIC | Age: 60
End: 2025-07-21
Payer: COMMERCIAL

## 2025-07-21 DIAGNOSIS — R06.09 OTHER FORMS OF DYSPNEA: ICD-10-CM

## 2025-07-24 ENCOUNTER — APPOINTMENT (OUTPATIENT)
Dept: PHYSICAL THERAPY | Facility: CLINIC | Age: 60
End: 2025-07-24
Payer: COMMERCIAL

## 2025-08-04 ENCOUNTER — HOSPITAL ENCOUNTER (OUTPATIENT)
Dept: NON INVASIVE DIAGNOSTICS | Facility: HOSPITAL | Age: 60
Discharge: HOME | End: 2025-08-04
Attending: INTERNAL MEDICINE
Payer: COMMERCIAL

## 2025-08-04 VITALS
HEIGHT: 67 IN | HEART RATE: 80 BPM | BODY MASS INDEX: 36.1 KG/M2 | SYSTOLIC BLOOD PRESSURE: 158 MMHG | WEIGHT: 230 LBS | DIASTOLIC BLOOD PRESSURE: 72 MMHG

## 2025-08-04 DIAGNOSIS — R06.09 DYSPNEA ON EXERTION: ICD-10-CM

## 2025-08-04 LAB
AORTIC ROOT: 3 CM
ASCENDING AORTA: 2.9 CM
BSA FOR ECHO PROCEDURE: 2.15 M2
E WAVE DECELERATION TIME: 292 MS
E/A RATIO: 0.83
FRACTIONAL SHORTENING: 42 (ref 28–44)
INTERVENTRICULAR SEPTUM IN DIASTOLE (PARASTERNAL SHORT AXIS VIEW): 1.1 CM
INTERVENTRICULAR SEPTUM: 1.1 CM (ref 0.6–1.1)
LAAS-AP2: 17.4 CM2
LAAS-AP4: 18.3 CM2
LEFT ATRIUM SIZE: 4.6 CM
LEFT ATRIUM VOLUME (MOD BIPLANE): 47 ML
LEFT ATRIUM VOLUME INDEX (MOD BIPLANE): 21.9 ML/M2
LEFT INTERNAL DIMENSION IN SYSTOLE: 3 CM (ref 2.1–4)
LEFT VENTRICLE DIASTOLIC VOLUME (MOD BIPLANE): 70 ML
LEFT VENTRICLE DIASTOLIC VOLUME INDEX (MOD BIPLANE): 32.6 ML/M2
LEFT VENTRICLE SYSTOLIC VOLUME (MOD BIPLANE): 17 ML
LEFT VENTRICLE SYSTOLIC VOLUME INDEX (MOD BIPLANE): 7.9 ML/M2
LEFT VENTRICULAR INTERNAL DIMENSION IN DIASTOLE: 5.2 CM (ref 3.5–6)
LEFT VENTRICULAR POSTERIOR WALL IN END DIASTOLE: 1.2 CM
LEFT VENTRICULAR STROKE VOLUME: 92 ML
LV EF BIPLANE MOD: 76 %
LV EF US.2D.A4C+ESTIMATED: 71 %
LVSV (TEICH): 92 ML
MV E'TISSUE VEL-LAT: 8 CM/S
MV E'TISSUE VEL-SEP: 7 CM/S
MV PEAK A VEL: 1 M/S
MV PEAK E VEL: 83 CM/S
MV STENOSIS PRESSURE HALF TIME: 85 MS
MV VALVE AREA P 1/2 METHOD: 2.59
RIGHT ATRIUM AREA SYSTOLE A4C: 7.2 CM2
RIGHT VENTRICLE ID DIMENSION: 2.5 CM
SL CV LEFT ATRIUM LENGTH A2C: 5.4 CM
SL CV PED ECHO LEFT VENTRICLE DIASTOLIC VOLUME (MOD BIPLANE) 2D: 127 ML
SL CV PED ECHO LEFT VENTRICLE SYSTOLIC VOLUME (MOD BIPLANE) 2D: 35 ML
TRICUSPID ANNULAR PLANE SYSTOLIC EXCURSION: 2.2 CM

## 2025-08-04 PROCEDURE — 93306 TTE W/DOPPLER COMPLETE: CPT

## 2025-08-08 ENCOUNTER — ANESTHESIA EVENT (OUTPATIENT)
Dept: GASTROENTEROLOGY | Facility: HOSPITAL | Age: 60
End: 2025-08-08
Payer: COMMERCIAL

## 2025-08-08 ENCOUNTER — HOSPITAL ENCOUNTER (OUTPATIENT)
Dept: GASTROENTEROLOGY | Facility: HOSPITAL | Age: 60
Setting detail: OUTPATIENT SURGERY
End: 2025-08-08
Attending: HOSPITALIST
Payer: COMMERCIAL

## 2025-08-08 ENCOUNTER — ANESTHESIA (OUTPATIENT)
Dept: GASTROENTEROLOGY | Facility: HOSPITAL | Age: 60
End: 2025-08-08
Payer: COMMERCIAL

## 2025-08-08 RX ORDER — SODIUM CHLORIDE, SODIUM LACTATE, POTASSIUM CHLORIDE, CALCIUM CHLORIDE 600; 310; 30; 20 MG/100ML; MG/100ML; MG/100ML; MG/100ML
INJECTION, SOLUTION INTRAVENOUS CONTINUOUS PRN
Status: DISCONTINUED | OUTPATIENT
Start: 2025-08-08 | End: 2025-08-08

## 2025-08-08 RX ORDER — LIDOCAINE HYDROCHLORIDE 20 MG/ML
INJECTION, SOLUTION EPIDURAL; INFILTRATION; INTRACAUDAL; PERINEURAL AS NEEDED
Status: DISCONTINUED | OUTPATIENT
Start: 2025-08-08 | End: 2025-08-08

## 2025-08-08 RX ORDER — PROPOFOL 10 MG/ML
INJECTION, EMULSION INTRAVENOUS AS NEEDED
Status: DISCONTINUED | OUTPATIENT
Start: 2025-08-08 | End: 2025-08-08

## 2025-08-08 RX ADMIN — PROPOFOL 120 MCG/KG/MIN: 10 INJECTION, EMULSION INTRAVENOUS at 08:25

## 2025-08-08 RX ADMIN — SODIUM CHLORIDE, SODIUM LACTATE, POTASSIUM CHLORIDE, AND CALCIUM CHLORIDE: .6; .31; .03; .02 INJECTION, SOLUTION INTRAVENOUS at 08:22

## 2025-08-08 RX ADMIN — PROPOFOL 100 MG: 10 INJECTION, EMULSION INTRAVENOUS at 08:24

## 2025-08-08 RX ADMIN — LIDOCAINE HYDROCHLORIDE 100 MG: 20 INJECTION, SOLUTION EPIDURAL; INFILTRATION; INTRACAUDAL; PERINEURAL at 08:24

## 2025-08-17 ENCOUNTER — OFFICE VISIT (OUTPATIENT)
Dept: URGENT CARE | Facility: CLINIC | Age: 60
End: 2025-08-17
Payer: COMMERCIAL

## 2025-08-17 VITALS
HEART RATE: 74 BPM | TEMPERATURE: 96.9 F | WEIGHT: 230 LBS | RESPIRATION RATE: 20 BRPM | BODY MASS INDEX: 36.1 KG/M2 | DIASTOLIC BLOOD PRESSURE: 68 MMHG | HEIGHT: 67 IN | OXYGEN SATURATION: 90 % | SYSTOLIC BLOOD PRESSURE: 130 MMHG

## 2025-08-17 DIAGNOSIS — H66.91 RIGHT OTITIS MEDIA, UNSPECIFIED OTITIS MEDIA TYPE: Primary | ICD-10-CM

## 2025-08-17 DIAGNOSIS — M62.838 NECK MUSCLE SPASM: ICD-10-CM

## 2025-08-17 PROCEDURE — S9088 SERVICES PROVIDED IN URGENT: HCPCS | Performed by: NURSE PRACTITIONER

## 2025-08-17 PROCEDURE — 99213 OFFICE O/P EST LOW 20 MIN: CPT | Performed by: NURSE PRACTITIONER

## 2025-08-17 RX ORDER — AMOXICILLIN 875 MG/1
875 TABLET, COATED ORAL 2 TIMES DAILY
Qty: 20 TABLET | Refills: 0 | Status: SHIPPED | OUTPATIENT
Start: 2025-08-17 | End: 2025-08-27

## 2025-08-20 ENCOUNTER — HOSPITAL ENCOUNTER (EMERGENCY)
Facility: HOSPITAL | Age: 60
Discharge: HOME | End: 2025-08-20
Attending: EMERGENCY MEDICINE | Admitting: EMERGENCY MEDICINE
Payer: COMMERCIAL

## 2025-08-20 ENCOUNTER — APPOINTMENT (EMERGENCY)
Dept: RADIOLOGY | Facility: HOSPITAL | Age: 60
End: 2025-08-20
Payer: COMMERCIAL

## 2025-08-20 VITALS
RESPIRATION RATE: 22 BRPM | OXYGEN SATURATION: 92 % | DIASTOLIC BLOOD PRESSURE: 79 MMHG | TEMPERATURE: 99.3 F | BODY MASS INDEX: 40.19 KG/M2 | SYSTOLIC BLOOD PRESSURE: 192 MMHG | WEIGHT: 256.62 LBS | HEART RATE: 75 BPM

## 2025-08-20 DIAGNOSIS — R79.89 ELEVATED LFTS: ICD-10-CM

## 2025-08-20 DIAGNOSIS — J44.9 COPD (CHRONIC OBSTRUCTIVE PULMONARY DISEASE) (HCC): ICD-10-CM

## 2025-08-20 DIAGNOSIS — U07.1 COVID-19: Primary | ICD-10-CM

## 2025-08-20 LAB
ALBUMIN SERPL BCG-MCNC: 4.2 G/DL (ref 3.5–5)
ALP SERPL-CCNC: 89 U/L (ref 34–104)
ALT SERPL W P-5'-P-CCNC: 57 U/L (ref 7–52)
ANION GAP SERPL CALCULATED.3IONS-SCNC: 7 MMOL/L (ref 4–13)
AST SERPL W P-5'-P-CCNC: 100 U/L (ref 13–39)
BASOPHILS # BLD AUTO: 0.03 THOUSANDS/ÂΜL (ref 0–0.1)
BASOPHILS NFR BLD AUTO: 1 % (ref 0–1)
BILIRUB SERPL-MCNC: 0.28 MG/DL (ref 0.2–1)
BNP SERPL-MCNC: 93 PG/ML (ref 0–100)
BUN SERPL-MCNC: 9 MG/DL (ref 5–25)
CALCIUM SERPL-MCNC: 8.5 MG/DL (ref 8.4–10.2)
CARDIAC TROPONIN I PNL SERPL HS: 15 NG/L (ref ?–50)
CHLORIDE SERPL-SCNC: 100 MMOL/L (ref 96–108)
CO2 SERPL-SCNC: 29 MMOL/L (ref 21–32)
CREAT SERPL-MCNC: 0.86 MG/DL (ref 0.6–1.3)
D DIMER PPP FEU-MCNC: 0.64 UG/ML FEU
EOSINOPHIL # BLD AUTO: 0.01 THOUSAND/ÂΜL (ref 0–0.61)
EOSINOPHIL NFR BLD AUTO: 0 % (ref 0–6)
ERYTHROCYTE [DISTWIDTH] IN BLOOD BY AUTOMATED COUNT: 14 % (ref 11.6–15.1)
FLUAV AG UPPER RESP QL IA.RAPID: NEGATIVE
FLUBV AG UPPER RESP QL IA.RAPID: NEGATIVE
GFR SERPL CREATININE-BSD FRML MDRD: 73 ML/MIN/1.73SQ M
GLUCOSE SERPL-MCNC: 128 MG/DL (ref 65–140)
HCT VFR BLD AUTO: 37 % (ref 34.8–46.1)
HGB BLD-MCNC: 11.6 G/DL (ref 11.5–15.4)
IMM GRANULOCYTES # BLD AUTO: 0.03 THOUSAND/UL (ref 0–0.2)
IMM GRANULOCYTES NFR BLD AUTO: 1 % (ref 0–2)
LACTATE SERPL-SCNC: 1.2 MMOL/L (ref 0.5–2)
LYMPHOCYTES # BLD AUTO: 0.84 THOUSANDS/ÂΜL (ref 0.6–4.47)
LYMPHOCYTES NFR BLD AUTO: 14 % (ref 14–44)
MAGNESIUM SERPL-MCNC: 1.7 MG/DL (ref 1.9–2.7)
MCH RBC QN AUTO: 27 PG (ref 26.8–34.3)
MCHC RBC AUTO-ENTMCNC: 31.4 G/DL (ref 31.4–37.4)
MCV RBC AUTO: 86 FL (ref 82–98)
MONOCYTES # BLD AUTO: 0.74 THOUSAND/ÂΜL (ref 0.17–1.22)
MONOCYTES NFR BLD AUTO: 12 % (ref 4–12)
NEUTROPHILS # BLD AUTO: 4.37 THOUSANDS/ÂΜL (ref 1.85–7.62)
NEUTS SEG NFR BLD AUTO: 72 % (ref 43–75)
NRBC BLD AUTO-RTO: 0 /100 WBCS
PLATELET # BLD AUTO: 186 THOUSANDS/UL (ref 149–390)
PMV BLD AUTO: 11.5 FL (ref 8.9–12.7)
POTASSIUM SERPL-SCNC: 3.4 MMOL/L (ref 3.5–5.3)
PROT SERPL-MCNC: 7 G/DL (ref 6.4–8.4)
RBC # BLD AUTO: 4.29 MILLION/UL (ref 3.81–5.12)
SARS-COV+SARS-COV-2 AG RESP QL IA.RAPID: POSITIVE
SODIUM SERPL-SCNC: 136 MMOL/L (ref 135–147)
WBC # BLD AUTO: 6.02 THOUSAND/UL (ref 4.31–10.16)

## 2025-08-20 PROCEDURE — 87804 INFLUENZA ASSAY W/OPTIC: CPT | Performed by: EMERGENCY MEDICINE

## 2025-08-20 PROCEDURE — 80053 COMPREHEN METABOLIC PANEL: CPT | Performed by: EMERGENCY MEDICINE

## 2025-08-20 PROCEDURE — 83605 ASSAY OF LACTIC ACID: CPT | Performed by: EMERGENCY MEDICINE

## 2025-08-20 PROCEDURE — 85379 FIBRIN DEGRADATION QUANT: CPT | Performed by: EMERGENCY MEDICINE

## 2025-08-20 PROCEDURE — 87040 BLOOD CULTURE FOR BACTERIA: CPT | Performed by: EMERGENCY MEDICINE

## 2025-08-20 PROCEDURE — 71045 X-RAY EXAM CHEST 1 VIEW: CPT

## 2025-08-20 PROCEDURE — 84484 ASSAY OF TROPONIN QUANT: CPT | Performed by: EMERGENCY MEDICINE

## 2025-08-20 PROCEDURE — 83880 ASSAY OF NATRIURETIC PEPTIDE: CPT | Performed by: EMERGENCY MEDICINE

## 2025-08-20 PROCEDURE — 93005 ELECTROCARDIOGRAM TRACING: CPT

## 2025-08-20 PROCEDURE — 36415 COLL VENOUS BLD VENIPUNCTURE: CPT | Performed by: EMERGENCY MEDICINE

## 2025-08-20 PROCEDURE — 96365 THER/PROPH/DIAG IV INF INIT: CPT

## 2025-08-20 PROCEDURE — 99285 EMERGENCY DEPT VISIT HI MDM: CPT

## 2025-08-20 PROCEDURE — 85025 COMPLETE CBC W/AUTO DIFF WBC: CPT | Performed by: EMERGENCY MEDICINE

## 2025-08-20 PROCEDURE — 87811 SARS-COV-2 COVID19 W/OPTIC: CPT | Performed by: EMERGENCY MEDICINE

## 2025-08-20 PROCEDURE — 83735 ASSAY OF MAGNESIUM: CPT | Performed by: EMERGENCY MEDICINE

## 2025-08-20 PROCEDURE — 99285 EMERGENCY DEPT VISIT HI MDM: CPT | Performed by: EMERGENCY MEDICINE

## 2025-08-20 RX ORDER — MAGNESIUM SULFATE HEPTAHYDRATE 40 MG/ML
2 INJECTION, SOLUTION INTRAVENOUS ONCE
Status: COMPLETED | OUTPATIENT
Start: 2025-08-20 | End: 2025-08-20

## 2025-08-20 RX ORDER — ALBUTEROL SULFATE 90 UG/1
2 INHALANT RESPIRATORY (INHALATION) ONCE
Status: COMPLETED | OUTPATIENT
Start: 2025-08-20 | End: 2025-08-20

## 2025-08-20 RX ORDER — PREDNISONE 20 MG/1
60 TABLET ORAL ONCE
Status: COMPLETED | OUTPATIENT
Start: 2025-08-20 | End: 2025-08-20

## 2025-08-20 RX ORDER — PREDNISONE 20 MG/1
20 TABLET ORAL DAILY
Qty: 5 TABLET | Refills: 0 | Status: SHIPPED | OUTPATIENT
Start: 2025-08-20 | End: 2025-08-25

## 2025-08-20 RX ADMIN — PREDNISONE 60 MG: 20 TABLET ORAL at 17:57

## 2025-08-20 RX ADMIN — MAGNESIUM SULFATE HEPTAHYDRATE 2 G: 2 INJECTION, SOLUTION INTRAVENOUS at 19:26

## 2025-08-20 RX ADMIN — ALBUTEROL SULFATE 2 PUFF: 90 AEROSOL, METERED RESPIRATORY (INHALATION) at 17:58

## 2025-08-21 LAB
ATRIAL RATE: 74 BPM
P AXIS: 41 DEGREES
PR INTERVAL: 176 MS
QRS AXIS: 15 DEGREES
QRSD INTERVAL: 90 MS
QT INTERVAL: 394 MS
QTC INTERVAL: 437 MS
T WAVE AXIS: 26 DEGREES
VENTRICULAR RATE: 74 BPM

## 2025-08-25 LAB
BACTERIA BLD CULT: NORMAL
BACTERIA BLD CULT: NORMAL

## (undated) DEVICE — SUT SILK 2-0 30 IN A305H

## (undated) DEVICE — CHEST/BREAST DRAPE: Brand: CONVERTORS

## (undated) DEVICE — DECANTER: Brand: UNBRANDED

## (undated) DEVICE — 4-PORT MANIFOLD: Brand: NEPTUNE 2

## (undated) DEVICE — NEPTUNE E-SEP SMOKE EVACUATION PENCIL, COATED, 70MM BLADE, PUSH BUTTON SWITCH: Brand: NEPTUNE E-SEP

## (undated) DEVICE — ACE WRAP 6 IN UNSTERILE

## (undated) DEVICE — DRAIN SPONGES,6 PLY: Brand: EXCILON

## (undated) DEVICE — INTENDED FOR TISSUE SEPARATION, AND OTHER PROCEDURES THAT REQUIRE A SHARP SURGICAL BLADE TO PUNCTURE OR CUT.: Brand: BARD-PARKER SAFETY BLADES SIZE 15, STERILE

## (undated) DEVICE — SUT VICRYL 4-0 PS-2 27 IN J426H

## (undated) DEVICE — SUT SILK 2-0 SH 30 IN K833H

## (undated) DEVICE — LIGACLIP MCA MULTIPLE CLIP APPLIERS, 20 MEDIUM CLIPS: Brand: LIGACLIP

## (undated) DEVICE — SUT VICRYL 2-0 SHB 27 IN JB417

## (undated) DEVICE — 3M™ IOBAN™ 2 ANTIMICROBIAL INCISE DRAPE 6650EZ: Brand: IOBAN™ 2

## (undated) DEVICE — JP CHAN DRN SIL HUBLESS 15FR W/TRO: Brand: CARDINAL HEALTH

## (undated) DEVICE — SUT VICRYL 2-0 REEL 54 IN J286G

## (undated) DEVICE — KERLIX BANDAGE ROLL: Brand: KERLIX

## (undated) DEVICE — SUT VICRYL 3-0 SH 27 IN J416H

## (undated) DEVICE — SUT ETHILON 3-0 FS-1 18 IN 663G

## (undated) DEVICE — GLOVE INDICATOR PI UNDERGLOVE SZ 6.5 BLUE

## (undated) DEVICE — PROVE COVER: Brand: UNBRANDED

## (undated) DEVICE — BETHLEHEM UNIVERSAL BREAST PK: Brand: CARDINAL HEALTH

## (undated) DEVICE — STERILE LATEX POWDER-FREE SURGICAL GLOVESWITH NITRILE COATING: Brand: PROTEXIS

## (undated) DEVICE — NEEDLE 25G X 1 1/2

## (undated) DEVICE — ELECTRODE BLADE MOD E-Z CLEAN 4IN -0014AM

## (undated) DEVICE — PAD GROUNDING ADULT

## (undated) DEVICE — ADHESIVE SKIN HIGH VISCOSITY EXOFIN 1ML

## (undated) DEVICE — JACKSON-PRATT 100CC BULB RESERVOIR: Brand: CARDINAL HEALTH

## (undated) DEVICE — SPONGE LAP 18 X 18 IN STRL RFD

## (undated) DEVICE — DRAPE EQUIPMENT RF WAND

## (undated) DEVICE — ACE WRAP 6 IN STERILE

## (undated) DEVICE — SYRINGE 5ML LL

## (undated) DEVICE — LIGACLIP MCA MULTIPLE CLIP APPLIERS, 20 SMALL CLIPS: Brand: LIGACLIP

## (undated) DEVICE — GLOVE SRG BIOGEL 6